# Patient Record
Sex: MALE | Race: WHITE | Employment: UNEMPLOYED | URBAN - METROPOLITAN AREA
[De-identification: names, ages, dates, MRNs, and addresses within clinical notes are randomized per-mention and may not be internally consistent; named-entity substitution may affect disease eponyms.]

---

## 2018-11-16 ENCOUNTER — OFFICE VISIT (OUTPATIENT)
Dept: FAMILY MEDICINE CLINIC | Age: 58
End: 2018-11-16
Payer: MEDICAID

## 2018-11-16 VITALS
DIASTOLIC BLOOD PRESSURE: 82 MMHG | TEMPERATURE: 98.1 F | SYSTOLIC BLOOD PRESSURE: 132 MMHG | HEART RATE: 89 BPM | WEIGHT: 222.5 LBS | HEIGHT: 70 IN | OXYGEN SATURATION: 95 % | BODY MASS INDEX: 31.85 KG/M2

## 2018-11-16 DIAGNOSIS — F33.1 MODERATE EPISODE OF RECURRENT MAJOR DEPRESSIVE DISORDER (HCC): ICD-10-CM

## 2018-11-16 DIAGNOSIS — F10.10 ALCOHOL ABUSE: ICD-10-CM

## 2018-11-16 DIAGNOSIS — F10.982 ALCOHOL-INDUCED INSOMNIA (HCC): Primary | ICD-10-CM

## 2018-11-16 DIAGNOSIS — M79.2 PERIPHERAL NEURALGIA: ICD-10-CM

## 2018-11-16 LAB
BASOPHILS ABSOLUTE: 0.1 K/UL (ref 0–0.2)
BASOPHILS RELATIVE PERCENT: 1.3 %
EOSINOPHILS ABSOLUTE: 0.1 K/UL (ref 0–0.6)
EOSINOPHILS RELATIVE PERCENT: 1.6 %
HCT VFR BLD CALC: 41.8 % (ref 40.5–52.5)
HEMOGLOBIN: 14 G/DL (ref 13.5–17.5)
LYMPHOCYTES ABSOLUTE: 1.7 K/UL (ref 1–5.1)
LYMPHOCYTES RELATIVE PERCENT: 28.9 %
MCH RBC QN AUTO: 33.8 PG (ref 26–34)
MCHC RBC AUTO-ENTMCNC: 33.4 G/DL (ref 31–36)
MCV RBC AUTO: 101.3 FL (ref 80–100)
MONOCYTES ABSOLUTE: 0.7 K/UL (ref 0–1.3)
MONOCYTES RELATIVE PERCENT: 11 %
NEUTROPHILS ABSOLUTE: 3.4 K/UL (ref 1.7–7.7)
NEUTROPHILS RELATIVE PERCENT: 57.2 %
PDW BLD-RTO: 15.1 % (ref 12.4–15.4)
PLATELET # BLD: 193 K/UL (ref 135–450)
PMV BLD AUTO: 9.6 FL (ref 5–10.5)
RBC # BLD: 4.13 M/UL (ref 4.2–5.9)
WBC # BLD: 5.9 K/UL (ref 4–11)

## 2018-11-16 PROCEDURE — 36415 COLL VENOUS BLD VENIPUNCTURE: CPT | Performed by: NURSE PRACTITIONER

## 2018-11-16 PROCEDURE — G8484 FLU IMMUNIZE NO ADMIN: HCPCS | Performed by: NURSE PRACTITIONER

## 2018-11-16 PROCEDURE — 3017F COLORECTAL CA SCREEN DOC REV: CPT | Performed by: NURSE PRACTITIONER

## 2018-11-16 PROCEDURE — G8417 CALC BMI ABV UP PARAM F/U: HCPCS | Performed by: NURSE PRACTITIONER

## 2018-11-16 PROCEDURE — 99204 OFFICE O/P NEW MOD 45 MIN: CPT | Performed by: NURSE PRACTITIONER

## 2018-11-16 PROCEDURE — 1036F TOBACCO NON-USER: CPT | Performed by: NURSE PRACTITIONER

## 2018-11-16 PROCEDURE — G8427 DOCREV CUR MEDS BY ELIG CLIN: HCPCS | Performed by: NURSE PRACTITIONER

## 2018-11-16 RX ORDER — NALTREXONE HYDROCHLORIDE 50 MG/1
50 TABLET, FILM COATED ORAL DAILY
Qty: 30 TABLET | Refills: 0 | Status: SHIPPED | OUTPATIENT
Start: 2018-11-16 | End: 2018-12-12 | Stop reason: SDUPTHER

## 2018-11-16 RX ORDER — MIRTAZAPINE 15 MG/1
TABLET, FILM COATED ORAL
Qty: 49 TABLET | Refills: 0 | Status: SHIPPED | OUTPATIENT
Start: 2018-11-16 | End: 2018-12-12

## 2018-11-16 ASSESSMENT — PATIENT HEALTH QUESTIONNAIRE - PHQ9
SUM OF ALL RESPONSES TO PHQ QUESTIONS 1-9: 17
4. FEELING TIRED OR HAVING LITTLE ENERGY: 3
8. MOVING OR SPEAKING SO SLOWLY THAT OTHER PEOPLE COULD HAVE NOTICED. OR THE OPPOSITE, BEING SO FIGETY OR RESTLESS THAT YOU HAVE BEEN MOVING AROUND A LOT MORE THAN USUAL: 2
10. IF YOU CHECKED OFF ANY PROBLEMS, HOW DIFFICULT HAVE THESE PROBLEMS MADE IT FOR YOU TO DO YOUR WORK, TAKE CARE OF THINGS AT HOME, OR GET ALONG WITH OTHER PEOPLE: 1
6. FEELING BAD ABOUT YOURSELF - OR THAT YOU ARE A FAILURE OR HAVE LET YOURSELF OR YOUR FAMILY DOWN: 2
9. THOUGHTS THAT YOU WOULD BE BETTER OFF DEAD, OR OF HURTING YOURSELF: 1
2. FEELING DOWN, DEPRESSED OR HOPELESS: 2
SUM OF ALL RESPONSES TO PHQ QUESTIONS 1-9: 17
5. POOR APPETITE OR OVEREATING: 1
SUM OF ALL RESPONSES TO PHQ9 QUESTIONS 1 & 2: 4
1. LITTLE INTEREST OR PLEASURE IN DOING THINGS: 2
7. TROUBLE CONCENTRATING ON THINGS, SUCH AS READING THE NEWSPAPER OR WATCHING TELEVISION: 1
3. TROUBLE FALLING OR STAYING ASLEEP: 3

## 2018-11-16 NOTE — PROGRESS NOTES
were both diabetic as well as a daughter. No current outpatient prescriptions on file. No current facility-administered medications for this visit. Patient's past medical history, surgical history, family history, medications,  and allergies  were all reviewed and updated as appropriate today. Review of Systems  See HPI    Physical Exam   Constitutional: He is oriented to person, place, and time. He appears well-developed. Non-toxic appearance. Griffin facial complexion. HENT:   Head: Normocephalic and atraumatic. Mouth/Throat: Oropharynx is clear and moist.   Eyes: Pupils are equal, round, and reactive to light. EOM are normal. No scleral icterus. Neck: Normal range of motion. Neck supple. Cardiovascular: Normal rate, regular rhythm, normal heart sounds and intact distal pulses. No murmur heard. Pulmonary/Chest: Effort normal and breath sounds normal.   Abdominal: Soft. Bowel sounds are normal. He exhibits no abdominal bruit and no ascites. There is no tenderness. Multiple surgical scars   Musculoskeletal: He exhibits no edema. Lymphadenopathy:     He has no cervical adenopathy. Neurological: He is alert and oriented to person, place, and time. Skin: Skin is warm and dry. Capillary refill takes 2 to 3 seconds. Acanthosis nigricans noted bilateral ankles. Psychiatric: His behavior is normal. Thought content normal. He exhibits a depressed mood (tearful). Contracts for safety   Vitals reviewed. Vitals:    11/16/18 1501   BP: 132/82   Pulse: 89   Temp: 98.1 °F (36.7 °C)   TempSrc: Oral   SpO2: 95%   Weight: 222 lb 8 oz (100.9 kg)   Height: 5' 10.25\" (1.784 m)       Assessment:  Encounter Diagnoses   Name Primary?  Alcohol-induced insomnia (HCC) Yes    Moderate episode of recurrent major depressive disorder (HCC)     Alcohol abuse     Peripheral neuralgia        Plan:  1.  Alcohol-induced insomnia ([facility] [address]  New problem  I discussed patient's history and physical

## 2018-11-16 NOTE — PATIENT INSTRUCTIONS
taking it too soon, your symptoms may come back or get worse. You may start to feel better within 1 to 3 weeks of taking antidepressant medicine. But it can take as many as 6 to 8 weeks to see more improvement. Talk to your doctor if you have problems with your medicine or if you do not notice any improvement after 3 weeks. Antidepressants can make you feel tired, dizzy, or nervous. Some people have dry mouth, constipation, headaches, sexual problems, an upset stomach, or diarrhea. Many of these side effects are mild and go away on their own after you take the medicine for a few weeks. Some may last longer. Talk to your doctor if side effects bother you too much. You might be able to try a different medicine. If you are pregnant or breastfeeding, talk to your doctor about what medicines you can take. Learn about counseling  In many cases, counseling can work as well as medicines to treat mild to moderate depression. Counseling is done by licensed mental health providers, such as psychologists, social workers, and some types of nurses. It can be done in one-on-one sessions or in a group setting. Many people find group sessions helpful. Cognitive-behavioral therapy is a type of counseling. In this treatment therapy, you learn how to see and change unhelpful thinking styles that may be adding to your depression. Counseling and medicines often work well when used together. To manage depression  · Be physically active. Getting 30 minutes of exercise each day is good for your body and your mind. Begin slowly if it is hard for you to get started. If you already exercise, keep it up. · Plan something pleasant for yourself every day. Include activities that you have enjoyed in the past.  · Get enough sleep. Talk to your doctor if you have problems sleeping. · Eat a balanced diet. If you do not feel hungry, eat small snacks rather than large meals.   · Do not drink alcohol, use illegal drugs, or take medicines that your Treatment: Care Instructions. \"     If you do not have an account, please click on the \"Sign Up Now\" link. Current as of: December 7, 2017  Content Version: 11.8  © 2006-2018 [employer]wise, [employer]. Care instructions adapted under license by [facility] ([facility]). If you have questions about a medical condition or this instruction, always ask your healthcare professional. [address] any warranty or liability for your use of this information. Patient Education        Learning About Alcohol Misuse  What is alcohol misuse? Alcohol misuse means drinking so much that it causes problems for you or others. Early problems with alcohol can start at home. You may argue with loved ones about how much you're drinking. Your job may be affected because of drinking. You may drink when it's dangerous or illegal, such as when you drive. Drinking too much for a long time can lead to health conditions like high blood pressure and liver problems. What are the symptoms? Symptoms of alcohol misuse may include:  · Drinking much more than you planned. · Drinking even though it's causing problems for you or others. · Putting yourself in situations where you might get hurt. · Wanting to cut down or stop drinking, but not being able to. · Feeling guilty about how much you're drinking. How is alcohol misuse treated? Getting help for problems with alcohol is up to you. But you don't have to do it alone. There are many people and kinds of treatments to help with alcohol problems. Talking to your doctor is the first step. When you get a doctor's help, treatment for alcohol problems can be safer and quicker. Treatment options can include:  · Treatment programs. Examples are group therapy, one or more types of counseling, and alcohol education. · Medicines. A doctor or counselor can help you know what kinds of medicines might help with cravings. · Free social support groups.  These groups include AA can also help you sleep better and improve your mood. But medicines are only one part of successful treatment. Most people do best with more than one kind of treatment. Your doctor may recommend that you try cognitive-behavioral therapy and stress management. Or, if needed, you may decide to try to quit smoking, lower your blood pressure, or better control blood sugar. These kinds of healthy changes can also make a difference. If you feel that your treatment is not working, talk to your doctor. And be sure to tell your doctor if you think you might be depressed or anxious. These are common problems that can also be treated. Follow-up care is a key part of your treatment and safety. Be sure to make and go to all appointments, and call your doctor if you are having problems. It's also a good idea to know your test results and keep a list of the medicines you take. How can you care for yourself at home? · Be safe with medicines. Read and follow all instructions on the label. ? If the doctor gave you a prescription medicine for pain, take it as prescribed. ? If you are not taking a prescription pain medicine, ask your doctor if you can take an over-the-counter medicine. · Save hard tasks for days when you have less pain. Follow a hard task with an easy task. And remember to take breaks. · Relax, and reduce stress. You may want to try deep breathing or meditation. These can help. · Keep moving. Gentle, daily exercise can help reduce pain. Your doctor or physical therapist can tell you what type of exercise is best for you. This may include walking, swimming, and stationary biking. It may also include stretches and range-of-motion exercises. · Try heat, cold packs, and massage. · Get enough sleep. Constant pain can make you more tired. If the pain makes it hard to sleep, talk with your doctor. · Think positively. Your thoughts can affect your pain. Do fun things to distract yourself from the pain.  See a movie, read a book, listen to music, or spend time with a friend. · Keep a pain diary. Try to write down how strong your pain is and what it feels like. Also try to notice and write down how your moods, thoughts, sleep, activities, and medicine affect your pain. These notes can help you and your doctor find the best ways to treat your pain. Reducing constipation caused by pain medicine  Pain medicines often cause constipation. To reduce constipation:  · Include fruits, vegetables, beans, and whole grains in your diet each day. These foods are high in fiber. · Drink plenty of fluids, enough so that your urine is light yellow or clear like water. If you have kidney, heart, or liver disease and have to limit fluids, talk with your doctor before you increase the amount of fluids you drink. · Get some exercise every day. Build up slowly to 30 to 60 minutes a day on 5 or more days of the week. · Take a fiber supplement, such as Citrucel or Metamucil, every day if needed. Read and follow all instructions on the label. · Schedule time each day for a bowel movement. Having a daily routine may help. Take your time and do not strain when having a bowel movement. · Ask your doctor about a laxative. The goal is to have one easy bowel movement every 1 to 2 days. Do not let constipation go untreated for more than 3 days. When should you call for help? Call your doctor now or seek immediate medical care if:    · You feel sad, anxious, or hopeless for more than a few days. This could mean you are depressed. Depression is common in people who have a lot of pain. But it can be treated.     · You have trouble with bowel movements, such as:  ? No bowel movement in 3 days. ? Blood in the anal area, in your stool, or on the toilet paper. ? Diarrhea for more than 24 hours.    Watch closely for changes in your health, and be sure to contact your doctor if:    · Your pain is getting worse.     · You can't sleep because of pain.

## 2018-11-17 LAB
A/G RATIO: 1.7 (ref 1.1–2.2)
ALBUMIN SERPL-MCNC: 4.8 G/DL (ref 3.4–5)
ALP BLD-CCNC: 79 U/L (ref 40–129)
ALT SERPL-CCNC: 120 U/L (ref 10–40)
ANION GAP SERPL CALCULATED.3IONS-SCNC: 20 MMOL/L (ref 3–16)
AST SERPL-CCNC: 86 U/L (ref 15–37)
BILIRUB SERPL-MCNC: <0.2 MG/DL (ref 0–1)
BUN BLDV-MCNC: 12 MG/DL (ref 7–20)
CALCIUM SERPL-MCNC: 9.4 MG/DL (ref 8.3–10.6)
CHLORIDE BLD-SCNC: 104 MMOL/L (ref 99–110)
CO2: 21 MMOL/L (ref 21–32)
CREAT SERPL-MCNC: 0.8 MG/DL (ref 0.9–1.3)
ESTIMATED AVERAGE GLUCOSE: 108.3 MG/DL
FOLATE: 9.34 NG/ML (ref 4.78–24.2)
GFR AFRICAN AMERICAN: >60
GFR NON-AFRICAN AMERICAN: >60
GLOBULIN: 2.8 G/DL
GLUCOSE BLD-MCNC: 113 MG/DL (ref 70–99)
HAV IGM SER IA-ACNC: NORMAL
HBA1C MFR BLD: 5.4 %
HEPATITIS B CORE IGM ANTIBODY: NORMAL
HEPATITIS B SURFACE ANTIGEN INTERPRETATION: NORMAL
HEPATITIS C ANTIBODY INTERPRETATION: NORMAL
POTASSIUM SERPL-SCNC: 4.1 MMOL/L (ref 3.5–5.1)
SODIUM BLD-SCNC: 145 MMOL/L (ref 136–145)
TOTAL PROTEIN: 7.6 G/DL (ref 6.4–8.2)
VITAMIN B-12: 350 PG/ML (ref 211–911)

## 2018-11-19 DIAGNOSIS — K62.5 RECTAL BLEEDING: Primary | ICD-10-CM

## 2018-11-19 DIAGNOSIS — K92.0 HEMATEMESIS WITHOUT NAUSEA: ICD-10-CM

## 2018-12-05 ENCOUNTER — TELEPHONE (OUTPATIENT)
Dept: FAMILY MEDICINE CLINIC | Age: 58
End: 2018-12-05

## 2018-12-06 ENCOUNTER — TELEPHONE (OUTPATIENT)
Dept: FAMILY MEDICINE CLINIC | Age: 58
End: 2018-12-06

## 2018-12-12 ENCOUNTER — OFFICE VISIT (OUTPATIENT)
Dept: FAMILY MEDICINE CLINIC | Age: 58
End: 2018-12-12
Payer: MEDICAID

## 2018-12-12 VITALS
HEART RATE: 87 BPM | WEIGHT: 228.25 LBS | SYSTOLIC BLOOD PRESSURE: 172 MMHG | BODY MASS INDEX: 32.52 KG/M2 | OXYGEN SATURATION: 97 % | DIASTOLIC BLOOD PRESSURE: 112 MMHG | TEMPERATURE: 98.7 F

## 2018-12-12 DIAGNOSIS — F10.10 ALCOHOL ABUSE: ICD-10-CM

## 2018-12-12 DIAGNOSIS — F33.1 MODERATE EPISODE OF RECURRENT MAJOR DEPRESSIVE DISORDER (HCC): ICD-10-CM

## 2018-12-12 DIAGNOSIS — I10 ESSENTIAL HYPERTENSION: ICD-10-CM

## 2018-12-12 DIAGNOSIS — M79.2 PERIPHERAL NEURALGIA: ICD-10-CM

## 2018-12-12 DIAGNOSIS — F10.982 ALCOHOL-INDUCED INSOMNIA (HCC): Primary | ICD-10-CM

## 2018-12-12 PROCEDURE — G8417 CALC BMI ABV UP PARAM F/U: HCPCS | Performed by: NURSE PRACTITIONER

## 2018-12-12 PROCEDURE — G8427 DOCREV CUR MEDS BY ELIG CLIN: HCPCS | Performed by: NURSE PRACTITIONER

## 2018-12-12 PROCEDURE — 1036F TOBACCO NON-USER: CPT | Performed by: NURSE PRACTITIONER

## 2018-12-12 PROCEDURE — 3017F COLORECTAL CA SCREEN DOC REV: CPT | Performed by: NURSE PRACTITIONER

## 2018-12-12 PROCEDURE — 99214 OFFICE O/P EST MOD 30 MIN: CPT | Performed by: NURSE PRACTITIONER

## 2018-12-12 PROCEDURE — G8484 FLU IMMUNIZE NO ADMIN: HCPCS | Performed by: NURSE PRACTITIONER

## 2018-12-12 RX ORDER — TRAZODONE HYDROCHLORIDE 50 MG/1
50 TABLET ORAL NIGHTLY
Qty: 30 TABLET | Refills: 0 | Status: SHIPPED | OUTPATIENT
Start: 2018-12-12 | End: 2018-12-31 | Stop reason: SDUPTHER

## 2018-12-12 RX ORDER — UREA 10 %
800 LOTION (ML) TOPICAL DAILY
Qty: 30 TABLET | Refills: 3 | Status: SHIPPED | OUTPATIENT
Start: 2018-12-12 | End: 2020-08-05

## 2018-12-12 RX ORDER — LISINOPRIL 10 MG/1
10 TABLET ORAL DAILY
Qty: 90 TABLET | Refills: 1 | Status: SHIPPED | OUTPATIENT
Start: 2018-12-12 | End: 2019-01-02 | Stop reason: DRUGHIGH

## 2018-12-12 RX ORDER — NALTREXONE HYDROCHLORIDE 50 MG/1
50 TABLET, FILM COATED ORAL DAILY
Qty: 30 TABLET | Refills: 0 | Status: SHIPPED | OUTPATIENT
Start: 2018-12-12 | End: 2019-01-11

## 2018-12-12 RX ORDER — PANTOPRAZOLE SODIUM 40 MG/1
40 TABLET, DELAYED RELEASE ORAL
Qty: 90 TABLET | Refills: 1 | Status: ON HOLD | OUTPATIENT
Start: 2018-12-12 | End: 2019-01-04

## 2018-12-12 NOTE — PROGRESS NOTES
normal.   Musculoskeletal: He exhibits no edema. Neurological: He is alert and oriented to person, place, and time. Skin: Skin is warm and dry. Psychiatric: He has a normal mood and affect. His behavior is normal. Thought content normal.   Nursing note and vitals reviewed. Vitals:    12/12/18 1344 12/12/18 1348   BP: (!) 174/114 (!) 172/112   Pulse: 87    Temp: 98.7 °F (37.1 °C)    TempSrc: Oral    SpO2: 97%    Weight: 228 lb 4 oz (103.5 kg)        Assessment:  Encounter Diagnoses   Name Primary?  Alcohol-induced insomnia (HCC) Yes    Moderate episode of recurrent major depressive disorder (HCC)     Essential hypertension     Peripheral neuralgia     Alcohol abuse        Plan:  1. Alcohol-induced insomnia (HCC)  Discontinue Remeron and start trazodone. Take medication for 3 days then may increase the dose to 2 tablets at bedtime. He is to call the office if he has to increased dose. Discussed potential medication side effects. Patient is okay side effects  He is not to take more medication than directed without calling the office. Follow-up in 3 months or sooner as needed  - traZODone (DESYREL) 50 MG tablet; Take 1 tablet by mouth nightly  Dispense: 30 tablet; Refill: 0    2. Moderate episode of recurrent [facility]  See above  - traZODone (DESYREL) 50 MG tablet; Take 1 tablet by mouth nightly  Dispense: 30 tablet; Refill: 0    3. Essential hypertension  Uncontrolled  Continue to check blood pressure daily. Call the office his blood pressure is not improved in 2 weeks  Follow-up in 3 months or sooner as needed  - lisinopril (PRINIVIL;ZESTRIL) 10 MG tablet; Take 1 tablet by mouth daily  Dispense: 90 tablet; Refill: 1    4. Peripheral neuralgia  Trial folic acid. Symptoms do not improve with folic acid use consider low back imaging.  - folic acid (FOLVITE) 522 MCG tablet; Take 1 tablet by mouth daily  Dispense: 30 tablet; Refill: 3    5.  Alcohol abuse  Continue medication  Continue to decrease alcohol intake. - naltrexone (DEPADE) 50 MG tablet; Take 1 tablet by mouth daily  Dispense: 30 tablet; Refill: 0      HERRERA Fitzgerald-CNP    The note was completedusing Dragon voice recognition transcription. Every effort was made to ensure accuracy; however, inadvertent  transcription errors may be present despite my best efforts to edit errors.

## 2018-12-17 ENCOUNTER — INITIAL CONSULT (OUTPATIENT)
Dept: GASTROENTEROLOGY | Age: 58
End: 2018-12-17
Payer: MEDICAID

## 2018-12-17 VITALS
SYSTOLIC BLOOD PRESSURE: 164 MMHG | WEIGHT: 231.4 LBS | BODY MASS INDEX: 33.13 KG/M2 | DIASTOLIC BLOOD PRESSURE: 110 MMHG | HEIGHT: 70 IN

## 2018-12-17 DIAGNOSIS — K62.5 RECTAL BLEEDING: ICD-10-CM

## 2018-12-17 DIAGNOSIS — K76.0 FATTY LIVER: ICD-10-CM

## 2018-12-17 DIAGNOSIS — F10.982 ALCOHOL-INDUCED INSOMNIA (HCC): ICD-10-CM

## 2018-12-17 DIAGNOSIS — F33.1 MODERATE EPISODE OF RECURRENT MAJOR DEPRESSIVE DISORDER (HCC): ICD-10-CM

## 2018-12-17 DIAGNOSIS — R10.9 ABDOMINAL PAIN, UNSPECIFIED ABDOMINAL LOCATION: Primary | ICD-10-CM

## 2018-12-17 DIAGNOSIS — R13.19 ESOPHAGEAL DYSPHAGIA: ICD-10-CM

## 2018-12-17 PROCEDURE — 1036F TOBACCO NON-USER: CPT | Performed by: INTERNAL MEDICINE

## 2018-12-17 PROCEDURE — 3017F COLORECTAL CA SCREEN DOC REV: CPT | Performed by: INTERNAL MEDICINE

## 2018-12-17 PROCEDURE — G8417 CALC BMI ABV UP PARAM F/U: HCPCS | Performed by: INTERNAL MEDICINE

## 2018-12-17 PROCEDURE — 99204 OFFICE O/P NEW MOD 45 MIN: CPT | Performed by: INTERNAL MEDICINE

## 2018-12-17 PROCEDURE — G8427 DOCREV CUR MEDS BY ELIG CLIN: HCPCS | Performed by: INTERNAL MEDICINE

## 2018-12-17 PROCEDURE — G8484 FLU IMMUNIZE NO ADMIN: HCPCS | Performed by: INTERNAL MEDICINE

## 2018-12-17 RX ORDER — POLYETHYLENE GLYCOL 3350 17 G/17G
255 POWDER ORAL DAILY
Qty: 255 G | Refills: 0 | Status: SHIPPED | OUTPATIENT
Start: 2018-12-17 | End: 2018-12-30

## 2018-12-17 NOTE — PATIENT INSTRUCTIONS
into the anus and advanced through the entire colon. The procedure generally takes between 20 minutes and one hour. Other tests that are sometimes used to screen for colon cancer, like virtual colonoscopy (also called CT colonography), are discussed separately. More detailed information about colonoscopy is available by subscription. REASONS FOR COLONOSCOPY - The most common reasons for colonoscopy are to evaluate the following:        As a screening exam for colon cancer      Rectal bleeding      A change in bowel habits, like persistent diarrhea      Iron deficiency anemia (a decrease in blood count due to loss of iron)      A family history of colon cancer      As a follow-up test in people with colon polyps or colon cancer      Chronic, unexplained abdominal or rectal pain      An abnormal X-ray exam, like a barium enema or CT scan    COLONOSCOPY PREPARATION - Before colonoscopy, your colon must be completely cleaned out so that the doctor can see any abnormal areas. To clean the colon, you will take a strong laxative and empty your bowels the night before your test.    Your doctor's office will provide specific instructions about how you should prepare for colonoscopy. Be sure to read these instructions ahead of time so you will be prepared for the prep. If you have questions, call the doctor's office in advance. You will need to avoid solid food for at least one day before the test. You should also drink plenty of fluids on the day before the test. You can drink clear liquids up to several hours before your procedure, including:        Water      Clear broth (beef, chicken, or vegetable)      Coffee or tea (without milk)      Ices      Gelatin (avoid red gelatin)    The day or night before the colonoscopy, you will take a laxative in two parts:        A pill that you take by mouth      A powder that is mixed with water    The most common laxative treatment is called Go-Lytely® or Half-Lytely®.  You lining. You might feel bloating or gas cramps as the air opens the colon. Try not to be embarrassed about passing this gas, and let your doctor know if you are uncomfortable. During the procedure, the doctor might take a biopsy (small pieces of tissue) or remove polyps. Polyps are growths of tissue that can range in size from the tip of a pen to several inches. Most polyps are benign (not cancerous). However, some polyps can become cancerous if allowed to grow for a long time. Having a polyp removed does not hurt. RECOVERY FROM COLONOSCOPY - After the colonoscopy, you will be observed in a recovery area until the effects of the sedative medication wear off. The most common complaint after colonoscopy is a feeling of bloating and gas cramps. You may also feel groggy from the sedation medications. You should not return to work or drive that day. Most people are able to eat normally after the test. Ask your doctor when it is safe to restart aspirin and other blood-thinning medications. COLONOSCOPY COMPLICATIONS - Colonoscopy is a safe procedure, and complications are rare but can occur:        Bleeding can occur from biopsies or the removal of polyps, but it is usually minimal and can be controlled. The colonoscope can cause a tear or hole in the colon. This is a serious problem, but it does not happen commonly. It is possible to have side effects from the sedative medicines. Although colonoscopy is the best test to examine the colon, it is possible for even the most skilled doctors to miss or overlook an abnormal area in the colon.     You should call your doctor immediately if you have any of the following:        Severe abdominal pain (not just gas cramps)      A firm, bloated abdomen      Vomiting      Fever      Rectal bleeding (greater than a few tablespoons)    AFTER COLONOSCOPY - Although many people worry about being uncomfortable during a colonoscopy, most people tolerate it very well

## 2018-12-18 RX ORDER — MIRTAZAPINE 15 MG/1
TABLET, FILM COATED ORAL
Qty: 49 TABLET | Refills: 0 | OUTPATIENT
Start: 2018-12-18

## 2018-12-30 ENCOUNTER — HOSPITAL ENCOUNTER (EMERGENCY)
Age: 58
Discharge: HOME | End: 2018-12-30
Attending: EMERGENCY MEDICINE
Payer: MEDICAID

## 2018-12-30 VITALS
HEIGHT: 71 IN | RESPIRATION RATE: 18 BRPM | BODY MASS INDEX: 31.78 KG/M2 | WEIGHT: 227 LBS | OXYGEN SATURATION: 97 % | HEART RATE: 79 BPM | DIASTOLIC BLOOD PRESSURE: 118 MMHG | SYSTOLIC BLOOD PRESSURE: 154 MMHG | TEMPERATURE: 97.3 F

## 2018-12-30 DIAGNOSIS — I10 ESSENTIAL HYPERTENSION: ICD-10-CM

## 2018-12-30 DIAGNOSIS — M10.9 ACUTE GOUT OF LEFT FOOT, UNSPECIFIED CAUSE: Primary | ICD-10-CM

## 2018-12-30 DIAGNOSIS — I73.9 PERIPHERAL VASCULAR INSUFFICIENCY (HCC): ICD-10-CM

## 2018-12-30 LAB
A/G RATIO: 1.2 (ref 1.1–2.2)
ALBUMIN SERPL-MCNC: 4.4 G/DL (ref 3.4–5)
ALP BLD-CCNC: 90 U/L (ref 40–129)
ALT SERPL-CCNC: 78 U/L (ref 10–40)
AMMONIA: <10 UMOL/L (ref 16–60)
ANION GAP SERPL CALCULATED.3IONS-SCNC: 10 MMOL/L (ref 3–16)
AST SERPL-CCNC: 89 U/L (ref 15–37)
BASOPHILS ABSOLUTE: 0.1 K/UL (ref 0–0.2)
BASOPHILS RELATIVE PERCENT: 0.9 %
BILIRUB SERPL-MCNC: 0.8 MG/DL (ref 0–1)
BUN BLDV-MCNC: 15 MG/DL (ref 7–20)
CALCIUM SERPL-MCNC: 10.1 MG/DL (ref 8.3–10.6)
CHLORIDE BLD-SCNC: 96 MMOL/L (ref 99–110)
CO2: 27 MMOL/L (ref 21–32)
CREAT SERPL-MCNC: 0.7 MG/DL (ref 0.9–1.3)
EOSINOPHILS ABSOLUTE: 0.1 K/UL (ref 0–0.6)
EOSINOPHILS RELATIVE PERCENT: 1.6 %
GFR AFRICAN AMERICAN: >60
GFR NON-AFRICAN AMERICAN: >60
GLOBULIN: 3.6 G/DL
GLUCOSE BLD-MCNC: 110 MG/DL (ref 70–99)
HCT VFR BLD CALC: 45.9 % (ref 40.5–52.5)
HEMOGLOBIN: 15.6 G/DL (ref 13.5–17.5)
LYMPHOCYTES ABSOLUTE: 1 K/UL (ref 1–5.1)
LYMPHOCYTES RELATIVE PERCENT: 13.8 %
MCH RBC QN AUTO: 33.6 PG (ref 26–34)
MCHC RBC AUTO-ENTMCNC: 34.1 G/DL (ref 31–36)
MCV RBC AUTO: 98.7 FL (ref 80–100)
MONOCYTES ABSOLUTE: 0.8 K/UL (ref 0–1.3)
MONOCYTES RELATIVE PERCENT: 10.4 %
NEUTROPHILS ABSOLUTE: 5.4 K/UL (ref 1.7–7.7)
NEUTROPHILS RELATIVE PERCENT: 73.3 %
PDW BLD-RTO: 14.3 % (ref 12.4–15.4)
PLATELET # BLD: 189 K/UL (ref 135–450)
PMV BLD AUTO: 9.3 FL (ref 5–10.5)
POTASSIUM REFLEX MAGNESIUM: 4.1 MMOL/L (ref 3.5–5.1)
RBC # BLD: 4.65 M/UL (ref 4.2–5.9)
SODIUM BLD-SCNC: 133 MMOL/L (ref 136–145)
TOTAL PROTEIN: 8 G/DL (ref 6.4–8.2)
URIC ACID, SERUM: 6.6 MG/DL (ref 3.5–7.2)
WBC # BLD: 7.3 K/UL (ref 4–11)

## 2018-12-30 PROCEDURE — 99284 EMERGENCY DEPT VISIT MOD MDM: CPT

## 2018-12-30 PROCEDURE — 93005 ELECTROCARDIOGRAM TRACING: CPT | Performed by: PHYSICIAN ASSISTANT

## 2018-12-30 PROCEDURE — 6370000000 HC RX 637 (ALT 250 FOR IP): Performed by: PHYSICIAN ASSISTANT

## 2018-12-30 PROCEDURE — 80053 COMPREHEN METABOLIC PANEL: CPT

## 2018-12-30 PROCEDURE — 85025 COMPLETE CBC W/AUTO DIFF WBC: CPT

## 2018-12-30 PROCEDURE — 96374 THER/PROPH/DIAG INJ IV PUSH: CPT

## 2018-12-30 PROCEDURE — 6360000002 HC RX W HCPCS: Performed by: EMERGENCY MEDICINE

## 2018-12-30 PROCEDURE — 84550 ASSAY OF BLOOD/URIC ACID: CPT

## 2018-12-30 PROCEDURE — 82140 ASSAY OF AMMONIA: CPT

## 2018-12-30 RX ORDER — LISINOPRIL 10 MG/1
10 TABLET ORAL ONCE
Status: COMPLETED | OUTPATIENT
Start: 2018-12-30 | End: 2018-12-30

## 2018-12-30 RX ORDER — INDOMETHACIN 50 MG/1
50 CAPSULE ORAL 2 TIMES DAILY WITH MEALS
Qty: 20 CAPSULE | Refills: 0 | Status: SHIPPED | OUTPATIENT
Start: 2018-12-30 | End: 2019-01-02 | Stop reason: ALTCHOICE

## 2018-12-30 RX ORDER — 0.9 % SODIUM CHLORIDE 0.9 %
1000 INTRAVENOUS SOLUTION INTRAVENOUS ONCE
Status: DISCONTINUED | OUTPATIENT
Start: 2018-12-30 | End: 2018-12-30

## 2018-12-30 RX ORDER — METHYLPREDNISOLONE SODIUM SUCCINATE 125 MG/2ML
125 INJECTION, POWDER, LYOPHILIZED, FOR SOLUTION INTRAMUSCULAR; INTRAVENOUS ONCE
Status: COMPLETED | OUTPATIENT
Start: 2018-12-30 | End: 2018-12-30

## 2018-12-30 RX ORDER — INDOMETHACIN 25 MG/1
50 CAPSULE ORAL ONCE
Status: COMPLETED | OUTPATIENT
Start: 2018-12-30 | End: 2018-12-30

## 2018-12-30 RX ORDER — METHYLPREDNISOLONE SODIUM SUCCINATE 125 MG/2ML
125 INJECTION, POWDER, LYOPHILIZED, FOR SOLUTION INTRAMUSCULAR; INTRAVENOUS ONCE
Status: DISCONTINUED | OUTPATIENT
Start: 2018-12-30 | End: 2018-12-30

## 2018-12-30 RX ADMIN — LISINOPRIL 10 MG: 10 TABLET ORAL at 16:08

## 2018-12-30 RX ADMIN — INDOMETHACIN 50 MG: 25 CAPSULE ORAL at 15:42

## 2018-12-30 RX ADMIN — METHYLPREDNISOLONE SODIUM SUCCINATE 125 MG: 125 INJECTION, POWDER, FOR SOLUTION INTRAMUSCULAR; INTRAVENOUS at 15:42

## 2018-12-30 ASSESSMENT — PAIN SCALES - GENERAL
PAINLEVEL_OUTOF10: 8
PAINLEVEL_OUTOF10: 9

## 2018-12-30 ASSESSMENT — PAIN DESCRIPTION - PAIN TYPE
TYPE: ACUTE PAIN
TYPE: ACUTE PAIN

## 2018-12-30 ASSESSMENT — PAIN DESCRIPTION - LOCATION: LOCATION: FOOT

## 2018-12-31 DIAGNOSIS — F10.982 ALCOHOL-INDUCED INSOMNIA (HCC): ICD-10-CM

## 2018-12-31 DIAGNOSIS — F33.1 MODERATE EPISODE OF RECURRENT MAJOR DEPRESSIVE DISORDER (HCC): ICD-10-CM

## 2018-12-31 LAB
EKG ATRIAL RATE: 78 BPM
EKG DIAGNOSIS: NORMAL
EKG P AXIS: 70 DEGREES
EKG P-R INTERVAL: 158 MS
EKG Q-T INTERVAL: 366 MS
EKG QRS DURATION: 92 MS
EKG QTC CALCULATION (BAZETT): 417 MS
EKG R AXIS: 37 DEGREES
EKG T AXIS: 46 DEGREES
EKG VENTRICULAR RATE: 78 BPM

## 2018-12-31 PROCEDURE — 93010 ELECTROCARDIOGRAM REPORT: CPT | Performed by: INTERNAL MEDICINE

## 2018-12-31 RX ORDER — TRAZODONE HYDROCHLORIDE 50 MG/1
50 TABLET ORAL NIGHTLY
Qty: 30 TABLET | Refills: 3 | Status: SHIPPED | OUTPATIENT
Start: 2018-12-31 | End: 2019-01-02 | Stop reason: DRUGHIGH

## 2019-01-02 ENCOUNTER — OFFICE VISIT (OUTPATIENT)
Dept: FAMILY MEDICINE CLINIC | Age: 59
End: 2019-01-02
Payer: MEDICAID

## 2019-01-02 VITALS
DIASTOLIC BLOOD PRESSURE: 106 MMHG | OXYGEN SATURATION: 97 % | HEART RATE: 76 BPM | WEIGHT: 234.25 LBS | TEMPERATURE: 98.5 F | BODY MASS INDEX: 33.14 KG/M2 | SYSTOLIC BLOOD PRESSURE: 172 MMHG

## 2019-01-02 DIAGNOSIS — F33.1 MODERATE EPISODE OF RECURRENT MAJOR DEPRESSIVE DISORDER (HCC): ICD-10-CM

## 2019-01-02 DIAGNOSIS — M79.672 BILATERAL FOOT PAIN: Primary | ICD-10-CM

## 2019-01-02 DIAGNOSIS — I10 ESSENTIAL HYPERTENSION: ICD-10-CM

## 2019-01-02 DIAGNOSIS — M79.671 BILATERAL FOOT PAIN: Primary | ICD-10-CM

## 2019-01-02 DIAGNOSIS — F10.982 ALCOHOL-INDUCED INSOMNIA (HCC): ICD-10-CM

## 2019-01-02 PROCEDURE — G8417 CALC BMI ABV UP PARAM F/U: HCPCS | Performed by: NURSE PRACTITIONER

## 2019-01-02 PROCEDURE — 99214 OFFICE O/P EST MOD 30 MIN: CPT | Performed by: NURSE PRACTITIONER

## 2019-01-02 PROCEDURE — 1036F TOBACCO NON-USER: CPT | Performed by: NURSE PRACTITIONER

## 2019-01-02 PROCEDURE — 3017F COLORECTAL CA SCREEN DOC REV: CPT | Performed by: NURSE PRACTITIONER

## 2019-01-02 PROCEDURE — G8427 DOCREV CUR MEDS BY ELIG CLIN: HCPCS | Performed by: NURSE PRACTITIONER

## 2019-01-02 PROCEDURE — G8484 FLU IMMUNIZE NO ADMIN: HCPCS | Performed by: NURSE PRACTITIONER

## 2019-01-02 RX ORDER — LISINOPRIL 30 MG/1
30 TABLET ORAL DAILY
Qty: 30 TABLET | Refills: 0 | Status: SHIPPED | OUTPATIENT
Start: 2019-01-02 | End: 2020-08-05

## 2019-01-02 RX ORDER — POLYETHYLENE GLYCOL 3350 17 G/17G
POWDER, FOR SOLUTION ORAL
Status: ON HOLD | COMMUNITY
Start: 2018-12-17 | End: 2019-01-04 | Stop reason: HOSPADM

## 2019-01-02 RX ORDER — TRAZODONE HYDROCHLORIDE 100 MG/1
100 TABLET ORAL NIGHTLY
Qty: 30 TABLET | Refills: 3 | Status: SHIPPED | OUTPATIENT
Start: 2019-01-02 | End: 2020-08-05

## 2019-01-02 ASSESSMENT — ENCOUNTER SYMPTOMS
SHORTNESS OF BREATH: 0
COLOR CHANGE: 0

## 2019-01-03 ENCOUNTER — ANESTHESIA EVENT (OUTPATIENT)
Dept: ENDOSCOPY | Age: 59
End: 2019-01-03
Payer: MEDICAID

## 2019-01-03 ENCOUNTER — TELEPHONE (OUTPATIENT)
Dept: GASTROENTEROLOGY | Age: 59
End: 2019-01-03

## 2019-01-04 ENCOUNTER — TELEPHONE (OUTPATIENT)
Dept: GASTROENTEROLOGY | Age: 59
End: 2019-01-04

## 2019-01-04 ENCOUNTER — HOSPITAL ENCOUNTER (OUTPATIENT)
Age: 59
Setting detail: OUTPATIENT SURGERY
Discharge: HOME | End: 2019-01-04
Attending: INTERNAL MEDICINE | Admitting: INTERNAL MEDICINE
Payer: MEDICAID

## 2019-01-04 ENCOUNTER — TELEPHONE (OUTPATIENT)
Dept: FAMILY MEDICINE CLINIC | Age: 59
End: 2019-01-04

## 2019-01-04 ENCOUNTER — ANESTHESIA (OUTPATIENT)
Dept: ENDOSCOPY | Age: 59
End: 2019-01-04
Payer: MEDICAID

## 2019-01-04 VITALS
SYSTOLIC BLOOD PRESSURE: 151 MMHG | DIASTOLIC BLOOD PRESSURE: 110 MMHG | BODY MASS INDEX: 32.76 KG/M2 | OXYGEN SATURATION: 95 % | HEIGHT: 71 IN | HEART RATE: 68 BPM | TEMPERATURE: 98.3 F | WEIGHT: 234 LBS | RESPIRATION RATE: 16 BRPM

## 2019-01-04 VITALS — DIASTOLIC BLOOD PRESSURE: 86 MMHG | OXYGEN SATURATION: 96 % | SYSTOLIC BLOOD PRESSURE: 133 MMHG

## 2019-01-04 PROCEDURE — 2709999900 HC NON-CHARGEABLE SUPPLY: Performed by: INTERNAL MEDICINE

## 2019-01-04 PROCEDURE — 3609012400 HC EGD TRANSORAL BIOPSY SINGLE/MULTIPLE: Performed by: INTERNAL MEDICINE

## 2019-01-04 PROCEDURE — 7100000011 HC PHASE II RECOVERY - ADDTL 15 MIN: Performed by: INTERNAL MEDICINE

## 2019-01-04 PROCEDURE — 3700000001 HC ADD 15 MINUTES (ANESTHESIA): Performed by: INTERNAL MEDICINE

## 2019-01-04 PROCEDURE — 6360000002 HC RX W HCPCS: Performed by: NURSE ANESTHETIST, CERTIFIED REGISTERED

## 2019-01-04 PROCEDURE — 2580000003 HC RX 258: Performed by: NURSE ANESTHETIST, CERTIFIED REGISTERED

## 2019-01-04 PROCEDURE — 45380 COLONOSCOPY AND BIOPSY: CPT | Performed by: INTERNAL MEDICINE

## 2019-01-04 PROCEDURE — 88305 TISSUE EXAM BY PATHOLOGIST: CPT

## 2019-01-04 PROCEDURE — 2500000003 HC RX 250 WO HCPCS: Performed by: NURSE ANESTHETIST, CERTIFIED REGISTERED

## 2019-01-04 PROCEDURE — 7100000010 HC PHASE II RECOVERY - FIRST 15 MIN: Performed by: INTERNAL MEDICINE

## 2019-01-04 PROCEDURE — 3609027000 HC COLONOSCOPY: Performed by: INTERNAL MEDICINE

## 2019-01-04 PROCEDURE — 43239 EGD BIOPSY SINGLE/MULTIPLE: CPT | Performed by: INTERNAL MEDICINE

## 2019-01-04 PROCEDURE — 3700000000 HC ANESTHESIA ATTENDED CARE: Performed by: INTERNAL MEDICINE

## 2019-01-04 RX ORDER — SODIUM CHLORIDE, SODIUM LACTATE, POTASSIUM CHLORIDE, CALCIUM CHLORIDE 600; 310; 30; 20 MG/100ML; MG/100ML; MG/100ML; MG/100ML
INJECTION, SOLUTION INTRAVENOUS CONTINUOUS
Status: DISCONTINUED | OUTPATIENT
Start: 2019-01-04 | End: 2019-01-04 | Stop reason: HOSPADM

## 2019-01-04 RX ORDER — LIDOCAINE HYDROCHLORIDE 20 MG/ML
INJECTION, SOLUTION INFILTRATION; PERINEURAL PRN
Status: DISCONTINUED | OUTPATIENT
Start: 2019-01-04 | End: 2019-01-04 | Stop reason: SDUPTHER

## 2019-01-04 RX ORDER — PROPOFOL 10 MG/ML
INJECTION, EMULSION INTRAVENOUS PRN
Status: DISCONTINUED | OUTPATIENT
Start: 2019-01-04 | End: 2019-01-04 | Stop reason: SDUPTHER

## 2019-01-04 RX ORDER — PANTOPRAZOLE SODIUM 40 MG/1
40 TABLET, DELAYED RELEASE ORAL 2 TIMES DAILY
Qty: 60 TABLET | Refills: 2 | Status: SHIPPED | OUTPATIENT
Start: 2019-01-04 | End: 2020-08-05

## 2019-01-04 RX ORDER — SODIUM CHLORIDE, SODIUM LACTATE, POTASSIUM CHLORIDE, CALCIUM CHLORIDE 600; 310; 30; 20 MG/100ML; MG/100ML; MG/100ML; MG/100ML
INJECTION, SOLUTION INTRAVENOUS CONTINUOUS PRN
Status: DISCONTINUED | OUTPATIENT
Start: 2019-01-04 | End: 2019-01-04 | Stop reason: SDUPTHER

## 2019-01-04 RX ORDER — COLCHICINE 0.6 MG/1
TABLET ORAL
Qty: 3 TABLET | Refills: 0 | Status: SHIPPED | OUTPATIENT
Start: 2019-01-04 | End: 2019-01-16

## 2019-01-04 RX ADMIN — PROPOFOL 50 MG: 10 INJECTION, EMULSION INTRAVENOUS at 10:05

## 2019-01-04 RX ADMIN — PROPOFOL 100 MG: 10 INJECTION, EMULSION INTRAVENOUS at 10:03

## 2019-01-04 RX ADMIN — PROPOFOL 100 MG: 10 INJECTION, EMULSION INTRAVENOUS at 10:17

## 2019-01-04 RX ADMIN — PROPOFOL 50 MG: 10 INJECTION, EMULSION INTRAVENOUS at 10:09

## 2019-01-04 RX ADMIN — SODIUM CHLORIDE, POTASSIUM CHLORIDE, SODIUM LACTATE AND CALCIUM CHLORIDE: 600; 310; 30; 20 INJECTION, SOLUTION INTRAVENOUS at 09:46

## 2019-01-04 RX ADMIN — PROPOFOL 100 MG: 10 INJECTION, EMULSION INTRAVENOUS at 10:13

## 2019-01-04 RX ADMIN — LIDOCAINE HYDROCHLORIDE 40 MG: 20 INJECTION, SOLUTION INFILTRATION; PERINEURAL at 10:03

## 2019-01-04 RX ADMIN — PROPOFOL 100 MG: 10 INJECTION, EMULSION INTRAVENOUS at 10:04

## 2019-01-04 ASSESSMENT — PAIN - FUNCTIONAL ASSESSMENT: PAIN_FUNCTIONAL_ASSESSMENT: 0-10

## 2019-01-16 ENCOUNTER — TELEPHONE (OUTPATIENT)
Dept: FAMILY MEDICINE CLINIC | Age: 59
End: 2019-01-16

## 2019-01-16 RX ORDER — COLCHICINE 0.6 MG/1
0.6 TABLET ORAL DAILY
Qty: 30 TABLET | Refills: 3 | Status: SHIPPED | OUTPATIENT
Start: 2019-01-16 | End: 2020-08-05

## 2019-03-08 ENCOUNTER — TELEPHONE (OUTPATIENT)
Dept: GASTROENTEROLOGY | Age: 59
End: 2019-03-08

## 2019-03-28 ENCOUNTER — TELEPHONE (OUTPATIENT)
Dept: GASTROENTEROLOGY | Age: 59
End: 2019-03-28

## 2019-03-28 NOTE — TELEPHONE ENCOUNTER
Pts daughter called to [facility] scheduled for 4/9/19 w/ Dr Eliceo Ortez - family emergency - wcb to resched

## 2020-08-05 ENCOUNTER — OFFICE VISIT (OUTPATIENT)
Dept: FAMILY MEDICINE CLINIC | Age: 60
End: 2020-08-05
Payer: MEDICAID

## 2020-08-05 VITALS
SYSTOLIC BLOOD PRESSURE: 138 MMHG | BODY MASS INDEX: 34.22 KG/M2 | WEIGHT: 239 LBS | HEART RATE: 82 BPM | DIASTOLIC BLOOD PRESSURE: 86 MMHG | OXYGEN SATURATION: 96 % | HEIGHT: 70 IN | TEMPERATURE: 98.6 F

## 2020-08-05 LAB
A/G RATIO: 1.7 (ref 1.1–2.2)
ALBUMIN SERPL-MCNC: 4.4 G/DL (ref 3.4–5)
ALP BLD-CCNC: 85 U/L (ref 40–129)
ALT SERPL-CCNC: 24 U/L (ref 10–40)
ANION GAP SERPL CALCULATED.3IONS-SCNC: 12 MMOL/L (ref 3–16)
AST SERPL-CCNC: 23 U/L (ref 15–37)
BASOPHILS ABSOLUTE: 0.1 K/UL (ref 0–0.2)
BASOPHILS RELATIVE PERCENT: 1.3 %
BILIRUB SERPL-MCNC: 0.4 MG/DL (ref 0–1)
BILIRUBIN, POC: NORMAL
BLOOD URINE, POC: NORMAL
BUN BLDV-MCNC: 13 MG/DL (ref 7–20)
CALCIUM SERPL-MCNC: 9.6 MG/DL (ref 8.3–10.6)
CHLORIDE BLD-SCNC: 104 MMOL/L (ref 99–110)
CLARITY, POC: CLEAR
CO2: 26 MMOL/L (ref 21–32)
COLOR, POC: YELLOW
CREAT SERPL-MCNC: 0.7 MG/DL (ref 0.9–1.3)
EOSINOPHILS ABSOLUTE: 0.1 K/UL (ref 0–0.6)
EOSINOPHILS RELATIVE PERCENT: 1.9 %
GFR AFRICAN AMERICAN: >60
GFR NON-AFRICAN AMERICAN: >60
GLOBULIN: 2.6 G/DL
GLUCOSE BLD-MCNC: 103 MG/DL (ref 70–99)
GLUCOSE URINE, POC: NORMAL
HCT VFR BLD CALC: 43 % (ref 40.5–52.5)
HEMOGLOBIN: 14.4 G/DL (ref 13.5–17.5)
KETONES, POC: NORMAL
LEUKOCYTE EST, POC: NORMAL
LYMPHOCYTES ABSOLUTE: 1.7 K/UL (ref 1–5.1)
LYMPHOCYTES RELATIVE PERCENT: 31.7 %
MCH RBC QN AUTO: 30.8 PG (ref 26–34)
MCHC RBC AUTO-ENTMCNC: 33.4 G/DL (ref 31–36)
MCV RBC AUTO: 92 FL (ref 80–100)
MONOCYTES ABSOLUTE: 0.5 K/UL (ref 0–1.3)
MONOCYTES RELATIVE PERCENT: 10.2 %
NEUTROPHILS ABSOLUTE: 2.9 K/UL (ref 1.7–7.7)
NEUTROPHILS RELATIVE PERCENT: 54.9 %
NITRITE, POC: NORMAL
PDW BLD-RTO: 13.4 % (ref 12.4–15.4)
PH, POC: 5
PLATELET # BLD: 179 K/UL (ref 135–450)
PMV BLD AUTO: 10.3 FL (ref 5–10.5)
POTASSIUM SERPL-SCNC: 4.8 MMOL/L (ref 3.5–5.1)
PROSTATE SPECIFIC ANTIGEN: 0.54 NG/ML (ref 0–4)
PROTEIN, POC: NORMAL
RBC # BLD: 4.68 M/UL (ref 4.2–5.9)
SODIUM BLD-SCNC: 142 MMOL/L (ref 136–145)
SPECIFIC GRAVITY, POC: 1.03
TOTAL PROTEIN: 7 G/DL (ref 6.4–8.2)
TSH REFLEX: 2.23 UIU/ML (ref 0.27–4.2)
UROBILINOGEN, POC: 0.2
WBC # BLD: 5.3 K/UL (ref 4–11)

## 2020-08-05 PROCEDURE — 99214 OFFICE O/P EST MOD 30 MIN: CPT | Performed by: NURSE PRACTITIONER

## 2020-08-05 PROCEDURE — 36415 COLL VENOUS BLD VENIPUNCTURE: CPT | Performed by: NURSE PRACTITIONER

## 2020-08-05 PROCEDURE — 81002 URINALYSIS NONAUTO W/O SCOPE: CPT | Performed by: NURSE PRACTITIONER

## 2020-08-05 RX ORDER — GABAPENTIN 100 MG/1
100 CAPSULE ORAL 2 TIMES DAILY
Qty: 60 CAPSULE | Refills: 2 | Status: SHIPPED | OUTPATIENT
Start: 2020-08-05 | End: 2020-08-26

## 2020-08-05 ASSESSMENT — ENCOUNTER SYMPTOMS
EYES NEGATIVE: 1
RESPIRATORY NEGATIVE: 1

## 2020-08-05 NOTE — PATIENT INSTRUCTIONS
Please read the healthy family handout that you were given and share it with your family. Please compare this printed medication list with your medications at home to be sure they are the same. If you have any medications that are different please contact us immediately at [phone]. Also review your allergies that we have listed, these may also include medications that you have not been able to tolerate, make sure everything listed is correct. If you have any allergies that are different please contact us immediately at [phone].
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
## 2020-08-06 LAB
ESTIMATED AVERAGE GLUCOSE: 111.2 MG/DL
HBA1C MFR BLD: 5.5 %

## 2020-08-12 ENCOUNTER — TELEPHONE (OUTPATIENT)
Dept: VASCULAR SURGERY | Age: 60
End: 2020-08-12

## 2020-08-17 ENCOUNTER — HOSPITAL ENCOUNTER (OUTPATIENT)
Dept: VASCULAR LAB | Age: 60
Discharge: HOME | End: 2020-08-17
Payer: COMMERCIAL

## 2020-08-17 PROCEDURE — 93925 LOWER EXTREMITY STUDY: CPT

## 2020-08-23 NOTE — PROGRESS NOTES
Bert Maldonado   1960, 64 y.o. male   [id number]       Referring Provider: Kristina Carrasco DO  Referral Type: In an order in [address] [place] [address]    Reason for Visit: Evaluation of suspected change in hearing, tinnitus, and balance. ADULT AUDIOLOGIC EVALUATION      Bert Maldonado is a 64 y.o. male seen today, 8/24/2020 , for an initial audiologic evaluation. Patient was seen by Kristina Carrasco DO following today's evaluation. AUDIOLOGIC AND OTHER PERTINENT MEDICAL HISTORY:      Bert Maldonado noted long-standing history of constant, high-pitched tinnitus, bilaterally with recent ear pressure \"needing to pop\" sensation that started about 3 weeks ago. Patient reports history of imbalance over the past 10 years attributed to leg injury - denies room-spinning sensation. Patient states tinnitus can become aggravating as it often prevents him from concentrating, falling asleep at night, and gets worse in quiet. He also notes history of noise exposure through working around loud machines and tools for most of adult life. No additional significant otologic or medical history was reported. Bert Maldonado denied otalgia, aural fullness, otorrhea, dizziness, history of falls, history of head trauma, history of ear surgery and family history of hearing loss. Date: 8/24/2020     IMPRESSIONS:      Today's results revealed sensorineural hearing loss with excellent word recognition, bilaterally. Significant asymmetry of 35 dBHL noted at [facility] with left ear worse than right. Hearing loss consistent with patient's tinnitus percept and significant enough to create hearing difficulty in most listening situations. Discussed tinnitus management strategies and benefits of amplification. Gave patient information for [employer] and [employer]. due to [employer].  Follow medical recommendations of Kristina Carrasco DO.     ASSESSMENT AND FINDINGS:     QUESTIONNAIRES:  Tinnitus Handicap Inventory: Patient's score = 96  0-16 = Slight or no handicap  18-36 = Mild handicap  38-56 = Moderate handicap  58-76 = Severe handicap                       = Catastrophic handicap    Otoscopy revealed: Clear ear canals bilaterally    RIGHT EAR:  Hearing Sensitivity: Within normal limits through 1.5kHz sloping to a severe sensorineural hearing loss notch from 3-4kHz rising to a moderately-severe sensorineural hearing loss from 6-8kHz. Speech Recognition Threshold: 15 dB HL  Word Recognition: Excellent (96%), based on NU-6 25-word list at 75 dBHL masked using recorded speech stimuli. Tympanometry: Normal peak pressure and compliance, Type A tympanogram, consistent with normal middle ear function. Acoustic Reflexes: Ipsilateral: Could not maintain seal. Contralateral: Could not maintain seal.    LEFT EAR:  Hearing Sensitivity: Within normal limits through 1kHz sloping to a moderately-severe sensorineural hearing loss from 2-8kHz. Speech Recognition Threshold: 15 dB HL  Word Recognition: Excellent (92.%), based on NU-6 25-word list at 80 dBHL masked using recorded speech stimuli. Tympanometry: Normal peak pressure and compliance, Type A tympanogram, consistent with normal middle ear function. Acoustic Reflexes: Ipsilateral: Did not test. Contralateral: Did not test.    Reliability: Good  Transducer: Inserts    **NOTE: Significant asymmetry of 35 dBHL noted at [facility] with left ear worse than right. See scanned audiogram dated 8/24/2020  for results. PATIENT EDUCATION:       The following items were discussed with the patient:    - Good Communication Strategies   - Hearing Loss and Hearing Aids   - Tinnitus Management Strategies   - Noise-Induced Hearing Loss and use of Hearing Protection Devices (HPDs)    Educational information was shared in the After Visit Summary.                                                 RECOMMENDATIONS: The following items are recommended based on patient report and results from today's appointment:   - Continue medical follow-up with Mikey Starks DO.   - Retest hearing as medically indicated and/or sooner if a change in hearing is noted. - If desired, schedule a Hearing Aid Evaluation (HAE) appointment to discuss hearing aid options. Recommended patient contact insurance to determine possible hearing aid benefit. - Utilize \"Good Communication Strategies\" as discussed to assist in speech understanding with communication partners. - Maintain a sound enriched environment to assist in the management of tinnitus symptoms. Catastrophic handicap noted on patient's THI. Dicussed long-term options of tinnitus management should tinnitus persist following trail with hearing aids. - Use hearing protection devices (HPDs), such as protective ear muffs and ear plugs, when exposed to dangerous sound levels.        Shelbi Perkins  Audiologist    Chart CC'd to: Mikey Starks DO      Degree of   Hearing Sensitivity dB Range   Within Normal Limits (WNL) 0 - 20   Mild 20 - 40   Moderate 40 - 55   Moderately-Severe 55 - 70   Severe 70 - 90   Profound 90 +

## 2020-08-24 ENCOUNTER — PROCEDURE VISIT (OUTPATIENT)
Dept: AUDIOLOGY | Age: 60
End: 2020-08-24
Payer: COMMERCIAL

## 2020-08-24 ENCOUNTER — TELEPHONE (OUTPATIENT)
Dept: VASCULAR SURGERY | Age: 60
End: 2020-08-24

## 2020-08-24 ENCOUNTER — OFFICE VISIT (OUTPATIENT)
Dept: ENT CLINIC | Age: 60
End: 2020-08-24
Payer: COMMERCIAL

## 2020-08-24 VITALS
HEART RATE: 74 BPM | BODY MASS INDEX: 33.74 KG/M2 | HEIGHT: 71 IN | WEIGHT: 241 LBS | TEMPERATURE: 97.1 F | SYSTOLIC BLOOD PRESSURE: 127 MMHG | DIASTOLIC BLOOD PRESSURE: 84 MMHG

## 2020-08-24 PROBLEM — H90.3 SENSORINEURAL HEARING LOSS, BILATERAL: Status: ACTIVE | Noted: 2020-08-24

## 2020-08-24 PROCEDURE — 92557 COMPREHENSIVE HEARING TEST: CPT | Performed by: AUDIOLOGIST

## 2020-08-24 PROCEDURE — 99203 OFFICE O/P NEW LOW 30 MIN: CPT | Performed by: OTOLARYNGOLOGY

## 2020-08-24 PROCEDURE — 92567 TYMPANOMETRY: CPT | Performed by: AUDIOLOGIST

## 2020-08-24 NOTE — PATIENT INSTRUCTIONS
Good Communication Strategies    Communication can be challenging for anyone, but can be especially difficult for those with some degree of hearing loss. While we may not be able to control every factor that may lead to difficulty with communication, there are Good Communication Strategies that we can all use in our day-to-day lives. Communication takes both parties working together for it to be successful. Tips as a Listener:   1. Control your environment. It is important to limit the amount of background noise in the room when possible. You should also consider having a good light source in the room to best see the other person. 2. Ask for clarification. Instead of saying \"What?\", you can use parts of what you heard to make a new question. For example, if you heard the word \"Thursday\" but not the rest of the week, you may ask \"What was that about Thursday? \" or \"What did you want to do Thursday? \". This shows the person talking that you are listening and will help them better explain what they are saying. 3. Be an advocate for yourself. If you are hearing but not understanding, tell the other person \"I can hear you, but I need you to slow down when you speak. \"  Or if someone is facing the other direction, say \"I cannot hear you when you are not looking at me when we talk. \"       Tips as a Talker:   - Sit or stand 3 to 6 feet away to maximize audibility         -- It is unrealistic to believe someone else will fully hear your message if you are speaking from across the room or in a different room in the house   - Stay at eye level to help with visual cues   - Make sure you have the persons attention before speaking   - Use facial expressions and gestures to accentuate your message   - Raise your voice slightly (do not scream)   - Speak slowly and distinctly   - Use short, simple sentences   - Rephrase your words if the person is having a hard time understanding you    - To avoid distortion, dont speak directly into a persons ear      Some additional items that may be helpful:   - Remain patient - this is important for both parties   - Write down items that still cannot be heard/understood. You may write with pen/paper or consider typing/texting on a cell phone or smart device. - If background noise is unavoidable, try to keep yourself in a good position in the room. By sitting at a ramos on the side of the restaurant (preferably a corner), it will be easier to communicate than if you were sitting at a table in the middle with background noise surrounding you. Keep yourself positioned away from music speakers or heavy foot traffic. Tinnitus: Overview and Management Strategies          Many people have some ringing sounds in their ears once in a while. You may hear a roar, a hiss, a tinkle, or a buzz. The sound usually lasts only a few minutes. If it goes on all the time, you may have tinnitus. Tinnitus is usually caused by long-term exposure to loud noise. This damages the nerves in the inner ear. It can occur with all types of hearing loss. It may be a symptom of almost any ear problem. Tinnitus may be caused by a buildup of earwax. Or, it may be caused by ear infections or certain medicines (especially antibiotics or large amounts of aspirin). You can also hear noises in your ears because of an injury to the ears, drinking too much alcohol or caffeine, or a medical condition. Other conditions may also contribute to tinnitus, including: head and neck trauma, temporomandibular joint disorder (TMJ), sinus pressure and barometric trauma, traumatic brain injury, metabolic disorders, autoimmune disorders, stress, and high blood pressure. You may need tests to evaluate your hearing and to find causes of long-lasting tinnitus. Your doctor may suggest one or more treatments to help you cope with the tinnitus. You can also do things at home to help reduce symptoms.     Follow-up care is a key part of your treatment and safety. Be sure to make and go to all appointments, and call your doctor if you are having problems. It's also a good idea to know your test results and keep a list of the medicines you take. How can you care for yourself at home? · Limit or cut out alcohol, caffeine, and sodium. They can make your symptoms worse. · Do not smoke or use other tobacco products. Nicotine reduces blood flow to the ear and makes tinnitus worse. If you need help quitting, talk to your doctor about stop-smoking programs and medicines. These can increase your chances of quitting for good. · Talk to your doctor about whether to stop taking aspirin and similar products such as ibuprofen or naproxen. · Get exercise often. It can help improve blood flow to the ear. Ways to manage/cope with tinnitus  Some tinnitus may last a long time. To manage your tinnitus, try to:  · Avoid noises that you think caused your tinnitus. If you can't avoid loud noises, wear earplugs or earmuffs. · Ignore the sound by paying attention to other things. Keeping your brain busy with other tasks or background noise can help your brain not focus on the tinnitus. · Try to not give the tinnitus an emotional reaction. Do your best to ignore the sound and not let it bother you. Relax using biofeedback, meditation, or yoga. Feeling stressed and being tired can make tinnitus worse. · Play music or white noise to help you sleep. Background noise may cover up the noise that you hear in your ears. You can buy a tabletop machine or a device that sits under your pillow to play soothing sounds, like ocean waves. · Smart phones have free apps, such as Whist, Relax Melodies, ReSound Relief, and White Noise Lite. These apps have different types of sounds/noise, some of which you can blend together to find sounds that are most soothing to you.   · Hearing aid technology, especially when there is some hearing loss, may help reduce tinnitus symptoms by giving your brain better access to the sounds it is missing. There are some hearing aids with built-in noise generator programs, which may help when amplification alone is not enough. Additional resources may be found through the [employer]inn[employer] Association at www.malathi.org    When should you call for help? Call 911 anytime you think you may need emergency care. For example, call if:    · You have symptoms of a stroke. These may include:  ? Sudden numbness, tingling, weakness, or loss of movement in your face, arm, or leg, especially on only one side of your body. ? Sudden vision changes. ? Sudden trouble speaking. ? Sudden confusion or trouble understanding simple statements. ? Sudden problems with walking or balance. ? A sudden, severe headache that is different from past headaches. Call your doctor now or seek immediate medical care if:    · You develop other symptoms. These may include hearing loss (or worse hearing loss), balance problems, dizziness, nausea, or vomiting. Watch closely for changes in your health, and be sure to contact your doctor if:    · Your tinnitus moves from both ears to one ear. · Your hearing loss gets worse within 1 day after an ear injury. · Your tinnitus or hearing loss does not get better within 1 week after an ear injury. · Your tinnitus bothers you enough that you want to take medicines to help you cope with it. If you notice changes in your tinnitus and/or your hearing, it is recommended that you have your hearing tested by your audiologist and to follow-up with your physician that manages your hearing loss (such as your ENT or Primary Care doctor). Hearing Loss: Care Instructions  Your Care Instructions      Hearing loss is a sudden or slow decrease in how well you hear. It can range from mild to profound. Permanent hearing loss can occur with aging, and it can happen when you are exposed long-term to loud noise. Examples include listening to loud music, riding motorcycles, or being around other loud machines. Hearing loss can affect your work and home life. It can make you feel lonely or depressed. You may feel that you have lost your independence. But hearing aids and other devices can help you hear better and feel connected to others. Follow-up care is a key part of your treatment and safety. Be sure to make and go to all appointments, and call your doctor if you are having problems. It's also a good idea to know your test results and keep a list of the medicines you take. How can you care for yourself at home? · Avoid loud noises whenever possible. This helps keep your hearing from getting worse. Always wear hearing protection around loud noises. · If appropriate, wear hearing aid(s) as directed. It is recommended that hearing aids are worn during all waking hours to keep your brain active and give it access to the sounds it is missing. · If you are beginning your process with hearing aid(s), schedule a \"Hearing Aid Evaluation\" with an audiologist to discuss your lifestyle, features of hearing aid technology, and styles of hearing aids available. It is recommended that you contact your insurance company to determine if you have a hearing aid benefit, as this may dictate who you can see for these services. · Have hearing tests as your doctor suggests. They can show whether your hearing has changed. Your hearing aid may need to be adjusted. · Use other assistive devices as needed. These may include:  ? Telephone amplifiers and hearing aids that can connect to a television, stereo, radio, or microphone. ? Devices that use lights or vibrations. These alert you to the doorbell, a ringing telephone, or a baby monitor. ? Television closed-captioning. This shows the words at the bottom of the screen. Most new TVs can do this. ? TTY (text telephone).  This lets you type messages back and forth on the cannot avoid loud sounds, here are ways to reduce your exposure:  o 1. Wear hearing protection  - Ear plugs and protective ear muffs can be used to reduce the intensity of the sound. The higher the NRR (noise reduction rating), the better reduction of the intensity of the sound   o 2. Turn the volume down  - When listening to music, turn the volume down, especially when wearing ear buds or headphones. A good rule of thumb is to not go beyond the middle setting on your device. If you can't hear someone talking to you from arm's length away, your music may be at a level that it can cause damage. If someone else can hear your music from 3 feet away, it may also be at a level that it can cause damage. o 3. Walk away from the sound  - If you do not have the ability to wear hearing protection or turn down the volume of the sound, you should do your best to move away from the source of the sound. - Sound decreases in intensity as we move further from the source. The sound will decrease by 6 dB for every doubling of distance from the sound source. Exposure to these sounds may cause permanent damage to your hearing.   If you suspect your hearing has changed, it is recommended that you have your hearing tested by your audiologist.
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
## 2020-08-27 ENCOUNTER — OFFICE VISIT (OUTPATIENT)
Dept: FAMILY MEDICINE CLINIC | Age: 60
End: 2020-08-27
Payer: COMMERCIAL

## 2020-08-27 VITALS
OXYGEN SATURATION: 95 % | HEART RATE: 76 BPM | SYSTOLIC BLOOD PRESSURE: 138 MMHG | WEIGHT: 238.2 LBS | TEMPERATURE: 98.2 F | DIASTOLIC BLOOD PRESSURE: 88 MMHG | BODY MASS INDEX: 33.22 KG/M2

## 2020-08-27 PROCEDURE — G8417 CALC BMI ABV UP PARAM F/U: HCPCS | Performed by: NURSE PRACTITIONER

## 2020-08-27 PROCEDURE — 3017F COLORECTAL CA SCREEN DOC REV: CPT | Performed by: NURSE PRACTITIONER

## 2020-08-27 PROCEDURE — 36415 COLL VENOUS BLD VENIPUNCTURE: CPT | Performed by: NURSE PRACTITIONER

## 2020-08-27 PROCEDURE — 99213 OFFICE O/P EST LOW 20 MIN: CPT | Performed by: NURSE PRACTITIONER

## 2020-08-27 PROCEDURE — G8427 DOCREV CUR MEDS BY ELIG CLIN: HCPCS | Performed by: NURSE PRACTITIONER

## 2020-08-27 PROCEDURE — 1036F TOBACCO NON-USER: CPT | Performed by: NURSE PRACTITIONER

## 2020-08-27 RX ORDER — ALLOPURINOL 100 MG/1
100 TABLET ORAL DAILY
Qty: 90 TABLET | Refills: 1 | Status: SHIPPED | OUTPATIENT
Start: 2020-08-27 | End: 2021-01-04

## 2020-08-27 RX ORDER — PREDNISONE 10 MG/1
TABLET ORAL
Qty: 30 TABLET | Refills: 0 | Status: SHIPPED | OUTPATIENT
Start: 2020-08-27 | End: 2020-09-08

## 2020-08-27 RX ORDER — GABAPENTIN 100 MG/1
100 CAPSULE ORAL 2 TIMES DAILY
COMMUNITY
End: 2020-08-27

## 2020-08-27 RX ORDER — GABAPENTIN 100 MG/1
200 CAPSULE ORAL 2 TIMES DAILY
Qty: 120 CAPSULE | Refills: 2 | Status: SHIPPED | OUTPATIENT
Start: 2020-08-27 | End: 2020-11-23

## 2020-08-27 ASSESSMENT — ENCOUNTER SYMPTOMS
GASTROINTESTINAL NEGATIVE: 1
RESPIRATORY NEGATIVE: 1
EYES NEGATIVE: 1

## 2020-08-27 NOTE — PATIENT INSTRUCTIONS
Please read the healthy family handout that you were given and share it with your family. Please compare this printed medication list with your medications at home to be sure they are the same. If you have any medications that are different please contact us immediately at [phone]. Also review your allergies that we have listed, these may also include medications that you have not been able to tolerate, make sure everything listed is correct. If you have any allergies that are different please contact us immediately at [phone].
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
## 2020-08-28 LAB — URIC ACID, SERUM: 7.8 MG/DL (ref 3.5–7.2)

## 2020-11-23 ENCOUNTER — OFFICE VISIT (OUTPATIENT)
Dept: FAMILY MEDICINE CLINIC | Age: 60
End: 2020-11-23
Payer: COMMERCIAL

## 2020-11-23 VITALS
TEMPERATURE: 98 F | OXYGEN SATURATION: 96 % | HEART RATE: 75 BPM | WEIGHT: 239 LBS | DIASTOLIC BLOOD PRESSURE: 82 MMHG | BODY MASS INDEX: 33.33 KG/M2 | SYSTOLIC BLOOD PRESSURE: 133 MMHG

## 2020-11-23 PROBLEM — I73.9 PERIPHERAL VASCULAR INSUFFICIENCY (HCC): Status: ACTIVE | Noted: 2020-11-23

## 2020-11-23 PROBLEM — F10.982 ALCOHOL-INDUCED INSOMNIA (HCC): Status: RESOLVED | Noted: 2018-11-16 | Resolved: 2020-11-23

## 2020-11-23 PROCEDURE — G8427 DOCREV CUR MEDS BY ELIG CLIN: HCPCS | Performed by: NURSE PRACTITIONER

## 2020-11-23 PROCEDURE — G8417 CALC BMI ABV UP PARAM F/U: HCPCS | Performed by: NURSE PRACTITIONER

## 2020-11-23 PROCEDURE — 3017F COLORECTAL CA SCREEN DOC REV: CPT | Performed by: NURSE PRACTITIONER

## 2020-11-23 PROCEDURE — 90471 IMMUNIZATION ADMIN: CPT | Performed by: NURSE PRACTITIONER

## 2020-11-23 PROCEDURE — 1036F TOBACCO NON-USER: CPT | Performed by: NURSE PRACTITIONER

## 2020-11-23 PROCEDURE — 99214 OFFICE O/P EST MOD 30 MIN: CPT | Performed by: NURSE PRACTITIONER

## 2020-11-23 PROCEDURE — G8482 FLU IMMUNIZE ORDER/ADMIN: HCPCS | Performed by: NURSE PRACTITIONER

## 2020-11-23 PROCEDURE — 90688 IIV4 VACCINE SPLT 0.5 ML IM: CPT | Performed by: NURSE PRACTITIONER

## 2020-11-23 RX ORDER — GABAPENTIN 300 MG/1
300 CAPSULE ORAL 3 TIMES DAILY
Qty: 90 CAPSULE | Refills: 2 | Status: SHIPPED | OUTPATIENT
Start: 2020-11-23 | End: 2020-12-23 | Stop reason: SDUPTHER

## 2020-11-23 RX ORDER — MELATONIN 10 MG
10 CAPSULE ORAL NIGHTLY
Qty: 30 CAPSULE | Refills: 5 | Status: SHIPPED | OUTPATIENT
Start: 2020-11-23 | End: 2020-12-23 | Stop reason: ALTCHOICE

## 2020-11-23 RX ORDER — DULOXETIN HYDROCHLORIDE 20 MG/1
20 CAPSULE, DELAYED RELEASE ORAL DAILY
Qty: 30 CAPSULE | Refills: 5 | Status: SHIPPED | OUTPATIENT
Start: 2020-11-23 | End: 2020-12-23

## 2020-11-23 ASSESSMENT — ENCOUNTER SYMPTOMS
RESPIRATORY NEGATIVE: 1
EYES NEGATIVE: 1
GASTROINTESTINAL NEGATIVE: 1
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
## 2020-11-23 NOTE — PATIENT INSTRUCTIONS
Please read the healthy family handout that you were given and share it with your family. Please compare this printed medication list with your medications at home to be sure they are the same. If you have any medications that are different please contact us immediately at [phone]. Also review your allergies that we have listed, these may also include medications that you have not been able to tolerate, make sure everything listed is correct. If you have any allergies that are different please contact us immediately at [phone]. Patient Education        Neuropathic Pain: Care Instructions  Your Care Instructions     Neuropathic pain is caused by pressure on or damage to your nerves. It's often simply called nerve pain. Some people feel this type of pain all the time. For others, it comes and goes. Diabetes, shingles, or an injury can cause nerve pain. Many people say the pain feels sharp, burning, or stabbing. But some people feel it as a dull ache. In some cases, it makes your skin very sensitive. So touch, pressure, and other sensations that did not hurt before may now cause pain. It's important to know that this kind of pain is real and can affect your quality of life. It's also important to know that treatment can help. Treatment includes pain medicines, exercise, and physical therapy. Medicines can help reduce the number of pain signals that travel over the nerves. This can make the painful areas less sensitive. It can also help you sleep better and improve your mood. But medicines are only one part of successful treatment. Most people do best with more than one kind of treatment. Your doctor may recommend that you try cognitive-behavioral therapy and stress management. Or, if needed, you may decide to try to quit smoking, lower your blood pressure, or better control blood sugar. These kinds of healthy changes can also make a difference.   If you feel that your treatment is not working, talk to your doctor. And be sure to tell your doctor if you think you might be depressed or anxious. These are common problems that can also be treated. Follow-up care is a key part of your treatment and safety. Be sure to make and go to all appointments, and call your doctor if you are having problems. It's also a good idea to know your test results and keep a list of the medicines you take. How can you care for yourself at home? · Be safe with medicines. Read and follow all instructions on the label. ? If the doctor gave you a prescription medicine for pain, take it as prescribed. ? If you are not taking a prescription pain medicine, ask your doctor if you can take an over-the-counter medicine. · Save hard tasks for days when you have less pain. Follow a hard task with an easy task. And remember to take breaks. · Relax, and reduce stress. You may want to try deep breathing or meditation. These can help. · Keep moving. Gentle, daily exercise can help reduce pain. Your doctor or physical therapist can tell you what type of exercise is best for you. This may include walking, swimming, and stationary biking. It may also include stretches and range-of-motion exercises. · Try heat, cold packs, and massage. · Get enough sleep. Constant pain can make you more tired. If the pain makes it hard to sleep, talk with your doctor. · Think positively. Your thoughts can affect your pain. Do fun things to distract yourself from the pain. See a movie, read a book, listen to music, or spend time with a friend. · Keep a pain diary. Try to write down how strong your pain is and what it feels like. Also try to notice and write down how your moods, thoughts, sleep, activities, and medicine affect your pain. These notes can help you and your doctor find the best ways to treat your pain. Reducing constipation caused by pain medicine  Pain medicines often cause constipation.  To reduce constipation:  · Include fruits, vegetables, beans, and whole grains in your diet each day. These foods are high in fiber. · Drink plenty of fluids, enough so that your urine is light yellow or clear like water. If you have kidney, heart, or liver disease and have to limit fluids, talk with your doctor before you increase the amount of fluids you drink. · Get some exercise every day. Build up slowly to 30 to 60 minutes a day on 5 or more days of the week. · Take a fiber supplement, such as Citrucel or Metamucil, every day if needed. Read and follow all instructions on the label. · Schedule time each day for a bowel movement. Having a daily routine may help. Take your time and do not strain when having a bowel movement. · Ask your doctor about a laxative. The goal is to have one easy bowel movement every 1 to 2 days. Do not let constipation go untreated for more than 3 days. When should you call for help? Call your doctor now or seek immediate medical care if:    · You feel sad, anxious, or hopeless for more than a few days. This could mean you are depressed. Depression is common in people who have a lot of pain. But it can be treated.     · You have trouble with bowel movements, such as:  ? No bowel movement in 3 days. ? Blood in the anal area, in your stool, or on the toilet paper. ? Diarrhea for more than 24 hours. Watch closely for changes in your health, and be sure to contact your doctor if:    · Your pain is getting worse.     · You can't sleep because of pain.     · You are very worried or anxious about your pain.     · You have trouble taking your pain medicine.     · You have any concerns about your pain medicine or its side effects.     · You have vomiting or cramps for more than 2 hours. Where can you learn more? Go to [email or web address] org and sign in to your [employer] account. Enter I189 in the [employer] box to learn more about \"Neuropathic Pain: Care Instructions. \"     If you do not have an account, please click on the \"Sign Up Now\" link. Current as of: November 20, 2019               Content Version: 12.6  © 2006-2020 [employer]. Care instructions adapted under license by [facility] ([facility]). If you have questions about a medical condition or this instruction, always ask your healthcare professional. [address] any warranty or liability for your use of this information. Patient Education        duloxetine  Pronunciation:  chiquita BERMUDEZ 25 e teen  Brand:  Pablo Lo  What is the most important information I should know about duloxetine? Do not take duloxetine within 5 days before or 14 days after you have used an MAO inhibitor, such as isocarboxazid, linezolid, methylene blue injection, phenelzine, rasagiline, selegiline, or tranylcypromine. Some young people have thoughts about suicide when first taking an antidepressant. Stay alert to changes in your mood or symptoms. Report any new or worsening symptoms to your doctor. Do not stop using duloxetine without first talking to your doctor. What is duloxetine? Duloxetine is a selective serotonin and norepinephrine reuptake inhibitor antidepressant (SSNRI). Duloxetine is used to treat major depressive disorder in adults. Duloxetine is also used to treat general anxiety disorder in adults and children at least 9years old. Duloxetine is also used in adults to treat nerve pain caused by diabetes (diabetic neuropathy), or chronic muscle or joint pain (such as low back pain and osteoarthritis pain). Duloxetine is also used to treat fibromyalgia (a chronic pain disorder) in adults and children at least 15years old. Duloxetine may also be used for purposes not listed in this medication guide. What should I discuss with my healthcare provider before taking duloxetine? You should not use duloxetine if you are allergic to it.   Do not take duloxetine within 5 days before or 14 days after you have used an MAO inhibitor, such as isocarboxazid, linezolid, methylene blue injection, phenelzine, rasagiline, selegiline, or tranylcypromine. A dangerous drug interaction could occur. Be sure your doctor knows if you also take stimulant medicine, opioid medicine, herbal products, or medicine for depression, mental illness, Parkinson's disease, migraine headaches, serious infections, or prevention of nausea and vomiting. These medicines may interact with duloxetine and cause a serious condition called serotonin syndrome. Duloxetine is not approved for use by anyone younger than 9years old. Tell your doctor if you have ever had:  · liver or kidney disease;  · slow digestion;  · a seizure;  · bleeding problems;  · narrow-angle glaucoma;  · bipolar disorder (manic depression); or  · drug addiction or suicidal thoughts. Some young people have thoughts about suicide when first taking an antidepressant. Your doctor should check your progress at regular visits. Your family or other caregivers should also be alert to changes in your mood or symptoms. Ask your doctor about taking duloxetine if you are pregnant. Taking this medicine during late pregnancy may cause excessive bleeding during childbirth, or serious medical complications in the baby. However, if not treated during pregnancy, conditions such as depression or fibromyalgia may cause complications in the baby or the mother. If you are pregnant, your name may be listed on a pregnancy registry to track the effects of duloxetine on the baby. Tell your doctor right away if you become pregnant while taking duloxetine. Do not start or stop taking this medicine without your doctor's advice. It may not be safe to breast-feed while using this medicine. Ask your doctor about any risk. How should I take duloxetine? Follow all directions on your prescription label and read all medication guides or instruction sheets. Your doctor may occasionally change your dose.  Use the medicine exactly as directed. Taking duloxetine in higher doses or more often than prescribed will not make it more effective, and may increase side effects. Swallow the capsule whole and do not crush, chew, break, or open it. You may take duloxetine with or without food. It may take up to 4 weeks before your symptoms improve. Keep using the medication as directed and tell your doctor if your symptoms do not improve. Your blood pressure will need to be checked often. Do not stop using duloxetine suddenly, or you could have unpleasant symptoms (such as dizziness, nausea, diarrhea, irritability, or anxiety). Ask your doctor how to safely stop using this medicine. Store at room temperature away from moisture and heat. What happens if I miss a dose? Take the medicine as soon as you can, but skip the missed dose if it is almost time for your next dose. Do not take two doses at one time. What happens if I overdose? Seek emergency medical attention or call the Poison Help line at 1-316.465.9972. Overdose symptoms may include vomiting, dizziness or drowsiness, seizures, fast heartbeats, fainting, or coma. What should I avoid while taking duloxetine? Avoid driving or hazardous activity until you know how this medicine will affect you. Your reactions could be impaired. Dizziness or fainting can cause falls, accidents, or severe injuries. Avoid getting up too fast from a sitting or lying position, or you may feel dizzy. Avoid drinking alcohol. It may increase your risk of liver damage. Ask your doctor before taking a nonsteroidal anti-inflammatory drug (NSAID) such as aspirin, ibuprofen (Advil, Motrin), naproxen (Aleve), celecoxib (Celebrex), diclofenac, indomethacin, meloxicam, and others. Using an NSAID with duloxetine may cause you to bruise or bleed easily. What are the possible side effects of duloxetine?   Get emergency medical help if you have signs of an allergic reaction (hives, difficult breathing, swelling in your This includes prescription and over-the-counter medicines, vitamins, and herbal products. Not all possible interactions are listed here. Tell your doctor about all your current medicines and any medicine you start or stop using. Where can I get more information? Your pharmacist can provide more information about duloxetine. Remember, keep this and all other medicines out of the reach of children, never share your medicines with others, and use this medication only for the indication prescribed. Every effort has been made to ensure that the information provided by [facility] is accurate, up-to-date, and complete, but no guarantee is made to that effect. Drug information contained herein may be time sensitive. [facility] information has been compiled for use by healthcare practitioners and consumers in the [place] and therefore [facility] does not warrant that uses outside of the [place] are appropriate, unless specifically indicated otherwise. [facility]'s drug information does not endorse drugs, diagnose patients or recommend therapy. [facility] drug information is an informational resource designed to assist licensed healthcare practitioners in caring for their patients and/or to serve consumers viewing this service as a supplement to, and not a substitute for, the expertise, skill, knowledge and judgment of healthcare practitioners. The absence of a warning for a given drug or drug combination in no way should be construed to indicate that the drug or drug combination is safe, effective or appropriate for any given patient. [facility] does not assume any responsibility for any aspect of healthcare administered with the aid of information [facility] provides. The information contained herein is not intended to cover all possible uses, directions, precautions, warnings, drug interactions, allergic reactions, or adverse effects.  If you have questions about the drugs you are taking, check with your tablet  whole and do not crush, chew, break, or open it. Measure liquid medicine carefully. Use the dosing syringe provided, or use a medicine dose-measuring device (not a kitchen spoon). Do not stop using gabapentin suddenly, even if you feel fine. Stopping suddenly may cause increased seizures. Follow your doctor's instructions about tapering your dose. In case of emergency, wear or carry medical identification to let others know you have seizures. This medicine can cause unusual results with certain medical tests. Tell any doctor who treats you that you are using gabapentin. Store gabapentin tablets and capsules at room temperature away from light and moisture. Store the liquid medicine in the refrigerator. Do not freeze. What happens if I miss a dose? Take the medicine as soon as you can, but skip the missed dose if it is almost time for your next dose. Do not take two doses at one time. If you take Horizant:  Skip the missed dose and use your next dose at the regular time. Do not use two doses of Horizant at one time. What happens if I overdose? Seek emergency medical attention or call the Poison Help line at 1-811.340.3416. What should I avoid while taking gabapentin? Avoid driving or hazardous activity until you know how this medicine will affect you. Your reactions could be impaired. Dizziness or drowsiness can cause falls, accidents, or severe injuries. Avoid taking an antacid within 2 hours before or after you take gabapentin. Antacids can make it harder for your body to absorb gabapentin. Avoid drinking alcohol while taking gabapentin. What are the possible side effects of gabapentin? Get emergency medical help if you have signs of an allergic reaction: hives; difficult breathing; swelling of your face, lips, tongue, or throat. Seek medical treatment if you have a serious drug reaction that can affect many parts of your body.  Symptoms may include: skin rash, fever, swollen glands, muscle aches, severe weakness, unusual bruising, upper stomach pain, or yellowing of your skin or eyes. Report any new or worsening symptoms to your doctor, such as: mood or behavior changes, anxiety, panic attacks, trouble sleeping, or if you feel impulsive, irritable, agitated, hostile, aggressive, restless, hyperactive (mentally or physically), depressed, or have thoughts about suicide or hurting yourself. Call your doctor at once if you have:  · weak or shallow breathing;  · blue-colored skin, lips, fingers, and toes;  · confusion, extreme drowsiness or weakness;  · problems with balance or muscle movement;  · unusual or involuntary eye movements; or  · increased seizures. Gabapentin can cause life-threatening breathing problems. A person caring for you should seek emergency medical attention if you have slow breathing with long pauses, blue colored lips, or if you are hard to wake up. Breathing problems may be more likely in older adults or in people with COPD. Some side effects are more likely in children taking gabapentin. Contact your doctor if the child taking this medicine has any of the following side effects:  · changes in behavior;  · memory problems;  · trouble concentrating; or  · acting restless, hostile, or aggressive. Common side effects may include:  · headache;  · dizziness, drowsiness, tiredness;  · problems with balance or eye movements; or  · (in children) fever, nausea, vomiting. This is not a complete list of side effects and others may occur. Call your doctor for medical advice about side effects. You may report side effects to FDA at 4-627-FDA-8405. What other drugs will affect gabapentin? Using gabapentin with other drugs that slow your breathing can cause dangerous side effects or death. Ask your doctor before using opioid medication, a sleeping pill, cold or allergy medicine, a muscle relaxer, or medicine for anxiety or seizures.   Other drugs may affect gabapentin, including prescription and over-the-counter medicines, vitamins, and herbal products. Tell your doctor about all your current medicines and any medicine you start or stop using. Where can I get more information? Your pharmacist can provide more information about gabapentin. Remember, keep this and all other medicines out of the reach of children, never share your medicines with others, and use this medication only for the indication prescribed. Every effort has been made to ensure that the information provided by [address] [place] [address]  is accurate, up-to-date, and complete, but no guarantee is made to that effect. Drug information contained herein may be time sensitive. [facility] information has been compiled for use by healthcare practitioners and consumers in the [place] and therefore [facility] does not warrant that uses outside of the [place] are appropriate, unless specifically indicated otherwise. [facility]'s drug information does not endorse drugs, diagnose patients or recommend therapy. [facility]'s drug information is an informational resource designed to assist licensed healthcare practitioners in caring for their patients and/or to serve consumers viewing this service as a supplement to, and not a substitute for, the expertise, skill, knowledge and judgment of healthcare practitioners. The absence of a warning for a given drug or drug combination in no way should be construed to indicate that the drug or drug combination is safe, effective or appropriate for any given patient. [facility] does not assume any responsibility for any aspect of healthcare administered with the aid of information [facility] provides. The information contained herein is not intended to cover all possible uses, directions, precautions, warnings, drug interactions, allergic reactions, or adverse effects.  If you have questions about the drugs you are taking, check with your doctor, nurse or pharmacist.  Copyright 4215-1471 [employer] Version: 16.01. Revision date: 4/30/2020. Care instructions adapted under license by [facility] ([facility]). If you have questions about a medical condition or this instruction, always ask your healthcare professional. [address] any warranty or liability for your use of this information. Patient Education        Insomnia: Care Instructions  Your Care Instructions     Insomnia is the inability to sleep well. It is a common problem for most people at some time. Insomnia may make it hard for you to get to sleep, stay asleep, or sleep as long as you need to. This can make you tired and grouchy during the day. It can also make you forgetful, less effective at work, and unhappy. Insomnia can be caused by conditions such as depression or anxiety. Pain can also affect your ability to sleep. When these problems are solved, the insomnia usually clears up. But sometimes bad sleep habits can cause insomnia. If insomnia is affecting your work or your enjoyment of life, you can take steps to improve your sleep. Follow-up care is a key part of your treatment and safety. Be sure to make and go to all appointments, and call your doctor if you are having problems. It's also a good idea to know your test results and keep a list of the medicines you take. How can you care for yourself at home? What to avoid   · Do not have drinks with caffeine, such as coffee or black tea, for 8 hours before bed. · Do not smoke or use other types of tobacco near bedtime. Nicotine is a stimulant and can keep you awake. · Avoid drinking alcohol late in the evening, because it can cause you to wake in the middle of the night. · Do not eat a big meal close to bedtime. If you are hungry, eat a light snack. · Do not drink a lot of water close to bedtime, because the need to urinate may wake you up during the night. · Do not read or watch TV in bed. Use the bed only for sleeping and sexual activity.   What to try   · Go to mean?     Sleeping well means getting enough sleep. How much sleep is enough varies among people. The number of hours you sleep is not as important as how you feel when you wake up. If you do not feel refreshed, you probably need more sleep. Another sign of not getting enough sleep is feeling tired during the day. The average total nightly sleep time is 7½ to 8 hours. Healthy adults may need a little more or a little less than this. Why is getting enough sleep important? Getting enough quality sleep is a basic part of good health. When your sleep suffers, your mood and your thoughts can suffer too. You may find yourself feeling more grumpy or stressed. Not getting enough sleep also can lead to serious problems, including injury, accidents, anxiety, and depression. What might cause poor sleeping? Many things can cause sleep problems, including:  · Stress. Stress can be caused by fear about a single event, such as giving a speech. Or you may have ongoing stress, such as worry about work or school. · Depression, anxiety, and other mental or emotional conditions. · Changes in your sleep habits or surroundings. This includes changes that happen where you sleep, such as noise, light, or sleeping in a different bed. It also includes changes in your sleep pattern, such as having jet lag or working a late shift. · Health problems, such as pain, breathing problems, and restless legs syndrome. · Lack of regular exercise. How can you help yourself? Here are some tips that may help you sleep more soundly and wake up feeling more refreshed. Your sleeping area   · Use your bedroom only for sleeping and sex. A bit of light reading may help you fall asleep. But if it doesn't, do your reading elsewhere in the house. Don't watch TV in bed. · Be sure your bed is big enough to stretch out comfortably, especially if you have a sleep partner. · Keep your bedroom quiet, dark, and cool.  Use curtains, blinds, or a sleep mask to block out light. To block out noise, use earplugs, soothing music, or a \"white noise\" machine. Your evening and bedtime routine   · Create a relaxing bedtime routine. You might want to take a warm shower or bath, listen to soothing music, or drink a cup of noncaffeinated tea. · Go to bed at the same time every night. And get up at the same time every morning, even if you feel tired. What to avoid   · Limit caffeine (coffee, tea, caffeinated sodas) during the day, and don't have any for at least 4 to 6 hours before bedtime. · Don't drink alcohol before bedtime. Alcohol can cause you to wake up more often during the night. · Don't smoke or use tobacco, especially in the evening. Nicotine can keep you awake. · Don't take naps during the day, especially close to bedtime. · Don't lie in bed awake for too long. If you can't fall asleep, or if you wake up in the middle of the night and can't get back to sleep within 15 minutes or so, get out of bed and go to another room until you feel sleepy. · Don't take medicine right before bed that may keep you awake or make you feel hyper or energized. Your doctor can tell you if your medicine may do this and if you can take it earlier in the day. If you can't sleep   · Imagine yourself in a peaceful, pleasant scene. Focus on the details and feelings of being in a place that is relaxing. · Get up and do a quiet or boring activity until you feel sleepy. · Don't drink any liquids after 6 p.m. if you wake up often because you have to go to the bathroom. Where can you learn more? Go to [email or web address] org and sign in to your [email or web address] account. Enter T753 in the Bizible box to learn more about \"Learning About Sleeping Well. \"     If you do not have an account, please click on the \"Sign Up Now\" link. Current as of: January 31, 2020               Content Version: 12.6  © 1728-8409 [employer].    Care instructions adapted under license by [facility] ([facility]). If you have questions about a medical condition or this instruction, always ask your healthcare professional. [place] [address] any warranty or liability for your use of this information. Patient Education        melatonin  Pronunciation:  dwight sheron TOE ralph  Brand:  Advanced Sleep Melatonin, Dual Spectrum Melatonin, Melatonin Time Release, Nature's Bounty Dual Spectrum Melatonin  What is the most important information I should know about melatonin? Follow all directions on the product label and package. Tell each of your healthcare providers about all your medical conditions, allergies, and all medicines you use. What is melatonin? Melatonin is a manmade form of a hormone produced in the brain that helps regulate your sleep and wake cycle. Melatonin has been used in alternative medicine as a likely effective aid in treating insomnia (trouble falling asleep or staying asleep). Melatonin is also likely effective in treating sleep disorders in people who are blind. Melatonin is also possibly effective in treating jet lag, high blood pressure, tumors, low blood platelets (blood cells that help your blood to clot), insomnia caused by withdrawal from drug addiction, or anxiety caused by surgery. A topical form of melatonin applied to the skin is possibly effective in preventing sunburn. Melatonin has also been used to treat infertility, to improve sleep problems caused by shift work, or to enhance athletic performance. However, research has shown that melatonin may not be effective in treating these conditions. Other uses not proven with research have included treating depression, bipolar disorder, dementia, macular degeneration, attention deficit hyperactivity disorder, enlarged prostate, chronic fatigue syndrome, fibromyalgia, restless leg syndrome, stomach ulcers, irritable bowel syndrome, nicotine withdrawal, and many other conditions.   It is not certain whether sleep. If you use this product to treat jet lag, take the melatonin at bedtime on the day you arrive at your destination and keep using this product for 2 to 5 days. If you take this product to treat other conditions not related to sleep, follow your healthcare provider's instructions about when and how to take melatonin. Do not crush, chew, or break an extended-release tablet. Swallow it whole. Do not swallow the orally disintegrating tablet whole. Allow it to dissolve in your mouth without chewing. If desired, you may drink liquid to help swallow the dissolved tablet. Measure liquid medicine carefully. Use the dosing syringe provided, or use a medicine dose-measuring device (not a kitchen spoon). Call your doctor if the condition you are treating with melatonin does not improve, or if it gets worse while using this product. Store at room temperature away from moisture and heat. What happens if I miss a dose? Since melatonin is used when needed, you may not be on a dosing schedule. Skip any missed dose if it's almost time for your next dose. Do not use two doses at one time. What happens if I overdose? Seek emergency medical attention or call the Poison Help line at 1-387.815.9748. What should I avoid while taking melatonin? Melatonin may impair your thinking or reactions. Avoid driving or operating machinery for a least 4 hours after taking melatonin. This product may also affect your sleep-wake cycle for several days if you are traveling through many different time zones. Avoid using melatonin with other herbal/health supplements. Melatonin and many other herbal products can increase your risk of bleeding, seizures, or low blood pressure. Using certain products together can increase these risks. Avoid coffee, tea, cola, energy drinks, or other products that contain caffeine. What are the possible side effects of melatonin?   Get emergency medical help if you have signs of an allergic reaction: hives; difficult breathing; swelling of your face, lips, tongue, or throat. Although not all side effects are known, melatonin is thought to be possibly safe when taken for a short period of time (up to 2 years in some people). Common side effects may include:  · daytime drowsiness;  · depressed mood, feeling irritable;  · stomach pain;  · headache; or  · dizziness. This is not a complete list of side effects and others may occur. Call your doctor for medical advice about side effects. You may report side effects to FDA at 0-897-FDA-8841. What other drugs will affect melatonin? Using melatonin with other drugs that make you drowsy can worsen this effect. Ask your doctor before using opioid medication, a sleeping pill, a muscle relaxer, medicine for anxiety or seizures, or herbal/health supplements may also cause drowsiness (tryptophan, [place] poppy, chamomile, [place], kava, Janneth's wort, skullcap, valerian, and others). Do not take melatonin without medical advice if you are using any of the following medications:  · an antibiotic;  · aspirin or acetaminophen (Tylenol);  · birth control pills;  · insulin or oral diabetes medicine;  · narcotic pain medicine;  · stomach medicine --lansoprazole (Prevacid), omeprazole (Prilosec), ondansetron (Zofran);  · ADHD medication- -methylphenidate, Adderall, Ritalin, and others;  · heart or blood pressure medicine --mexiletine, propranolol, verapamil;  · medicine to treat or prevent blood clots --clopidogrel (Plavix), warfarin (Coumadin, Jantoven);  · NSAIDs (nonsteroidal anti-inflammatory drugs) --ibuprofen (Advil, Motrin), naproxen (Aleve), celecoxib, diclofenac, indomethacin, meloxicam, and others; or  · steroid medicine --prednisone, and others. This list is not complete. Other drugs may affect melatonin, including prescription and over-the-counter medicines, vitamins, and herbal products. Not all possible drug interactions are listed here.   Where can I get more information? Your doctor, pharmacist, or health care provider may have more information about melatonin. Remember, keep this and all other medicines out of the reach of children, never share your medicines with others, and use this medication only for the indication prescribed. Every effort has been made to ensure that the information provided by [facility] [address] is accurate, up-to-date, and complete, but no guarantee is made to that effect. Drug information contained herein may be time sensitive. [facility] information has been compiled for use by healthcare practitioners and consumers in the [place] and therefore [facility] does not warrant that uses outside of the [place] are appropriate, unless specifically indicated otherwise. [facility]'s drug information does not endorse drugs, diagnose patients or recommend therapy. [facility]'s drug information is an informational resource designed to assist licensed healthcare practitioners in caring for their patients and/or to serve consumers viewing this service as a supplement to, and not a substitute for, the expertise, skill, knowledge and judgment of healthcare practitioners. The absence of a warning for a given drug or drug combination in no way should be construed to indicate that the drug or drug combination is safe, effective or appropriate for any given patient. [facility] does not assume any responsibility for any aspect of healthcare administered with the aid of information [facility] provides. The information contained herein is not intended to cover all possible uses, directions, precautions, warnings, drug interactions, allergic reactions, or adverse effects. If you have questions about the drugs you are taking, check with your doctor, nurse or pharmacist.  Copyright 6999-3268 [address] [place] [address] Dr SMITH. Version: 3.06. Revision date: 4/17/2019. Care instructions adapted under license by [facility] ([facility]).  If you have questions about a medical condition or

## 2020-11-23 NOTE — PROGRESS NOTES
Vaccine Information Sheet, \"Influenza - Inactivated\"  given to Jeanne Pittman, or parent/legal guardian of  Jeanne Pittman and verbalized understanding. Patient responses:    Have you ever had a reaction to a flu vaccine? No  Do you have any current illness? No  Have you ever had [place] Syndrome? No  Do you have a serious allergy to any of the follow: Neomycin, Polymyxin, Thimerosal, eggs or egg products? No    Flu vaccine given per order. Please see immunization tab. Risks and benefits explained. Current VIS given.

## 2020-12-23 ENCOUNTER — OFFICE VISIT (OUTPATIENT)
Dept: FAMILY MEDICINE CLINIC | Age: 60
End: 2020-12-23
Payer: COMMERCIAL

## 2020-12-23 VITALS
SYSTOLIC BLOOD PRESSURE: 159 MMHG | BODY MASS INDEX: 33.81 KG/M2 | WEIGHT: 242.4 LBS | TEMPERATURE: 97.8 F | HEART RATE: 82 BPM | OXYGEN SATURATION: 96 % | DIASTOLIC BLOOD PRESSURE: 94 MMHG

## 2020-12-23 PROCEDURE — 1036F TOBACCO NON-USER: CPT | Performed by: NURSE PRACTITIONER

## 2020-12-23 PROCEDURE — G8417 CALC BMI ABV UP PARAM F/U: HCPCS | Performed by: NURSE PRACTITIONER

## 2020-12-23 PROCEDURE — 3017F COLORECTAL CA SCREEN DOC REV: CPT | Performed by: NURSE PRACTITIONER

## 2020-12-23 PROCEDURE — G8482 FLU IMMUNIZE ORDER/ADMIN: HCPCS | Performed by: NURSE PRACTITIONER

## 2020-12-23 PROCEDURE — 99214 OFFICE O/P EST MOD 30 MIN: CPT | Performed by: NURSE PRACTITIONER

## 2020-12-23 PROCEDURE — G8427 DOCREV CUR MEDS BY ELIG CLIN: HCPCS | Performed by: NURSE PRACTITIONER

## 2020-12-23 RX ORDER — TRAZODONE HYDROCHLORIDE 50 MG/1
50 TABLET ORAL NIGHTLY PRN
Qty: 30 TABLET | Refills: 5 | Status: SHIPPED | OUTPATIENT
Start: 2020-12-23 | End: 2021-02-24

## 2020-12-23 RX ORDER — DULOXETIN HYDROCHLORIDE 30 MG/1
30 CAPSULE, DELAYED RELEASE ORAL DAILY
Qty: 30 CAPSULE | Refills: 5 | Status: SHIPPED | OUTPATIENT
Start: 2020-12-23 | End: 2021-02-24

## 2020-12-23 RX ORDER — GABAPENTIN 400 MG/1
400 CAPSULE ORAL 3 TIMES DAILY
Qty: 90 CAPSULE | Refills: 2 | Status: SHIPPED | OUTPATIENT
Start: 2020-12-23 | End: 2021-06-09 | Stop reason: SDUPTHER

## 2020-12-23 ASSESSMENT — ENCOUNTER SYMPTOMS
EYES NEGATIVE: 1
ALLERGIC/IMMUNOLOGIC NEGATIVE: 1
RESPIRATORY NEGATIVE: 1
GASTROINTESTINAL NEGATIVE: 1

## 2020-12-23 NOTE — PATIENT INSTRUCTIONS
Please read the healthy family handout that you were given and share it with your family. Please compare this printed medication list with your medications at home to be sure they are the same. If you have any medications that are different please contact us immediately at [phone]. Also review your allergies that we have listed, these may also include medications that you have not been able to tolerate, make sure everything listed is correct. If you have any allergies that are different please contact us immediately at [phone]. Patient Education      Monitor blood pressure daily, keep a log and call with report in 1 week. Insomnia: Care Instructions  Your Care Instructions     Insomnia is the inability to sleep well. It is a common problem for most people at some time. Insomnia may make it hard for you to get to sleep, stay asleep, or sleep as long as you need to. This can make you tired and grouchy during the day. It can also make you forgetful, less effective at work, and unhappy. Insomnia can be caused by conditions such as depression or anxiety. Pain can also affect your ability to sleep. When these problems are solved, the insomnia usually clears up. But sometimes bad sleep habits can cause insomnia. If insomnia is affecting your work or your enjoyment of life, you can take steps to improve your sleep. Follow-up care is a key part of your treatment and safety. Be sure to make and go to all appointments, and call your doctor if you are having problems. It's also a good idea to know your test results and keep a list of the medicines you take. How can you care for yourself at home? What to avoid   · Do not have drinks with caffeine, such as coffee or black tea, for 8 hours before bed. · Do not smoke or use other types of tobacco near bedtime. Nicotine is a stimulant and can keep you awake.   · Avoid drinking alcohol late in the evening, because it can cause you to wake in the middle of the [facility] [facility] If you have questions about a medical condition or this instruction, always ask your healthcare professional. [address] any warranty or liability for your use of this information. Patient Education        Recovering From Depression: Care Instructions  Your Care Instructions     Taking good care of yourself is important as you recover from depression. In time, your symptoms will fade as your treatment takes hold. Do not give up. Instead, focus your energy on getting better. Your mood will improve. It just takes some time. Focus on things that can help you feel better, such as being with friends and family, eating well, and getting enough rest. But take things slowly. Do not do too much too soon. You will begin to feel better gradually. Follow-up care is a key part of your treatment and safety. Be sure to make and go to all appointments, and call your doctor if you are having problems. It's also a good idea to know your test results and keep a list of the medicines you take. How can you care for yourself at home? Be realistic  · If you have a large task to do, break it up into smaller steps you can handle, and just do what you can. · You may want to put off important decisions until your depression has lifted. If you have plans that will have a major impact on your life, such as marriage, divorce, or a job change, try to wait a bit. Talk it over with friends and loved ones who can help you look at the overall picture first.  · Reaching out to people for help is important. Do not isolate yourself. Let your family and friends help you. Find someone you can trust and confide in, and talk to that person. · Be patient, and be kind to yourself. Remember that depression is not your fault and is not something you can overcome with willpower alone. Treatment is important for depression, just like for any other illness.  Feeling better takes time, and your mood will improve little by little. Stay active  · Stay busy and get outside. Take a walk, or try some other light exercise. · Talk with your doctor about an exercise program. Exercise can help with mild depression. · Go to a movie or concert. Take part in a Hindu activity or other social gathering. Go to a ball game. · Ask a friend to have dinner with you. Take care of yourself  · Eat a balanced diet with plenty of fresh fruits and vegetables, whole grains, and lean protein. If you have lost your appetite, eat small snacks rather than large meals. · Avoid using illegal drugs or marijuana and drinking alcohol. Do not take medicines that have not been prescribed for you. They may interfere with medicines you may be taking for depression, or they may make your depression worse. · Take your medicines exactly as they are prescribed. You may start to feel better within 1 to 3 weeks of taking antidepressant medicine. But it can take as many as 6 to 8 weeks to see more improvement. If you have questions or concerns about your medicines, or if you do not notice any improvement by 3 weeks, talk to your doctor. · Continue to take your medicine after your symptoms improve. Taking your medicine for at least 6 months after you feel better can help keep you from getting depressed again. If this isn't the first time you have been depressed, your doctor may recommend you to take medicine even longer. · If you have any side effects from your medicine, tell your doctor. Many side effects are mild and will go away on their own after you have been taking the medicine for a few weeks. Some may last longer. Talk to your doctor if side effects are bothering you too much. You might be able to try a different medicine. · Continue counseling. It may help prevent depression from returning, especially if you've had multiple episodes of depression.  Talk with your counselor if you are having a hard time attending your sessions or you think the sessions aren't working. Don't just stop going. · Get enough sleep. Talk to your doctor if you are having problems sleeping. · Avoid sleeping pills unless they are prescribed by the doctor treating your depression. Sleeping pills may make you groggy during the day, and they may interact with other medicine you are taking. · If you have any other illnesses, such as diabetes, heart disease, or high blood pressure, make sure to continue with your treatment. Tell your doctor about all of the medicines you take, including those with or without a prescription. · If you or someone you know talks about suicide, self-harm, or feeling hopeless, get help right away. Call the [facility] at 7-322-640-TIJX ([phone]) or text HOME to 547569 to access the [employer] Text Line. Consider saving these numbers in your phone. When should you call for help? Call 911 anytime you think you may need emergency care. For example, call if:    · You feel like hurting yourself or someone else.     · Someone you know has depression and is about to attempt or is attempting suicide. Call your doctor now or seek immediate medical care if:    · You hear voices.     · Someone you know has depression and:  ? Starts to give away his or her possessions. ? Uses illegal drugs or drinks alcohol heavily. ? Talks or writes about death, including writing suicide notes or talking about guns, knives, or pills. ? Starts to spend a lot of time alone. ? Acts very aggressively or suddenly appears calm. Watch closely for changes in your health, and be sure to contact your doctor if:    · You do not get better as expected. Where can you learn more? Go to https://edilberto.2houses. org and sign in to your [employer] account. Enter G335 in the [employer] box to learn more about \"Recovering From Depression: Care Instructions. \"     If you do not have an account, please click on the \"Sign Up Now\" link.   Current as of: January 31, 2020               Content Version: 12.6  © 2006-2020 [facility], [employer]. Care instructions adapted under license by [facility] ([facility]). If you have questions about a medical condition or this instruction, always ask your healthcare professional. [address] any warranty or liability for your use of this information. Patient Education        trazodone  Pronunciation:  Shell Ellison oh done  Brand:  Beckie  What is the most important information I should know about trazodone? Do not use this medicine if you have used an MAO inhibitor in the past 14 days, such as isocarboxazid, linezolid, methylene blue injection, phenelzine, rasagiline, selegiline, or tranylcypromine. Some young people have thoughts about suicide when first taking an antidepressant. Stay alert to changes in your mood or symptoms. Report any new or worsening symptoms to your doctor. Trazodone is not approved for use by anyone younger than 25years old. What is trazodone? Trazodone is an antidepressant that is used to treat major depressive disorder. Trazodone may also be used for purposes not listed in this medication guide. What should I discuss with my healthcare provider before taking trazodone? You should not use trazodone if you are allergic to it. Do not use trazodone if you have used an MAO inhibitor in the past 14 days. A dangerous drug interaction could occur. MAO inhibitors include isocarboxazid, linezolid, methylene blue injection, phenelzine, rasagiline, selegiline, tranylcypromine, and others. After you stop taking trazodone, you must wait at least 14 days before you start taking an MAOI.   Tell your doctor if you have ever had:  · liver or kidney disease;  · heart disease, or a recent heart attack;  · a bleeding or blood clotting disorder;  · seizures or epilepsy;  · narrow-angle glaucoma;  · long QT syndrome;  · drug addiction or suicidal thoughts; or  · bipolar disorder (manic depression). Be sure your doctor knows if you also take stimulant medicine, opioid medicine, herbal products, or medicine for depression, mental illness, Parkinson's disease, migraine headaches, serious infections, or prevention of nausea and vomiting. These medicines may interact with trazodone and cause a serious condition called serotonin syndrome. Some young people have thoughts about suicide when first taking an antidepressant. Your doctor should check your progress at regular visits. Your family or other caregivers should also be alert to changes in your mood or symptoms. Ask your doctor about taking this medicine if you are pregnant. It is not known whether trazodone will harm an unborn baby. However, you may have a relapse of depression if you stop taking your antidepressant. Tell your doctor right away if you become pregnant. Do not start or stop taking this medicine without your doctor's advice. If you are pregnant, your name may be listed on a pregnancy registry to track the effects of trazodone on the baby. It may not be safe to breastfeed while using this medicine. Ask your doctor about any risk. Trazodone is not approved for use by anyone younger than 25years old. How should I take trazodone? Follow all directions on your prescription label and read all medication guides or instruction sheets. Your doctor may occasionally change your dose. Use the medicine exactly as directed. Take trazodone after a meal or a snack. It may take up to 2 weeks before your symptoms improve. Keep using the medication as directed and tell your doctor if your symptoms do not improve. Do not stop using trazodone suddenly, or you could have unpleasant symptoms (such as dizziness, vomiting, agitation, sweating, confusion, numbness, tingling, or electric shock feelings). Ask your doctor how to safely stop using this medicine. Store at room temperature away from moisture, heat, and light.   What happens if I miss a in the body --headache, confusion, slurred speech, severe weakness, vomiting, loss of coordination, feeling unsteady. Seek medical attention right away if you have symptoms of serotonin syndrome, such as: agitation, hallucinations, fever, sweating, shivering, fast heart rate, muscle stiffness, twitching, loss of coordination, nausea, vomiting, or diarrhea. Common side effects may include:  · drowsiness, dizziness, tiredness;  · swelling;  · weight loss;  · blurred vision;  · diarrhea, constipation; or  · stuffy nose. This is not a complete list of side effects and others may occur. Call your doctor for medical advice about side effects. You may report side effects to FDA at 4-581-DUF-7999. What other drugs will affect trazodone? Ask your doctor before taking a nonsteroidal anti-inflammatory drug (NSAID) such as aspirin, ibuprofen (Advil, Motrin), naproxen (Aleve), celecoxib (Celebrex), diclofenac, indomethacin, meloxicam, and others. Using an NSAID with trazodone may cause you to bruise or bleed easily. Using trazodone with other drugs that make you drowsy can worsen this effect. Ask your doctor before using opioid medication, a sleeping pill, a muscle relaxer, or medicine for anxiety or seizures. Tell your doctor about all your current medicines. Many drugs can affect trazodone, especially:  · any other antidepressants;  · phenytoin;  · Janneth's wort;  · tramadol;  · a diuretic or \"water pill\";  · medicine to treat anxiety, mood disorders, or mental illness such as schizophrenia;  · a blood thinner --warfarin, Coumadin, Jantoven; or  · migraine headache medicine --sumatriptan, Imitrex, Maxalt, Treximet, and others. This list is not complete and many other drugs may affect trazodone. This includes prescription and over-the-counter medicines, vitamins, and herbal products. Not all possible drug interactions are listed here. Where can I get more information?   Your pharmacist can provide more information about trazodone. Remember, keep this and all other medicines out of the reach of children, never share your medicines with others, and use this medication only for the indication prescribed. Every effort has been made to ensure that the information provided by [address] is accurate, up-to-date, and complete, but no guarantee is made to that effect. Drug information contained herein may be time sensitive. [facility] information has been compiled for use by healthcare practitioners and consumers in the [place] and therefore [facility] does not warrant that uses outside of the [place] are appropriate, unless specifically indicated otherwise. [facility]'s drug information does not endorse drugs, diagnose patients or recommend therapy. [facility]'s drug information is an informational resource designed to assist licensed healthcare practitioners in caring for their patients and/or to serve consumers viewing this service as a supplement to, and not a substitute for, the expertise, skill, knowledge and judgment of healthcare practitioners. The absence of a warning for a given drug or drug combination in no way should be construed to indicate that the drug or drug combination is safe, effective or appropriate for any given patient. [facility] does not assume any responsibility for any aspect of healthcare administered with the aid of information [facility] provides. The information contained herein is not intended to cover all possible uses, directions, precautions, warnings, drug interactions, allergic reactions, or adverse effects. If you have questions about the drugs you are taking, check with your doctor, nurse or pharmacist.  Copyright 0397-3599 [employer] [address] [place] [address] Avenue: 10.03. Revision date: 4/22/2020. Care instructions adapted under license by [facility] ([facility]).  If you have questions about a medical condition or this instruction, always ask your healthcare professional. [place] [address] any warranty or liability for your use of this information. Patient Education        duloxetine  Pronunciation:  chiquita BERMUDEZ 25 e teen  Brand:  Norman Lo  What is the most important information I should know about duloxetine? Do not take duloxetine within 5 days before or 14 days after you have used an MAO inhibitor, such as isocarboxazid, linezolid, methylene blue injection, phenelzine, rasagiline, selegiline, or tranylcypromine. Some young people have thoughts about suicide when first taking an antidepressant. Stay alert to changes in your mood or symptoms. Report any new or worsening symptoms to your doctor. Do not stop using duloxetine without first talking to your doctor. What is duloxetine? Duloxetine is a selective serotonin and norepinephrine reuptake inhibitor antidepressant (SSNRI). Duloxetine is used to treat major depressive disorder in adults. Duloxetine is also used to treat general anxiety disorder in adults and children at least 9years old. Duloxetine is also used in adults to treat nerve pain caused by diabetes (diabetic neuropathy), or chronic muscle or joint pain (such as low back pain and osteoarthritis pain). Duloxetine is also used to treat fibromyalgia (a chronic pain disorder) in adults and children at least 15years old. Duloxetine may also be used for purposes not listed in this medication guide. What should I discuss with my healthcare provider before taking duloxetine? You should not use duloxetine if you are allergic to it. Do not take duloxetine within 5 days before or 14 days after you have used an MAO inhibitor, such as isocarboxazid, linezolid, methylene blue injection, phenelzine, rasagiline, selegiline, or tranylcypromine. A dangerous drug interaction could occur.   Be sure your doctor knows if you also take stimulant medicine, opioid medicine, herbal products, or medicine for depression, mental illness, Parkinson's disease, migraine headaches, serious infections, or prevention of nausea and vomiting. These medicines may interact with duloxetine and cause a serious condition called serotonin syndrome. Duloxetine is not approved for use by anyone younger than 9years old. Tell your doctor if you have ever had:  · liver or kidney disease;  · slow digestion;  · a seizure;  · bleeding problems;  · narrow-angle glaucoma;  · bipolar disorder (manic depression); or  · drug addiction or suicidal thoughts. Some young people have thoughts about suicide when first taking an antidepressant. Your doctor should check your progress at regular visits. Your family or other caregivers should also be alert to changes in your mood or symptoms. Ask your doctor about taking duloxetine if you are pregnant. Taking this medicine during late pregnancy may cause excessive bleeding during childbirth, or serious medical complications in the baby. However, if not treated during pregnancy, conditions such as depression or fibromyalgia may cause complications in the baby or the mother. If you are pregnant, your name may be listed on a pregnancy registry to track the effects of duloxetine on the baby. Tell your doctor right away if you become pregnant while taking duloxetine. Do not start or stop taking this medicine without your doctor's advice. It may not be safe to breast-feed while using this medicine. Ask your doctor about any risk. How should I take duloxetine? Follow all directions on your prescription label and read all medication guides or instruction sheets. Your doctor may occasionally change your dose. Use the medicine exactly as directed. Taking duloxetine in higher doses or more often than prescribed will not make it more effective, and may increase side effects. Swallow the capsule whole and do not crush, chew, break, or open it. You may take duloxetine with or without food. It may take up to 4 weeks before your symptoms improve.  Keep using the medication as directed and tell your doctor if your symptoms do not improve. Your blood pressure will need to be checked often. Do not stop using duloxetine suddenly, or you could have unpleasant symptoms (such as dizziness, nausea, diarrhea, irritability, or anxiety). Ask your doctor how to safely stop using this medicine. Store at room temperature away from moisture and heat. What happens if I miss a dose? Take the medicine as soon as you can, but skip the missed dose if it is almost time for your next dose. Do not take two doses at one time. What happens if I overdose? Seek emergency medical attention or call the Poison Help line at 1-247.186.1884. Overdose symptoms may include vomiting, dizziness or drowsiness, seizures, fast heartbeats, fainting, or coma. What should I avoid while taking duloxetine? Avoid driving or hazardous activity until you know how this medicine will affect you. Your reactions could be impaired. Dizziness or fainting can cause falls, accidents, or severe injuries. Avoid getting up too fast from a sitting or lying position, or you may feel dizzy. Avoid drinking alcohol. It may increase your risk of liver damage. Ask your doctor before taking a nonsteroidal anti-inflammatory drug (NSAID) such as aspirin, ibuprofen (Advil, Motrin), naproxen (Aleve), celecoxib (Celebrex), diclofenac, indomethacin, meloxicam, and others. Using an NSAID with duloxetine may cause you to bruise or bleed easily. What are the possible side effects of duloxetine? Get emergency medical help if you have signs of an allergic reaction (hives, difficult breathing, swelling in your face or throat) or a severe skin reaction (fever, sore throat, burning eyes, skin pain, red or purple skin rash with blistering and peeling).   Report any new or worsening symptoms to your doctor, such as: mood or behavior changes, anxiety, panic attacks, trouble sleeping, or if you feel impulsive, irritable, agitated, hostile, aggressive, restless, and all other medicines out of the reach of children, never share your medicines with others, and use this medication only for the indication prescribed. Every effort has been made to ensure that the information provided by [address] is accurate, up-to-date, and complete, but no guarantee is made to that effect. Drug information contained herein may be time sensitive. [facility] information has been compiled for use by healthcare practitioners and consumers in the [place] and therefore [facility] does not warrant that uses outside of the [place] are appropriate, unless specifically indicated otherwise. [facility]'s drug information does not endorse drugs, diagnose patients or recommend therapy. [facility]'s drug information is an informational resource designed to assist licensed healthcare practitioners in caring for their patients and/or to serve consumers viewing this service as a supplement to, and not a substitute for, the expertise, skill, knowledge and judgment of healthcare practitioners. The absence of a warning for a given drug or drug combination in no way should be construed to indicate that the drug or drug combination is safe, effective or appropriate for any given patient. [facility] does not assume any responsibility for any aspect of healthcare administered with the aid of information [facility] provides. The information contained herein is not intended to cover all possible uses, directions, precautions, warnings, drug interactions, allergic reactions, or adverse effects. If you have questions about the drugs you are taking, check with your doctor, nurse or pharmacist.  Copyright 9177-1130 [address]: 13.01. Revision date: 5/28/2020. Care instructions adapted under license by [facility] ([facility]).  If you have questions about a medical condition or this instruction, always ask your healthcare professional. [place] [address] any warranty or liability for your use of this information.
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
## 2021-01-04 RX ORDER — ALLOPURINOL 100 MG/1
TABLET ORAL
Qty: 90 TABLET | Refills: 1 | Status: SHIPPED | OUTPATIENT
Start: 2021-01-04 | End: 2021-06-09

## 2021-02-24 ENCOUNTER — OFFICE VISIT (OUTPATIENT)
Dept: FAMILY MEDICINE CLINIC | Age: 61
End: 2021-02-24
Payer: COMMERCIAL

## 2021-02-24 VITALS
DIASTOLIC BLOOD PRESSURE: 88 MMHG | WEIGHT: 240.6 LBS | BODY MASS INDEX: 33.56 KG/M2 | OXYGEN SATURATION: 95 % | TEMPERATURE: 98 F | SYSTOLIC BLOOD PRESSURE: 123 MMHG | HEART RATE: 100 BPM

## 2021-02-24 DIAGNOSIS — K21.9 GASTROESOPHAGEAL REFLUX DISEASE WITHOUT ESOPHAGITIS: ICD-10-CM

## 2021-02-24 DIAGNOSIS — R35.0 URINARY FREQUENCY: ICD-10-CM

## 2021-02-24 DIAGNOSIS — F10.10 ALCOHOL ABUSE: ICD-10-CM

## 2021-02-24 DIAGNOSIS — M79.2 PERIPHERAL NEURALGIA: ICD-10-CM

## 2021-02-24 DIAGNOSIS — F51.02 ADJUSTMENT INSOMNIA: ICD-10-CM

## 2021-02-24 DIAGNOSIS — M1A.40X0 OTHER SECONDARY CHRONIC GOUT WITHOUT TOPHUS, UNSPECIFIED SITE: ICD-10-CM

## 2021-02-24 DIAGNOSIS — M54.50 ACUTE LEFT-SIDED LOW BACK PAIN WITHOUT SCIATICA: ICD-10-CM

## 2021-02-24 DIAGNOSIS — F32.9 REACTIVE DEPRESSION: ICD-10-CM

## 2021-02-24 DIAGNOSIS — Z13.220 LIPID SCREENING: ICD-10-CM

## 2021-02-24 DIAGNOSIS — R10.13 DYSPEPSIA: ICD-10-CM

## 2021-02-24 DIAGNOSIS — F33.1 MODERATE EPISODE OF RECURRENT MAJOR DEPRESSIVE DISORDER (HCC): ICD-10-CM

## 2021-02-24 DIAGNOSIS — I73.9 PERIPHERAL VASCULAR INSUFFICIENCY (HCC): ICD-10-CM

## 2021-02-24 DIAGNOSIS — I10 ESSENTIAL HYPERTENSION: Primary | ICD-10-CM

## 2021-02-24 PROBLEM — M1A.9XX0 CHRONIC GOUT WITHOUT TOPHUS: Status: ACTIVE | Noted: 2021-02-24

## 2021-02-24 LAB
BILIRUBIN, POC: ABNORMAL
BLOOD URINE, POC: ABNORMAL
CLARITY, POC: CLEAR
COLOR, POC: CLEAR
GLUCOSE URINE, POC: ABNORMAL
KETONES, POC: 15
LEUKOCYTE EST, POC: ABNORMAL
NITRITE, POC: ABNORMAL
PH, POC: 5.5
PROTEIN, POC: ABNORMAL
SPECIFIC GRAVITY, POC: 1.02
UROBILINOGEN, POC: 0.2

## 2021-02-24 PROCEDURE — G8427 DOCREV CUR MEDS BY ELIG CLIN: HCPCS | Performed by: NURSE PRACTITIONER

## 2021-02-24 PROCEDURE — 36415 COLL VENOUS BLD VENIPUNCTURE: CPT | Performed by: NURSE PRACTITIONER

## 2021-02-24 PROCEDURE — G8482 FLU IMMUNIZE ORDER/ADMIN: HCPCS | Performed by: NURSE PRACTITIONER

## 2021-02-24 PROCEDURE — 1036F TOBACCO NON-USER: CPT | Performed by: NURSE PRACTITIONER

## 2021-02-24 PROCEDURE — 81002 URINALYSIS NONAUTO W/O SCOPE: CPT | Performed by: NURSE PRACTITIONER

## 2021-02-24 PROCEDURE — 3017F COLORECTAL CA SCREEN DOC REV: CPT | Performed by: NURSE PRACTITIONER

## 2021-02-24 PROCEDURE — G8417 CALC BMI ABV UP PARAM F/U: HCPCS | Performed by: NURSE PRACTITIONER

## 2021-02-24 PROCEDURE — 99214 OFFICE O/P EST MOD 30 MIN: CPT | Performed by: NURSE PRACTITIONER

## 2021-02-24 RX ORDER — DULOXETINE 40 MG/1
40 CAPSULE, DELAYED RELEASE ORAL DAILY
Qty: 30 CAPSULE | Refills: 5 | Status: SHIPPED | OUTPATIENT
Start: 2021-02-24 | End: 2021-06-09 | Stop reason: SDUPTHER

## 2021-02-24 RX ORDER — PREDNISONE 20 MG/1
20 TABLET ORAL DAILY
Qty: 10 TABLET | Refills: 0 | Status: SHIPPED | OUTPATIENT
Start: 2021-02-24 | End: 2021-03-06

## 2021-02-24 RX ORDER — TRAZODONE HYDROCHLORIDE 150 MG/1
150 TABLET ORAL NIGHTLY PRN
Qty: 30 TABLET | Refills: 5 | Status: SHIPPED | OUTPATIENT
Start: 2021-02-24 | End: 2021-06-09 | Stop reason: SDUPTHER

## 2021-02-24 ASSESSMENT — ENCOUNTER SYMPTOMS
GASTROINTESTINAL NEGATIVE: 1
BACK PAIN: 1
EYES NEGATIVE: 1
ALLERGIC/IMMUNOLOGIC NEGATIVE: 1
RESPIRATORY NEGATIVE: 1
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
## 2021-02-24 NOTE — PATIENT INSTRUCTIONS
Please read the healthy family handout that you were given and share it with your family. Please compare this printed medication list with your medications at home to be sure they are the same. If you have any medications that are different please contact us immediately at [phone]. Also review your allergies that we have listed, these may also include medications that you have not been able to tolerate, make sure everything listed is correct. If you have any allergies that are different please contact us immediately at [phone]. Patient Education        Back Strain: Care Instructions  Overview     A back strain happens when you overstretch, or pull, a muscle in your back. You may hurt your back in an accident or when you exercise or lift something. Sometimes you may not know how you hurt your back. Most back pain will get better with rest and time. You can take care of yourself at home to help your back heal.  Follow-up care is a key part of your treatment and safety. Be sure to make and go to all appointments, and call your doctor if you are having problems. It's also a good idea to know your test results and keep a list of the medicines you take. How can you care for yourself at home? · Try to stay as active as you can, but stop or reduce any activity that causes pain. · Put ice or a cold pack on the sore muscle for 10 to 20 minutes at a time to stop swelling. Try this every 1 to 2 hours for 3 days (when you are awake) or until the swelling goes down. Put a thin cloth between the ice pack and your skin. · After 2 or 3 days, apply a heating pad on low or a warm cloth to your back. Some doctors suggest that you go back and forth between hot and cold treatments. · Take pain medicines exactly as directed. ? If the doctor gave you a prescription medicine for pain, take it as prescribed.   ? If you are not taking a prescription pain medicine, ask your doctor if you can take an over-the-counter medicine. · Try sleeping on your side with a pillow between your legs. Or put a pillow under your knees when you lie on your back. These measures can ease pain in your lower back. · Return to your usual level of activity slowly. When should you call for help? Call 911 anytime you think you may need emergency care. For example, call if:    · You are unable to move a leg at all. Call your doctor now or seek immediate medical care if:    · You have new or worse symptoms in your legs, belly, or buttocks. Symptoms may include:  ? Numbness or tingling. ? Weakness. ? Pain.     · You lose bladder or bowel control. Watch closely for changes in your health, and be sure to contact your doctor if:    · You have a fever, lose weight, or don't feel well.     · You are not getting better as expected. Where can you learn more? Go to https://Keldeal.[employer]. org and sign in to your Rep[employer]Pal account. Enter R175 in the [employer] box to learn more about \"Back Strain: Care Instructions. \"     If you do not have an account, please click on the \"Sign Up Now\" link. Current as of: March 2, 2020               Content Version: 12.6  © 4315-5491 Bringg. Care instructions adapted under license by [facility] ([facility]). If you have questions about a medical condition or this instruction, always ask your healthcare professional. [place] [address] any warranty or liability for your use of this information. Patient Education        Learning About RICE (Rest, Ice, Compression, and Elevation)  What is RICE? RICE is a way to care for an injury. RICE helps relieve pain and swelling. It may also help with healing and flexibility. RICE stands for:  · R est and protect the injured or sore area. · I ce or a cold pack used as soon as possible. · C ompression, or wrapping the injured or sore area with an elastic bandage. · E levation (propping up) the injured or sore area.   How do you do RICE? You can use RICE for home treatment when you have general aches and pains or after an injury or surgery. Rest  · Do not put weight on the injury for at least 24 to 48 hours. · Use crutches for a badly sprained knee or ankle. · Support a sprained wrist, elbow, or shoulder with a sling. Ice  · Put ice or a cold pack on the injury right away to reduce pain and swelling. Frozen vegetables will also work as an ice pack. Put a thin cloth between the ice or cold pack and your skin. The cloth protects the injured area from getting too cold. · Use ice for 10 to 15 minutes at a time for the first 48 to 72 hours. Compression  · Use compression for sprains, strains, and surgeries of the arms and legs. · Wrap the injured area with an elastic bandage or compression sleeve to reduce swelling. · Don't wrap it too tightly. If the area below it feels numb, tingles, or feels cool, loosen the wrap. Elevation  · Use elevation for areas of the body that can be propped up, such as arms and legs. · Prop up the injured area on pillows whenever you use ice. Keep it propped up anytime you sit or lie down. · Try to keep the injured area at or above the level of your heart. This will help reduce swelling and bruising. Where can you learn more? Go to https://Quandoomonty.ClearFit. org and sign in to your Creator Up account. Enter V253 in the [facility] box to learn more about \"Learning About RICE (Rest, Ice, Compression, and Elevation). \"     If you do not have an account, please click on the \"Sign Up Now\" link. Current as of: March 2, 2020               Content Version: 12.6  © 9160-5397 DocSend, Waterfall. Care instructions adapted under license by [facility] ([facility]). If you have questions about a medical condition or this instruction, always ask your healthcare professional. [address] any warranty or liability for your use of this information.          Patient Education prednisone  Pronunciation:  PRED ni lay  Brand:  Ольга  What is the most important information I should know about prednisone? You should not use prednisone if you have a fungal infection anywhere in your body. You should not stop using prednisone suddenly. Follow your doctor's instructions about tapering your dose. What is prednisone? Prednisone is a steroid that reduces inflammation in the body, and also suppresses your immune system. Prednisone is used to treat many different conditions such as hormonal disorders, skin diseases, arthritis, lupus, psoriasis, allergic conditions, ulcerative colitis, Crohn's disease, eye diseases, lung diseases, asthma, tuberculosis, blood cell disorders, kidney disorders, leukemia, lymphoma, multiple sclerosis, organ transplant rejection, swelling from a brain tumor or injury. Prednisone may also be used for purposes not listed in this medication guide. What should I discuss with my healthcare provider before taking prednisone? You should not use prednisone if you are allergic to it, or if you have a fungal infection anywhere in your body. Steroid medication can weaken your immune system, making it easier for you to get an infection or worsening an infection you already have. Tell your doctor about any illness or infection you've had within the past several weeks.   Tell your doctor if you have ever had:  · heart problems, high blood pressure, or a heart attack;  · glaucoma or cataracts;  · herpes infection of the eyes;  · past or present tuberculosis;  · a parasite infection that causes diarrhea (such as threadworms);  · any illness that causes diarrhea;  · underactive thyroid;  · diabetes;  · a stomach ulcer, diverticulitis;  · a colostomy or ileostomy;  · osteoporosis or low bone mineral density (steroid medication can increase your risk of bone loss);  · low levels of calcium or potassium in your blood;  · cirrhosis or other liver disease;  · mental illness or psychosis; or  · a muscle disorder such as myasthenia gravis. Long-term use of steroids may lead to bone loss (osteoporosis), especially if you smoke or drink alcohol, if you do not exercise, or if you do not get enough vitamin D or calcium in your diet. It is not known whether this medicine will harm an unborn baby. Tell your doctor if you are pregnant or plan to become pregnant. You should not breastfeed while using prednisone. How should I take prednisone? Follow all directions on your prescription label and read all medication guides or instruction sheets. Your doctor may occasionally change your dose. Use the medicine exactly as directed. Prednisone is taken daily or every other day, depending on the condition being treated. You may need to take the medicine at a certain time of day. Follow your doctor's instructions about when and how often to take this medicine. Take with food if prednisone upsets your stomach. Measure liquid medicine carefully. Use the dosing syringe provided, or use a medicine dose-measuring device (not a kitchen spoon). Swallow the delayed-release tablet whole and do not crush, chew, or break it. Prednisone can weaken (suppress) your immune system, and you may get an infection more easily. Call your doctor if you have signs of infection (fever, weakness, cold or flu symptoms, skin sores, diarrhea, frequent or recurring illness). If you have major surgery or a severe injury or infection, your prednisone dose needs may change. Make sure any doctor caring for you knows you are using this medicine. If you use this medicine long-term, you may need medical tests and vision exams. In case of emergency, wear or carry medical identification to let others know you use a steroid. You should not stop using prednisone suddenly. Follow your doctor's instructions about tapering your dose. Store at room temperature away from moisture, heat, and light. What happens if I miss a dose?   Take the medicine as soon as you can, but skip the missed dose if it is almost time for your next dose. Do not take two doses at one time. What happens if I overdose? Seek emergency medical attention or call the Poison Help line at 1-413.702.9385. High doses or long-term use of prednisone can lead to thinning skin, easy bruising, changes in body fat (especially in your face, neck, back, and waist), increased acne or facial hair, menstrual problems, impotence, or loss of interest in sex. What should I avoid while taking prednisone? Do not receive a \"live\" vaccine while using prednisone. The vaccine may not work as well and may not fully protect you from disease. Live vaccines include measles, mumps, rubella (MMR), polio, rotavirus, typhoid, yellow fever, varicella (chickenpox), zoster (shingles), and nasal flu (influenza) vaccine. Avoid being near people who are sick or have infections. Call your doctor for preventive treatment if you are exposed to chickenpox or measles. These conditions can be serious or even fatal in people who are using steroid medicine. Avoid drinking alcohol. What are the possible side effects of prednisone? Get emergency medical help if you have signs of an allergic reaction: hives; difficult breathing; swelling of your face, lips, tongue, or throat.   Call your doctor at once if you have:  · muscle pain or weakness;  · blurred vision, tunnel vision, eye pain, or seeing halos around lights;  · severe depression, changes in personality, unusual thoughts or behavior;  · bloody or tarry stools, coughing up blood or vomit that looks like coffee grounds;  · swelling, rapid weight gain, feeling short of breath;  · irregular heartbeats;  · severe headache, pounding in your neck or ears;  · decreased adrenal gland hormones --muscle weakness, tiredness, diarrhea, nausea, menstrual changes, skin discoloration, craving salty foods, and feeling light-headed; or  · low potassium level --leg cramps, information? Your pharmacist can provide more information about prednisone. Remember, keep this and all other medicines out of the reach of children, never share your medicines with others, and use this medication only for the indication prescribed. Every effort has been made to ensure that the information provided by [address] is accurate, up-to-date, and complete, but no guarantee is made to that effect. Drug information contained herein may be time sensitive. [facility] information has been compiled for use by healthcare practitioners and consumers in the [place] and therefore [facility] does not warrant that uses outside of the [place] are appropriate, unless specifically indicated otherwise. [facility]'s drug information does not endorse drugs, diagnose patients or recommend therapy. [facility]'s drug information is an informational resource designed to assist licensed healthcare practitioners in caring for their patients and/or to serve consumers viewing this service as a supplement to, and not a substitute for, the expertise, skill, knowledge and judgment of healthcare practitioners. The absence of a warning for a given drug or drug combination in no way should be construed to indicate that the drug or drug combination is safe, effective or appropriate for any given patient. [facility] does not assume any responsibility for any aspect of healthcare administered with the aid of information [facility] provides. The information contained herein is not intended to cover all possible uses, directions, precautions, warnings, drug interactions, allergic reactions, or adverse effects. If you have questions about the drugs you are taking, check with your doctor, nurse or pharmacist.  Copyright 3111-5921 [address]. Version: 10.01. Revision date: 3/28/2019. Care instructions adapted under license by [facility] ([facility]).  If you have questions about a medical condition or this instruction, always ask your healthcare professional. [address] any warranty or liability for your use of this information.

## 2021-02-25 LAB
A/G RATIO: 1.5 (ref 1.1–2.2)
ALBUMIN SERPL-MCNC: 4.5 G/DL (ref 3.4–5)
ALP BLD-CCNC: 101 U/L (ref 40–129)
ALT SERPL-CCNC: 76 U/L (ref 10–40)
ANION GAP SERPL CALCULATED.3IONS-SCNC: 13 MMOL/L (ref 3–16)
AST SERPL-CCNC: 52 U/L (ref 15–37)
BILIRUB SERPL-MCNC: 0.8 MG/DL (ref 0–1)
BUN BLDV-MCNC: 15 MG/DL (ref 7–20)
CALCIUM SERPL-MCNC: 9.6 MG/DL (ref 8.3–10.6)
CHLORIDE BLD-SCNC: 100 MMOL/L (ref 99–110)
CO2: 26 MMOL/L (ref 21–32)
CREAT SERPL-MCNC: 0.9 MG/DL (ref 0.9–1.3)
GFR AFRICAN AMERICAN: >60
GFR NON-AFRICAN AMERICAN: >60
GLOBULIN: 3.1 G/DL
GLUCOSE BLD-MCNC: 106 MG/DL (ref 70–99)
POTASSIUM SERPL-SCNC: 4.6 MMOL/L (ref 3.5–5.1)
SODIUM BLD-SCNC: 139 MMOL/L (ref 136–145)
TOTAL PROTEIN: 7.6 G/DL (ref 6.4–8.2)
URIC ACID, SERUM: 7 MG/DL (ref 3.5–7.2)

## 2021-04-28 DIAGNOSIS — R20.2 PARESTHESIA OF BOTH LOWER EXTREMITIES: ICD-10-CM

## 2021-04-28 RX ORDER — GABAPENTIN 300 MG/1
300 CAPSULE ORAL 3 TIMES DAILY
Qty: 90 CAPSULE | Refills: 2 | OUTPATIENT
Start: 2021-04-28 | End: 2021-07-27

## 2021-05-03 ENCOUNTER — TELEPHONE (OUTPATIENT)
Dept: FAMILY MEDICINE CLINIC | Age: 61
End: 2021-05-03

## 2021-05-03 DIAGNOSIS — R20.2 PARESTHESIA OF BOTH LOWER EXTREMITIES: ICD-10-CM

## 2021-05-03 RX ORDER — PREDNISONE 10 MG/1
TABLET ORAL
Qty: 18 TABLET | Refills: 0 | Status: SHIPPED | OUTPATIENT
Start: 2021-05-03 | End: 2021-06-09

## 2021-05-03 NOTE — TELEPHONE ENCOUNTER
Recommend prednisone 30 mg daily x3 days then 20 mg daily x3 days then 10 mg daily x3 days then discontinue.

## 2021-05-03 NOTE — TELEPHONE ENCOUNTER
Patient notified.  Rx sent to [facility] in [facility]

## 2021-05-03 NOTE — TELEPHONE ENCOUNTER
Patient called and state he has gout in his right foot. It is swollen and painful. He is taking Allopurinol 100mg daily.

## 2021-05-04 RX ORDER — GABAPENTIN 300 MG/1
300 CAPSULE ORAL 3 TIMES DAILY
Qty: 90 CAPSULE | Refills: 2 | OUTPATIENT
Start: 2021-05-04 | End: 2021-08-02

## 2021-05-05 ENCOUNTER — TELEPHONE (OUTPATIENT)
Dept: ADMINISTRATIVE | Age: 61
End: 2021-05-05

## 2021-05-05 NOTE — TELEPHONE ENCOUNTER
Submitted PA for DULOXETINE  Via CMM Key: [id number] STATUS: \":Approved; Review Type:Prior Auth; Coverage Start Date:04/06/2021; Coverage End Date:05/06/2022\"    If this requires a response please respond to the pool ( [id number]). Not the person sending the telephone encounter. Thank you please advise patient.

## 2021-05-26 DIAGNOSIS — F51.01 PRIMARY INSOMNIA: ICD-10-CM

## 2021-05-26 RX ORDER — BIOTIN 10000 MCG
TABLET,CHEWABLE ORAL
Qty: 30 CAPSULE | Refills: 5 | Status: SHIPPED | OUTPATIENT
Start: 2021-05-26 | End: 2021-11-24

## 2021-05-27 ENCOUNTER — TELEPHONE (OUTPATIENT)
Dept: FAMILY MEDICINE CLINIC | Age: 61
End: 2021-05-27

## 2021-05-27 RX ORDER — LISINOPRIL 20 MG/1
20 TABLET ORAL DAILY
Qty: 30 TABLET | Refills: 5 | Status: SHIPPED | OUTPATIENT
Start: 2021-05-27 | End: 2021-06-09 | Stop reason: ALTCHOICE

## 2021-05-27 NOTE — TELEPHONE ENCOUNTER
Patient states his bp has been running high again and he has been having some headaches. Recent bp readings 166/91 155/99 161/109 176/118. He was on bp med in the past but states he has not been on it for awhile.   His next appt is not until end of Aug.  Wanted to see if you want to start him back on bp med or have him make appt sooner

## 2021-06-09 ENCOUNTER — OFFICE VISIT (OUTPATIENT)
Dept: FAMILY MEDICINE CLINIC | Age: 61
End: 2021-06-09
Payer: COMMERCIAL

## 2021-06-09 VITALS
OXYGEN SATURATION: 95 % | BODY MASS INDEX: 33.64 KG/M2 | WEIGHT: 241.2 LBS | TEMPERATURE: 98.2 F | HEART RATE: 76 BPM | DIASTOLIC BLOOD PRESSURE: 103 MMHG | SYSTOLIC BLOOD PRESSURE: 157 MMHG

## 2021-06-09 DIAGNOSIS — F51.02 ADJUSTMENT INSOMNIA: ICD-10-CM

## 2021-06-09 DIAGNOSIS — R20.2 PARESTHESIA OF BOTH LOWER EXTREMITIES: ICD-10-CM

## 2021-06-09 DIAGNOSIS — M1A.40X0 OTHER SECONDARY CHRONIC GOUT WITHOUT TOPHUS, UNSPECIFIED SITE: ICD-10-CM

## 2021-06-09 DIAGNOSIS — F32.9 REACTIVE DEPRESSION: ICD-10-CM

## 2021-06-09 DIAGNOSIS — I10 ESSENTIAL HYPERTENSION: Primary | ICD-10-CM

## 2021-06-09 DIAGNOSIS — R73.09 ELEVATED GLUCOSE: ICD-10-CM

## 2021-06-09 PROCEDURE — 3017F COLORECTAL CA SCREEN DOC REV: CPT | Performed by: NURSE PRACTITIONER

## 2021-06-09 PROCEDURE — G8417 CALC BMI ABV UP PARAM F/U: HCPCS | Performed by: NURSE PRACTITIONER

## 2021-06-09 PROCEDURE — 99214 OFFICE O/P EST MOD 30 MIN: CPT | Performed by: NURSE PRACTITIONER

## 2021-06-09 PROCEDURE — 1036F TOBACCO NON-USER: CPT | Performed by: NURSE PRACTITIONER

## 2021-06-09 PROCEDURE — G8427 DOCREV CUR MEDS BY ELIG CLIN: HCPCS | Performed by: NURSE PRACTITIONER

## 2021-06-09 RX ORDER — TRAZODONE HYDROCHLORIDE 150 MG/1
150 TABLET ORAL NIGHTLY PRN
Qty: 30 TABLET | Refills: 5 | Status: SHIPPED | OUTPATIENT
Start: 2021-06-09 | End: 2021-11-26

## 2021-06-09 RX ORDER — GABAPENTIN 400 MG/1
400 CAPSULE ORAL 3 TIMES DAILY
Qty: 90 CAPSULE | Refills: 2 | Status: SHIPPED | OUTPATIENT
Start: 2021-06-09 | End: 2021-08-26

## 2021-06-09 RX ORDER — DULOXETINE 40 MG/1
40 CAPSULE, DELAYED RELEASE ORAL DAILY
Qty: 30 CAPSULE | Refills: 2 | Status: SHIPPED | OUTPATIENT
Start: 2021-06-09 | End: 2021-11-26

## 2021-06-09 RX ORDER — ALLOPURINOL 100 MG/1
TABLET ORAL
Qty: 30 TABLET | Refills: 5 | Status: SHIPPED | OUTPATIENT
Start: 2021-06-09 | End: 2021-12-20

## 2021-06-09 RX ORDER — LISINOPRIL 30 MG/1
30 TABLET ORAL DAILY
Qty: 30 TABLET | Refills: 5 | Status: SHIPPED | OUTPATIENT
Start: 2021-06-09 | End: 2021-08-26 | Stop reason: SDUPTHER

## 2021-06-09 ASSESSMENT — ENCOUNTER SYMPTOMS
RESPIRATORY NEGATIVE: 1
ALLERGIC/IMMUNOLOGIC NEGATIVE: 1
EYES NEGATIVE: 1

## 2021-06-09 NOTE — PATIENT INSTRUCTIONS
Please read the healthy family handout that you were given and share it with your family. Please compare this printed medication list with your medications at home to be sure they are the same. If you have any medications that are different please contact us immediately at [phone]. Also review your allergies that we have listed, these may also include medications that you have not been able to tolerate, make sure everything listed is correct. If you have any allergies that are different please contact us immediately at [phone]. Patient Education      Reduce salt in diet, take medications as ordered, monitor blood pressure daily, keep a log and call with report in 2 weeks. DASH Diet: Care Instructions  Your Care Instructions     The DASH diet is an eating plan that can help lower your blood pressure. DASH stands for Dietary Approaches to Stop Hypertension. Hypertension is high blood pressure. The DASH diet focuses on eating foods that are high in calcium, potassium, and magnesium. These nutrients can lower blood pressure. The foods that are highest in these nutrients are fruits, vegetables, low-fat dairy products, nuts, seeds, and legumes. But taking calcium, potassium, and magnesium supplements instead of eating foods that are high in those nutrients does not have the same effect. The DASH diet also includes whole grains, fish, and poultry. The DASH diet is one of several lifestyle changes your doctor may recommend to lower your high blood pressure. Your doctor may also want you to decrease the amount of sodium in your diet. Lowering sodium while following the DASH diet can lower blood pressure even further than just the DASH diet alone. Follow-up care is a key part of your treatment and safety. Be sure to make and go to all appointments, and call your doctor if you are having problems. It's also a good idea to know your test results and keep a list of the medicines you take.   How can you care for yourself at home? Following the DASH diet  · Eat 4 to 5 servings of fruit each day. A serving is 1 medium-sized piece of fruit, ½ cup chopped or canned fruit, 1/4 cup dried fruit, or 4 ounces (½ cup) of fruit juice. Choose fruit more often than fruit juice. · Eat 4 to 5 servings of vegetables each day. A serving is 1 cup of lettuce or raw leafy vegetables, ½ cup of chopped or cooked vegetables, or 4 ounces (½ cup) of vegetable juice. Choose vegetables more often than vegetable juice. · Get 2 to 3 servings of low-fat and fat-free dairy each day. A serving is 8 ounces of milk, 1 cup of yogurt, or 1 ½ ounces of cheese. · Eat 6 to 8 servings of grains each day. A serving is 1 slice of bread, 1 ounce of dry cereal, or ½ cup of cooked rice, pasta, or cooked cereal. Try to choose whole-grain products as much as possible. · Limit lean meat, poultry, and fish to 2 servings each day. A serving is 3 ounces, about the size of a deck of cards. · Eat 4 to 5 servings of nuts, seeds, and legumes (cooked dried beans, lentils, and split peas) each week. A serving is 1/3 cup of nuts, 2 tablespoons of seeds, or ½ cup of cooked beans or peas. · Limit fats and oils to 2 to 3 servings each day. A serving is 1 teaspoon of vegetable oil or 2 tablespoons of salad dressing. · Limit sweets and added sugars to 5 servings or less a week. A serving is 1 tablespoon jelly or jam, ½ cup sorbet, or 1 cup of lemonade. · Eat less than 2,300 milligrams (mg) of sodium a day. If you limit your sodium to 1,500 mg a day, you can lower your blood pressure even more. · Be aware that all of these are the suggested number of servings for people who eat 1,800 to 2,000 calories a day. Your recommended number of servings may be different if you need more or fewer calories. Tips for success  · Start small. Do not try to make dramatic changes to your diet all at once.  You might feel that you are missing out on your favorite foods and then be more likely to not follow the plan. Make small changes, and stick with them. Once those changes become habit, add a few more changes. · Try some of the following:  ? Make it a goal to eat a fruit or vegetable at every meal and at snacks. This will make it easy to get the recommended amount of fruits and vegetables each day. ? Try yogurt topped with fruit and nuts for a snack or healthy dessert. ? Add lettuce, tomato, cucumber, and onion to sandwiches. ? Combine a ready-made pizza crust with low-fat mozzarella cheese and lots of vegetable toppings. Try using tomatoes, squash, spinach, broccoli, carrots, cauliflower, and onions. ? Have a variety of cut-up vegetables with a low-fat dip as an appetizer instead of chips and dip. ? Sprinkle sunflower seeds or chopped almonds over salads. Or try adding chopped walnuts or almonds to cooked vegetables. ? Try some vegetarian meals using beans and peas. Add garbanzo or kidney beans to salads. Make burritos and tacos with mashed white beans or black beans. Where can you learn more? Go to https://Dreamzer [email or web address] technologyeb.GreenBiz Group. org and sign in to your [employer] account. Enter G757 in the [facility] box to learn more about \"DASH Diet: Care Instructions. \"     If you do not have an account, please click on the \"Sign Up Now\" link. Current as of: August 31, 2020               Content Version: 12.8  © 2919-3874 Healthwise, [employer]orporated. Care instructions adapted under license by [facility] ([facility]). If you have questions about a medical condition or this instruction, always ask your healthcare professional. [place] [address] any warranty or liability for your use of this information.

## 2021-06-09 NOTE — PROGRESS NOTES
Subjective:      Patient ID: Aurelia Mendieta is a 64 y.o. male. Chief Complaint   Patient presents with    Other     bp        HPI     Patient presents today to f/u on HTN. Hypertension:  Home blood pressure monitoring: Yes - improving however it is still elevated. 150-160's over . He is not adherent to a low sodium diet. Patient denies chest pain, shortness of breath, lightheadedness, blurred vision, peripheral edema, palpitations, dry cough and fatigue however he had headache and had a nose bleed one day. Antihypertensive medication side effects: no medication side effects noted. Use of agents associated with hypertension: none. Sodium (mmol/L)   Date Value   02/24/2021 139    BUN (mg/dL)   Date Value   02/24/2021 15    Glucose (mg/dL)   Date Value   02/24/2021 106 (H)      Potassium (mmol/L)   Date Value   02/24/2021 4.6     Potassium reflex Magnesium (mmol/L)   Date Value   12/30/2018 4.1    CREATININE (mg/dL)   Date Value   02/24/2021 0.9             Aurelia Mendieta is a 64 y.o. male who presents to f/u on chronic gout. Patient reports gout flare about every 2 weeks. Pain is located in multiple joints, and has been present for several years. Pain is described as aching, and is intermittent. Associated symptoms: crepitation, decreased range of motion, tenderness and warmth. Patient was previously not Allopurinol however he stopped taking, it seems like he lost track of his medication. Review of Systems   Constitutional: Negative for appetite change, chills and fever. HENT: Negative. Eyes: Negative. Respiratory: Negative. Cardiovascular: Negative. Endocrine: Negative. Genitourinary: Negative. Musculoskeletal: Positive for arthralgias. Skin: Negative. Allergic/Immunologic: Negative. Neurological: Positive for headaches. Hematological: Negative. Psychiatric/Behavioral: Negative.         Patient Active Problem List Diagnosis    Alcohol abuse    Moderate episode of recurrent major depressive disorder (HCC)    Peripheral neuralgia    Essential hypertension    Bilateral foot pain    Dyspepsia    Gastroesophageal reflux disease without esophagitis    Esophageal dysphagia    Esophagitis, [place] grade C    Sensorineural hearing loss, bilateral    Peripheral vascular insufficiency (HCC)    Chronic gout without tophus       No outpatient medications have been marked as taking for the 6/9/21 encounter (Appointment) with HERRERA Morris CNP. No Known Allergies    Social History     Tobacco Use    Smoking status: Never Smoker    Smokeless tobacco: Never Used   Substance Use Topics    Alcohol use: Yes     Comment: 1 pint a day. \"quit a couple of years ago\"- 08/24/2020         Objective:   BP (!) 157/94   Pulse 76   Temp 98.2 °F (36.8 °C) (Oral)   Wt 241 lb 3.2 oz (109.4 kg)   SpO2 95%   BMI 33.64 kg/m²     Vitals:    06/09/21 1632 06/09/21 1719   BP: (!) 157/94 (!) 157/103   Pulse: 76    Temp: 98.2 °F (36.8 °C)    TempSrc: Oral    SpO2: 95%    Weight: 241 lb 3.2 oz (109.4 kg)      Physical Exam  Vitals and nursing note reviewed. Constitutional:       General: He is not in acute distress. Appearance: Normal appearance. He is well-developed and well-groomed. He is not ill-appearing or toxic-appearing. HENT:      Head: Normocephalic and atraumatic. Eyes:      Extraocular Movements: Extraocular movements intact. Conjunctiva/sclera: Conjunctivae normal.   Cardiovascular:      Rate and Rhythm: Normal rate and regular rhythm. Pulses: Normal pulses. Heart sounds: Normal heart sounds, S1 normal and S2 normal.   Pulmonary:      Effort: Pulmonary effort is normal. No accessory muscle usage or respiratory distress. Breath sounds: Normal breath sounds. Abdominal:      General: Bowel sounds are normal.      Palpations: Abdomen is soft.    Musculoskeletal:      Cervical back: Neck supple. Right lower leg: No edema. Left lower leg: No edema. Lymphadenopathy:      Cervical: No cervical adenopathy. Skin:     General: Skin is warm and moist.      Capillary Refill: Capillary refill takes less than 2 seconds. Findings: No rash. Neurological:      General: No focal deficit present. Mental Status: He is alert and oriented to person, place, and time. Mental status is at baseline. Psychiatric:         Attention and Perception: Attention normal.         Mood and Affect: Mood normal.         Speech: Speech normal.         Behavior: Behavior normal. Behavior is cooperative. Assessment/Plan:   1. Essential hypertension  Patient presents today to follow-up on blood pressure. Patient has been monitoring his blood pressure reports it is improved however remains elevated. Recommend increasing lisinopril to 30 mg daily. Advised patient to work on reducing sodium in diet and avoid excessive alcohol intake. Instructed to monitor blood pressure daily, keep a log and call with report in 2 weeks. Provided patient with order for blood work as below. Patient will obtain blood work at outside lab. Patient verbalized understanding agreeable to plan. - lisinopril (PRINIVIL;ZESTRIL) 30 MG tablet; Take 1 tablet by mouth daily  Dispense: 30 tablet; Refill: 5  - Comprehensive Metabolic Panel; Future  - CBC Auto Differential; Future    2. Adjustment insomnia  Well-controlled with trazodone. Patient denies any side effects. Recommend patient continue with trazodone as ordered. Refills provided. - traZODone (DESYREL) 150 MG tablet; Take 1 tablet by mouth nightly as needed for Sleep  Dispense: 30 tablet; Refill: 5    3. Reactive depression  Significantly improved. Patient is tolerating medication without any side effects. Refill on duloxetine as below. Recommend patient continue with current treatment. Patient agreeable. - DULoxetine HCl 40 MG CPEP;  Take 40 mg by mouth daily  Dispense: 30 capsule; Refill: 2    4. Paresthesia of both lower extremities  Improvement with gabapentin. [facility] report reviewed with no concerns noted. Recommend patient continue with gabapentin 400 mg 3 times daily. Refills provided. - gabapentin (NEURONTIN) 400 MG capsule; Take 1 capsule by mouth 3 times daily for 90 days. Dispense: 90 capsule; Refill: 2    5. Other secondary chronic gout without tophus, unspecified site  Patient reports gout flare about every 2 weeks. Patient is back to drinking alcohol regularly. Patient was previously prescribed allopurinol however somewhere along the line he stopped taking. Patient is not currently having a gout flare. Recommend patient start back on allopurinol 100 mg daily. Order for uric acid level. Advised on low purine diet. Advised patient to follow up should he develop any gout flares. Patient verbalized understanding and agreeable to plan. - allopurinol (ZYLOPRIM) 100 MG tablet; TAKE 1 TABLET BY MOUTH EVERY DAY  Dispense: 30 tablet; Refill: 5  - URIC ACID; Future    6.  Elevated glucose  - Hemoglobin A1C; Future

## 2021-06-18 DIAGNOSIS — F32.9 REACTIVE DEPRESSION: ICD-10-CM

## 2021-06-18 RX ORDER — DULOXETIN HYDROCHLORIDE 20 MG/1
CAPSULE, DELAYED RELEASE ORAL
Qty: 30 CAPSULE | Refills: 5 | OUTPATIENT
Start: 2021-06-18

## 2021-06-23 DIAGNOSIS — F32.9 REACTIVE DEPRESSION: ICD-10-CM

## 2021-06-23 RX ORDER — DULOXETIN HYDROCHLORIDE 20 MG/1
CAPSULE, DELAYED RELEASE ORAL
Qty: 30 CAPSULE | Refills: 5 | OUTPATIENT
Start: 2021-06-23

## 2021-06-25 DIAGNOSIS — F32.9 REACTIVE DEPRESSION: ICD-10-CM

## 2021-06-25 DIAGNOSIS — F51.02 ADJUSTMENT INSOMNIA: ICD-10-CM

## 2021-06-25 RX ORDER — TRAZODONE HYDROCHLORIDE 50 MG/1
TABLET ORAL
Qty: 30 TABLET | Refills: 5 | OUTPATIENT
Start: 2021-06-25

## 2021-06-25 RX ORDER — DULOXETIN HYDROCHLORIDE 30 MG/1
CAPSULE, DELAYED RELEASE ORAL
Qty: 30 CAPSULE | Refills: 5 | OUTPATIENT
Start: 2021-06-25

## 2021-08-26 ENCOUNTER — OFFICE VISIT (OUTPATIENT)
Dept: FAMILY MEDICINE CLINIC | Age: 61
End: 2021-08-26
Payer: COMMERCIAL

## 2021-08-26 VITALS
OXYGEN SATURATION: 96 % | HEIGHT: 71 IN | SYSTOLIC BLOOD PRESSURE: 152 MMHG | TEMPERATURE: 98.4 F | DIASTOLIC BLOOD PRESSURE: 97 MMHG | WEIGHT: 234.6 LBS | HEART RATE: 78 BPM | BODY MASS INDEX: 32.84 KG/M2

## 2021-08-26 DIAGNOSIS — M1A.40X0 OTHER SECONDARY CHRONIC GOUT WITHOUT TOPHUS, UNSPECIFIED SITE: ICD-10-CM

## 2021-08-26 DIAGNOSIS — F33.1 MODERATE EPISODE OF RECURRENT MAJOR DEPRESSIVE DISORDER (HCC): ICD-10-CM

## 2021-08-26 DIAGNOSIS — R20.2 PARESTHESIA OF BOTH LOWER EXTREMITIES: ICD-10-CM

## 2021-08-26 DIAGNOSIS — M79.2 PERIPHERAL NEURALGIA: ICD-10-CM

## 2021-08-26 DIAGNOSIS — I73.9 PERIPHERAL VASCULAR INSUFFICIENCY (HCC): ICD-10-CM

## 2021-08-26 DIAGNOSIS — K21.9 GASTROESOPHAGEAL REFLUX DISEASE WITHOUT ESOPHAGITIS: ICD-10-CM

## 2021-08-26 DIAGNOSIS — Z12.5 PROSTATE CANCER SCREENING: ICD-10-CM

## 2021-08-26 DIAGNOSIS — I10 ESSENTIAL HYPERTENSION: Primary | ICD-10-CM

## 2021-08-26 PROCEDURE — G8427 DOCREV CUR MEDS BY ELIG CLIN: HCPCS | Performed by: NURSE PRACTITIONER

## 2021-08-26 PROCEDURE — G8417 CALC BMI ABV UP PARAM F/U: HCPCS | Performed by: NURSE PRACTITIONER

## 2021-08-26 PROCEDURE — 1036F TOBACCO NON-USER: CPT | Performed by: NURSE PRACTITIONER

## 2021-08-26 PROCEDURE — 36415 COLL VENOUS BLD VENIPUNCTURE: CPT | Performed by: NURSE PRACTITIONER

## 2021-08-26 PROCEDURE — 3017F COLORECTAL CA SCREEN DOC REV: CPT | Performed by: NURSE PRACTITIONER

## 2021-08-26 PROCEDURE — 99214 OFFICE O/P EST MOD 30 MIN: CPT | Performed by: NURSE PRACTITIONER

## 2021-08-26 RX ORDER — GABAPENTIN 300 MG/1
600 CAPSULE ORAL 3 TIMES DAILY
Qty: 180 CAPSULE | Refills: 2 | Status: ON HOLD | OUTPATIENT
Start: 2021-08-26 | End: 2022-05-23 | Stop reason: HOSPADM

## 2021-08-26 RX ORDER — LISINOPRIL 30 MG/1
30 TABLET ORAL DAILY
Qty: 30 TABLET | Refills: 5 | Status: ON HOLD | OUTPATIENT
Start: 2021-08-26 | End: 2022-05-23 | Stop reason: HOSPADM

## 2021-08-26 ASSESSMENT — ENCOUNTER SYMPTOMS
ALLERGIC/IMMUNOLOGIC NEGATIVE: 1
CHEST TIGHTNESS: 0
ABDOMINAL PAIN: 0
RESPIRATORY NEGATIVE: 1
EYES NEGATIVE: 1
SHORTNESS OF BREATH: 0

## 2021-08-26 NOTE — PROGRESS NOTES
Subjective:      Patient ID: Ashly Ivey is a 64 y.o. male. Chief Complaint   Patient presents with    Check-Up    Hypertension        HPI   Patient presents today to f/u on chronic conditions. Depression  Patient complains of depression. He complains of depressed mood, insomnia and tearful. Onset was approximately several months ago. Symptoms have been gradually improving since started on Cymbalta. Current symptoms include: as previously noted. Patient denies suicidal thoughts with specific plan and suicidal thoughts without plan. Family history significant for alcoholism, anxiety, depression and substance abuse. Possible organic causes contributing are: none. Risk factors: previous episode of depression. Previous treatment includes Cymbalta. He complains of the following side effects from the treatment: None    Hypertension:  Home blood pressure monitoring: Yes - he has checked occasionally has been high per patient. He is not adherent to a low sodium diet. Patient denies chest pain, shortness of breath, headache, blurred vision, peripheral edema, palpitations, dry cough and fatigue. Antihypertensive medication side effects: no medication side effects noted however patient reports she has not been taking his blood pressure medication for several days. Use of agents associated with hypertension: none. Sodium (mmol/L)   Date Value   02/24/2021 139    BUN (mg/dL)   Date Value   02/24/2021 15    Glucose (mg/dL)   Date Value   02/24/2021 106 (H)      Potassium (mmol/L)   Date Value   02/24/2021 4.6     Potassium reflex Magnesium (mmol/L)   Date Value   12/30/2018 4.1    CREATININE (mg/dL)   Date Value   02/24/2021 0.9         Review of Systems   Constitutional: Negative. Negative for activity change, appetite change, chills, fatigue and unexpected weight change. HENT: Negative. Eyes: Negative. Respiratory: Negative.   Negative for chest tightness and shortness of breath. Cardiovascular: Negative. Negative for chest pain, palpitations and leg swelling. Gastrointestinal: Negative for abdominal pain. Endocrine: Negative. Genitourinary: Negative. Musculoskeletal: Negative. Skin: Negative. Negative for rash and wound. Allergic/Immunologic: Negative. Neurological: Positive for light-headedness (peripheral neuropathy). Negative for dizziness and headaches. Hematological: Negative. Psychiatric/Behavioral: Negative. Negative for dysphoric mood. Patient Active Problem List   Diagnosis    Alcohol abuse    Moderate episode of recurrent major depressive disorder (HCC)    Peripheral neuralgia    Essential hypertension    Bilateral foot pain    Dyspepsia    Gastroesophageal reflux disease without esophagitis    Esophageal dysphagia    Esophagitis, [place] grade C    Sensorineural hearing loss, bilateral    Peripheral vascular insufficiency (HCC)    Chronic gout without tophus       No outpatient medications have been marked as taking for the 8/26/21 encounter (Appointment) with HERRERA Rolon CNP. No Known Allergies    Social History     Tobacco Use    Smoking status: Never Smoker    Smokeless tobacco: Never Used   Substance Use Topics    Alcohol use: Yes     Comment: 1 pint a day. \"quit a couple of years ago\"- 08/24/2020         Objective:   BP (!) 152/97   Pulse 78   Temp 98.4 °F (36.9 °C) (Oral)   Ht 5' 10.5\" (1.791 m)   Wt 234 lb 9.6 oz (106.4 kg)   SpO2 96%   BMI 33.19 kg/m²     Physical Exam  Vitals and nursing note reviewed. Constitutional:       General: He is not in acute distress. Appearance: Normal appearance. He is well-developed and well-groomed. He is not ill-appearing or toxic-appearing. HENT:      Head: Normocephalic and atraumatic. Eyes:      Extraocular Movements: Extraocular movements intact.       Conjunctiva/sclera: Conjunctivae normal.   Cardiovascular:      Rate and as below. Advised patient to follow-up if no better worsening of symptoms. Patient agreeable. - gabapentin (NEURONTIN) 300 MG capsule; Take 2 capsules by mouth 3 times daily for 30 days. Dispense: 180 capsule; Refill: 2    3. Moderate episode of recurrent major depressive disorder [facility])  Patient continues to take Cymbalta without any side effects. Patient reports symptoms have been stable with current treatment. Recommend patient continue with Cymbalta as ordered. 4. Peripheral vascular insufficiency (HCC)  Stable. 5. Gastroesophageal reflux disease without esophagitis  Asymptomatic    6. Other secondary chronic gout without tophus, unspecified site  Patient continues to take allopurinol 100 mg daily. Repeat uric acid level. Recommend patient continue with current treatment and I will make further recommendations based on lab results. - URIC ACID    7. Prostate cancer screening  - PSA screening    8. Paresthesia of both lower extremities  See #2  - gabapentin (NEURONTIN) 300 MG capsule; Take 2 capsules by mouth 3 times daily for 30 days. Dispense: 180 capsule;  Refill: 2

## 2021-08-26 NOTE — PATIENT INSTRUCTIONS
Please read the healthy family handout that you were given and share it with your family. Please compare this printed medication list with your medications at home to be sure they are the same. If you have any medications that are different please contact us immediately at [phone]. Also review your allergies that we have listed, these may also include medications that you have not been able to tolerate, make sure everything listed is correct. If you have any allergies that are different please contact us immediately at [phone]. Patient Education      Get started back on blood pressure medication, monitor blood pressure and call in two weeks with a report on blood pressure. DASH Diet: Care Instructions  Your Care Instructions     The DASH diet is an eating plan that can help lower your blood pressure. DASH stands for Dietary Approaches to Stop Hypertension. Hypertension is high blood pressure. The DASH diet focuses on eating foods that are high in calcium, potassium, and magnesium. These nutrients can lower blood pressure. The foods that are highest in these nutrients are fruits, vegetables, low-fat dairy products, nuts, seeds, and legumes. But taking calcium, potassium, and magnesium supplements instead of eating foods that are high in those nutrients does not have the same effect. The DASH diet also includes whole grains, fish, and poultry. The DASH diet is one of several lifestyle changes your doctor may recommend to lower your high blood pressure. Your doctor may also want you to decrease the amount of sodium in your diet. Lowering sodium while following the DASH diet can lower blood pressure even further than just the DASH diet alone. Follow-up care is a key part of your treatment and safety. Be sure to make and go to all appointments, and call your doctor if you are having problems. It's also a good idea to know your test results and keep a list of the medicines you take.   How can you care for yourself at home? Following the DASH diet  · Eat 4 to 5 servings of fruit each day. A serving is 1 medium-sized piece of fruit, ½ cup chopped or canned fruit, 1/4 cup dried fruit, or 4 ounces (½ cup) of fruit juice. Choose fruit more often than fruit juice. · Eat 4 to 5 servings of vegetables each day. A serving is 1 cup of lettuce or raw leafy vegetables, ½ cup of chopped or cooked vegetables, or 4 ounces (½ cup) of vegetable juice. Choose vegetables more often than vegetable juice. · Get 2 to 3 servings of low-fat and fat-free dairy each day. A serving is 8 ounces of milk, 1 cup of yogurt, or 1 ½ ounces of cheese. · Eat 6 to 8 servings of grains each day. A serving is 1 slice of bread, 1 ounce of dry cereal, or ½ cup of cooked rice, pasta, or cooked cereal. Try to choose whole-grain products as much as possible. · Limit lean meat, poultry, and fish to 2 servings each day. A serving is 3 ounces, about the size of a deck of cards. · Eat 4 to 5 servings of nuts, seeds, and legumes (cooked dried beans, lentils, and split peas) each week. A serving is 1/3 cup of nuts, 2 tablespoons of seeds, or ½ cup of cooked beans or peas. · Limit fats and oils to 2 to 3 servings each day. A serving is 1 teaspoon of vegetable oil or 2 tablespoons of salad dressing. · Limit sweets and added sugars to 5 servings or less a week. A serving is 1 tablespoon jelly or jam, ½ cup sorbet, or 1 cup of lemonade. · Eat less than 2,300 milligrams (mg) of sodium a day. If you limit your sodium to 1,500 mg a day, you can lower your blood pressure even more. · Be aware that all of these are the suggested number of servings for people who eat 1,800 to 2,000 calories a day. Your recommended number of servings may be different if you need more or fewer calories. Tips for success  · Start small. Do not try to make dramatic changes to your diet all at once.  You might feel that you are missing out on your favorite foods and then be more likely to not follow the plan. Make small changes, and stick with them. Once those changes become habit, add a few more changes. · Try some of the following:  ? Make it a goal to eat a fruit or vegetable at every meal and at snacks. This will make it easy to get the recommended amount of fruits and vegetables each day. ? Try yogurt topped with fruit and nuts for a snack or healthy dessert. ? Add lettuce, tomato, cucumber, and onion to sandwiches. ? Combine a ready-made pizza crust with low-fat mozzarella cheese and lots of vegetable toppings. Try using tomatoes, squash, spinach, broccoli, carrots, cauliflower, and onions. ? Have a variety of cut-up vegetables with a low-fat dip as an appetizer instead of chips and dip. ? Sprinkle sunflower seeds or chopped almonds over salads. Or try adding chopped walnuts or almonds to cooked vegetables. ? Try some vegetarian meals using beans and peas. Add garbanzo or kidney beans to salads. Make burritos and tacos with mashed white beans or black beans. Where can you learn more? Go to https://[employer]. org and sign in to your [employer] account. Enter A134 in the [facility] box to learn more about \"DASH Diet: Care Instructions. \"     If you do not have an account, please click on the \"Sign Up Now\" link. Current as of: August 31, 2020               Content Version: 12.9  © 4890-7800 [employer], [employer]. Care instructions adapted under license by [facility] ([facility]). If you have questions about a medical condition or this instruction, always ask your healthcare professional. [place] [address] any warranty or liability for your use of this information.

## 2021-08-27 LAB
A/G RATIO: 1.7 (ref 1.1–2.2)
ALBUMIN SERPL-MCNC: 4.5 G/DL (ref 3.4–5)
ALP BLD-CCNC: 84 U/L (ref 40–129)
ALT SERPL-CCNC: 53 U/L (ref 10–40)
ANION GAP SERPL CALCULATED.3IONS-SCNC: 16 MMOL/L (ref 3–16)
AST SERPL-CCNC: 36 U/L (ref 15–37)
BASOPHILS ABSOLUTE: 0 K/UL (ref 0–0.2)
BASOPHILS RELATIVE PERCENT: 0.7 %
BILIRUB SERPL-MCNC: 0.5 MG/DL (ref 0–1)
BUN BLDV-MCNC: 11 MG/DL (ref 7–20)
CALCIUM SERPL-MCNC: 9.5 MG/DL (ref 8.3–10.6)
CHLORIDE BLD-SCNC: 103 MMOL/L (ref 99–110)
CO2: 22 MMOL/L (ref 21–32)
CREAT SERPL-MCNC: 0.8 MG/DL (ref 0.9–1.3)
EOSINOPHILS ABSOLUTE: 0.1 K/UL (ref 0–0.6)
EOSINOPHILS RELATIVE PERCENT: 1.9 %
GFR AFRICAN AMERICAN: >60
GFR NON-AFRICAN AMERICAN: >60
GLOBULIN: 2.7 G/DL
GLUCOSE BLD-MCNC: 100 MG/DL (ref 70–99)
HCT VFR BLD CALC: 43.1 % (ref 40.5–52.5)
HEMOGLOBIN: 14.7 G/DL (ref 13.5–17.5)
LYMPHOCYTES ABSOLUTE: 1.6 K/UL (ref 1–5.1)
LYMPHOCYTES RELATIVE PERCENT: 21.7 %
MCH RBC QN AUTO: 32.4 PG (ref 26–34)
MCHC RBC AUTO-ENTMCNC: 34.1 G/DL (ref 31–36)
MCV RBC AUTO: 95 FL (ref 80–100)
MONOCYTES ABSOLUTE: 0.6 K/UL (ref 0–1.3)
MONOCYTES RELATIVE PERCENT: 7.8 %
NEUTROPHILS ABSOLUTE: 4.9 K/UL (ref 1.7–7.7)
NEUTROPHILS RELATIVE PERCENT: 67.9 %
PDW BLD-RTO: 14.1 % (ref 12.4–15.4)
PLATELET # BLD: 189 K/UL (ref 135–450)
PMV BLD AUTO: 9.8 FL (ref 5–10.5)
POTASSIUM SERPL-SCNC: 4.4 MMOL/L (ref 3.5–5.1)
PROSTATE SPECIFIC ANTIGEN: 0.52 NG/ML (ref 0–4)
RBC # BLD: 4.53 M/UL (ref 4.2–5.9)
SODIUM BLD-SCNC: 141 MMOL/L (ref 136–145)
TOTAL PROTEIN: 7.2 G/DL (ref 6.4–8.2)
URIC ACID, SERUM: 5.9 MG/DL (ref 3.5–7.2)
WBC # BLD: 7.2 K/UL (ref 4–11)

## 2021-11-18 DIAGNOSIS — R20.2 PARESTHESIA OF BOTH LOWER EXTREMITIES: ICD-10-CM

## 2021-11-18 DIAGNOSIS — M79.2 PERIPHERAL NEURALGIA: ICD-10-CM

## 2021-11-18 RX ORDER — GABAPENTIN 300 MG/1
600 CAPSULE ORAL 3 TIMES DAILY
OUTPATIENT
Start: 2021-11-18

## 2021-11-18 NOTE — TELEPHONE ENCOUNTER
Send to provider due to medication being filled earlier this month so that medication is clarified and sent correctly.

## 2021-11-18 NOTE — TELEPHONE ENCOUNTER
Date of last refill of this med was 8/26, # of pills given 180 and # of refills given 2. Their next appointment is not scheduled, the last date patient was seen was 8/26. Does patient have medication agreement on file? No  Has drug screen been done in last 12 months if needed?  no

## 2021-11-23 NOTE — TELEPHONE ENCOUNTER
On 8/26/2021 the gabapentin was adjusted during patient's. Visit -  gabapentin to 300 mg, 2 tablets 3 times daily for a total of 600 mg 3 times a day. The refill for the 400 mg should not have been filled as there was a new order which superseded that old order. Please notify pharmacy of patient's new order it appears that she is getting prescriptions from 2 different pharmacies and also had the refill for the 400 mg filled in [place]. He does not need to have the 400 mg dose any longer. Please contact patient and clarify what he is taking and what he has on hand. Controlled Substance Monitoring:    Acute and Chronic Pain Monitoring:   RX Monitoring 11/23/2021   Periodic Controlled Substance Monitoring Potential drug abuse or diversion identified, see note documentation.

## 2021-11-24 DIAGNOSIS — F51.01 PRIMARY INSOMNIA: ICD-10-CM

## 2021-11-24 RX ORDER — BIOTIN 10000 MCG
TABLET,CHEWABLE ORAL
Qty: 30 CAPSULE | Refills: 1 | Status: SHIPPED | OUTPATIENT
Start: 2021-11-24

## 2021-11-24 NOTE — TELEPHONE ENCOUNTER
Future appt scheduled due in Feb  Last appt 8/26  Refilled medication per verbal order from provider.

## 2021-11-26 DIAGNOSIS — F51.02 ADJUSTMENT INSOMNIA: ICD-10-CM

## 2021-11-26 DIAGNOSIS — F32.9 REACTIVE DEPRESSION: ICD-10-CM

## 2021-11-26 RX ORDER — TRAZODONE HYDROCHLORIDE 150 MG/1
TABLET ORAL
Qty: 30 TABLET | Refills: 2 | Status: SHIPPED | OUTPATIENT
Start: 2021-11-26 | End: 2022-02-28

## 2021-11-26 RX ORDER — DULOXETINE 40 MG/1
CAPSULE, DELAYED RELEASE ORAL
Qty: 30 CAPSULE | Refills: 2 | Status: SHIPPED | OUTPATIENT
Start: 2021-11-26 | End: 2022-02-28 | Stop reason: DRUGHIGH

## 2021-12-01 RX ORDER — GABAPENTIN 300 MG/1
600 CAPSULE ORAL 3 TIMES DAILY
Qty: 180 CAPSULE | OUTPATIENT
Start: 2021-12-01 | End: 2021-12-31

## 2021-12-02 DIAGNOSIS — M79.2 PERIPHERAL NEURALGIA: ICD-10-CM

## 2021-12-02 DIAGNOSIS — R20.2 PARESTHESIA OF BOTH LOWER EXTREMITIES: ICD-10-CM

## 2021-12-02 RX ORDER — GABAPENTIN 300 MG/1
600 CAPSULE ORAL 3 TIMES DAILY
Qty: 180 CAPSULE | Refills: 2 | OUTPATIENT
Start: 2021-12-02 | End: 2022-01-01

## 2021-12-07 RX ORDER — GABAPENTIN 400 MG/1
CAPSULE ORAL
Qty: 90 CAPSULE | Refills: 5 | OUTPATIENT
Start: 2021-12-07

## 2021-12-19 DIAGNOSIS — M1A.40X0 OTHER SECONDARY CHRONIC GOUT WITHOUT TOPHUS, UNSPECIFIED SITE: ICD-10-CM

## 2021-12-20 RX ORDER — ALLOPURINOL 100 MG/1
TABLET ORAL
Qty: 30 TABLET | Refills: 5 | Status: SHIPPED | OUTPATIENT
Start: 2021-12-20 | End: 2022-07-05

## 2021-12-20 NOTE — TELEPHONE ENCOUNTER
Future appt scheduled 0 appt scheduled- Return in about 6 months (around 2/26/2022),                Last appt 08/26/2021      Last Written 06/09/2021    allopurinol (ZYLOPRIM) 100 MG tablet  #30  5 RF

## 2021-12-30 RX ORDER — GABAPENTIN 400 MG/1
CAPSULE ORAL
Qty: 90 CAPSULE | Refills: 5 | Status: SHIPPED | OUTPATIENT
Start: 2021-12-30 | End: 2022-07-05

## 2021-12-30 NOTE — TELEPHONE ENCOUNTER
Date of last refill of this med was 8/26/21, # of pills given 180 and # of refills given 2. Their next appointment is 2/28/22, the last date patient was seen was 8/26/21. Does patient have medication agreement on file? No  Has drug screen been done in last 12 months if needed?  no

## 2022-01-10 ENCOUNTER — TELEPHONE (OUTPATIENT)
Dept: FAMILY MEDICINE CLINIC | Age: 62
End: 2022-01-10

## 2022-01-10 NOTE — TELEPHONE ENCOUNTER
----- Message from Estephania Wheeler sent at 1/10/2022  2:04 PM EST -----  Subject: Message to Provider    QUESTIONS  Information for Provider? pt said his covid test is neg and wants to know   if he can have an appt please call to advise   ---------------------------------------------------------------------------  --------------  CALL BACK INFO  What is the best way for the office to contact you? OK to leave message on   voicemail  Preferred Call Back Phone Number? [phone]  ---------------------------------------------------------------------------  --------------  SCRIPT ANSWERS  Relationship to Patient?  Self

## 2022-01-11 ENCOUNTER — OFFICE VISIT (OUTPATIENT)
Dept: FAMILY MEDICINE CLINIC | Age: 62
End: 2022-01-11
Payer: COMMERCIAL

## 2022-01-11 VITALS
BODY MASS INDEX: 32.45 KG/M2 | WEIGHT: 229.4 LBS | SYSTOLIC BLOOD PRESSURE: 138 MMHG | HEART RATE: 91 BPM | OXYGEN SATURATION: 96 % | TEMPERATURE: 99.6 F | DIASTOLIC BLOOD PRESSURE: 88 MMHG

## 2022-01-11 DIAGNOSIS — L50.9 URTICARIA: Primary | ICD-10-CM

## 2022-01-11 PROCEDURE — 96372 THER/PROPH/DIAG INJ SC/IM: CPT | Performed by: NURSE PRACTITIONER

## 2022-01-11 PROCEDURE — 99213 OFFICE O/P EST LOW 20 MIN: CPT | Performed by: NURSE PRACTITIONER

## 2022-01-11 PROCEDURE — G8484 FLU IMMUNIZE NO ADMIN: HCPCS | Performed by: NURSE PRACTITIONER

## 2022-01-11 PROCEDURE — G8417 CALC BMI ABV UP PARAM F/U: HCPCS | Performed by: NURSE PRACTITIONER

## 2022-01-11 PROCEDURE — 3017F COLORECTAL CA SCREEN DOC REV: CPT | Performed by: NURSE PRACTITIONER

## 2022-01-11 PROCEDURE — G8427 DOCREV CUR MEDS BY ELIG CLIN: HCPCS | Performed by: NURSE PRACTITIONER

## 2022-01-11 PROCEDURE — 1036F TOBACCO NON-USER: CPT | Performed by: NURSE PRACTITIONER

## 2022-01-11 RX ORDER — METHYLPREDNISOLONE SODIUM SUCCINATE 40 MG/ML
40 INJECTION, POWDER, LYOPHILIZED, FOR SOLUTION INTRAMUSCULAR; INTRAVENOUS ONCE
Status: COMPLETED | OUTPATIENT
Start: 2022-01-11 | End: 2022-01-11

## 2022-01-11 RX ORDER — HYDROXYZINE HYDROCHLORIDE 25 MG/1
25 TABLET, FILM COATED ORAL EVERY 8 HOURS PRN
Qty: 30 TABLET | Refills: 0 | Status: SHIPPED | OUTPATIENT
Start: 2022-01-11 | End: 2022-01-21

## 2022-01-11 RX ORDER — PREDNISONE 20 MG/1
20 TABLET ORAL DAILY
Qty: 10 TABLET | Refills: 0 | Status: SHIPPED | OUTPATIENT
Start: 2022-01-11 | End: 2022-01-21

## 2022-01-11 RX ADMIN — METHYLPREDNISOLONE SODIUM SUCCINATE 40 MG: 40 INJECTION, POWDER, LYOPHILIZED, FOR SOLUTION INTRAMUSCULAR; INTRAVENOUS at 11:37

## 2022-01-11 ASSESSMENT — ENCOUNTER SYMPTOMS
RESPIRATORY NEGATIVE: 1
GASTROINTESTINAL NEGATIVE: 1
EYES NEGATIVE: 1

## 2022-01-11 NOTE — PROGRESS NOTES
Subjective:      Patient ID: Yosi Guzman is a 63 y.o. male. Chief Complaint   Patient presents with    Rash        HPI  Patient presents today with a diffuse rash X 3 days. Patient reports the rash first started on his arms and torso and has recently spread to his lower extremities. Rash  Patient complains of diffuse rash. Rash started 3 days ago. Patient had COVID infection approximately 1 month ago. Discomfort associated with rash: is pruritic. Associated symptoms: none. Denies: fever. Patient has not had previous evaluation of rash. Patient has not had previous treatment. Response to treatment: NA. Patient has not had contacts with similar rash. Patient has not identified precipitant. Patient has not had new exposures (soaps, lotions, laundry detergents, foods, medications, plants, insects or animals.)    Review of Systems   Constitutional: Negative for appetite change, chills and fever. HENT: Negative. Eyes: Negative. Respiratory: Negative. Cardiovascular: Negative. Gastrointestinal: Negative. Genitourinary: Negative. Musculoskeletal: Positive for arthralgias. Skin: Positive for rash. Neurological: Negative. Psychiatric/Behavioral: Negative.         Patient Active Problem List   Diagnosis    Alcohol abuse    Moderate episode of recurrent major depressive disorder (HCC)    Peripheral neuralgia    Essential hypertension    Bilateral foot pain    Dyspepsia    Gastroesophageal reflux disease without esophagitis    Esophageal dysphagia    Esophagitis, [place] grade C    Sensorineural hearing loss, bilateral    Peripheral vascular insufficiency (HCC)    Chronic gout without tophus       Outpatient Medications Marked as Taking for the 1/11/22 encounter (Office Visit) with HERRERA Carranza CNP   Medication Sig Dispense Refill    gabapentin (NEURONTIN) 400 MG capsule TAKE ONE CAPSULE BY MOUTH THREE TIMES DAILY 90 capsule 5    allopurinol (ZYLOPRIM) 100 MG tablet TAKE ONE TABLET BY MOUTH EVERY MORNING 30 tablet 5    Melatonin 10 MG SUBL TAKE ONE TABLET BY MOUTH AT BEDTIME 30 tablet 2    traZODone (DESYREL) 150 MG tablet TAKE ONE TABLET BY MOUTH AT BEDTIME 30 tablet 2    DULoxetine HCl 40 MG CPEP TAKE ONE CAPSULE BY MOUTH EVERY MORNING 30 capsule 2    [facility] MELATONIN 10 MG CAPS capsule TAKE 1 CAPSULE BY MOUTH EVERY DAY AT NIGHT 30 capsule 1    gabapentin (NEURONTIN) 300 MG capsule Take 2 capsules by mouth 3 times daily for 30 days. 180 capsule 2    lisinopril (PRINIVIL;ZESTRIL) 30 MG tablet Take 1 tablet by mouth daily 30 tablet 5       No Known Allergies    Social History     Tobacco Use    Smoking status: Never Smoker    Smokeless tobacco: Never Used   Substance Use Topics    Alcohol use: Yes     Comment: 1 pint a day. \"quit a couple of years ago\"- 08/24/2020         Objective:   /88   Pulse 91   Temp 99.6 °F (37.6 °C) (Oral)   Wt 229 lb 6.4 oz (104.1 kg)   SpO2 96%   BMI 32.45 kg/m²     Physical Exam  Vitals and nursing note reviewed. Constitutional:       General: He is not in acute distress. Appearance: Normal appearance. He is well-developed and well-groomed. He is not ill-appearing or toxic-appearing. HENT:      Head: Normocephalic and atraumatic. Eyes:      Extraocular Movements: Extraocular movements intact. Conjunctiva/sclera: Conjunctivae normal.   Cardiovascular:      Rate and Rhythm: Normal rate and regular rhythm. Pulses: Normal pulses. Heart sounds: Normal heart sounds, S1 normal and S2 normal.   Pulmonary:      Effort: Pulmonary effort is normal. No accessory muscle usage or respiratory distress. Breath sounds: Normal breath sounds. Abdominal:      General: Bowel sounds are normal.      Palpations: Abdomen is soft. Musculoskeletal:      Cervical back: Neck supple. Right lower leg: No edema. Left lower leg: No edema. Lymphadenopathy:      Cervical: No cervical adenopathy.    Skin: General: Skin is warm and moist.      Capillary Refill: Capillary refill takes less than 2 seconds. Findings: Rash present. Urticarial rash: diffuse. Neurological:      General: No focal deficit present. Mental Status: He is alert and oriented to person, place, and time. Mental status is at baseline. Psychiatric:         Attention and Perception: Attention normal.         Mood and Affect: Mood normal.         Speech: Speech normal.         Behavior: Behavior normal. Behavior is cooperative. Assessment/Plan:   1. Urticaria  Suspect a morbilliform rash related to recent COVID. Recommend treatment as below. Advised to follow-up if no better or worsening of symptoms. Patient agreeable. - predniSONE (DELTASONE) 20 MG tablet; Take 1 tablet by mouth daily for 10 days  Dispense: 10 tablet; Refill: 0  - methylPREDNISolone sodium (SOLU-MEDROL) injection 40 mg  - hydrOXYzine (ATARAX) 25 MG tablet; Take 1 tablet by mouth every 8 hours as needed for Itching or Anxiety  Dispense: 30 tablet;  Refill: 0

## 2022-01-11 NOTE — PATIENT INSTRUCTIONS
Please read the healthy family handout that you were given and share it with your family. Please compare this printed medication list with your medications at home to be sure they are the same. If you have any medications that are different please contact us immediately at [phone]. Also review your allergies that we have listed, these may also include medications that you have not been able to tolerate, make sure everything listed is correct. If you have any allergies that are different please contact us immediately at [phone]. Patient Education        Hives: Care Instructions  Your Care Instructions  Hives are raised, red, itchy patches of skin. They are also called wheals or welts. They usually have red borders and pale centers. Hives range in size from ¼ inch to 3 inches or more across. They may seem to move from place to place on the skin. Several hives may form a large area of raised, red skin. You can get hives after an insect sting, after taking medicine or eating certain foods, or because of infection or stress. Other causes include plants, things you breathe in, makeup, heat, cold, sunlight, and latex. You cannot spread hives to other people. Hives may last a few minutes or a few days, but a single spot may last less than 36 hours. Follow-up care is a key part of your treatment and safety. Be sure to make and go to all appointments, and call your doctor if you are having problems. It's also a good idea to know your test results and keep a list of the medicines you take. How can you care for yourself at home? · Avoid whatever you think may have caused your hives, such as a certain food or medicine. However, you may not know the cause. · Put a cool, wet towel on the area to relieve itching. · Take an over-the-counter antihistamine, such as diphenhydramine (Benadryl), cetirizine (Zyrtec), or loratadine (Claritin), to help stop the hives and calm the itching.  Read and follow directions enough vitamin D or calcium in your diet. It is not known whether this medicine will harm an unborn baby. Tell your doctor if you are pregnant or plan to become pregnant. You should not breastfeed while using prednisone. How should I take prednisone? Follow all directions on your prescription label and read all medication guides or instruction sheets. Your doctor may occasionally change your dose. Use the medicine exactly as directed. Prednisone is taken daily or every other day, depending on the condition being treated. You may need to take the medicine at a certain time of day. Follow your doctor's instructions about when and how often to take this medicine. Take with food if prednisone upsets your stomach. Measure liquid medicine carefully. Use the dosing syringe provided, or use a medicine dose-measuring device (not a kitchen spoon). Swallow the delayed-release tablet whole and do not crush, chew, or break it. Prednisone can weaken (suppress) your immune system, and you may get an infection more easily. Call your doctor if you have signs of infection (fever, weakness, cold or flu symptoms, skin sores, diarrhea, frequent or recurring illness). If you have major surgery or a severe injury or infection, your prednisone dose needs may change. Make sure any doctor caring for you knows you are using this medicine. If you use this medicine long-term, you may need medical tests and vision exams. In case of emergency, wear or carry medical identification to let others know you use a steroid. You should not stop using prednisone suddenly. Follow your doctor's instructions about tapering your dose. Store at room temperature away from moisture, heat, and light. What happens if I miss a dose? Take the medicine as soon as you can, but skip the missed dose if it is almost time for your next dose. Do not take two doses at one time. What happens if I overdose?   Seek emergency medical attention or call the Poison Help line at 1-277.516.3616. High doses or long-term use of prednisone can lead to thinning skin, easy bruising, changes in body fat (especially in your face, neck, back, and waist), increased acne or facial hair, menstrual problems, impotence, or loss of interest in sex. What should I avoid while taking prednisone? Do not receive a \"live\" vaccine while using prednisone. The vaccine may not work as well and may not fully protect you from disease. Live vaccines include measles, mumps, rubella (MMR), polio, rotavirus, typhoid, yellow fever, varicella (chickenpox), zoster (shingles), and nasal flu (influenza) vaccine. Avoid being near people who are sick or have infections. Call your doctor for preventive treatment if you are exposed to chickenpox or measles. These conditions can be serious or even fatal in people who are using steroid medicine. Avoid drinking alcohol. What are the possible side effects of prednisone? Get emergency medical help if you have signs of an allergic reaction: hives; difficult breathing; swelling of your face, lips, tongue, or throat. Call your doctor at once if you have:  · muscle pain or weakness;  · blurred vision, tunnel vision, eye pain, or seeing halos around lights;  · severe depression, changes in personality, unusual thoughts or behavior;  · bloody or tarry stools, coughing up blood or vomit that looks like coffee grounds;  · swelling, rapid weight gain, feeling short of breath;  · irregular heartbeats;  · severe headache, pounding in your neck or ears;  · decreased adrenal gland hormones --muscle weakness, tiredness, diarrhea, nausea, menstrual changes, skin discoloration, craving salty foods, and feeling light-headed; or  · low potassium level --leg cramps, constipation, irregular heartbeats, fluttering in your chest, increased thirst or urination, numbness or tingling, muscle weakness or limp feeling. Prednisone can affect growth in children.  Tell your doctor if your child is not growing at a normal rate while using this medicine. Common side effects may include:  · weight gain (especially in your face or your upper back and torso);  · increased appetite;  · mood changes, trouble sleeping;  · changes in your menstrual periods;  · problems with memory or thought;  · muscle or joint pain;  · weakness;  · headache, dizziness, spinning sensation;  · nausea, bloating, loss of appetite;  · slow wound healing; or  · acne, increased sweating, thinning skin, bruising, pinpoint spots under your skin. This is not a complete list of side effects and others may occur. Call your doctor for medical advice about side effects. You may report side effects to FDA at 1-857-FDA-5899. What other drugs will affect prednisone? Sometimes it is not safe to use certain medications at the same time. Some drugs can affect your blood levels of other drugs you take, which may increase side effects or make the medications less effective. Tell your doctor about all your current medicines. Many drugs can affect prednisone, especially:  · bupropion;  · cyclosporine;  · digoxin;  · ketoconazole;  · an antibiotic;  · birth control pills or hormone replacement therapy;  · a diuretic or \"water pill\";  · insulin or oral diabetes medicine;  · a blood thinner --warfarin, Coumadin, Jantoven; or  · NSAIDs (nonsteroidal anti-inflammatory drugs) --aspirin, ibuprofen (Advil, Motrin), naproxen (Aleve), celecoxib, diclofenac, indomethacin, meloxicam, and others. This list is not complete and many other drugs may affect prednisone. This includes prescription and over-the-counter medicines, vitamins, and herbal products. Not all possible drug interactions are listed here. Where can I get more information? Your pharmacist can provide more information about prednisone.   Remember, keep this and all other medicines out of the reach of children, never share your medicines with others, and use this medication only for the indication prescribed. Every effort has been made to ensure that the information provided by [facility] is accurate, up-to-date, and complete, but no guarantee is made to that effect. Drug information contained herein may be time sensitive. [facility] information has been compiled for use by healthcare practitioners and consumers in the [place] and therefore [facility] does not warrant that uses outside of the [place] are appropriate, unless specifically indicated otherwise. [facility]'s drug information does not endorse drugs, diagnose patients or recommend therapy. [facility]'s drug information is an informational resource designed to assist licensed healthcare practitioners in caring for their patients and/or to serve consumers viewing this service as a supplement to, and not a substitute for, the expertise, skill, knowledge and judgment of healthcare practitioners. The absence of a warning for a given drug or drug combination in no way should be construed to indicate that the drug or drug combination is safe, effective or appropriate for any given patient. [facility] does not assume any responsibility for any aspect of healthcare administered with the aid of information [facility] provides. The information contained herein is not intended to cover all possible uses, directions, precautions, warnings, drug interactions, allergic reactions, or adverse effects. If you have questions about the drugs you are taking, check with your doctor, nurse or pharmacist.  Copyright 2233-3691 [address]. Version: 10.01. Revision date: 3/28/2019. Care instructions adapted under license by [facility] ([facility]). If you have questions about a medical condition or this instruction, always ask your healthcare professional. [place] [address] any warranty or liability for your use of this information.          Patient Education        hydroxyzine  Pronunciation:  jairo DROX ee zeen  Brand:  Vistaril  What is the most important information I should know about hydroxyzine? You should not use hydroxyzine if you are pregnant, especially during the first or second trimester. Hydroxyzine can cause a serious heart problem, especially if you use certain medicines at the same time. Tell your doctor about all your current medicines and any you start or stop using. What is hydroxyzine? Hydroxyzine reduces activity in the central nervous system. It also acts as an antihistamine that reduces the effects of natural chemical histamine in the body. Histamine can produce symptoms of itching, or hives on the skin. Hydroxyzine is used as a sedative to treat anxiety and tension. It is also used together with other medications given during and after general anesthesia. Hydroxyzine is also used to treat allergic skin reactions such as hives or contact dermatitis. Hydroxyzine may also be used for purposes not listed in this medication guide. What should I discuss with my healthcare provider before taking hydroxyzine? You should not use hydroxyzine if you are allergic to it, or if:  · you have long QT syndrome;  · you are allergic to cetirizine (Zyrtec) or levocetirizine (Xyzal); or  · you are in the first trimester of pregnancy. You should not use hydroxyzine if you are pregnant, especially during the first or second trimester. Hydroxyzine could harm the unborn baby or cause birth defects. Use effective birth control to prevent pregnancy while you are using this medicine. To make sure hydroxyzine is safe for you, tell your doctor if you have:  · blockage in your digestive tract (stomach or intestines);  · bladder obstruction or other urination problems;  · glaucoma;  · heart disease, slow heartbeats;  · personal or family history of long QT syndrome;  · an electrolyte imbalance (such as high or low levels of potassium in your blood);  · if you have recently had a heart attack.   It is not known whether hydroxyzine passes into breast milk or if it could harm a nursing baby. You should not breast-feed while using this medicine. Do not give this medicine to a child without medical advice. How should I take hydroxyzine? Follow all directions on your prescription label. Your doctor may occasionally change your dose. Do not use this medicine in larger or smaller amounts or for longer than recommended. Shake the oral suspension (liquid) well just before you measure a dose. Measure liquid medicine with the dosing syringe provided, or with a special dose-measuring spoon or medicine cup. If you do not have a dose-measuring device, ask your pharmacist for one. Hydroxyzine is for short-term use only. You should not take this medicine for longer than 4 months. Call your doctor if your anxiety symptoms do not improve, or if they get worse. Store at room temperature away from moisture and heat. What happens if I miss a dose? Take the missed dose as soon as you remember. Skip the missed dose if it is almost time for your next scheduled dose. Do not take extra medicine to make up the missed dose. What happens if I overdose? Seek emergency medical attention or call the Poison Help line at 1-825.965.1944. Overdose symptoms may include severe drowsiness, nausea, vomiting, uncontrolled muscle movements, or seizure (convulsions). What should I avoid while taking hydroxyzine? This medicine may impair your thinking or reactions. Be careful if you drive or do anything that requires you to be alert. Drinking alcohol with this medicine can cause side effects. What are the possible side effects of hydroxyzine? Get emergency medical help if you have signs of an allergic reaction:  hives; difficult breathing; swelling of your face, lips, tongue, or throat. In rare cases, hydroxyzine may cause a severe skin reaction.  Stop taking this medicine and call your doctor right away if you have sudden skin redness or a rash that spreads and causes white or yellow pustules, blistering, or peeling. Stop using hydroxyzine and call your doctor at once if you have:  · fast or pounding heartbeats;  · headache with chest pain;  · severe dizziness, fainting; or  · a seizure (convulsions). Side effects such as drowsiness and confusion may be more likely in older adults. Common side effects may include:  · drowsiness;  · headache;  · dry mouth; or  · skin rash. This is not a complete list of side effects and others may occur. Call your doctor for medical advice about side effects. You may report side effects to FDA at 5-236-XWI-1595. What other drugs will affect hydroxyzine? Taking this medicine with other drugs that make you sleepy can worsen this effect. Ask your doctor before taking hydroxyzine with a sleeping pill, narcotic pain medicine, muscle relaxer, or medicine for anxiety, depression, or seizures. Hydroxyzine can cause a serious heart problem, especially if you use certain medicines at the same time, including antibiotics, antidepressants, heart rhythm medicine, antipsychotic medicines, and medicines to treat cancer, malaria, HIV or AIDS. Tell your doctor about all medicines you use, and those you start or stop using during your treatment with hydroxyzine. Other drugs may interact with hydroxyzine, including prescription and over-the-counter medicines, vitamins, and herbal products. Not all possible interactions are listed here. Tell each of your health care providers about all medicines you use now and any medicine you start or stop using. Where can I get more information? Your pharmacist can provide more information about hydroxyzine. Remember, keep this and all other medicines out of the reach of children, never share your medicines with others, and use this medication only for the indication prescribed.    Every effort has been made to ensure that the information provided by [facility] [address] is accurate, up-to-date, and complete, but no guarantee is made to that effect. Drug information contained herein may be time sensitive. [facility] information has been compiled for use by healthcare practitioners and consumers in the [place] and therefore [employer] does not warrant that uses outside of the [place] are appropriate, unless specifically indicated otherwise. [facility]'s drug information does not endorse drugs, diagnose patients or recommend therapy. [facility] drug information is an informational resource designed to assist licensed healthcare practitioners in caring for their patients and/or to serve consumers viewing this service as a supplement to, and not a substitute for, the expertise, skill, knowledge and judgment of healthcare practitioners. The absence of a warning for a given drug or drug combination in no way should be construed to indicate that the drug or drug combination is safe, effective or appropriate for any given patient. [facility] does not assume any responsibility for any aspect of healthcare administered with the aid of information [facility] provides. The information contained herein is not intended to cover all possible uses, directions, precautions, warnings, drug interactions, allergic reactions, or adverse effects. If you have questions about the drugs you are taking, check with your doctor, nurse or pharmacist.  Copyright 8495-0445 [address]. Version: 8.01. Revision date: 3/15/2017. Care instructions adapted under license by [facility] ([facility]). If you have questions about a medical condition or this instruction, always ask your healthcare professional. [place] [address] any warranty or liability for your use of this information.

## 2022-02-28 ENCOUNTER — OFFICE VISIT (OUTPATIENT)
Dept: FAMILY MEDICINE CLINIC | Age: 62
End: 2022-02-28
Payer: COMMERCIAL

## 2022-02-28 VITALS
DIASTOLIC BLOOD PRESSURE: 88 MMHG | TEMPERATURE: 97.8 F | OXYGEN SATURATION: 94 % | BODY MASS INDEX: 33.98 KG/M2 | HEART RATE: 90 BPM | SYSTOLIC BLOOD PRESSURE: 130 MMHG | WEIGHT: 240.2 LBS

## 2022-02-28 DIAGNOSIS — Z13.1 DIABETES MELLITUS SCREENING: ICD-10-CM

## 2022-02-28 DIAGNOSIS — M19.90 ARTHRITIS PAIN: ICD-10-CM

## 2022-02-28 DIAGNOSIS — M79.2 PERIPHERAL NEURALGIA: ICD-10-CM

## 2022-02-28 DIAGNOSIS — F32.9 REACTIVE DEPRESSION: ICD-10-CM

## 2022-02-28 DIAGNOSIS — F51.02 ADJUSTMENT INSOMNIA: ICD-10-CM

## 2022-02-28 DIAGNOSIS — M1A.40X0 OTHER SECONDARY CHRONIC GOUT WITHOUT TOPHUS, UNSPECIFIED SITE: ICD-10-CM

## 2022-02-28 DIAGNOSIS — Z13.220 LIPID SCREENING: ICD-10-CM

## 2022-02-28 DIAGNOSIS — Z13.31 POSITIVE DEPRESSION SCREENING: ICD-10-CM

## 2022-02-28 DIAGNOSIS — Z23 NEED FOR VACCINATION FOR PNEUMOCOCCUS: ICD-10-CM

## 2022-02-28 DIAGNOSIS — I10 ESSENTIAL HYPERTENSION: Primary | ICD-10-CM

## 2022-02-28 DIAGNOSIS — F10.10 ALCOHOL ABUSE: ICD-10-CM

## 2022-02-28 DIAGNOSIS — I73.9 PERIPHERAL VASCULAR INSUFFICIENCY (HCC): ICD-10-CM

## 2022-02-28 PROCEDURE — G8484 FLU IMMUNIZE NO ADMIN: HCPCS | Performed by: NURSE PRACTITIONER

## 2022-02-28 PROCEDURE — G8427 DOCREV CUR MEDS BY ELIG CLIN: HCPCS | Performed by: NURSE PRACTITIONER

## 2022-02-28 PROCEDURE — 1036F TOBACCO NON-USER: CPT | Performed by: NURSE PRACTITIONER

## 2022-02-28 PROCEDURE — G8417 CALC BMI ABV UP PARAM F/U: HCPCS | Performed by: NURSE PRACTITIONER

## 2022-02-28 PROCEDURE — 99214 OFFICE O/P EST MOD 30 MIN: CPT | Performed by: NURSE PRACTITIONER

## 2022-02-28 PROCEDURE — 90732 PPSV23 VACC 2 YRS+ SUBQ/IM: CPT | Performed by: NURSE PRACTITIONER

## 2022-02-28 PROCEDURE — 90471 IMMUNIZATION ADMIN: CPT | Performed by: NURSE PRACTITIONER

## 2022-02-28 PROCEDURE — 3017F COLORECTAL CA SCREEN DOC REV: CPT | Performed by: NURSE PRACTITIONER

## 2022-02-28 RX ORDER — CELECOXIB 100 MG/1
100 CAPSULE ORAL 2 TIMES DAILY
Qty: 60 CAPSULE | Refills: 5 | Status: ON HOLD | OUTPATIENT
Start: 2022-02-28 | End: 2022-05-23 | Stop reason: HOSPADM

## 2022-02-28 RX ORDER — DULOXETIN HYDROCHLORIDE 60 MG/1
60 CAPSULE, DELAYED RELEASE ORAL DAILY
Qty: 30 CAPSULE | Refills: 5 | Status: SHIPPED | OUTPATIENT
Start: 2022-02-28 | End: 2022-03-23

## 2022-02-28 RX ORDER — TRAZODONE HYDROCHLORIDE 100 MG/1
200 TABLET ORAL NIGHTLY
Qty: 60 TABLET | Refills: 5 | Status: SHIPPED | OUTPATIENT
Start: 2022-02-28 | End: 2022-03-23

## 2022-02-28 ASSESSMENT — PATIENT HEALTH QUESTIONNAIRE - PHQ9
1. LITTLE INTEREST OR PLEASURE IN DOING THINGS: 1
SUM OF ALL RESPONSES TO PHQ QUESTIONS 1-9: 10
10. IF YOU CHECKED OFF ANY PROBLEMS, HOW DIFFICULT HAVE THESE PROBLEMS MADE IT FOR YOU TO DO YOUR WORK, TAKE CARE OF THINGS AT HOME, OR GET ALONG WITH OTHER PEOPLE: 1
7. TROUBLE CONCENTRATING ON THINGS, SUCH AS READING THE NEWSPAPER OR WATCHING TELEVISION: 1
SUM OF ALL RESPONSES TO PHQ QUESTIONS 1-9: 10
4. FEELING TIRED OR HAVING LITTLE ENERGY: 1
6. FEELING BAD ABOUT YOURSELF - OR THAT YOU ARE A FAILURE OR HAVE LET YOURSELF OR YOUR FAMILY DOWN: 1
5. POOR APPETITE OR OVEREATING: 2
9. THOUGHTS THAT YOU WOULD BE BETTER OFF DEAD, OR OF HURTING YOURSELF: 0
8. MOVING OR SPEAKING SO SLOWLY THAT OTHER PEOPLE COULD HAVE NOTICED. OR THE OPPOSITE, BEING SO FIGETY OR RESTLESS THAT YOU HAVE BEEN MOVING AROUND A LOT MORE THAN USUAL: 0
SUM OF ALL RESPONSES TO PHQ9 QUESTIONS 1 & 2: 2
2. FEELING DOWN, DEPRESSED OR HOPELESS: 1
3. TROUBLE FALLING OR STAYING ASLEEP: 3

## 2022-02-28 ASSESSMENT — ENCOUNTER SYMPTOMS
ALLERGIC/IMMUNOLOGIC NEGATIVE: 1
CHEST TIGHTNESS: 0
SHORTNESS OF BREATH: 0
RESPIRATORY NEGATIVE: 1
EYES NEGATIVE: 1
ABDOMINAL PAIN: 0

## 2022-02-28 NOTE — PATIENT INSTRUCTIONS
Please read the healthy family handout that you were given and share it with your family. Please compare this printed medication list with your medications at home to be sure they are the same. If you have any medications that are different please contact us immediately at [phone]. Also review your allergies that we have listed, these may also include medications that you have not been able to tolerate, make sure everything listed is correct. If you have any allergies that are different please contact us immediately at [phone]. Patient Education        Arthritis: Care Instructions  Overview     Arthritis, also called osteoarthritis, is a breakdown of the cartilage that cushions your joints. When the cartilage wears down, your bones rub against each other. This causes pain and stiffness. Many people have some arthritis as they age. Arthritis most often affects the joints of the spine, hands, hips, knees, or feet. Arthritis never goes away completely. But medicine and home treatment can help reduce pain and help you stay active. Follow-up care is a key part of your treatment and safety. Be sure to make and go to all appointments, and call your doctor if you are having problems. It's also a good idea to know your test results and keep a list of the medicines you take. How can you care for yourself at home? · Stay at a healthy weight. Being overweight puts extra strain on your joints. · Talk to your doctor or physical therapist about exercises that will help ease joint pain. ? Stretch. You may enjoy gentle forms of yoga to help keep your joints and muscles flexible. ? Walk instead of jog. Other types of exercise that are less stressful on the joints include riding a bike, swimming, eron chi, or water exercise. ? Lift weights. Strong muscles help reduce stress on your joints. Stronger thigh muscles, for example, take some of the stress off of the knees and hips.  Learn the right way to lift weights so you don't make joint pain worse. · Take your medicines exactly as prescribed. Call your doctor if you think you are having a problem with your medicine. · Take pain medicines exactly as directed. ? If the doctor gave you a prescription medicine for pain, take it as prescribed. ? If you are not taking a prescription pain medicine, ask your doctor if you can take an over-the-counter medicine. · Use a cane, crutch, walker, or another device if you need help to get around. These can help rest your joints. You also can use other things to make life easier, such as a higher toilet seat and padded handles on kitchen utensils. · Do not sit in low chairs. They can make it hard to get up. · Put heat or cold on your sore joints as needed. Use whichever helps you most. You can also switch between hot and cold packs. ? Apply heat 2 or 3 times a day for 20 to 30 minutes--using a heating pad, hot shower, or hot pack--to relieve pain and stiffness. But don't use heat on a swollen joint. ? Put ice or a cold pack on your sore joint for 10 to 20 minutes at a time. Put a thin cloth between the ice and your skin. When should you call for help? Call your doctor now or seek immediate medical care if:    · You have sudden swelling, warmth, or pain in any joint.     · You have joint pain and a fever or rash.     · You have such bad pain that you cannot use a joint. Watch closely for changes in your health, and be sure to contact your doctor if:    · You have mild joint symptoms that continue even with more than 6 weeks of care at home.     · You have stomach pain or other problems with your medicine. Where can you learn more? Go to [email or web address] org and sign in to your [facility] account. Enter V954 in the [employer] CRM box to learn more about \"Arthritis: Care Instructions. \"     If you do not have an account, please click on the \"Sign Up Now\" link.   Current as of: April 30, 2021               Content Version: 13.1  © 2006-2021 [email or web address]. Care instructions adapted under license by [facility] ([facility]). If you have questions about a medical condition or this instruction, always ask your healthcare professional. [address] any warranty or liability for your use of this information. Patient Education        celecoxib  Pronunciation:  MAYCO SMITH ib  Brand:  CeleBREX  What is the most important information I should know about celecoxib? Celecoxib can increase your risk of fatal heart attack or stroke, even if you don't have any risk factors. Do not use this medicine just before or after heart bypass surgery (coronary artery bypass graft, or CABG). Celecoxib may also cause stomach or intestinal bleeding, which can be fatal. These conditions can occur without warning while you are using celecoxib, especially in older adults. What is celecoxib? Celecoxib is a nonsteroidal anti-inflammatory drug (NSAID). Celecoxib is used to treat pain or inflammation caused by many conditions such as arthritis, ankylosing spondylitis, and menstrual pain. Celecoxib is used to treat juvenile rheumatoid arthritis in children who are at least 3years old. Celecoxib is also used in the treatment of hereditary polyps in the colon. Celecoxib may also be used for purposes not listed in this medication guide. What should I discuss with my healthcare provider before taking celecoxib? Celecoxib can increase your risk of fatal heart attack or stroke, even if you don't have any risk factors. Do not use this medicine just before or after heart bypass surgery (coronary artery bypass graft, or CABG). Celecoxib may also cause stomach or intestinal bleeding, which can be fatal. These conditions can occur without warning while you are using celecoxib, especially in older adults.   You should not use celecoxib if you are allergic to it, or if you have:  · an allergy to sulfa drugs; or  · a history of asthma attack or severe allergic reaction after taking aspirin or an NSAID. Tell your doctor if you have ever had:  · a stomach ulcer, bleeding in your stomach or intestines;  · heart disease, high blood pressure;  · asthma;  · bleeding problems;  · liver or kidney disease; or  · if you smoke or drink alcohol. If you are pregnant, you should not take celecoxib unless your doctor tells you to. Taking an NSAID during the last 20 weeks of pregnancy can cause serious heart or kidney problems in the unborn baby and possible complications with your pregnancy. This medicine may affect fertility (ability to have children) in women. Ask your doctor about this risk. It may not be safe to breast-feed while using this medicine. Ask your doctor about any risk. How should I take celecoxib? Follow all directions on your prescription label and read all medication guides. Use the lowest dose that is effective in treating your condition. You may take celecoxib with or without food. If you cannot swallow a capsule whole, open it and sprinkle the medicine into a spoonful of applesauce. Swallow the mixture with water. You may save this applesauce mixture for later use in a refrigerator for up to 6 hours. Store at room temperature away from moisture and heat. What happens if I miss a dose? Take the medicine as soon as you can, but skip the missed dose if it is almost time for your next dose. Do not take two doses at one time. What happens if I overdose? Seek emergency medical attention or call the Poison Help line at 1-501.453.1270. What should I avoid while taking celecoxib? Avoid taking aspirin or other NSAIDs unless your doctor tells you to. Avoid drinking alcohol. It may increase your risk of stomach bleeding. Ask a doctor or pharmacist before using other medicines for pain, fever, swelling, or cold/flu symptoms.  They may contain ingredients similar to celecoxib (such as aspirin, ibuprofen, ketoprofen, or naproxen). What are the possible side effects of celecoxib? Get emergency medical help if you have signs of an allergic reaction (hives, difficult breathing, swelling in your face or throat) or a severe skin reaction (fever, sore throat, burning eyes, skin pain, red or purple skin rash with blistering and peeling). Get emergency medical help if you have signs of a heart attack or stroke: chest pain spreading to your jaw or shoulder, sudden numbness or weakness on one side of the body, slurred speech, leg swelling, feeling short of breath. Stop using celecoxib and call your doctor at once if you have:  · the first sign of any skin rash, no matter how mild;  · heart problems --swelling, rapid weight gain, feeling short of breath;  · signs of stomach bleeding --bloody or tarry stools, coughing up blood or vomit that looks like coffee grounds;  · liver problems --nausea, stomach pain (upper right side), itching, tiredness, dark urine, jaundice (yellowing of the skin or eyes);  · kidney problems --little or no urination, swelling in your feet or ankles, feeling tired or short of breath; or  · low red blood cells (anemia) --pale skin, unusual tiredness, feeling light-headed or short of breath, cold hands and feet. Common side effects may include:  · stomach pain, heartburn, gas, diarrhea, constipation, nausea, vomiting;  · swelling in your hands or feet;  · dizziness; or  · cold symptoms such as stuffy nose, sneezing, sore throat. This is not a complete list of side effects and others may occur. Call your doctor for medical advice about side effects. You may report side effects to FDA at 5-459-FDA-1243. What other drugs will affect celecoxib? Ask your doctor before using celecoxib if you take an antidepressant, steroid medicine, or medicine to treat or prevent blood clots. Taking certain medicines with an NSAID may increase your risk of a stomach ulcer or bleeding. Many drugs can affect celecoxib. This includes prescription and over-the-counter medicines, vitamins, and herbal products. Not all possible interactions are listed here. Tell your doctor about all your current medicines and any medicine you start or stop using. Where can I get more information? Your pharmacist can provide more information about celecoxib. Remember, keep this and all other medicines out of the reach of children, never share your medicines with others, and use this medication only for the indication prescribed. Every effort has been made to ensure that the information provided by [address] is accurate, up-to-date, and complete, but no guarantee is made to that effect. Drug information contained herein may be time sensitive. [facility] information has been compiled for use by healthcare practitioners and consumers in the [place] and therefore [facility] does not warrant that uses outside of the [place] are appropriate, unless specifically indicated otherwise. [facility]'s drug information does not endorse drugs, diagnose patients or recommend therapy. [facility] drug information is an informational resource designed to assist licensed healthcare practitioners in caring for their patients and/or to serve consumers viewing this service as a supplement to, and not a substitute for, the expertise, skill, knowledge and judgment of healthcare practitioners. The absence of a warning for a given drug or drug combination in no way should be construed to indicate that the drug or drug combination is safe, effective or appropriate for any given patient. [facility] does not assume any responsibility for any aspect of healthcare administered with the aid of information [facility] provides. The information contained herein is not intended to cover all possible uses, directions, precautions, warnings, drug interactions, allergic reactions, or adverse effects.  If you have questions about the drugs you are taking, check with your doctor, nurse or pharmacist.  Copyright 8328-6027 [employer]. Version: 19.01. Revision date: 11/3/2020. Care instructions adapted under license by [facility] [employer] [facility] ([facility]). If you have questions about a medical condition or this instruction, always ask your healthcare professional. [address] any warranty or liability for your use of this information. Patient Education        Learning About Sleeping Well  What does sleeping well mean? Sleeping well means getting enough sleep to feel good and stay healthy. How much sleep is enough varies among people. The number of hours you sleep and how you feel when you wake up are both important. If you do not feel refreshed, you probably need more sleep. Another sign of not getting enough sleep is feeling tired during the day. Experts recommend that adults get at least 7 or more hours of sleep per day. Children and older adults need more sleep. Why is getting enough sleep important? Getting enough quality sleep is a basic part of good health. When your sleep suffers, your physical health, mood, and your thoughts can suffer too. You may find yourself feeling more grumpy or stressed. Not getting enough sleep also can lead to serious problems, including injury, accidents, anxiety, and depression. What might cause poor sleeping? Many things can cause sleep problems, including:  · Changes to your sleep schedule. · Stress. Stress can be caused by fear about a single event, such as giving a speech. Or you may have ongoing stress, such as worry about work or school. · Depression, anxiety, and other mental or emotional conditions. · Changes in your sleep habits or surroundings. This includes changes that happen where you sleep, such as noise, light, or sleeping in a different bed. It also includes changes in your sleep pattern, such as having jet lag or working a late shift.   · Health problems, such as pain, breathing problems, and restless legs syndrome. · Lack of regular exercise. · Using alcohol, nicotine, or caffeine before bed. How can you help yourself? Here are some tips that may help you sleep more soundly and wake up feeling more refreshed. Your sleeping area   · Use your bedroom only for sleeping and sex. A bit of light reading may help you fall asleep. But if it doesn't, do your reading elsewhere in the house. Try not to use your TV, computer, smartphone, or tablet while you are in bed. · Be sure your bed is big enough to stretch out comfortably, especially if you have a sleep partner. · Keep your bedroom quiet, dark, and cool. Use curtains, blinds, or a sleep mask to block out light. To block out noise, use earplugs, soothing music, or a \"white noise\" machine. Your evening and bedtime routine   · Create a relaxing bedtime routine. You might want to take a warm shower or bath, or listen to soothing music. · Go to bed at the same time every night. And get up at the same time every morning, even if you feel tired. What to avoid   · Limit caffeine (coffee, tea, caffeinated sodas) during the day, and don't have any for at least 6 hours before bedtime. · Avoid drinking alcohol before bedtime. Alcohol can cause you to wake up more often during the night. · Try not to smoke or use tobacco, especially in the evening. Nicotine can keep you awake. · Limit naps during the day, especially close to bedtime. · Avoid lying in bed awake for too long. If you can't fall asleep or if you wake up in the middle of the night and can't get back to sleep within about 20 minutes, get out of bed and go to another room until you feel sleepy. · Avoid taking medicine right before bed that may keep you awake or make you feel hyper or energized. Your doctor can tell you if your medicine may do this and if you can take it earlier in the day. If you can't sleep   · Imagine yourself in a peaceful, pleasant scene.  Focus on the details and feelings of being in a place that is relaxing. · Get up and do a quiet or boring activity until you feel sleepy. · Avoid drinking any liquids before going to bed to help prevent waking up often to use the bathroom. Where can you learn more? Go to https://edilberto.[employer]. org and sign in to your [employer] account. Enter K850 in the [employer] Harry box to learn more about \"Learning About Sleeping Well. \"     If you do not have an account, please click on the \"Sign Up Now\" link. Current as of: June 16, 2021               Content Version: 13.1  © 2006-2021 [employer]. Care instructions adapted under license by [facility] ([facility]). If you have questions about a medical condition or this instruction, always ask your healthcare professional. [address] any warranty or liability for your use of this information. Patient Education        Pneumococcal Polysaccharide Vaccine: What You Need to Know  Why get vaccinated? Pneumococcal polysaccharide vaccine (PPSV23) can prevent pneumococcal disease. Pneumococcal disease refers to any illness caused by pneumococcal bacteria. These bacteria can cause many types of illnesses, including pneumonia, which is an infection of the lungs. Pneumococcal bacteria are one of the most common causes of pneumonia. Besides pneumonia, pneumococcal bacteria can also cause:  · Ear infections,  · Sinus infections  · Meningitis (infection of the tissue covering the brain and spinal cord)  · Bacteremia (bloodstream infection)  Anyone can get pneumococcal disease, but children under 3years of age, people with certain medical conditions, adults 72 years or older, and cigarette smokers are at the highest risk. Most pneumococcal infections are mild. However, some can result in long-term problems, such as brain damage or hearing loss.  Meningitis, bacteremia, and pneumonia caused by pneumococcal disease can be fatal.  PPSV23  PPSV23 protects against 23 types of bacteria that cause pneumococcal disease. PPSV23 is recommended for:  · All adults 72 years or older,  · Anyone 2 years or older with certain medical conditions that can lead to an increased risk for pneumococcal disease. Most people need only one dose of PPSV23. A second dose of PPSV23, and another type of pneumococcal vaccine called PCV13, are recommended for certain high-risk groups. Your health care provider can give you more information. People 65 years or older should get a dose of PPSV23 even if they have already gotten one or more doses of the vaccine before they turned 65. Talk with your health care provider  Tell your vaccine provider if the person getting the vaccine:  · Has had an allergic reaction after a previous dose of PPSV23, or has any severe, life-threatening allergies. In some cases, your health care provider may decide to postpone PPSV23 vaccination to a future visit. People with minor illnesses, such as a cold, may be vaccinated. People who are moderately or severely ill should usually wait until they recover before getting PPSV23. Your health care provider can give you more information. Risks of a vaccine reaction  · Redness or pain where the shot is given, feeling tired, fever, or muscle aches can happen after PPSV23. People sometimes faint after medical procedures, including vaccination. Tell your provider if you feel dizzy or have vision changes or ringing in the ears. As with any medicine, there is a very remote chance of a vaccine causing a severe allergic reaction, other serious injury, or death. What if there is a serious problem? An allergic reaction could occur after the vaccinated person leaves the clinic.  If you see signs of a severe allergic reaction (hives, swelling of the face and throat, difficulty breathing, a fast heartbeat, dizziness, or weakness), call 9-1-1 and get the person to the nearest hospital.  For other signs that concern you, call your health care provider. Adverse reactions should be reported to the Vaccine Adverse Event Reporting System (VAERS). Your health care provider will usually file this report, or you can do it yourself. Visit the VAERS website at www.vaers. hhs.gov at www.vaers. hhs.gov or call 9-851.561.6944. VAERS is only for reporting reactions, and VAERS staff do not give medical advice. How can I learn more? · Ask your health care provider. · Call your local or state health department. · Contact the Centers for Disease Control and Prevention (CDC):  ? Call 6-850.256.2479 (1-800-CDC-INFO) or  ? Visit CDC's website at www.cdc.gov/vaccines  Vaccine Information Statement  PPSV23 Vaccine  10/30/2019  [facility] and [employer] for Disease Control and Prevention  Many Vaccine Information Statements are available in [place] and other languages. See www.immunize.org/vis. Hojas de información Sobre Vacunas están disponibles en español y en muchos otros idiomas. Visite Gilson.si. Care instructions adapted under license by [facility] ([facility]). If you have questions about a medical condition or this instruction, always ask your healthcare professional. [place] [address] any warranty or liability for your use of this information.

## 2022-02-28 NOTE — PROGRESS NOTES
Subjective:      Patient ID: Keli Triana is a 63 y.o. male. Chief Complaint   Patient presents with    Check-Up    Hypertension        HPI     Patient presents today to f/u on chronic conditions. Hypertension:  Home blood pressure monitoring: No.  He is not adherent to a low sodium diet. Patient denies chest pain, shortness of breath, headache, lightheadedness, blurred vision, peripheral edema, palpitations, dry cough and fatigue. Antihypertensive medication side effects: no medication side effects noted. Use of agents associated with hypertension: none. 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 The patient reports no acute gout attacks since last clinic visit. Attacks occur primarily in the left first MTP joint. Patient reports his chronic pain is resolved, his joint stiffness is resolved and his joint swelling is resolved. Limitation on activities include none. The patient is not avoiding high purine foods. Review of Systems   Constitutional: Negative. Negative for activity change, appetite change, chills, fatigue and unexpected weight change. HENT: Negative. Eyes: Negative. Respiratory: Negative. Negative for chest tightness and shortness of breath. Cardiovascular: Negative. Negative for chest pain, palpitations and leg swelling. Gastrointestinal: Negative for abdominal pain. Endocrine: Negative. Genitourinary: Negative. Musculoskeletal: Positive for arthralgias (patient c/o bilat lower leg pain - reports broke both legs when was 8years old in an accident). Skin: Negative.   Negative for rash and wound. Allergic/Immunologic: Negative. Neurological: Negative. Negative for dizziness, light-headedness and headaches. Hematological: Negative. Psychiatric/Behavioral: Positive for dysphoric mood and sleep disturbance. Patient Active Problem List   Diagnosis    Alcohol abuse    Moderate episode of recurrent major depressive disorder (HCC)    Peripheral neuralgia    Essential hypertension    Bilateral foot pain    Dyspepsia    Gastroesophageal reflux disease without esophagitis    Esophageal dysphagia    Esophagitis, Ramey grade C    Sensorineural hearing loss, bilateral    Peripheral vascular insufficiency (HCC)    Chronic gout without tophus       No outpatient medications have been marked as taking for the 2/28/22 encounter (Appointment) with HERRERA Osman CNP. No Known Allergies    Social History     Tobacco Use    Smoking status: Never Smoker    Smokeless tobacco: Never Used   Substance Use Topics    Alcohol use: Yes     Comment: 1 pint a day. \"quit a couple of years ago\"- 08/24/2020         Objective:   /88   Pulse 90   Temp 97.8 °F (36.6 °C) (Oral)   Wt 240 lb 3.2 oz (109 kg)   SpO2 94%   BMI 33.98 kg/m²     Physical Exam  Vitals and nursing note reviewed. Constitutional:       General: He is not in acute distress. Appearance: Normal appearance. He is well-developed and well-groomed. He is not ill-appearing or toxic-appearing. HENT:      Head: Normocephalic and atraumatic. Eyes:      Extraocular Movements: Extraocular movements intact. Conjunctiva/sclera: Conjunctivae normal.   Cardiovascular:      Rate and Rhythm: Normal rate and regular rhythm. Pulses: Normal pulses. Heart sounds: Normal heart sounds, S1 normal and S2 normal.   Pulmonary:      Effort: Pulmonary effort is normal. No accessory muscle usage or respiratory distress. Breath sounds: Normal breath sounds.    Abdominal:      General: Bowel sounds are normal.      Palpations: Abdomen is soft. Musculoskeletal:      Cervical back: Neck supple. Right lower leg: No edema. Left lower leg: No edema. Lymphadenopathy:      Cervical: No cervical adenopathy. Skin:     General: Skin is warm and moist.      Capillary Refill: Capillary refill takes less than 2 seconds. Findings: No rash. Neurological:      General: No focal deficit present. Mental Status: He is alert and oriented to person, place, and time. Mental status is at baseline. Psychiatric:         Attention and Perception: Attention normal.         Mood and Affect: Mood normal.         Speech: Speech normal.         Behavior: Behavior normal. Behavior is cooperative. Assessment/Plan:   1. Essential hypertension  Patient presents today to follow-up on chronic conditions. Patient does not monitor his blood pressure at home however his blood pressure has been well controlled with lisinopril 30 mg daily. Recommend patient continue with current treatment. - Comprehensive Metabolic Panel; Future  - CBC with Auto Differential; Future    2. Other secondary chronic gout without tophus, unspecified site  No recent flares. Patient continues to take allopurinol daily as ordered. Recommend patient continue with current treatment. 3. Peripheral neuralgia  Reports improvement with gabapentin. Patient is tolerating medication without any oversedation. 4. Peripheral vascular insufficiency (HCC)  Stable. 5. Alcohol abuse  Drinks alcohol intermittently    6. Lipid screening  Patient will return tomorrow morning for fasting blood work. - Lipid Panel; Future    7. Diabetes mellitus screening  Asymptomatic.  - Hemoglobin A1C; Future    8. Reactive depression  Reports improvement with duloxetine 40 mg however patient reports he still has intermittent depression. Recommend increasing Cymbalta to 60 mg daily. Advised patient to follow-up if no better worsening of symptoms. Patient verbalized understanding agreeable to plan. - DULoxetine (CYMBALTA) 60 MG extended release capsule; Take 1 capsule by mouth daily  Dispense: 30 capsule; Refill: 5    9. Arthritis pain  Patient reports he fractured both lower extremities and accident at the age of 8years old. Patient reports intermittent pain which is worse during hours of sleep. Discussed treatment options and patient will trial Celebrex. Advised patient to follow-up if no better worsening of symptoms. Patient agreeable. - celecoxib (CELEBREX) 100 MG capsule; Take 1 capsule by mouth 2 times daily  Dispense: 60 capsule; Refill: 5    10. Positive depression screening  See #8    11. Need for vaccination for pneumococcus  - PNEUMOVAX 23 subcutaneous/IM (Pneumococcal polysaccharide vaccine 23-valent >= 1yo)    12. Adjustment insomnia  Patient reports trazodone does help but he continues to have difficulty sleeping. Will increase dose as below. Advised patient to follow-up if no better worsening of symptoms. - traZODone (DESYREL) 100 MG tablet; Take 2 tablets by mouth nightly  Dispense: 60 tablet; Refill: 5              PHQ-9 score today: (PHQ-9 Total Score: 10), additional evaluation and assessment performed, follow-up plan includes but not limited to: Medication management and Referral to /Specialist  for evaluation and management.

## 2022-03-01 ENCOUNTER — NURSE ONLY (OUTPATIENT)
Dept: FAMILY MEDICINE CLINIC | Age: 62
End: 2022-03-01
Payer: COMMERCIAL

## 2022-03-01 DIAGNOSIS — Z13.1 DIABETES MELLITUS SCREENING: ICD-10-CM

## 2022-03-01 DIAGNOSIS — I10 ESSENTIAL HYPERTENSION: ICD-10-CM

## 2022-03-01 DIAGNOSIS — Z13.220 LIPID SCREENING: ICD-10-CM

## 2022-03-01 LAB
A/G RATIO: 1.9 (ref 1.1–2.2)
ALBUMIN SERPL-MCNC: 4.6 G/DL (ref 3.4–5)
ALP BLD-CCNC: 76 U/L (ref 40–129)
ALT SERPL-CCNC: 74 U/L (ref 10–40)
ANION GAP SERPL CALCULATED.3IONS-SCNC: 16 MMOL/L (ref 3–16)
AST SERPL-CCNC: 67 U/L (ref 15–37)
BASOPHILS ABSOLUTE: 0 K/UL (ref 0–0.2)
BASOPHILS RELATIVE PERCENT: 0.8 %
BILIRUB SERPL-MCNC: 0.9 MG/DL (ref 0–1)
BUN BLDV-MCNC: 10 MG/DL (ref 7–20)
CALCIUM SERPL-MCNC: 9.2 MG/DL (ref 8.3–10.6)
CHLORIDE BLD-SCNC: 102 MMOL/L (ref 99–110)
CHOLESTEROL, TOTAL: 195 MG/DL (ref 0–199)
CO2: 25 MMOL/L (ref 21–32)
CREAT SERPL-MCNC: 0.6 MG/DL (ref 0.9–1.3)
EOSINOPHILS ABSOLUTE: 0.1 K/UL (ref 0–0.6)
EOSINOPHILS RELATIVE PERCENT: 2.6 %
GFR AFRICAN AMERICAN: >60
GFR NON-AFRICAN AMERICAN: >60
GLUCOSE BLD-MCNC: 110 MG/DL (ref 70–99)
HCT VFR BLD CALC: 41.6 % (ref 40.5–52.5)
HDLC SERPL-MCNC: 49 MG/DL (ref 40–60)
HEMOGLOBIN: 14.3 G/DL (ref 13.5–17.5)
LDL CHOLESTEROL CALCULATED: 113 MG/DL
LYMPHOCYTES ABSOLUTE: 1.2 K/UL (ref 1–5.1)
LYMPHOCYTES RELATIVE PERCENT: 21.5 %
MCH RBC QN AUTO: 32.9 PG (ref 26–34)
MCHC RBC AUTO-ENTMCNC: 34.4 G/DL (ref 31–36)
MCV RBC AUTO: 95.7 FL (ref 80–100)
MONOCYTES ABSOLUTE: 0.5 K/UL (ref 0–1.3)
MONOCYTES RELATIVE PERCENT: 8.3 %
NEUTROPHILS ABSOLUTE: 3.7 K/UL (ref 1.7–7.7)
NEUTROPHILS RELATIVE PERCENT: 66.8 %
PDW BLD-RTO: 16 % (ref 12.4–15.4)
PLATELET # BLD: 153 K/UL (ref 135–450)
PMV BLD AUTO: 9.2 FL (ref 5–10.5)
POTASSIUM SERPL-SCNC: 4.4 MMOL/L (ref 3.5–5.1)
RBC # BLD: 4.35 M/UL (ref 4.2–5.9)
SODIUM BLD-SCNC: 143 MMOL/L (ref 136–145)
TOTAL PROTEIN: 7 G/DL (ref 6.4–8.2)
TRIGL SERPL-MCNC: 165 MG/DL (ref 0–150)
VLDLC SERPL CALC-MCNC: 33 MG/DL
WBC # BLD: 5.5 K/UL (ref 4–11)

## 2022-03-01 PROCEDURE — 36415 COLL VENOUS BLD VENIPUNCTURE: CPT | Performed by: NURSE PRACTITIONER

## 2022-03-02 LAB
ESTIMATED AVERAGE GLUCOSE: 108.3 MG/DL
HBA1C MFR BLD: 5.4 %

## 2022-03-23 DIAGNOSIS — F32.9 REACTIVE DEPRESSION: ICD-10-CM

## 2022-03-23 DIAGNOSIS — F51.02 ADJUSTMENT INSOMNIA: ICD-10-CM

## 2022-03-23 RX ORDER — DULOXETIN HYDROCHLORIDE 60 MG/1
CAPSULE, DELAYED RELEASE ORAL
Qty: 30 CAPSULE | Refills: 5 | Status: SHIPPED | OUTPATIENT
Start: 2022-03-23 | End: 2022-05-27

## 2022-03-23 RX ORDER — TRAZODONE HYDROCHLORIDE 100 MG/1
TABLET ORAL
Qty: 180 TABLET | Refills: 1 | Status: SHIPPED | OUTPATIENT
Start: 2022-03-23

## 2022-03-23 NOTE — TELEPHONE ENCOUNTER
Future appt scheduled 08/30/2022              Last appt 02/528/2022      Last Written 02/28/2022    DULoxetine (CYMBALTA) 60 MG extended release capsule  #30  5 RF

## 2022-05-12 ENCOUNTER — TELEPHONE (OUTPATIENT)
Dept: FAMILY MEDICINE CLINIC | Age: 62
End: 2022-05-12

## 2022-05-12 NOTE — TELEPHONE ENCOUNTER
Patient c/o diarrhea since Monday, yesterday he started having pain on the right side under his ribs, he states it hurts to breathe, no other symptoms. discussed with pcp, recommend patient go to ER today for evaluation.   Patient notified and agreed

## 2022-05-13 ENCOUNTER — APPOINTMENT (OUTPATIENT)
Dept: CT IMAGING | Age: 62
End: 2022-05-13
Payer: COMMERCIAL

## 2022-05-13 ENCOUNTER — HOSPITAL ENCOUNTER (EMERGENCY)
Age: 62
Discharge: HOME | End: 2022-05-13
Payer: COMMERCIAL

## 2022-05-13 ENCOUNTER — APPOINTMENT (OUTPATIENT)
Dept: GENERAL RADIOLOGY | Age: 62
End: 2022-05-13
Payer: COMMERCIAL

## 2022-05-13 VITALS
OXYGEN SATURATION: 95 % | TEMPERATURE: 97.9 F | SYSTOLIC BLOOD PRESSURE: 165 MMHG | HEART RATE: 94 BPM | WEIGHT: 240 LBS | HEIGHT: 70 IN | BODY MASS INDEX: 34.36 KG/M2 | DIASTOLIC BLOOD PRESSURE: 94 MMHG | RESPIRATION RATE: 16 BRPM

## 2022-05-13 DIAGNOSIS — I10 ESSENTIAL HYPERTENSION: ICD-10-CM

## 2022-05-13 DIAGNOSIS — R10.11 ABDOMINAL PAIN, RIGHT UPPER QUADRANT: ICD-10-CM

## 2022-05-13 DIAGNOSIS — I70.0 THORACIC AORTIC ATHEROSCLEROSIS (HCC): ICD-10-CM

## 2022-05-13 DIAGNOSIS — R59.0 LYMPHADENOPATHY, MEDIASTINAL: ICD-10-CM

## 2022-05-13 DIAGNOSIS — R31.21 ASYMPTOMATIC MICROSCOPIC HEMATURIA: ICD-10-CM

## 2022-05-13 DIAGNOSIS — F10.10 ALCOHOL ABUSE, DAILY USE: ICD-10-CM

## 2022-05-13 DIAGNOSIS — R16.0 LIVER MASSES: Primary | ICD-10-CM

## 2022-05-13 LAB
A/G RATIO: 0.7 (ref 1.1–2.2)
ALBUMIN SERPL-MCNC: 3.2 G/DL (ref 3.4–5)
ALP BLD-CCNC: 174 U/L (ref 40–129)
ALT SERPL-CCNC: 65 U/L (ref 10–40)
ANION GAP SERPL CALCULATED.3IONS-SCNC: 13 MMOL/L (ref 3–16)
AST SERPL-CCNC: 60 U/L (ref 15–37)
BASOPHILS ABSOLUTE: 0 K/UL (ref 0–0.2)
BASOPHILS RELATIVE PERCENT: 0.3 %
BILIRUB SERPL-MCNC: 0.9 MG/DL (ref 0–1)
BILIRUBIN URINE: ABNORMAL
BLOOD, URINE: ABNORMAL
BUN BLDV-MCNC: 11 MG/DL (ref 7–20)
CALCIUM SERPL-MCNC: 9.1 MG/DL (ref 8.3–10.6)
CHLORIDE BLD-SCNC: 93 MMOL/L (ref 99–110)
CLARITY: CLEAR
CO2: 25 MMOL/L (ref 21–32)
COLOR: YELLOW
CREAT SERPL-MCNC: 0.6 MG/DL (ref 0.9–1.3)
EKG ATRIAL RATE: 94 BPM
EKG DIAGNOSIS: NORMAL
EKG P AXIS: -4 DEGREES
EKG P-R INTERVAL: 140 MS
EKG Q-T INTERVAL: 374 MS
EKG QRS DURATION: 100 MS
EKG QTC CALCULATION (BAZETT): 467 MS
EKG R AXIS: 7 DEGREES
EKG T AXIS: 2 DEGREES
EKG VENTRICULAR RATE: 94 BPM
EOSINOPHILS ABSOLUTE: 0 K/UL (ref 0–0.6)
EOSINOPHILS RELATIVE PERCENT: 0.2 %
EPITHELIAL CELLS, UA: ABNORMAL /HPF (ref 0–5)
GFR AFRICAN AMERICAN: >60
GFR NON-AFRICAN AMERICAN: >60
GLUCOSE BLD-MCNC: 109 MG/DL (ref 70–99)
GLUCOSE URINE: NEGATIVE MG/DL
HCT VFR BLD CALC: 41.9 % (ref 40.5–52.5)
HEMOGLOBIN: 14.1 G/DL (ref 13.5–17.5)
KETONES, URINE: NEGATIVE MG/DL
LEUKOCYTE ESTERASE, URINE: NEGATIVE
LIPASE: 21 U/L (ref 13–60)
LYMPHOCYTES ABSOLUTE: 1.4 K/UL (ref 1–5.1)
LYMPHOCYTES RELATIVE PERCENT: 11.6 %
MCH RBC QN AUTO: 31.9 PG (ref 26–34)
MCHC RBC AUTO-ENTMCNC: 33.7 G/DL (ref 31–36)
MCV RBC AUTO: 94.7 FL (ref 80–100)
MICROSCOPIC EXAMINATION: YES
MONOCYTES ABSOLUTE: 1.4 K/UL (ref 0–1.3)
MONOCYTES RELATIVE PERCENT: 11.5 %
NEUTROPHILS ABSOLUTE: 9 K/UL (ref 1.7–7.7)
NEUTROPHILS RELATIVE PERCENT: 76.4 %
NITRITE, URINE: NEGATIVE
PDW BLD-RTO: 14.5 % (ref 12.4–15.4)
PH UA: 5.5 (ref 5–8)
PLATELET # BLD: 249 K/UL (ref 135–450)
PMV BLD AUTO: 9.5 FL (ref 5–10.5)
POTASSIUM REFLEX MAGNESIUM: 4.2 MMOL/L (ref 3.5–5.1)
PROTEIN UA: NEGATIVE MG/DL
RBC # BLD: 4.42 M/UL (ref 4.2–5.9)
RBC UA: ABNORMAL /HPF (ref 0–4)
SODIUM BLD-SCNC: 131 MMOL/L (ref 136–145)
SPECIFIC GRAVITY UA: <=1.005 (ref 1–1.03)
TOTAL PROTEIN: 7.5 G/DL (ref 6.4–8.2)
TROPONIN: <0.01 NG/ML
URINE REFLEX TO CULTURE: ABNORMAL
URINE TYPE: ABNORMAL
UROBILINOGEN, URINE: 1 E.U./DL
WBC # BLD: 11.8 K/UL (ref 4–11)
WBC UA: ABNORMAL /HPF (ref 0–5)

## 2022-05-13 PROCEDURE — 74177 CT ABD & PELVIS W/CONTRAST: CPT

## 2022-05-13 PROCEDURE — 6360000004 HC RX CONTRAST MEDICATION: Performed by: PHYSICIAN ASSISTANT

## 2022-05-13 PROCEDURE — 71260 CT THORAX DX C+: CPT

## 2022-05-13 PROCEDURE — 96375 TX/PRO/DX INJ NEW DRUG ADDON: CPT

## 2022-05-13 PROCEDURE — 81001 URINALYSIS AUTO W/SCOPE: CPT

## 2022-05-13 PROCEDURE — 36415 COLL VENOUS BLD VENIPUNCTURE: CPT

## 2022-05-13 PROCEDURE — 85025 COMPLETE CBC W/AUTO DIFF WBC: CPT

## 2022-05-13 PROCEDURE — 80053 COMPREHEN METABOLIC PANEL: CPT

## 2022-05-13 PROCEDURE — 93005 ELECTROCARDIOGRAM TRACING: CPT | Performed by: STUDENT IN AN ORGANIZED HEALTH CARE EDUCATION/TRAINING PROGRAM

## 2022-05-13 PROCEDURE — 96374 THER/PROPH/DIAG INJ IV PUSH: CPT

## 2022-05-13 PROCEDURE — 71045 X-RAY EXAM CHEST 1 VIEW: CPT

## 2022-05-13 PROCEDURE — 6360000002 HC RX W HCPCS: Performed by: PHYSICIAN ASSISTANT

## 2022-05-13 PROCEDURE — 99285 EMERGENCY DEPT VISIT HI MDM: CPT

## 2022-05-13 PROCEDURE — 84484 ASSAY OF TROPONIN QUANT: CPT

## 2022-05-13 PROCEDURE — 83690 ASSAY OF LIPASE: CPT

## 2022-05-13 RX ORDER — ONDANSETRON 2 MG/ML
4 INJECTION INTRAMUSCULAR; INTRAVENOUS ONCE
Status: COMPLETED | OUTPATIENT
Start: 2022-05-13 | End: 2022-05-13

## 2022-05-13 RX ORDER — KETOROLAC TROMETHAMINE 30 MG/ML
15 INJECTION, SOLUTION INTRAMUSCULAR; INTRAVENOUS ONCE
Status: COMPLETED | OUTPATIENT
Start: 2022-05-13 | End: 2022-05-13

## 2022-05-13 RX ORDER — OXYCODONE HYDROCHLORIDE 5 MG/1
5 TABLET ORAL EVERY 6 HOURS PRN
Qty: 12 TABLET | Refills: 0 | Status: SHIPPED | OUTPATIENT
Start: 2022-05-13 | End: 2022-05-16

## 2022-05-13 RX ORDER — 0.9 % SODIUM CHLORIDE 0.9 %
1000 INTRAVENOUS SOLUTION INTRAVENOUS ONCE
Status: DISCONTINUED | OUTPATIENT
Start: 2022-05-13 | End: 2022-05-13 | Stop reason: HOSPADM

## 2022-05-13 RX ORDER — ONDANSETRON 4 MG/1
4 TABLET, FILM COATED ORAL EVERY 8 HOURS PRN
Qty: 10 TABLET | Refills: 0 | Status: SHIPPED | OUTPATIENT
Start: 2022-05-13 | End: 2022-05-27

## 2022-05-13 RX ADMIN — IOPAMIDOL 85 ML: 755 INJECTION, SOLUTION INTRAVENOUS at 16:29

## 2022-05-13 RX ADMIN — ONDANSETRON HYDROCHLORIDE 4 MG: 2 INJECTION, SOLUTION INTRAMUSCULAR; INTRAVENOUS at 16:27

## 2022-05-13 RX ADMIN — KETOROLAC TROMETHAMINE 15 MG: 30 INJECTION, SOLUTION INTRAMUSCULAR at 16:27

## 2022-05-13 ASSESSMENT — ENCOUNTER SYMPTOMS
NAUSEA: 1
BLOOD IN STOOL: 0
DIARRHEA: 1
VOMITING: 1
COUGH: 0
ABDOMINAL PAIN: 1
SHORTNESS OF BREATH: 1

## 2022-05-13 ASSESSMENT — PAIN SCALES - GENERAL: PAINLEVEL_OUTOF10: 9

## 2022-05-13 ASSESSMENT — PAIN DESCRIPTION - ORIENTATION: ORIENTATION: RIGHT

## 2022-05-13 ASSESSMENT — PAIN DESCRIPTION - LOCATION: LOCATION: ABDOMEN

## 2022-05-13 ASSESSMENT — PAIN - FUNCTIONAL ASSESSMENT
PAIN_FUNCTIONAL_ASSESSMENT: 0-10
PAIN_FUNCTIONAL_ASSESSMENT: NONE - DENIES PAIN
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
## 2022-05-13 NOTE — ED NOTES
1649 12 Lead EKG completed results given to Dr. Maddie Carrion for review      Rachel Hernandez  05/13/22 [id number]

## 2022-05-17 ENCOUNTER — HOSPITAL ENCOUNTER (INPATIENT)
Age: 62
LOS: 6 days | Discharge: HOME | DRG: 720 | End: 2022-05-23
Attending: INTERNAL MEDICINE | Admitting: INTERNAL MEDICINE
Payer: COMMERCIAL

## 2022-05-17 PROBLEM — R16.0 LIVER MASS: Status: ACTIVE | Noted: 2022-05-17

## 2022-05-17 LAB
A/G RATIO: 0.9 (ref 1.1–2.2)
ALBUMIN SERPL-MCNC: 3 G/DL (ref 3.4–5)
ALP BLD-CCNC: 187 U/L (ref 40–129)
ALT SERPL-CCNC: 50 U/L (ref 10–40)
ANION GAP SERPL CALCULATED.3IONS-SCNC: 11 MMOL/L (ref 3–16)
AST SERPL-CCNC: 48 U/L (ref 15–37)
BASOPHILS ABSOLUTE: 0 K/UL (ref 0–0.2)
BASOPHILS RELATIVE PERCENT: 0.2 %
BILIRUB SERPL-MCNC: 0.6 MG/DL (ref 0–1)
BUN BLDV-MCNC: 9 MG/DL (ref 7–20)
CALCIUM SERPL-MCNC: 8.3 MG/DL (ref 8.3–10.6)
CHLORIDE BLD-SCNC: 95 MMOL/L (ref 99–110)
CO2: 26 MMOL/L (ref 21–32)
CREAT SERPL-MCNC: 0.7 MG/DL (ref 0.9–1.3)
EOSINOPHILS ABSOLUTE: 0 K/UL (ref 0–0.6)
EOSINOPHILS RELATIVE PERCENT: 0.1 %
GFR AFRICAN AMERICAN: >60
GFR NON-AFRICAN AMERICAN: >60
GLUCOSE BLD-MCNC: 139 MG/DL (ref 70–99)
HCT VFR BLD CALC: 36.1 % (ref 40.5–52.5)
HEMOGLOBIN: 12.2 G/DL (ref 13.5–17.5)
INR BLD: 1.74 (ref 0.88–1.12)
LYMPHOCYTES ABSOLUTE: 1 K/UL (ref 1–5.1)
LYMPHOCYTES RELATIVE PERCENT: 7.4 %
MCH RBC QN AUTO: 31.7 PG (ref 26–34)
MCHC RBC AUTO-ENTMCNC: 33.9 G/DL (ref 31–36)
MCV RBC AUTO: 93.4 FL (ref 80–100)
MONOCYTES ABSOLUTE: 0.8 K/UL (ref 0–1.3)
MONOCYTES RELATIVE PERCENT: 5.8 %
NEUTROPHILS ABSOLUTE: 11.8 K/UL (ref 1.7–7.7)
NEUTROPHILS RELATIVE PERCENT: 86.5 %
PDW BLD-RTO: 14.4 % (ref 12.4–15.4)
PLATELET # BLD: 317 K/UL (ref 135–450)
PMV BLD AUTO: 8.7 FL (ref 5–10.5)
POTASSIUM REFLEX MAGNESIUM: 4.8 MMOL/L (ref 3.5–5.1)
PROTHROMBIN TIME: 20.1 SEC (ref 9.9–12.7)
RBC # BLD: 3.86 M/UL (ref 4.2–5.9)
SODIUM BLD-SCNC: 132 MMOL/L (ref 136–145)
TOTAL PROTEIN: 6.5 G/DL (ref 6.4–8.2)
WBC # BLD: 13.6 K/UL (ref 4–11)

## 2022-05-17 PROCEDURE — 1200000000 HC SEMI PRIVATE

## 2022-05-17 PROCEDURE — 0F903ZZ DRAINAGE OF LIVER, PERCUTANEOUS APPROACH: ICD-10-PCS | Performed by: INTERNAL MEDICINE

## 2022-05-17 PROCEDURE — 80053 COMPREHEN METABOLIC PANEL: CPT

## 2022-05-17 PROCEDURE — 85610 PROTHROMBIN TIME: CPT

## 2022-05-17 PROCEDURE — 6360000002 HC RX W HCPCS

## 2022-05-17 PROCEDURE — 36415 COLL VENOUS BLD VENIPUNCTURE: CPT

## 2022-05-17 PROCEDURE — 2580000003 HC RX 258

## 2022-05-17 PROCEDURE — 6370000000 HC RX 637 (ALT 250 FOR IP)

## 2022-05-17 PROCEDURE — 6370000000 HC RX 637 (ALT 250 FOR IP): Performed by: INTERNAL MEDICINE

## 2022-05-17 PROCEDURE — 82105 ALPHA-FETOPROTEIN SERUM: CPT

## 2022-05-17 PROCEDURE — 85025 COMPLETE CBC W/AUTO DIFF WBC: CPT

## 2022-05-17 PROCEDURE — 86301 IMMUNOASSAY TUMOR CA 19-9: CPT

## 2022-05-17 RX ORDER — ONDANSETRON 4 MG/1
4 TABLET, ORALLY DISINTEGRATING ORAL EVERY 8 HOURS PRN
Status: DISCONTINUED | OUTPATIENT
Start: 2022-05-17 | End: 2022-05-23 | Stop reason: HOSPADM

## 2022-05-17 RX ORDER — ACETAMINOPHEN 650 MG/1
650 SUPPOSITORY RECTAL EVERY 6 HOURS PRN
Status: DISCONTINUED | OUTPATIENT
Start: 2022-05-17 | End: 2022-05-23 | Stop reason: HOSPADM

## 2022-05-17 RX ORDER — MORPHINE SULFATE 4 MG/ML
4 INJECTION, SOLUTION INTRAMUSCULAR; INTRAVENOUS
Status: DISCONTINUED | OUTPATIENT
Start: 2022-05-17 | End: 2022-05-23 | Stop reason: HOSPADM

## 2022-05-17 RX ORDER — SODIUM CHLORIDE 0.9 % (FLUSH) 0.9 %
10 SYRINGE (ML) INJECTION PRN
Status: DISCONTINUED | OUTPATIENT
Start: 2022-05-17 | End: 2022-05-23 | Stop reason: HOSPADM

## 2022-05-17 RX ORDER — TRAZODONE HYDROCHLORIDE 100 MG/1
200 TABLET ORAL NIGHTLY
Status: DISCONTINUED | OUTPATIENT
Start: 2022-05-17 | End: 2022-05-17

## 2022-05-17 RX ORDER — ALLOPURINOL 100 MG/1
100 TABLET ORAL EVERY MORNING
Status: DISCONTINUED | OUTPATIENT
Start: 2022-05-18 | End: 2022-05-23 | Stop reason: HOSPADM

## 2022-05-17 RX ORDER — ACETAMINOPHEN 325 MG/1
650 TABLET ORAL EVERY 6 HOURS PRN
Status: DISCONTINUED | OUTPATIENT
Start: 2022-05-17 | End: 2022-05-23 | Stop reason: HOSPADM

## 2022-05-17 RX ORDER — POTASSIUM CHLORIDE 7.45 MG/ML
10 INJECTION INTRAVENOUS PRN
Status: DISCONTINUED | OUTPATIENT
Start: 2022-05-17 | End: 2022-05-23 | Stop reason: HOSPADM

## 2022-05-17 RX ORDER — OXYCODONE HYDROCHLORIDE AND ACETAMINOPHEN 5; 325 MG/1; MG/1
1 TABLET ORAL EVERY 6 HOURS PRN
Status: DISCONTINUED | OUTPATIENT
Start: 2022-05-17 | End: 2022-05-23 | Stop reason: HOSPADM

## 2022-05-17 RX ORDER — ONDANSETRON 2 MG/ML
4 INJECTION INTRAMUSCULAR; INTRAVENOUS EVERY 6 HOURS PRN
Status: DISCONTINUED | OUTPATIENT
Start: 2022-05-17 | End: 2022-05-23 | Stop reason: HOSPADM

## 2022-05-17 RX ORDER — SODIUM CHLORIDE 0.9 % (FLUSH) 0.9 %
5-40 SYRINGE (ML) INJECTION EVERY 12 HOURS SCHEDULED
Status: DISCONTINUED | OUTPATIENT
Start: 2022-05-17 | End: 2022-05-23 | Stop reason: HOSPADM

## 2022-05-17 RX ORDER — MAGNESIUM SULFATE 1 G/100ML
1000 INJECTION INTRAVENOUS PRN
Status: DISCONTINUED | OUTPATIENT
Start: 2022-05-17 | End: 2022-05-23 | Stop reason: HOSPADM

## 2022-05-17 RX ORDER — MECOBALAMIN 5000 MCG
10 TABLET,DISINTEGRATING ORAL NIGHTLY
Status: DISCONTINUED | OUTPATIENT
Start: 2022-05-17 | End: 2022-05-23 | Stop reason: HOSPADM

## 2022-05-17 RX ORDER — SODIUM CHLORIDE 9 MG/ML
INJECTION, SOLUTION INTRAVENOUS CONTINUOUS
Status: DISCONTINUED | OUTPATIENT
Start: 2022-05-17 | End: 2022-05-23 | Stop reason: HOSPADM

## 2022-05-17 RX ORDER — DULOXETIN HYDROCHLORIDE 60 MG/1
60 CAPSULE, DELAYED RELEASE ORAL DAILY
Status: DISCONTINUED | OUTPATIENT
Start: 2022-05-17 | End: 2022-05-23 | Stop reason: HOSPADM

## 2022-05-17 RX ORDER — TRAZODONE HYDROCHLORIDE 100 MG/1
100 TABLET ORAL NIGHTLY PRN
Status: DISCONTINUED | OUTPATIENT
Start: 2022-05-17 | End: 2022-05-23 | Stop reason: HOSPADM

## 2022-05-17 RX ORDER — MORPHINE SULFATE 2 MG/ML
2 INJECTION, SOLUTION INTRAMUSCULAR; INTRAVENOUS
Status: DISCONTINUED | OUTPATIENT
Start: 2022-05-17 | End: 2022-05-23 | Stop reason: HOSPADM

## 2022-05-17 RX ORDER — POLYETHYLENE GLYCOL 3350 17 G/17G
17 POWDER, FOR SOLUTION ORAL DAILY PRN
Status: DISCONTINUED | OUTPATIENT
Start: 2022-05-17 | End: 2022-05-23 | Stop reason: HOSPADM

## 2022-05-17 RX ORDER — POTASSIUM CHLORIDE 20 MEQ/1
40 TABLET, EXTENDED RELEASE ORAL PRN
Status: DISCONTINUED | OUTPATIENT
Start: 2022-05-17 | End: 2022-05-23 | Stop reason: HOSPADM

## 2022-05-17 RX ORDER — SODIUM CHLORIDE 9 MG/ML
INJECTION, SOLUTION INTRAVENOUS PRN
Status: DISCONTINUED | OUTPATIENT
Start: 2022-05-17 | End: 2022-05-23 | Stop reason: HOSPADM

## 2022-05-17 RX ADMIN — MORPHINE SULFATE 4 MG: 4 INJECTION, SOLUTION INTRAMUSCULAR; INTRAVENOUS at 20:06

## 2022-05-17 RX ADMIN — SODIUM CHLORIDE: 9 INJECTION, SOLUTION INTRAVENOUS at 16:56

## 2022-05-17 RX ADMIN — MORPHINE SULFATE 2 MG: 2 INJECTION, SOLUTION INTRAMUSCULAR; INTRAVENOUS at 16:56

## 2022-05-17 RX ADMIN — OXYCODONE HYDROCHLORIDE AND ACETAMINOPHEN 1 TABLET: 5; 325 TABLET ORAL at 22:26

## 2022-05-17 RX ADMIN — Medication 10 MG: at 22:26

## 2022-05-17 RX ADMIN — ACETAMINOPHEN 650 MG: 325 TABLET ORAL at 16:00

## 2022-05-17 RX ADMIN — TRAZODONE HYDROCHLORIDE 100 MG: 100 TABLET ORAL at 22:35

## 2022-05-17 ASSESSMENT — PAIN SCALES - GENERAL
PAINLEVEL_OUTOF10: 7
PAINLEVEL_OUTOF10: 7
PAINLEVEL_OUTOF10: 8
PAINLEVEL_OUTOF10: 4
PAINLEVEL_OUTOF10: 7
PAINLEVEL_OUTOF10: 4

## 2022-05-17 ASSESSMENT — PAIN DESCRIPTION - FREQUENCY: FREQUENCY: CONTINUOUS

## 2022-05-17 ASSESSMENT — PAIN DESCRIPTION - ORIENTATION: ORIENTATION: LEFT;MID;RIGHT

## 2022-05-17 ASSESSMENT — PAIN DESCRIPTION - PAIN TYPE: TYPE: ACUTE PAIN;CHRONIC PAIN

## 2022-05-17 ASSESSMENT — PAIN - FUNCTIONAL ASSESSMENT: PAIN_FUNCTIONAL_ASSESSMENT: INTOLERABLE, UNABLE TO DO ANY ACTIVE OR PASSIVE ACTIVITIES

## 2022-05-17 ASSESSMENT — PAIN DESCRIPTION - ONSET: ONSET: ON-GOING

## 2022-05-17 ASSESSMENT — PAIN DESCRIPTION - LOCATION
LOCATION: ABDOMEN
LOCATION: ABDOMEN

## 2022-05-17 ASSESSMENT — PAIN DESCRIPTION - DESCRIPTORS: DESCRIPTORS: SHARP

## 2022-05-17 ASSESSMENT — LIFESTYLE VARIABLES
HOW OFTEN DO YOU HAVE A DRINK CONTAINING ALCOHOL: NEVER
HOW MANY STANDARD DRINKS CONTAINING ALCOHOL DO YOU HAVE ON A TYPICAL DAY: 1 OR 2

## 2022-05-17 NOTE — PLAN OF CARE
2w     Direct admit from Dr. Lynda Damico. Patient has liver mass that needs biopsy. Initially going to pursue OP but abdominal pain is uncontrolled.      Plan of care discussed with Dr. Margaret Gomes PA-C  5/17/2022 3:08 PM
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
## 2022-05-18 ENCOUNTER — APPOINTMENT (OUTPATIENT)
Dept: CT IMAGING | Age: 62
DRG: 720 | End: 2022-05-18
Attending: INTERNAL MEDICINE
Payer: COMMERCIAL

## 2022-05-18 ENCOUNTER — APPOINTMENT (OUTPATIENT)
Dept: GENERAL RADIOLOGY | Age: 62
DRG: 720 | End: 2022-05-18
Attending: INTERNAL MEDICINE
Payer: COMMERCIAL

## 2022-05-18 PROBLEM — J32.0 OROANTRAL FISTULA: Status: RESOLVED | Noted: 2019-11-07 | Resolved: 2022-05-18

## 2022-05-18 PROBLEM — M79.671 BILATERAL FOOT PAIN: Status: RESOLVED | Noted: 2019-01-02 | Resolved: 2022-05-18

## 2022-05-18 PROBLEM — J32.0 OROANTRAL FISTULA: Status: ACTIVE | Noted: 2019-11-07

## 2022-05-18 PROBLEM — K75.0 LIVER ABSCESS: Status: ACTIVE | Noted: 2022-05-17

## 2022-05-18 PROBLEM — M79.672 BILATERAL FOOT PAIN: Status: RESOLVED | Noted: 2019-01-02 | Resolved: 2022-05-18

## 2022-05-18 LAB
A/G RATIO: 0.7 (ref 1.1–2.2)
ALBUMIN SERPL-MCNC: 2.8 G/DL (ref 3.4–5)
ALP BLD-CCNC: 193 U/L (ref 40–129)
ALT SERPL-CCNC: 52 U/L (ref 10–40)
ANION GAP SERPL CALCULATED.3IONS-SCNC: 13 MMOL/L (ref 3–16)
AST SERPL-CCNC: 45 U/L (ref 15–37)
BASOPHILS ABSOLUTE: 0.1 K/UL (ref 0–0.2)
BASOPHILS RELATIVE PERCENT: 0.6 %
BILIRUB SERPL-MCNC: 0.6 MG/DL (ref 0–1)
BILIRUBIN URINE: NEGATIVE
BLOOD, URINE: NEGATIVE
BUN BLDV-MCNC: 8 MG/DL (ref 7–20)
CALCIUM SERPL-MCNC: 8.1 MG/DL (ref 8.3–10.6)
CHLORIDE BLD-SCNC: 99 MMOL/L (ref 99–110)
CLARITY: CLEAR
CO2: 25 MMOL/L (ref 21–32)
COLOR: YELLOW
CREAT SERPL-MCNC: 0.7 MG/DL (ref 0.9–1.3)
EOSINOPHILS ABSOLUTE: 0 K/UL (ref 0–0.6)
EOSINOPHILS RELATIVE PERCENT: 0.1 %
GFR AFRICAN AMERICAN: >60
GFR NON-AFRICAN AMERICAN: >60
GLUCOSE BLD-MCNC: 119 MG/DL (ref 70–99)
GLUCOSE URINE: NEGATIVE MG/DL
HCT VFR BLD CALC: 37.2 % (ref 40.5–52.5)
HEMOGLOBIN: 12.7 G/DL (ref 13.5–17.5)
INFLUENZA A: NOT DETECTED
INFLUENZA B: NOT DETECTED
KETONES, URINE: NEGATIVE MG/DL
LEUKOCYTE ESTERASE, URINE: NEGATIVE
LYMPHOCYTES ABSOLUTE: 1 K/UL (ref 1–5.1)
LYMPHOCYTES RELATIVE PERCENT: 6.2 %
MCH RBC QN AUTO: 31.9 PG (ref 26–34)
MCHC RBC AUTO-ENTMCNC: 34.1 G/DL (ref 31–36)
MCV RBC AUTO: 93.6 FL (ref 80–100)
MICROSCOPIC EXAMINATION: NORMAL
MONOCYTES ABSOLUTE: 1 K/UL (ref 0–1.3)
MONOCYTES RELATIVE PERCENT: 6 %
NEUTROPHILS ABSOLUTE: 13.9 K/UL (ref 1.7–7.7)
NEUTROPHILS RELATIVE PERCENT: 87.1 %
NITRITE, URINE: NEGATIVE
PDW BLD-RTO: 14.5 % (ref 12.4–15.4)
PH UA: 6 (ref 5–8)
PLATELET # BLD: 357 K/UL (ref 135–450)
PMV BLD AUTO: 8.5 FL (ref 5–10.5)
POTASSIUM REFLEX MAGNESIUM: 4.5 MMOL/L (ref 3.5–5.1)
PROCALCITONIN: 1.26 NG/ML (ref 0–0.15)
PROTEIN UA: NEGATIVE MG/DL
RBC # BLD: 3.97 M/UL (ref 4.2–5.9)
SARS-COV-2 RNA, RT PCR: NOT DETECTED
SODIUM BLD-SCNC: 137 MMOL/L (ref 136–145)
SPECIFIC GRAVITY UA: 1.01 (ref 1–1.03)
TOTAL PROTEIN: 6.7 G/DL (ref 6.4–8.2)
TROPONIN: <0.01 NG/ML
URINE TYPE: NORMAL
UROBILINOGEN, URINE: 0.2 E.U./DL
WBC # BLD: 16 K/UL (ref 4–11)

## 2022-05-18 PROCEDURE — 87040 BLOOD CULTURE FOR BACTERIA: CPT

## 2022-05-18 PROCEDURE — 6370000000 HC RX 637 (ALT 250 FOR IP)

## 2022-05-18 PROCEDURE — 6360000002 HC RX W HCPCS: Performed by: INTERNAL MEDICINE

## 2022-05-18 PROCEDURE — 71045 X-RAY EXAM CHEST 1 VIEW: CPT

## 2022-05-18 PROCEDURE — 87636 SARSCOV2 & INF A&B AMP PRB: CPT

## 2022-05-18 PROCEDURE — 87075 CULTR BACTERIA EXCEPT BLOOD: CPT

## 2022-05-18 PROCEDURE — 2580000003 HC RX 258: Performed by: INTERNAL MEDICINE

## 2022-05-18 PROCEDURE — 6370000000 HC RX 637 (ALT 250 FOR IP): Performed by: INTERNAL MEDICINE

## 2022-05-18 PROCEDURE — 2580000003 HC RX 258

## 2022-05-18 PROCEDURE — 80053 COMPREHEN METABOLIC PANEL: CPT

## 2022-05-18 PROCEDURE — 99152 MOD SED SAME PHYS/QHP 5/>YRS: CPT

## 2022-05-18 PROCEDURE — 81003 URINALYSIS AUTO W/O SCOPE: CPT

## 2022-05-18 PROCEDURE — 87070 CULTURE OTHR SPECIMN AEROBIC: CPT

## 2022-05-18 PROCEDURE — 6360000002 HC RX W HCPCS

## 2022-05-18 PROCEDURE — 2060000000 HC ICU INTERMEDIATE R&B

## 2022-05-18 PROCEDURE — 87076 CULTURE ANAEROBE IDENT EACH: CPT

## 2022-05-18 PROCEDURE — 87077 CULTURE AEROBIC IDENTIFY: CPT

## 2022-05-18 PROCEDURE — 87186 SC STD MICRODIL/AGAR DIL: CPT

## 2022-05-18 PROCEDURE — 75989 ABSCESS DRAINAGE UNDER X-RAY: CPT

## 2022-05-18 PROCEDURE — 84484 ASSAY OF TROPONIN QUANT: CPT

## 2022-05-18 PROCEDURE — 6360000002 HC RX W HCPCS: Performed by: RADIOLOGY

## 2022-05-18 PROCEDURE — 87205 SMEAR GRAM STAIN: CPT

## 2022-05-18 PROCEDURE — 85025 COMPLETE CBC W/AUTO DIFF WBC: CPT

## 2022-05-18 PROCEDURE — 2500000003 HC RX 250 WO HCPCS: Performed by: INTERNAL MEDICINE

## 2022-05-18 PROCEDURE — 36415 COLL VENOUS BLD VENIPUNCTURE: CPT

## 2022-05-18 PROCEDURE — 99254 IP/OBS CNSLTJ NEW/EST MOD 60: CPT | Performed by: INTERNAL MEDICINE

## 2022-05-18 PROCEDURE — 99223 1ST HOSP IP/OBS HIGH 75: CPT | Performed by: INTERNAL MEDICINE

## 2022-05-18 PROCEDURE — 84145 PROCALCITONIN (PCT): CPT

## 2022-05-18 PROCEDURE — 93005 ELECTROCARDIOGRAM TRACING: CPT | Performed by: INTERNAL MEDICINE

## 2022-05-18 PROCEDURE — 82378 CARCINOEMBRYONIC ANTIGEN: CPT

## 2022-05-18 PROCEDURE — C1769 GUIDE WIRE: HCPCS

## 2022-05-18 RX ORDER — METOPROLOL TARTRATE 5 MG/5ML
5 INJECTION INTRAVENOUS EVERY 4 HOURS
Status: COMPLETED | OUTPATIENT
Start: 2022-05-19 | End: 2022-05-19

## 2022-05-18 RX ORDER — PHYTONADIONE 10 MG/ML
5 INJECTION, EMULSION INTRAMUSCULAR; INTRAVENOUS; SUBCUTANEOUS ONCE
Status: COMPLETED | OUTPATIENT
Start: 2022-05-18 | End: 2022-05-18

## 2022-05-18 RX ORDER — DOCUSATE SODIUM 100 MG/1
100 CAPSULE, LIQUID FILLED ORAL 2 TIMES DAILY
Status: DISCONTINUED | OUTPATIENT
Start: 2022-05-18 | End: 2022-05-23 | Stop reason: HOSPADM

## 2022-05-18 RX ORDER — FENTANYL CITRATE 50 UG/ML
INJECTION, SOLUTION INTRAMUSCULAR; INTRAVENOUS
Status: COMPLETED | OUTPATIENT
Start: 2022-05-18 | End: 2022-05-18

## 2022-05-18 RX ORDER — ADENOSINE 3 MG/ML
6 INJECTION, SOLUTION INTRAVENOUS ONCE
Status: COMPLETED | OUTPATIENT
Start: 2022-05-18 | End: 2022-05-18

## 2022-05-18 RX ORDER — METRONIDAZOLE 500 MG/100ML
500 INJECTION, SOLUTION INTRAVENOUS EVERY 8 HOURS
Status: DISCONTINUED | OUTPATIENT
Start: 2022-05-18 | End: 2022-05-23

## 2022-05-18 RX ORDER — ADENOSINE 3 MG/ML
12 INJECTION, SOLUTION INTRAVENOUS ONCE
Status: DISCONTINUED | OUTPATIENT
Start: 2022-05-18 | End: 2022-05-23 | Stop reason: HOSPADM

## 2022-05-18 RX ORDER — ADENOSINE 3 MG/ML
12 INJECTION, SOLUTION INTRAVENOUS ONCE
Status: COMPLETED | OUTPATIENT
Start: 2022-05-18 | End: 2022-05-18

## 2022-05-18 RX ORDER — MIDAZOLAM HYDROCHLORIDE 5 MG/ML
INJECTION INTRAMUSCULAR; INTRAVENOUS
Status: COMPLETED | OUTPATIENT
Start: 2022-05-18 | End: 2022-05-18

## 2022-05-18 RX ADMIN — MORPHINE SULFATE 4 MG: 4 INJECTION, SOLUTION INTRAMUSCULAR; INTRAVENOUS at 07:54

## 2022-05-18 RX ADMIN — METOPROLOL TARTRATE 25 MG: 25 TABLET, FILM COATED ORAL at 22:47

## 2022-05-18 RX ADMIN — VANCOMYCIN HYDROCHLORIDE 1250 MG: 10 INJECTION, POWDER, LYOPHILIZED, FOR SOLUTION INTRAVENOUS at 21:40

## 2022-05-18 RX ADMIN — PHYTONADIONE 5 MG: 10 INJECTION, EMULSION INTRAMUSCULAR; INTRAVENOUS; SUBCUTANEOUS at 17:13

## 2022-05-18 RX ADMIN — TRAZODONE HYDROCHLORIDE 100 MG: 100 TABLET ORAL at 21:34

## 2022-05-18 RX ADMIN — FENTANYL CITRATE 50 MCG: 50 INJECTION, SOLUTION INTRAMUSCULAR; INTRAVENOUS at 13:40

## 2022-05-18 RX ADMIN — SODIUM CHLORIDE: 9 INJECTION, SOLUTION INTRAVENOUS at 07:53

## 2022-05-18 RX ADMIN — MORPHINE SULFATE 4 MG: 4 INJECTION, SOLUTION INTRAMUSCULAR; INTRAVENOUS at 20:15

## 2022-05-18 RX ADMIN — DULOXETINE HYDROCHLORIDE 60 MG: 60 CAPSULE, DELAYED RELEASE ORAL at 17:14

## 2022-05-18 RX ADMIN — OXYCODONE HYDROCHLORIDE AND ACETAMINOPHEN 1 TABLET: 5; 325 TABLET ORAL at 04:46

## 2022-05-18 RX ADMIN — ALLOPURINOL 100 MG: 100 TABLET ORAL at 07:54

## 2022-05-18 RX ADMIN — MORPHINE SULFATE 4 MG: 4 INJECTION, SOLUTION INTRAMUSCULAR; INTRAVENOUS at 12:47

## 2022-05-18 RX ADMIN — MIDAZOLAM HYDROCHLORIDE 1 MG: 5 INJECTION, SOLUTION INTRAMUSCULAR; INTRAVENOUS at 13:40

## 2022-05-18 RX ADMIN — ADENOSINE 12 MG: 3 INJECTION, SOLUTION INTRAVENOUS at 23:40

## 2022-05-18 RX ADMIN — DOCUSATE SODIUM 100 MG: 100 CAPSULE, LIQUID FILLED ORAL at 21:34

## 2022-05-18 RX ADMIN — SODIUM CHLORIDE: 9 INJECTION, SOLUTION INTRAVENOUS at 21:38

## 2022-05-18 RX ADMIN — Medication 10 MG: at 21:34

## 2022-05-18 RX ADMIN — ADENOSINE 6 MG: 3 INJECTION, SOLUTION INTRAVENOUS at 22:26

## 2022-05-18 RX ADMIN — CEFEPIME 2000 MG: 2 INJECTION, POWDER, FOR SOLUTION INTRAVENOUS at 21:37

## 2022-05-18 RX ADMIN — METRONIDAZOLE 500 MG: 500 INJECTION, SOLUTION INTRAVENOUS at 17:19

## 2022-05-18 RX ADMIN — OXYCODONE HYDROCHLORIDE AND ACETAMINOPHEN 1 TABLET: 5; 325 TABLET ORAL at 17:14

## 2022-05-18 ASSESSMENT — PAIN SCALES - GENERAL
PAINLEVEL_OUTOF10: 8
PAINLEVEL_OUTOF10: 7
PAINLEVEL_OUTOF10: 5

## 2022-05-18 ASSESSMENT — PAIN DESCRIPTION - LOCATION: LOCATION: ABDOMEN
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
## 2022-05-18 NOTE — PROGRESS NOTES
Bedside report given to Hattie Boyer RN  pt in stable condition no needs at this time.  Call light within reach
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
## 2022-05-18 NOTE — FLOWSHEET NOTE
05/17/22 2015   Vital Signs   Temp 98.2 °F (36.8 °C)   Temp Source Oral   Pulse 87   Heart Rate Source Monitor   Resp 16   BP (!) 158/90   BP Location Left upper arm   MAP (Calculated) 112.67   Patient Position Semi fowlers   Level of Consciousness Alert (0)   MEWS Score 1   Oxygen Therapy   SpO2 91 %   O2 Device None (Room air)   Pt A/O assessment completed. IV infiltrated removed and dressing applied. New IV started. Meds given per MAR. Pt aware of NPO status at midnight. Pt denies any needs.  Call light within reach
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
## 2022-05-18 NOTE — CARE COORDINATION
Case Management Assessment  Initial Evaluation      Patient Name: Edi Bailon  YOB: 1960  Diagnosis: Liver mass [R16.0]  Date / Time: 5/17/2022  2:41 PM    Admission status/Date:  05/17/2022  Chart Reviewed: Yes      Patient Interviewed: Yes   Family Interviewed:  No      Hospitalization in the last 30 days:  No      Health Care Decision Maker :     (CM - must 1st enter selection under Navigator - emergency contact- Health Care Decision Maker Relationship and pick relationship)   Who do you trust or have selected to make healthcare decisions for you    Current PCP: HERRERA Lentz    Financial  [employer]  Precert required for SNF : Y, N          3 night stay required - Y, N    ADLS  Support Systems/Care Needs:    Transportation: self    Meal Preparation: self    Housing  Living Arrangements: IPTA Steps: NA  Intent for return to present living arrangements: Yes  Identified Issues: NA    Home Care Information  Active with [address] : No Agency:(Services)     Passport/Waiver : No  :                      Phone Number:    Passport/Waiver Services: NA          Durable Medical Equiptment   DME Provider: JAMAAL  Equipment: NA  Walker___Cane___RTS___ BSC___Shower Chair___Hospital Bed___W/C____Other________  02 at ____Liter(s)---wears(frequency)_______ HHN ___ CPAP___ BiPap___   N/A____      Home O2 Use :  No        Community Service Affiliation  Dialysis:  No    · Agency:  · Location:  · Dialysis Schedule:  · Phone:   · Fax: Other Community Services: (ex:PT/OT,Mental Health,Wound Clinic, Cardio/Pul [facility])    DISCHARGE PLAN: Explained Case Management role/services. Chart reviewed. Met with pt at bedside and explained the role of the CM. Plans to return home. IPTA. Unsure of home needs at this time. CM will follow and reassess. CM following.

## 2022-05-18 NOTE — PROGRESS NOTES
Pt awake at this time. PRN Percocet given with a sip of water. Pt denies any other needs.  Call light within reach

## 2022-05-18 NOTE — PROGRESS NOTES
Pharmacy Note  Vancomycin Consult    Ashly Ivey is a 63 y.o. male started on Vancomycin for Intra abdominal infection; consult received from Dr. Benjamin Sim to manage therapy. Also receiving the following antibiotics: cefepime. Allergies:  Patient has no known allergies. Tmax:     Recent Labs     05/17/22  1559 05/18/22  0540   CREATININE 0.7* 0.7*       Recent Labs     05/17/22  1559 05/18/22  0540   WBC 13.6* 16.0*       Estimated Creatinine Clearance: 140 mL/min (A) (based on SCr of 0.7 mg/dL (L)). Intake/Output Summary (Last 24 hours) at 5/18/2022 1505  Last data filed at 5/18/2022 1400  Gross per 24 hour   Intake 1286.87 ml   Output 1305 ml   Net -18.13 ml       Wt Readings from Last 1 Encounters:   05/17/22 234 lb (106.1 kg)         Body mass index is 33.1 kg/m². Culture Date      Source                       Results  NGTD     Loading dose (critically ill or in ICU, require dialysis or renal replacement therapy): Vancomycin 25 mg/kg IVPB x 1 (maximum 3000 mg). Maintenance dose: 15 mg/kg (maximum: 2000 mg/dose and 4500 mg/day) starting at the next dosing interval determined by renal function  Pulse dose: fluctuating renal function, PINA, ESRD   Goal Vancomycin trough: 10-15 mcg/mL or 15-20 mcg/mL   Goal Vancomycin AUC: 400-600     Assessment/Plan:  Will initiate Vancomycin 1250mg IV every 12 hours. Calculated  Vancomycin trough ordered for 5/19 Timing of trough level will be determined based on culture results, renal function, and clinical response. Thank you for the consult.   Robinson Banegas D 5/18/20223:06 PM  .
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
## 2022-05-18 NOTE — PROGRESS NOTES
Pt medicated for pain 7/10 in abdomen. Medicated with PRN Morphine. See MAR and pain flow sheet. No further needs at this time. Will continue to monitor.

## 2022-05-19 LAB
A/G RATIO: 0.4 (ref 1.1–2.2)
ALBUMIN SERPL-MCNC: 1.8 G/DL (ref 3.4–5)
ALP BLD-CCNC: 165 U/L (ref 40–129)
ALT SERPL-CCNC: 40 U/L (ref 10–40)
ANION GAP SERPL CALCULATED.3IONS-SCNC: 10 MMOL/L (ref 3–16)
AST SERPL-CCNC: 37 U/L (ref 15–37)
BASOPHILS ABSOLUTE: 0 K/UL (ref 0–0.2)
BASOPHILS RELATIVE PERCENT: 0.2 %
BILIRUB SERPL-MCNC: 0.6 MG/DL (ref 0–1)
BUN BLDV-MCNC: 10 MG/DL (ref 7–20)
CALCIUM SERPL-MCNC: 8.1 MG/DL (ref 8.3–10.6)
CHLORIDE BLD-SCNC: 98 MMOL/L (ref 99–110)
CO2: 19 MMOL/L (ref 21–32)
CREAT SERPL-MCNC: <0.5 MG/DL (ref 0.9–1.3)
EKG ATRIAL RATE: 46 BPM
EKG DIAGNOSIS: NORMAL
EKG Q-T INTERVAL: 282 MS
EKG QRS DURATION: 104 MS
EKG QTC CALCULATION (BAZETT): 477 MS
EKG R AXIS: 22 DEGREES
EKG T AXIS: -43 DEGREES
EKG VENTRICULAR RATE: 172 BPM
EOSINOPHILS ABSOLUTE: 0 K/UL (ref 0–0.6)
EOSINOPHILS RELATIVE PERCENT: 0.3 %
GFR AFRICAN AMERICAN: >60
GFR NON-AFRICAN AMERICAN: >60
GLUCOSE BLD-MCNC: 109 MG/DL (ref 70–99)
HCT VFR BLD CALC: 39.3 % (ref 40.5–52.5)
HEMOGLOBIN: 12.6 G/DL (ref 13.5–17.5)
LV EF: 55 %
LVEF MODALITY: NORMAL
LYMPHOCYTES ABSOLUTE: 1 K/UL (ref 1–5.1)
LYMPHOCYTES RELATIVE PERCENT: 7.9 %
MAGNESIUM: 2.3 MG/DL (ref 1.8–2.4)
MCH RBC QN AUTO: 31.1 PG (ref 26–34)
MCHC RBC AUTO-ENTMCNC: 32 G/DL (ref 31–36)
MCV RBC AUTO: 97.2 FL (ref 80–100)
MONOCYTES ABSOLUTE: 0.6 K/UL (ref 0–1.3)
MONOCYTES RELATIVE PERCENT: 4.6 %
NEUTROPHILS ABSOLUTE: 10.9 K/UL (ref 1.7–7.7)
NEUTROPHILS RELATIVE PERCENT: 87 %
PDW BLD-RTO: 15.2 % (ref 12.4–15.4)
PLATELET # BLD: 312 K/UL (ref 135–450)
PMV BLD AUTO: 9 FL (ref 5–10.5)
POTASSIUM REFLEX MAGNESIUM: 4.8 MMOL/L (ref 3.5–5.1)
RBC # BLD: 4.04 M/UL (ref 4.2–5.9)
SODIUM BLD-SCNC: 127 MMOL/L (ref 136–145)
TOTAL PROTEIN: 6.8 G/DL (ref 6.4–8.2)
WBC # BLD: 12.6 K/UL (ref 4–11)

## 2022-05-19 PROCEDURE — 99253 IP/OBS CNSLTJ NEW/EST LOW 45: CPT | Performed by: INTERNAL MEDICINE

## 2022-05-19 PROCEDURE — 2700000000 HC OXYGEN THERAPY PER DAY

## 2022-05-19 PROCEDURE — 2500000003 HC RX 250 WO HCPCS: Performed by: INTERNAL MEDICINE

## 2022-05-19 PROCEDURE — 2060000000 HC ICU INTERMEDIATE R&B

## 2022-05-19 PROCEDURE — 36415 COLL VENOUS BLD VENIPUNCTURE: CPT

## 2022-05-19 PROCEDURE — 93306 TTE W/DOPPLER COMPLETE: CPT

## 2022-05-19 PROCEDURE — 83735 ASSAY OF MAGNESIUM: CPT

## 2022-05-19 PROCEDURE — 99232 SBSQ HOSP IP/OBS MODERATE 35: CPT | Performed by: INTERNAL MEDICINE

## 2022-05-19 PROCEDURE — 93010 ELECTROCARDIOGRAM REPORT: CPT | Performed by: INTERNAL MEDICINE

## 2022-05-19 PROCEDURE — 6370000000 HC RX 637 (ALT 250 FOR IP)

## 2022-05-19 PROCEDURE — 94761 N-INVAS EAR/PLS OXIMETRY MLT: CPT

## 2022-05-19 PROCEDURE — 2580000003 HC RX 258: Performed by: INTERNAL MEDICINE

## 2022-05-19 PROCEDURE — 2580000003 HC RX 258

## 2022-05-19 PROCEDURE — 6360000002 HC RX W HCPCS: Performed by: INTERNAL MEDICINE

## 2022-05-19 PROCEDURE — 6370000000 HC RX 637 (ALT 250 FOR IP): Performed by: INTERNAL MEDICINE

## 2022-05-19 PROCEDURE — 85025 COMPLETE CBC W/AUTO DIFF WBC: CPT

## 2022-05-19 PROCEDURE — 99233 SBSQ HOSP IP/OBS HIGH 50: CPT | Performed by: INTERNAL MEDICINE

## 2022-05-19 PROCEDURE — 6360000002 HC RX W HCPCS

## 2022-05-19 PROCEDURE — 80053 COMPREHEN METABOLIC PANEL: CPT

## 2022-05-19 RX ADMIN — METRONIDAZOLE 500 MG: 500 INJECTION, SOLUTION INTRAVENOUS at 08:18

## 2022-05-19 RX ADMIN — CEFEPIME 2000 MG: 2 INJECTION, POWDER, FOR SOLUTION INTRAVENOUS at 08:20

## 2022-05-19 RX ADMIN — DOCUSATE SODIUM 100 MG: 100 CAPSULE, LIQUID FILLED ORAL at 08:13

## 2022-05-19 RX ADMIN — OXYCODONE HYDROCHLORIDE AND ACETAMINOPHEN 1 TABLET: 5; 325 TABLET ORAL at 20:45

## 2022-05-19 RX ADMIN — CEFTRIAXONE SODIUM 1000 MG: 1 INJECTION, POWDER, FOR SOLUTION INTRAMUSCULAR; INTRAVENOUS at 17:50

## 2022-05-19 RX ADMIN — METOPROLOL TARTRATE 25 MG: 25 TABLET, FILM COATED ORAL at 08:13

## 2022-05-19 RX ADMIN — OXYCODONE HYDROCHLORIDE AND ACETAMINOPHEN 1 TABLET: 5; 325 TABLET ORAL at 14:39

## 2022-05-19 RX ADMIN — METOPROLOL TARTRATE 5 MG: 5 INJECTION INTRAVENOUS at 04:44

## 2022-05-19 RX ADMIN — MORPHINE SULFATE 4 MG: 4 INJECTION, SOLUTION INTRAMUSCULAR; INTRAVENOUS at 04:49

## 2022-05-19 RX ADMIN — VANCOMYCIN HYDROCHLORIDE 1250 MG: 10 INJECTION, POWDER, LYOPHILIZED, FOR SOLUTION INTRAVENOUS at 10:20

## 2022-05-19 RX ADMIN — TRAZODONE HYDROCHLORIDE 100 MG: 100 TABLET ORAL at 20:45

## 2022-05-19 RX ADMIN — METRONIDAZOLE 500 MG: 500 INJECTION, SOLUTION INTRAVENOUS at 01:01

## 2022-05-19 RX ADMIN — Medication 10 MG: at 20:45

## 2022-05-19 RX ADMIN — ALLOPURINOL 100 MG: 100 TABLET ORAL at 08:13

## 2022-05-19 RX ADMIN — DOCUSATE SODIUM 100 MG: 100 CAPSULE, LIQUID FILLED ORAL at 20:45

## 2022-05-19 RX ADMIN — OXYCODONE HYDROCHLORIDE AND ACETAMINOPHEN 1 TABLET: 5; 325 TABLET ORAL at 08:28

## 2022-05-19 RX ADMIN — METOPROLOL TARTRATE 5 MG: 5 INJECTION INTRAVENOUS at 00:54

## 2022-05-19 RX ADMIN — METRONIDAZOLE 500 MG: 500 INJECTION, SOLUTION INTRAVENOUS at 17:10

## 2022-05-19 RX ADMIN — DULOXETINE HYDROCHLORIDE 60 MG: 60 CAPSULE, DELAYED RELEASE ORAL at 08:13

## 2022-05-19 RX ADMIN — METOPROLOL TARTRATE 25 MG: 25 TABLET, FILM COATED ORAL at 20:45

## 2022-05-19 RX ADMIN — Medication 10 ML: at 08:21

## 2022-05-19 ASSESSMENT — PAIN DESCRIPTION - LOCATION
LOCATION: ABDOMEN

## 2022-05-19 ASSESSMENT — PAIN - FUNCTIONAL ASSESSMENT
PAIN_FUNCTIONAL_ASSESSMENT: PREVENTS OR INTERFERES SOME ACTIVE ACTIVITIES AND ADLS
PAIN_FUNCTIONAL_ASSESSMENT: PREVENTS OR INTERFERES SOME ACTIVE ACTIVITIES AND ADLS

## 2022-05-19 ASSESSMENT — PAIN DESCRIPTION - ORIENTATION
ORIENTATION: RIGHT
ORIENTATION: RIGHT;MID
ORIENTATION: RIGHT;MID

## 2022-05-19 ASSESSMENT — PAIN DESCRIPTION - PAIN TYPE
TYPE: ACUTE PAIN
TYPE: ACUTE PAIN

## 2022-05-19 ASSESSMENT — PAIN DESCRIPTION - ONSET
ONSET: ON-GOING
ONSET: ON-GOING

## 2022-05-19 ASSESSMENT — PAIN SCALES - GENERAL
PAINLEVEL_OUTOF10: 7
PAINLEVEL_OUTOF10: 4
PAINLEVEL_OUTOF10: 7
PAINLEVEL_OUTOF10: 7

## 2022-05-19 ASSESSMENT — PAIN DESCRIPTION - FREQUENCY
FREQUENCY: CONTINUOUS
FREQUENCY: CONTINUOUS

## 2022-05-19 ASSESSMENT — PAIN DESCRIPTION - DESCRIPTORS
DESCRIPTORS: ACHING;SHARP
DESCRIPTORS: ACHING;SHARP

## 2022-05-19 NOTE — PROGRESS NOTES
D: Called back to the PCU because the patients Heart rate is back to 178. Dr. Dereck Laguna at bedside. Adenosine administered. Heart rate decreased to 82. Vital signs post adenosine obtained. New orders placed in Epic by Dr. Dereck Laguna.  Electronically signed by Shona Sprague RN on 5/19/2022 at 4:37 AM
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
## 2022-05-19 NOTE — PROGRESS NOTES
Pt transferred to 310 bedside report given to [facility]
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
## 2022-05-19 NOTE — PROGRESS NOTES
Bedside report given to [facility]. Pt. denies further needs at this time. Call light is within reach.   Carlo Cabrales RN
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
## 2022-05-19 NOTE — FLOWSHEET NOTE
05/18/22 2115   Vital Signs   Temp 98.2 °F (36.8 °C)   Temp Source Oral   Pulse 168   Heart Rate Source Monitor   Resp 18   /80   MAP (Calculated) 94   Patient Position Semi fowlers   Level of Consciousness Alert (0)   MEWS Score 4   Oxygen Therapy   SpO2 94 %   O2 Device None (Room air)   Pt A/O assessment completed at this time. Clinical made aware of elevated HR. STAT EKG ordered, with Troponin's. Pt does not complain of any chest pain or SOB. Meds given per MAR. Clinical in room at this time.  Call light within reach

## 2022-05-19 NOTE — FLOWSHEET NOTE
05/19/22 0828   Pain Assessment   Pain Assessment 0-10   Pain Level 7   Pain Location Abdomen   Pain Orientation Right;Mid   Pain Descriptors Aching; Sharp   Functional Pain Assessment Prevents or interferes some active activities and ADLs   Pain Type Acute pain   Pain Frequency Continuous   Pain Onset On-going     Pain as shown. PRN Percocet given per [facility] order. Pt. Assessment completed- see flow sheet. Pt. denies further needs at this time. Will continue to monitor. Call light is within reach.   Aleks Concepcion RN

## 2022-05-19 NOTE — PROGRESS NOTES
05/18/22 2345   Vital Signs   Temp 98.3 °F (36.8 °C)   Temp Source Oral   Pulse 86   Heart Rate Source Monitor   Resp 20   /87   MAP (Calculated) 101.33   Patient Position Semi fowlers   Level of Consciousness Alert (0)   MEWS Score 1   Oxygen Therapy   SpO2 90 %   O2 Device Nasal cannula   O2 Flow Rate (L/min) 2 L/min   Dr Anayeli Jaimes at bedside. Adenosine adminstered. SVT converted to SR. Vital signs obtained and are above. Patient denies chest pain, denies SOB.

## 2022-05-19 NOTE — FLOWSHEET NOTE
05/18/22 2233   Vitals   Temp 98.1 °F (36.7 °C)   Temp Source Axillary   Pulse 79   Heart Rate Source Monitor   Resp 20   BP (!) 133/96   Oxygen Therapy   SpO2 95 %   O2 Device None (Room air)   D: Patient transferred from [address] [place] [address] to PCU. Orders for adenosine obtained. Patient placed on a portable monitor/defibulator. 20 ga IV established in the patients left AC. Dr. Terry Barthel at bedside. Adenosine administered. SVT converted to SR. Vital signs obtained and are above. Patient verbalized that he still has no chest pain or SOB. Verbal orders from Dr. Terry Barthel for metoprolol 25 mg BID starting now.  Electronically signed by Ole Olson RN on 5/18/2022 at 10:38 PM

## 2022-05-19 NOTE — PROGRESS NOTES
ONCOLOGY FOLLOW-UP:         PROBLEM LIST:       Patient Active Problem List   Diagnosis Code    Alcohol abuse F10.10    Moderate episode of recurrent major depressive disorder ([facility] [address] F33.1    Peripheral neuralgia M79.2    Essential hypertension I10    Dyspepsia R10.13    Gastroesophageal reflux disease without esophagitis K21.9    Esophageal dysphagia R13.19    Sensorineural hearing loss, bilateral H90.3    Peripheral vascular insufficiency (HCC) I73.9    Chronic gout without tophus M1A. 9XX0    Liver abscess K75.0    Sepsis without acute organ dysfunction (HCC) A41.9    Hilar lymphadenopathy R59.0    Elevated INR R79.1       INTERVAL HISTORY:       Pt had drainage of liver lesion yesterday. Purulent fluid was aspirated and drain placed. Afebrile this AM.     REVIEW OF SYSTEMS:       10 point ROS completed. Pertinent positives in HPI, otherwise negative.        PHYSICAL EXAM:       Vitals:    05/19/22 0430   BP: 133/89   Pulse: 80   Resp: 16   Temp: 98.1 °F (36.7 °C)   SpO2: 93%       General appearance: alert and cooperative  Head: Normocephalic, without obvious abnormality, atraumatic  Neck: No palpable lymphadenopathy in supraclavicular or cervical chains  Lungs: Clear to auscultation bilaterally, no audible rales, wheezes or crackles  Heart: Regular rate and rhythm, S1, S2 normal  Abdomen: percutaneous drain present  Extremities: without cyanosis, clubbing, edema or asymmetry  Skin: No jaundice, purpura or petechiae      LABS:     Lab Results   Component Value Date    WBC 12.6 (H) 05/19/2022    HGB 12.6 (L) 05/19/2022    HCT 39.3 (L) 05/19/2022    MCV 97.2 05/19/2022     05/19/2022       Lab Results   Component Value Date    GLUCOSE 109 (H) 05/19/2022    BUN 10 05/19/2022    CREATININE <0.5 (L) 05/19/2022    K 4.8 05/19/2022       Lab Results   Component Value Date    ALKPHOS 165 (H) 05/19/2022    ALT 40 05/19/2022    AST 37 05/19/2022    BILITOT 0.6 05/19/2022    BILIDIR <0.2 07/21/2015 PROT 6.8 05/19/2022       IMAGING:     CT ABDOMEN PELVIS W IV CONTRAST Additional Contrast? None  Result Date: 5/13/2022  Abnormal hypoenhancing lesions in the hepatic parenchyma measuring up to 7 cm in the right hepatic lobe suspicious for patent metastasis new from prior comparison 2015 where these were not evident. Celiac and upper abdominal adenopathy with adjacent questionable wall thickening of the gastric outlet. Concern for underlying inflammatory process versus malignancy with endoscopy considered for further evaluation of gastric outlet findings. More distal segments of colon unremarkable however colonoscopy may additionally be considered for further evaluation in the setting of likely hepatic metastasis. No mechanical obstructive process evident     XR CHEST PORTABLE  Result Date: 5/13/2022  Mild left basilar airspace disease. This could represent atelectasis or in the appropriate clinical setting pneumonia. CT CHEST PULMONARY EMBOLISM W CONTRAST  Result Date: 5/13/2022  No evidence of a pulmonary embolus. Mildly dilated and atherosclerotic thoracic aorta with no aneurysm or dissection. Borderline enlarged mediastinal right hilar lymph nodes which are more apparent. Suggest follow-up or PET scan correlation. Subsegmental atelectasis vs early infiltrates along the lung bases posteriorly extending anteriorly. Recommend follow-up with serial chest x-rays Mild hepatosplenomegaly. PATHOLOGY:     Liver cytology 5/18/22 - pending    ASSESSMENT/PLAN:     1. Liver Mass  2. Abdominal LAD  3. Borderline Enlarged Mediastinal LAD  4. Abdominal Pain  5. Fever  6. Coagulopathy  7.  ETOH Abuse    - liver lesion and LAD noted on CT scan  - raises concern for malignancy vs.abscess/infectious causes  - fever and leukocytosis actually suggest possibly an infectious cause  - cultures have been sent  - abx per IM  - tumor markers have been sent - CEA, CA 19-9, AFP pending  - IR drained purulent fluid 5/18/22  - await cytology to rule out malignant process    Isaías Fleming MD

## 2022-05-19 NOTE — PROGRESS NOTES
Cardiology Consult has been called to Marin Reyes on 5/19/22. Spoke with answering service.  5:59 AM    Estefany Arce RN  5/19/2022

## 2022-05-19 NOTE — FLOWSHEET NOTE
05/19/22 1433 05/19/22 1439   Vital Signs   Temp 98.2 °F (36.8 °C)  --    Temp Source Oral  --    Pulse 73  --    Heart Rate Source Monitor  --    Resp 16 16   BP (!) 155/94  --    BP Location Left lower arm  --    MAP (Calculated) 114.33  --    Level of Consciousness Alert (0)  --    MEWS Score 1  --    Pain Assessment   Pain Level  --  7   Pain Location  --  Abdomen   Pain Orientation  --  Right;Mid   Pain Descriptors  --  Aching; Sharp   Functional Pain Assessment  --  Prevents or interferes some active activities and ADLs   Opioid-Induced Sedation   POSS Score  --  1   Oxygen Therapy   SpO2 95 %  --    O2 Device Nasal cannula  --    O2 Flow Rate (L/min) 2 L/min  --      VS and pain as shown. PRN Percocet given per [facility] order. Pt. denies further needs at this time. Will continue to monitor. Call light is within reach.   Ermias Podn RN

## 2022-05-20 LAB
A/G RATIO: 0.5 (ref 1.1–2.2)
AFP: 0.9 UG/L
ALBUMIN SERPL-MCNC: 2.3 G/DL (ref 3.4–5)
ALP BLD-CCNC: 160 U/L (ref 40–129)
ALT SERPL-CCNC: 35 U/L (ref 10–40)
ANION GAP SERPL CALCULATED.3IONS-SCNC: 11 MMOL/L (ref 3–16)
AST SERPL-CCNC: 26 U/L (ref 15–37)
BASOPHILS ABSOLUTE: 0 K/UL (ref 0–0.2)
BASOPHILS RELATIVE PERCENT: 0.3 %
BILIRUB SERPL-MCNC: 0.4 MG/DL (ref 0–1)
BUN BLDV-MCNC: 9 MG/DL (ref 7–20)
CA 19-9: 16 U/ML (ref 0–35)
CALCIUM SERPL-MCNC: 8.4 MG/DL (ref 8.3–10.6)
CEA: 1.6 NG/ML (ref 0–5)
CHLORIDE BLD-SCNC: 98 MMOL/L (ref 99–110)
CO2: 25 MMOL/L (ref 21–32)
CREAT SERPL-MCNC: <0.5 MG/DL (ref 0.9–1.3)
EOSINOPHILS ABSOLUTE: 0.1 K/UL (ref 0–0.6)
EOSINOPHILS RELATIVE PERCENT: 0.7 %
GFR AFRICAN AMERICAN: >60
GFR NON-AFRICAN AMERICAN: >60
GLUCOSE BLD-MCNC: 109 MG/DL (ref 70–99)
HCT VFR BLD CALC: 37.9 % (ref 40.5–52.5)
HEMOGLOBIN: 12.8 G/DL (ref 13.5–17.5)
LYMPHOCYTES ABSOLUTE: 0.9 K/UL (ref 1–5.1)
LYMPHOCYTES RELATIVE PERCENT: 8.1 %
MCH RBC QN AUTO: 31.6 PG (ref 26–34)
MCHC RBC AUTO-ENTMCNC: 33.8 G/DL (ref 31–36)
MCV RBC AUTO: 93.6 FL (ref 80–100)
MONOCYTES ABSOLUTE: 0.5 K/UL (ref 0–1.3)
MONOCYTES RELATIVE PERCENT: 4.7 %
NEUTROPHILS ABSOLUTE: 9.7 K/UL (ref 1.7–7.7)
NEUTROPHILS RELATIVE PERCENT: 86.2 %
PDW BLD-RTO: 14.9 % (ref 12.4–15.4)
PLATELET # BLD: 438 K/UL (ref 135–450)
PMV BLD AUTO: 8.4 FL (ref 5–10.5)
POTASSIUM REFLEX MAGNESIUM: 4.4 MMOL/L (ref 3.5–5.1)
RBC # BLD: 4.05 M/UL (ref 4.2–5.9)
SODIUM BLD-SCNC: 134 MMOL/L (ref 136–145)
TOTAL PROTEIN: 7 G/DL (ref 6.4–8.2)
WBC # BLD: 11.3 K/UL (ref 4–11)

## 2022-05-20 PROCEDURE — 6370000000 HC RX 637 (ALT 250 FOR IP): Performed by: INTERNAL MEDICINE

## 2022-05-20 PROCEDURE — 2580000003 HC RX 258

## 2022-05-20 PROCEDURE — 99232 SBSQ HOSP IP/OBS MODERATE 35: CPT | Performed by: NURSE PRACTITIONER

## 2022-05-20 PROCEDURE — 2060000000 HC ICU INTERMEDIATE R&B

## 2022-05-20 PROCEDURE — 2700000000 HC OXYGEN THERAPY PER DAY

## 2022-05-20 PROCEDURE — 6360000002 HC RX W HCPCS: Performed by: INTERNAL MEDICINE

## 2022-05-20 PROCEDURE — 85025 COMPLETE CBC W/AUTO DIFF WBC: CPT

## 2022-05-20 PROCEDURE — 36415 COLL VENOUS BLD VENIPUNCTURE: CPT

## 2022-05-20 PROCEDURE — 2580000003 HC RX 258: Performed by: INTERNAL MEDICINE

## 2022-05-20 PROCEDURE — 6370000000 HC RX 637 (ALT 250 FOR IP)

## 2022-05-20 PROCEDURE — 94761 N-INVAS EAR/PLS OXIMETRY MLT: CPT

## 2022-05-20 PROCEDURE — 80053 COMPREHEN METABOLIC PANEL: CPT

## 2022-05-20 PROCEDURE — 99233 SBSQ HOSP IP/OBS HIGH 50: CPT | Performed by: INTERNAL MEDICINE

## 2022-05-20 PROCEDURE — 6360000002 HC RX W HCPCS

## 2022-05-20 PROCEDURE — 2500000003 HC RX 250 WO HCPCS: Performed by: INTERNAL MEDICINE

## 2022-05-20 RX ADMIN — SODIUM CHLORIDE: 9 INJECTION, SOLUTION INTRAVENOUS at 16:50

## 2022-05-20 RX ADMIN — DOCUSATE SODIUM 100 MG: 100 CAPSULE, LIQUID FILLED ORAL at 21:03

## 2022-05-20 RX ADMIN — MORPHINE SULFATE 2 MG: 2 INJECTION, SOLUTION INTRAMUSCULAR; INTRAVENOUS at 03:29

## 2022-05-20 RX ADMIN — METOPROLOL TARTRATE 25 MG: 25 TABLET, FILM COATED ORAL at 08:50

## 2022-05-20 RX ADMIN — OXYCODONE HYDROCHLORIDE AND ACETAMINOPHEN 1 TABLET: 5; 325 TABLET ORAL at 17:27

## 2022-05-20 RX ADMIN — DULOXETINE HYDROCHLORIDE 60 MG: 60 CAPSULE, DELAYED RELEASE ORAL at 08:50

## 2022-05-20 RX ADMIN — SODIUM CHLORIDE: 9 INJECTION, SOLUTION INTRAVENOUS at 13:46

## 2022-05-20 RX ADMIN — METRONIDAZOLE 500 MG: 500 INJECTION, SOLUTION INTRAVENOUS at 16:48

## 2022-05-20 RX ADMIN — CEFTRIAXONE SODIUM 1000 MG: 1 INJECTION, POWDER, FOR SOLUTION INTRAMUSCULAR; INTRAVENOUS at 16:50

## 2022-05-20 RX ADMIN — METRONIDAZOLE 500 MG: 500 INJECTION, SOLUTION INTRAVENOUS at 00:51

## 2022-05-20 RX ADMIN — Medication 10 ML: at 08:51

## 2022-05-20 RX ADMIN — MORPHINE SULFATE 4 MG: 4 INJECTION, SOLUTION INTRAMUSCULAR; INTRAVENOUS at 19:22

## 2022-05-20 RX ADMIN — METOPROLOL TARTRATE 25 MG: 25 TABLET, FILM COATED ORAL at 21:03

## 2022-05-20 RX ADMIN — Medication 10 ML: at 21:03

## 2022-05-20 RX ADMIN — MORPHINE SULFATE 4 MG: 4 INJECTION, SOLUTION INTRAMUSCULAR; INTRAVENOUS at 13:53

## 2022-05-20 RX ADMIN — DOCUSATE SODIUM 100 MG: 100 CAPSULE, LIQUID FILLED ORAL at 08:50

## 2022-05-20 RX ADMIN — OXYCODONE HYDROCHLORIDE AND ACETAMINOPHEN 1 TABLET: 5; 325 TABLET ORAL at 08:50

## 2022-05-20 RX ADMIN — METRONIDAZOLE 500 MG: 500 INJECTION, SOLUTION INTRAVENOUS at 08:52

## 2022-05-20 RX ADMIN — POLYETHYLENE GLYCOL (3350) 17 G: 17 POWDER, FOR SOLUTION ORAL at 13:53

## 2022-05-20 RX ADMIN — TRAZODONE HYDROCHLORIDE 100 MG: 100 TABLET ORAL at 21:03

## 2022-05-20 RX ADMIN — ALLOPURINOL 100 MG: 100 TABLET ORAL at 08:50

## 2022-05-20 RX ADMIN — Medication 10 MG: at 21:03

## 2022-05-20 ASSESSMENT — PAIN DESCRIPTION - ONSET
ONSET: ON-GOING

## 2022-05-20 ASSESSMENT — PAIN DESCRIPTION - LOCATION
LOCATION: ABDOMEN

## 2022-05-20 ASSESSMENT — PAIN SCALES - GENERAL
PAINLEVEL_OUTOF10: 8
PAINLEVEL_OUTOF10: 7
PAINLEVEL_OUTOF10: 7
PAINLEVEL_OUTOF10: 9
PAINLEVEL_OUTOF10: 7

## 2022-05-20 ASSESSMENT — PAIN - FUNCTIONAL ASSESSMENT
PAIN_FUNCTIONAL_ASSESSMENT: ACTIVITIES ARE NOT PREVENTED
PAIN_FUNCTIONAL_ASSESSMENT: PREVENTS OR INTERFERES SOME ACTIVE ACTIVITIES AND ADLS
PAIN_FUNCTIONAL_ASSESSMENT: ACTIVITIES ARE NOT PREVENTED
PAIN_FUNCTIONAL_ASSESSMENT: PREVENTS OR INTERFERES SOME ACTIVE ACTIVITIES AND ADLS

## 2022-05-20 ASSESSMENT — PAIN DESCRIPTION - ORIENTATION
ORIENTATION: RIGHT

## 2022-05-20 ASSESSMENT — ENCOUNTER SYMPTOMS
RESPIRATORY NEGATIVE: 1
GASTROINTESTINAL NEGATIVE: 1

## 2022-05-20 ASSESSMENT — PAIN DESCRIPTION - PAIN TYPE
TYPE: ACUTE PAIN

## 2022-05-20 ASSESSMENT — PAIN DESCRIPTION - FREQUENCY
FREQUENCY: CONTINUOUS

## 2022-05-20 ASSESSMENT — PAIN DESCRIPTION - DESCRIPTORS
DESCRIPTORS: ACHING;SHARP
DESCRIPTORS: ACHING

## 2022-05-20 NOTE — FLOWSHEET NOTE
05/20/22 1922   Pain Assessment   Pain Assessment 0-10   Pain Level 7   Patient's Stated Pain Goal 6   Pain Location Abdomen   Pain Orientation Right   Pain Descriptors Aching   Functional Pain Assessment Prevents or interferes some active activities and ADLs   Pain Type Acute pain   Pain Frequency Continuous   Pain Onset On-going   Non-Pharmaceutical Pain Intervention(s) Declines   PRN pain medication administered at this time. Bedside report given to [facility], RN. Care transferred.

## 2022-05-20 NOTE — FLOWSHEET NOTE
05/20/22 7089   Pain Assessment   Pain Assessment 0-10   Pain Level 7   Patient's Stated Pain Goal 0 - No pain   Pain Location Abdomen   Pain Orientation Right   Pain Descriptors Aching   Functional Pain Assessment Activities are not prevented   Pain Type Acute pain   Pain Frequency Continuous   Pain Onset On-going   Non-Pharmaceutical Pain Intervention(s) Declines   PRN pain medication administered at this time.

## 2022-05-20 NOTE — PROGRESS NOTES
Physician Progress Note      [facility]:                  [id number]  :                       1960  ADMIT DATE:       2022 2:41 PM  [address] [place] DATE:  RESPONDING  PROVIDER #:        Willie Gann MD          QUERY TEXT:    Pt admitted with liver abscess. Noted documentation of sepsis per oncology   and Infectious disease consultant. If possible, please document in progress   notes and discharge summary:      The medical record reflects the following:  Risk Factors: fever and leukocytosis, liver abscess vs malignancy  Clinical Indicators: ID- Sepsis from the above. fever and leukocytosis   actually suggest possibly an infectious cause  - cultures have been sent. WBC 13.6 with highest 16,  temp 102.4,     Treatment: monitor labs, monitor vitals, oncology and ID consult, CT guided   drainage of liver lesion, vancomycin, cefepime, flagyl    Thank You Irma Portillo RN, [email or web address]  Options provided:  -- Sepsis confirmed present on admission  -- Sepsis ruled out  -- Other - I will add my own diagnosis  -- Disagree - Not applicable / Not valid  -- Disagree - Clinically unable to determine / Unknown  -- Refer to Clinical Documentation Reviewer    PROVIDER RESPONSE TEXT:    The diagnosis of sepsis was confirmed as present on admission.     Query created by: Lul Carmichael on 2022 12:54 PM      Electronically signed by:  Willie Gann MD 2022 2:39 PM
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
## 2022-05-20 NOTE — FLOWSHEET NOTE
05/20/22 0845   Vital Signs   Temp 96.8 °F (36 °C)   Temp Source Oral   Pulse 72   Heart Rate Source Monitor   Resp 20   BP (!) 163/93   BP Location Left lower arm   MAP (Calculated) 116.33   Patient Position Semi fowlers   Level of Consciousness Alert (0)   MEWS Score 1   Pain Assessment   Pain Assessment 0-10   Pain Level 7   Patient's Stated Pain Goal 0 - No pain   Pain Location Abdomen   Pain Orientation Right   Pain Descriptors Aching   Functional Pain Assessment Prevents or interferes some active activities and ADLs   Pain Type Acute pain   Pain Frequency Continuous   Pain Onset On-going   Non-Pharmaceutical Pain Intervention(s) Declines   Response to Pain Intervention Patient satisfied   Oxygen Therapy   SpO2 93 %   Pulse Oximeter Device Mode Intermittent   Pulse Oximeter Device Location Finger   O2 Device None (Room air)   Patient is resting showing no s/s of distress. Patient is alert and oriented. Meds were given, see MAR. Patient is denying any needs. Bed is in lowest position and call light is within reach. Will continue to monitor. Shift assessment complete, see flowsheets.    PRN  Percocet given at this time at per patient request.

## 2022-05-20 NOTE — PROGRESS NOTES
[address]  Cardiology  Progress Note    Admission date:  2022    Reason for follow up visit: SVT    HPI/CC: Edi Bailon is a 63 y.o. male who was admitted 2022 for abdominal pain and found to have liver mass and had biopsy of 2 liver collections with aspiration of purulent material. Cardiology consulted for SVT on 2022 requiring adenosine and IV metoprolol. Echo showed EF 55%. Rhythm has been sinus. Subjective: No chest pain, palpitations or shortness of breath. Vitals:  Blood pressure (!) 163/93, pulse 72, temperature 96.8 °F (36 °C), temperature source Oral, resp. rate 20, height 5' 10.5\" (1.791 m), weight 227 lb 3.2 oz (103.1 kg), SpO2 93 %.   Temp  Av.6 °F (36.4 °C)  Min: 96.8 °F (36 °C)  Max: 98.2 °F (36.8 °C)  Pulse  Av  Min: 69  Max: 73  BP  Min: 126/93  Max: 163/93  SpO2  Av.3 %  Min: 93 %  Max: 96 %    24 hour I/O    Intake/Output Summary (Last 24 hours) at 2022 0931  Last data filed at 2022 0843  Gross per 24 hour   Intake 440 ml   Output 535 ml   Net -95 ml     Current Facility-Administered Medications   Medication Dose Route Frequency Provider Last Rate Last Admin    cefTRIAXone (ROCEPHIN) 1000 mg IVPB in 50 mL D5W minibag  1,000 mg IntraVENous Q24H Devonte Murry MD   Stopped at 22 1821    docusate sodium (COLACE) capsule 100 mg  100 mg Oral BID Vanesa De La Paz MD   100 mg at 22 0850    metronidazole (FLAGYL) 500 mg in 0.9% NaCl 100 mL IVPB premix  500 mg IntraVENous Q8H Vanesa De La Paz  mL/hr at 22 0852 500 mg at 22 0852    adenosine (ADENOCARD) injection 12 mg  12 mg IntraVENous Once Krystal Rivera MD        metoprolol tartrate (LOPRESSOR) tablet 25 mg  25 mg Oral BID Krystal Rivera MD   25 mg at 22 0850    perflutren lipid microspheres (DEFINITY) injection 1.65 mg  1.5 mL IntraVENous ONCE PRN Krystal Rivera MD        allopurinol (ZYLOPRIM) tablet 100 mg  100 mg Oral REBEKA Case RADHA Ivey   100 mg at 05/20/22 0850    melatonin disintegrating tablet 10 mg  10 mg Oral Nightly Urmila Nolasco PA   10 mg at 05/19/22 2045    DULoxetine (CYMBALTA) extended release capsule 60 mg  60 mg Oral Daily Donnae Gaurav PA   60 mg at 05/20/22 0850    sodium chloride flush 0.9 % injection 5-40 mL  5-40 mL IntraVENous 2 times per day Denheidye Gaurav PA   10 mL at 05/20/22 0851    sodium chloride flush 0.9 % injection 10 mL  10 mL IntraVENous PRN Dennice Gaurav PA        0.9 % sodium chloride infusion   IntraVENous PRN Dennice Gaurav, PA 5 mL/hr at 05/18/22 2138 New Bag at 05/18/22 2138    potassium chloride (KLOR-CON M) extended release tablet 40 mEq  40 mEq Oral PRN Urmila Nolasco PA        Or    potassium bicarb-citric acid (EFFER-K) effervescent tablet 40 mEq  40 mEq Oral PRN Urmila Nolasco PA        Or    potassium chloride 10 mEq/100 mL IVPB (Peripheral Line)  10 mEq IntraVENous PRN Donnae Gaurav PA        magnesium sulfate 1000 mg in dextrose 5% 100 mL IVPB  1,000 mg IntraVENous PRN Donnae Gaurav PA        ondansetron (ZOFRAN-ODT) disintegrating tablet 4 mg  4 mg Oral Q8H PRN Urmila Nolasco PA        Or    ondansetron (ZOFRAN) injection 4 mg  4 mg IntraVENous Q6H PRN Dennice Gaurav, PA        polyethylene glycol (GLYCOLAX) packet 17 g  17 g Oral Daily PRN Urmila Nolasco PA        acetaminophen (TYLENOL) tablet 650 mg  650 mg Oral Q6H PRN Denheidye Gaurav PA   650 mg at 05/17/22 1600    Or    acetaminophen (TYLENOL) suppository 650 mg  650 mg Rectal Q6H PRN Urmila Nolasco PA        0.9 % sodium chloride infusion   IntraVENous Continuous Dennice Gaurav PA 75 mL/hr at 05/18/22 0753 New Bag at 05/18/22 0753    morphine (PF) injection 2 mg  2 mg IntraVENous Q3H PRN Dennice Gaurav, PA   2 mg at 05/20/22 0329    Or    morphine sulfate (PF) injection 4 mg  4 mg IntraVENous Q3H PRN Dennice Gaurav, PA   4 mg at 05/19/22 0449    traZODone (DESYREL) tablet 100 mg  100 mg Oral Nightly PRN Bari Sprague MD   100 mg at 05/19/22 2045    oxyCODONE-acetaminophen (PERCOCET) 5-325 MG per tablet 1 tablet  1 tablet Oral Q6H PRN Bari Sprague MD   1 tablet at 05/20/22 1977     Review of Systems   Constitutional: Negative. Respiratory: Negative. Cardiovascular: Negative. Gastrointestinal: Negative. Neurological: Negative. Objective:     Telemetry monitor: SR    Physical Exam:  Constitutional:  Comfortable and alert, NAD, appears stated age  Eyes: PERRL, sclera nonicteric  Neck:  Supple, no masses, no thyroidmegaly, no JVD  Skin:  Warm and dry; no rash or lesions  Heart: Regular, normal apex, S1 and S2 normal, no M/G/R  Lungs:  Normal respiratory effort; clear; no wheezing/rhonchi/rales  Abdomen: soft, non tender, + bowel sounds; +drain  Extremities:  No edema or cyanosis; no clubbing  Neuro: alert and oriented, moves legs and arms equally, normal mood and affect    Data Reviewed:    Echo 6/29/7990:  LV systolic function is normal with EF estimated at 55%. No regional wall motion abnormalities. There is mild concentric left ventricular hypertrophy. Normal left ventricular diastolic filling pressure. The right atrium is mildly dilated. Mild aortic and pulmonic regurgitation. Inadequate tricuspid regurgitation jet to estimate systolic artery pressure   (SPAP).     Lab Reviewed:     Renal Profile:  Lab Results   Component Value Date    CREATININE <0.5 05/20/2022    BUN 9 05/20/2022     05/20/2022    K 4.4 05/20/2022    CL 98 05/20/2022    CO2 25 05/20/2022     CBC:    Lab Results   Component Value Date    WBC 11.3 05/20/2022    RBC 4.05 05/20/2022    HGB 12.8 05/20/2022    HCT 37.9 05/20/2022    MCV 93.6 05/20/2022    RDW 14.9 05/20/2022     05/20/2022     BNP:  No results found for: PROBNP  Fasting Lipid Panel:    Lab Results   Component Value Date    CHOL 195 03/01/2022    HDL 49 03/01/2022    TRIG 165 03/01/2022     Cardiac Enzymes:  CK/MbTroponin  Lab Results   Component Value Date    TROPONINI <0.01 05/18/2022     PT/ INR   Lab Results   Component Value Date    INR 1.74 05/17/2022    PROTIME 20.1 05/17/2022     PTT No results found for: PTT   Lab Results   Component Value Date    MG 2.30 05/19/2022    No results found for: TSH    All labs and imaging reviewed today    Assessment:  SVT: now stable, noted 5/18/2022 required adenosine in the setting of hepatic abscess  HTN: sub optimal  Hepatic abscess: s/p drainage  Alcohol abuse: counseled    Plan:   1. Continue metoprolol 25 mg BID for SVT and HTN  2. Further HTN management per IM  3. He will be at increased risk for atrial arrhythmias given alcohol abuse so would continue metoprolol  4. Cardiology will sign off, please call if needed. Follow up will be arranged.      Frederick Carlson, HERRERA-CNP  [address]  [phone]

## 2022-05-20 NOTE — FLOWSHEET NOTE
05/20/22 1346   Vital Signs   Temp 96.8 °F (36 °C)   Temp Source Oral   Pulse 69   Heart Rate Source Monitor   Resp 18   BP (!) 158/87   BP Location Left upper arm   MAP (Calculated) 110.67   Patient Position Semi fowlers   Level of Consciousness Alert (0)   MEWS Score 1   Pain Assessment   Pain Assessment 0-10   Oxygen Therapy   SpO2 94 %   Pulse Oximeter Device Mode Intermittent   Pulse Oximeter Device Location Finger   O2 Device None (Room air)   PRN miralax administered per patient request. PRN morphine administered at this time for abdominal pain. Patient denies any other needs.

## 2022-05-20 NOTE — PROGRESS NOTES
Handoff report given to SERGE Martínez. Care transferred.      Electronically signed by Azar Aiken RN on 5/20/2022 at 7:39 AM

## 2022-05-20 NOTE — CARE COORDINATION
INTERDISCIPLINARY PLAN OF CARE CONFERENCE    Date/Time: 5/20/2022 9:38 AM  Completed by: Karina Collado RN, Case Management      Patient Name:  Edinson Mayer  YOB: 1960  Admitting Diagnosis: Liver mass [R16.0]     Admit Date/Time:  5/17/2022  2:41 PM    Chart reviewed. Interdisciplinary team contacted or reviewed plan related to patient progress and discharge plans. Disciplines included Case Management, Nursing, and Dietitian. Current Status:ongoing; has drains x2; cultures pending  GI and HemOnc involved    PT/OT recommendation for discharge plan of care: NA    Expected D/C Disposition:  Home  Discharge Plan Comments: Chart reviewed. Plan continues for return home at dc. IPTA Will follow for possible dc needs.      Home O2 in place on admit: No

## 2022-05-20 NOTE — PROGRESS NOTES
PM assessment completed. Scheduled medications given per MAR. VSS 2 liter NC, A/O x4, denies any needs at this time. Call light in reach, will monitor, bed alarm on.

## 2022-05-20 NOTE — PROGRESS NOTES
ONCOLOGY FOLLOW-UP:         PROBLEM LIST:       Patient Active Problem List   Diagnosis Code    Alcohol abuse F10.10    Moderate episode of recurrent major depressive disorder ([facility] [address] F33.1    Peripheral neuralgia M79.2    Essential hypertension I10    Dyspepsia R10.13    Gastroesophageal reflux disease without esophagitis K21.9    Esophageal dysphagia R13.19    Sensorineural hearing loss, bilateral H90.3    Peripheral vascular insufficiency (HCC) I73.9    Chronic gout without tophus M1A. 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 Purulent fluid was aspirated and drain placed. Afebrile this AM.     REVIEW OF SYSTEMS:       10 point ROS completed. Pertinent positives in HPI, otherwise negative.        PHYSICAL EXAM:       Vitals:    05/20/22 0045   BP: (!) 126/93   Pulse: 69   Resp: 16   Temp: 97.7 °F (36.5 °C)   SpO2: 93%       General appearance: alert and cooperative  Head: Normocephalic, without obvious abnormality, atraumatic  Neck: No palpable lymphadenopathy in supraclavicular or cervical chains  Lungs: Clear to auscultation bilaterally, no audible rales, wheezes or crackles  Heart: Regular rate and rhythm, S1, S2 normal  Abdomen: percutaneous drain present  Extremities: without cyanosis, clubbing, edema or asymmetry  Skin: No jaundice, purpura or petechiae      LABS:     Lab Results   Component Value Date    WBC 11.3 (H) 05/20/2022    HGB 12.8 (L) 05/20/2022    HCT 37.9 (L) 05/20/2022    MCV 93.6 05/20/2022     05/20/2022       Lab Results   Component Value Date    GLUCOSE 109 (H) 05/20/2022    BUN 9 05/20/2022    CREATININE <0.5 (L) 05/20/2022    K 4.4 05/20/2022       Lab Results   Component Value Date    ALKPHOS 160 (H) 05/20/2022    ALT 35 05/20/2022    AST 26 05/20/2022    BILITOT 0.4 05/20/2022    BILIDIR <0.2 07/21/2015    PROT 7.0 05/20/2022       IMAGING:     CT ABDOMEN PELVIS W IV CONTRAST Additional Contrast? None  Result Date: 5/13/2022  Abnormal hypoenhancing lesions in the hepatic parenchyma measuring up to 7 cm in the right hepatic lobe suspicious for patent metastasis new from prior comparison 2015 where these were not evident. Celiac and upper abdominal adenopathy with adjacent questionable wall thickening of the gastric outlet. Concern for underlying inflammatory process versus malignancy with endoscopy considered for further evaluation of gastric outlet findings. More distal segments of colon unremarkable however colonoscopy may additionally be considered for further evaluation in the setting of likely hepatic metastasis. No mechanical obstructive process evident     XR CHEST PORTABLE  Result Date: 5/13/2022  Mild left basilar airspace disease. This could represent atelectasis or in the appropriate clinical setting pneumonia. CT CHEST PULMONARY EMBOLISM W CONTRAST  Result Date: 5/13/2022  No evidence of a pulmonary embolus. Mildly dilated and atherosclerotic thoracic aorta with no aneurysm or dissection. Borderline enlarged mediastinal right hilar lymph nodes which are more apparent. Suggest follow-up or PET scan correlation. Subsegmental atelectasis vs early infiltrates along the lung bases posteriorly extending anteriorly. Recommend follow-up with serial chest x-rays Mild hepatosplenomegaly. PATHOLOGY:     None    ASSESSMENT/PLAN:     1. Liver Lesion  2. Abdominal LAD  3. Borderline Enlarged Mediastinal LAD  4. Abdominal Pain  5. Fever  6. Coagulopathy  7.  ETOH Abuse    - liver lesion and LAD noted on CT scan  - raises concern for malignancy vs.abscess/infectious causes  - fever and leukocytosis actually suggest possibly an infectious cause  - cultures have been sent  - abx per IM  - tumor markers have been sent - CEA, CA 19-9, AFP pending  - IR drained purulent fluid 5/18/22  - I do not see where cytology was sent on the aspirate  - recommend repeat imaging after completion of antibiotics  - will follow peripherally    Boy Veloz MD

## 2022-05-21 LAB
A/G RATIO: 0.5 (ref 1.1–2.2)
ALBUMIN SERPL-MCNC: 2.2 G/DL (ref 3.4–5)
ALP BLD-CCNC: 151 U/L (ref 40–129)
ALT SERPL-CCNC: 27 U/L (ref 10–40)
ANION GAP SERPL CALCULATED.3IONS-SCNC: 8 MMOL/L (ref 3–16)
AST SERPL-CCNC: 24 U/L (ref 15–37)
BASOPHILS ABSOLUTE: 0.1 K/UL (ref 0–0.2)
BASOPHILS RELATIVE PERCENT: 0.7 %
BILIRUB SERPL-MCNC: 0.3 MG/DL (ref 0–1)
BUN BLDV-MCNC: 8 MG/DL (ref 7–20)
CALCIUM SERPL-MCNC: 8.4 MG/DL (ref 8.3–10.6)
CHLORIDE BLD-SCNC: 101 MMOL/L (ref 99–110)
CO2: 23 MMOL/L (ref 21–32)
CREAT SERPL-MCNC: <0.5 MG/DL (ref 0.9–1.3)
EOSINOPHILS ABSOLUTE: 0.1 K/UL (ref 0–0.6)
EOSINOPHILS RELATIVE PERCENT: 1 %
GFR AFRICAN AMERICAN: >60
GFR NON-AFRICAN AMERICAN: >60
GLUCOSE BLD-MCNC: 108 MG/DL (ref 70–99)
HCT VFR BLD CALC: 39.5 % (ref 40.5–52.5)
HEMOGLOBIN: 13.1 G/DL (ref 13.5–17.5)
LYMPHOCYTES ABSOLUTE: 1.2 K/UL (ref 1–5.1)
LYMPHOCYTES RELATIVE PERCENT: 13.7 %
MCH RBC QN AUTO: 31.2 PG (ref 26–34)
MCHC RBC AUTO-ENTMCNC: 33.2 G/DL (ref 31–36)
MCV RBC AUTO: 93.9 FL (ref 80–100)
MONOCYTES ABSOLUTE: 0.5 K/UL (ref 0–1.3)
MONOCYTES RELATIVE PERCENT: 5.6 %
NEUTROPHILS ABSOLUTE: 7 K/UL (ref 1.7–7.7)
NEUTROPHILS RELATIVE PERCENT: 79 %
PDW BLD-RTO: 14.4 % (ref 12.4–15.4)
PLATELET # BLD: 472 K/UL (ref 135–450)
PMV BLD AUTO: 8.1 FL (ref 5–10.5)
POTASSIUM REFLEX MAGNESIUM: 4.4 MMOL/L (ref 3.5–5.1)
RBC # BLD: 4.21 M/UL (ref 4.2–5.9)
SODIUM BLD-SCNC: 132 MMOL/L (ref 136–145)
TOTAL PROTEIN: 7 G/DL (ref 6.4–8.2)
WBC # BLD: 8.9 K/UL (ref 4–11)

## 2022-05-21 PROCEDURE — 6360000002 HC RX W HCPCS: Performed by: INTERNAL MEDICINE

## 2022-05-21 PROCEDURE — 2500000003 HC RX 250 WO HCPCS: Performed by: INTERNAL MEDICINE

## 2022-05-21 PROCEDURE — 6370000000 HC RX 637 (ALT 250 FOR IP): Performed by: INTERNAL MEDICINE

## 2022-05-21 PROCEDURE — 2060000000 HC ICU INTERMEDIATE R&B

## 2022-05-21 PROCEDURE — 6370000000 HC RX 637 (ALT 250 FOR IP)

## 2022-05-21 PROCEDURE — 80053 COMPREHEN METABOLIC PANEL: CPT

## 2022-05-21 PROCEDURE — 2580000003 HC RX 258: Performed by: INTERNAL MEDICINE

## 2022-05-21 PROCEDURE — 36415 COLL VENOUS BLD VENIPUNCTURE: CPT

## 2022-05-21 PROCEDURE — 85025 COMPLETE CBC W/AUTO DIFF WBC: CPT

## 2022-05-21 PROCEDURE — 6360000002 HC RX W HCPCS

## 2022-05-21 PROCEDURE — 2580000003 HC RX 258

## 2022-05-21 RX ADMIN — METRONIDAZOLE 500 MG: 500 INJECTION, SOLUTION INTRAVENOUS at 08:17

## 2022-05-21 RX ADMIN — SODIUM CHLORIDE: 9 INJECTION, SOLUTION INTRAVENOUS at 17:08

## 2022-05-21 RX ADMIN — SODIUM CHLORIDE: 9 INJECTION, SOLUTION INTRAVENOUS at 18:44

## 2022-05-21 RX ADMIN — Medication 10 MG: at 20:15

## 2022-05-21 RX ADMIN — MORPHINE SULFATE 4 MG: 4 INJECTION, SOLUTION INTRAMUSCULAR; INTRAVENOUS at 20:25

## 2022-05-21 RX ADMIN — MORPHINE SULFATE 4 MG: 4 INJECTION, SOLUTION INTRAMUSCULAR; INTRAVENOUS at 14:05

## 2022-05-21 RX ADMIN — DOCUSATE SODIUM 100 MG: 100 CAPSULE, LIQUID FILLED ORAL at 08:17

## 2022-05-21 RX ADMIN — METRONIDAZOLE 500 MG: 500 INJECTION, SOLUTION INTRAVENOUS at 17:06

## 2022-05-21 RX ADMIN — DOCUSATE SODIUM 100 MG: 100 CAPSULE, LIQUID FILLED ORAL at 20:15

## 2022-05-21 RX ADMIN — ALLOPURINOL 100 MG: 100 TABLET ORAL at 08:18

## 2022-05-21 RX ADMIN — MORPHINE SULFATE 4 MG: 4 INJECTION, SOLUTION INTRAMUSCULAR; INTRAVENOUS at 11:04

## 2022-05-21 RX ADMIN — SODIUM CHLORIDE: 9 INJECTION, SOLUTION INTRAVENOUS at 00:55

## 2022-05-21 RX ADMIN — METOPROLOL TARTRATE 25 MG: 25 TABLET, FILM COATED ORAL at 08:18

## 2022-05-21 RX ADMIN — Medication 10 ML: at 08:18

## 2022-05-21 RX ADMIN — Medication 10 ML: at 20:15

## 2022-05-21 RX ADMIN — OXYCODONE HYDROCHLORIDE AND ACETAMINOPHEN 1 TABLET: 5; 325 TABLET ORAL at 18:44

## 2022-05-21 RX ADMIN — CEFTRIAXONE SODIUM 1000 MG: 1 INJECTION, POWDER, FOR SOLUTION INTRAMUSCULAR; INTRAVENOUS at 17:09

## 2022-05-21 RX ADMIN — METRONIDAZOLE 500 MG: 500 INJECTION, SOLUTION INTRAVENOUS at 00:56

## 2022-05-21 RX ADMIN — OXYCODONE HYDROCHLORIDE AND ACETAMINOPHEN 1 TABLET: 5; 325 TABLET ORAL at 08:17

## 2022-05-21 RX ADMIN — TRAZODONE HYDROCHLORIDE 100 MG: 100 TABLET ORAL at 20:15

## 2022-05-21 RX ADMIN — DULOXETINE HYDROCHLORIDE 60 MG: 60 CAPSULE, DELAYED RELEASE ORAL at 08:17

## 2022-05-21 RX ADMIN — METOPROLOL TARTRATE 25 MG: 25 TABLET, FILM COATED ORAL at 20:15

## 2022-05-21 RX ADMIN — MORPHINE SULFATE 4 MG: 4 INJECTION, SOLUTION INTRAMUSCULAR; INTRAVENOUS at 01:01

## 2022-05-21 ASSESSMENT — PAIN DESCRIPTION - ONSET
ONSET: ON-GOING

## 2022-05-21 ASSESSMENT — PAIN DESCRIPTION - DESCRIPTORS
DESCRIPTORS: ACHING

## 2022-05-21 ASSESSMENT — PAIN DESCRIPTION - PAIN TYPE
TYPE: ACUTE PAIN

## 2022-05-21 ASSESSMENT — PAIN DESCRIPTION - ORIENTATION
ORIENTATION: RIGHT

## 2022-05-21 ASSESSMENT — PAIN DESCRIPTION - FREQUENCY
FREQUENCY: CONTINUOUS

## 2022-05-21 ASSESSMENT — PAIN - FUNCTIONAL ASSESSMENT
PAIN_FUNCTIONAL_ASSESSMENT: PREVENTS OR INTERFERES WITH ALL ACTIVE AND SOME PASSIVE ACTIVITIES
PAIN_FUNCTIONAL_ASSESSMENT: PREVENTS OR INTERFERES WITH ALL ACTIVE AND SOME PASSIVE ACTIVITIES
PAIN_FUNCTIONAL_ASSESSMENT: PREVENTS OR INTERFERES SOME ACTIVE ACTIVITIES AND ADLS
PAIN_FUNCTIONAL_ASSESSMENT: PREVENTS OR INTERFERES WITH ALL ACTIVE AND SOME PASSIVE ACTIVITIES

## 2022-05-21 ASSESSMENT — PAIN DESCRIPTION - LOCATION
LOCATION: ABDOMEN

## 2022-05-21 ASSESSMENT — PAIN SCALES - GENERAL
PAINLEVEL_OUTOF10: 6
PAINLEVEL_OUTOF10: 0
PAINLEVEL_OUTOF10: 7
PAINLEVEL_OUTOF10: 7
PAINLEVEL_OUTOF10: 8
PAINLEVEL_OUTOF10: 7

## 2022-05-21 NOTE — FLOWSHEET NOTE
05/21/22 1102   Vital Signs   BP (!) 157/92   BP Location Left lower arm   MAP (Calculated) 113.67   Level of Consciousness Alert (0)   Pain Assessment   Pain Assessment 0-10   Pain Level 7   Patient's Stated Pain Goal 0 - No pain   Pain Location Abdomen   Pain Orientation Right   Pain Descriptors Aching   Functional Pain Assessment Prevents or interferes with all active and some passive activities   Pain Type Acute pain   Pain Frequency Continuous   Pain Onset On-going   Non-Pharmaceutical Pain Intervention(s) Declines   PRN morphine administered at this time per patient request. BP has significantly improved/

## 2022-05-21 NOTE — PLAN OF CARE
Problem: Discharge Planning  Goal: Discharge to home or other facility with appropriate resources  Outcome: Progressing     Problem: Pain  Goal: Verbalizes/displays adequate comfort level or baseline comfort level  Outcome: Progressing     Problem: Safety - Adult  Goal: Free from fall injury  Outcome: Progressing  Flowsheets (Taken 5/20/2022 0858 by Eleonora Burgos, RN)  Free From Fall Injury:   Instruct family/caregiver on patient safety   Based on caregiver fall risk screen, instruct family/caregiver to ask for assistance with transferring infant if caregiver noted to have fall risk factors     Problem: ABCDS Injury Assessment  Goal: Absence of physical injury  Outcome: Progressing  Flowsheets (Taken 5/20/2022 0858 by Eleonora Burgos, RN)  Absence of Physical Injury: Implement safety measures based on patient assessment

## 2022-05-21 NOTE — PROGRESS NOTES
Resting quietly in bed with eyes closed & respirations easy/even on RA. Continuous pulse oximetry mid 90%'s. Call in easy reach. Bed alarm on for safety. Continue to monitor closely.   Guanako Cortes RN

## 2022-05-21 NOTE — PROGRESS NOTES
Hospitalist Progress Note      PCP: Guille Finney, APRN - CNP    Date of Admission: 5/17/2022    Interval history:   Pt with Hx of HTN and alcohol abuse. Presented with increasing RUQ abd pain x1 week. Initially admitted to [address] floor for evaluation of liver mass vs liver abscess. Night of Wed to Thursday developing recurrent bouts of SVT s/p adenosine cardioversion and transferred to PCU floor. S/p right PTC pain placement, continued on IV antibiotics vancomycin cefepime and Flagyl    Subjective:   Abd pain - improved. No repeat SVT. 2 perc liver drains in place. Culture prelim with Strep. Afebrile. White count trending down. Tolerating diet.     Plan is for repeat CT abdomen on Monday    Medications:  Reviewed    Infusion Medications    sodium chloride 5 mL/hr at 05/20/22 1650    sodium chloride 75 mL/hr at 05/21/22 0338     Scheduled Medications    cefTRIAXone (ROCEPHIN) IV  1,000 mg IntraVENous Q24H    docusate sodium  100 mg Oral BID    metroNIDAZOLE  500 mg IntraVENous Q8H    adenosine  12 mg IntraVENous Once    metoprolol tartrate  25 mg Oral BID    allopurinol  100 mg Oral QAM    melatonin  10 mg Oral Nightly    DULoxetine  60 mg Oral Daily    sodium chloride flush  5-40 mL IntraVENous 2 times per day     PRN Meds: perflutren lipid microspheres, sodium chloride flush, sodium chloride, potassium chloride **OR** potassium alternative oral replacement **OR** potassium chloride, magnesium sulfate, ondansetron **OR** ondansetron, polyethylene glycol, acetaminophen **OR** acetaminophen, morphine **OR** morphine, traZODone, oxyCODONE-acetaminophen      Intake/Output Summary (Last 24 hours) at 5/21/2022 1402  Last data filed at 5/21/2022 1250  Gross per 24 hour   Intake 1220.44 ml   Output 2670 ml   Net -1449.56 ml       Physical Exam Performed:    BP (!) 157/92   Pulse 64   Temp 97.9 °F (36.6 °C) (Oral)   Resp 20   Ht 5' 10.5\" (1.791 m)   Wt 230 lb 6.4 oz (104.5 kg) SpO2 96%   BMI 32.59 kg/m²     General appearance: No apparent distress, appears stated age and cooperative. HEENT: Pupils equal, round, and reactive to light. Conjunctivae/corneas clear. Neck: Supple, with full range of motion. No jugular venous distention. Trachea midline. Respiratory:  Normal respiratory effort. Clear to auscultation, bilaterally without Rales/Wheezes/Rhonchi. Cardiovascular: Regular rate and rhythm with normal S1/S2 without murmurs, rubs or gallops. Abdomen: RUQ 2 perc liver drains in place. Abdomen mildly distended, mildly tender, no rigidity. +BS. Musculoskeletal: No clubbing, cyanosis or edema bilaterally. Full range of motion without deformity. Skin: Skin color, texture, turgor normal.  No rashes or lesions. Neurologic:  Neurovascularly intact without any focal sensory/motor deficits. Cranial nerves: II-XII intact, grossly non-focal.  Psychiatric: Alert and oriented, thought content appropriate, normal insight  Capillary Refill: Brisk,3 seconds, normal   Peripheral Pulses: +2 palpable, equal bilaterally       Labs:   Recent Labs     05/19/22 0434 05/20/22 0427 05/21/22 0416   WBC 12.6* 11.3* 8.9   HGB 12.6* 12.8* 13.1*   HCT 39.3* 37.9* 39.5*    438 472*     Recent Labs     05/19/22 0434 05/20/22 0427 05/21/22  0416   * 134* 132*   K 4.8 4.4 4.4   CL 98* 98* 101   CO2 19* 25 23   BUN 10 9 8   CREATININE <0.5* <0.5* <0.5*   CALCIUM 8.1* 8.4 8.4     Recent Labs     05/19/22  0434 05/20/22 0427 05/21/22  0416   AST 37 26 24   ALT 40 35 27   BILITOT 0.6 0.4 0.3   ALKPHOS 165* 160* 151*     No results for input(s): INR in the last 72 hours.   Recent Labs     05/18/22  2200   TROPONINI <0.01       Urinalysis:      Lab Results   Component Value Date    NITRU Negative 05/18/2022    WBCUA 0-2 05/13/2022    RBCUA 5-10 05/13/2022    BLOODU Negative 05/18/2022    SPECGRAV 1.010 05/18/2022    GLUCOSEU Negative 05/18/2022        Cultures  COVID not detected  Influenza a and B not detected  Cultures from drain from liver abscess growing Streptococcus intermedius, and Fusobacterium  Blood cultures no growth to date    Radiology:  CT GUIDED NEEDLE PLACEMENT   Final Result   Successful CT-guided drainage of 2 liver collections with aspiration of   approximately 35 cc of manish purulent material from the right side and 20 cc   of manish purulent material from the anterior collection as described above. 12 [place] drains x2 were placed to suction drainage. CT ABSCESS DRAINAGE W CATH PLACEMENT S&I   Final Result   Successful CT-guided drainage of 2 liver collections with aspiration of   approximately 35 cc of manish purulent material from the right side and 20 cc   of manish purulent material from the anterior collection as described above. 12 [place] drains x2 were placed to suction drainage. XR CHEST 1 VIEW   Final Result   No significant findings in the chest.                 Assessment/Plan:    Active Hospital Problems    Diagnosis     SVT (supraventricular tachycardia) (HCC) [I47.1]      Priority: Medium    Sepsis without acute organ dysfunction ([address] [A41.9]      Priority: Medium    Hilar lymphadenopathy [R59.0]      Priority: Medium    Elevated INR [R79.1]      Priority: Medium    Liver abscess [K75.0]      Priority: Medium        # Hepatic masses with abdominal LN - abscess vs cancerous   - high fever, high wbc s.o inflammatory process or lymphoma  - ID involved. - GI and HemOnc involved  - 2 perc liver drains in place.   - drain cultures with Strep and Fusobacterium.   -Was initially on IV vanc, cefepime and flagyl. Now on Rocephin and Flagyl-continue. - plan repeat CT abdomen on Monday. Likely could be discharged on oral antibiotics afterwards  -White blood count improved 16-> 12-> 8    #Sepsis present on admission  -Secondary to liver abscess  Resolved    # SVT - not POA. Several episodes 5/18 to 5/19 overnight.   S/p adenosine cardioversion and started on
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
## 2022-05-21 NOTE — FLOWSHEET NOTE
05/21/22 1404   Vital Signs   Temp 98.8 °F (37.1 °C)   Temp Source Oral   Pulse 64   Heart Rate Source Monitor   Resp 16   BP (!) 138/94   BP Location Left lower arm   MAP (Calculated) 108.67   Patient Position Semi fowlers   Level of Consciousness Alert (0)   MEWS Score 1   Pain Assessment   Pain Assessment 0-10   Pain Level 8   Patient's Stated Pain Goal 4   Pain Location Abdomen   Pain Orientation Right   Pain Descriptors Aching   Functional Pain Assessment Prevents or interferes with all active and some passive activities   Pain Type Acute pain   Pain Frequency Continuous   Pain Onset On-going   Non-Pharmaceutical Pain Intervention(s) Declines   Oxygen Therapy   SpO2 95 %   Pulse Oximeter Device Mode Intermittent   Pulse Oximeter Device Location Finger   O2 Device None (Room air)   PRN morphine administered at this time. Patient denies any other needs.

## 2022-05-22 ENCOUNTER — APPOINTMENT (OUTPATIENT)
Dept: CT IMAGING | Age: 62
DRG: 720 | End: 2022-05-22
Attending: INTERNAL MEDICINE
Payer: COMMERCIAL

## 2022-05-22 LAB
A/G RATIO: 0.6 (ref 1.1–2.2)
ALBUMIN SERPL-MCNC: 2.7 G/DL (ref 3.4–5)
ALP BLD-CCNC: 160 U/L (ref 40–129)
ALT SERPL-CCNC: 27 U/L (ref 10–40)
ANAEROBIC CULTURE: ABNORMAL
ANAEROBIC CULTURE: ABNORMAL
ANION GAP SERPL CALCULATED.3IONS-SCNC: 11 MMOL/L (ref 3–16)
AST SERPL-CCNC: 24 U/L (ref 15–37)
BASOPHILS ABSOLUTE: 0.1 K/UL (ref 0–0.2)
BASOPHILS RELATIVE PERCENT: 0.7 %
BILIRUB SERPL-MCNC: 0.4 MG/DL (ref 0–1)
BLOOD CULTURE, ROUTINE: NORMAL
BUN BLDV-MCNC: 9 MG/DL (ref 7–20)
CALCIUM SERPL-MCNC: 8.5 MG/DL (ref 8.3–10.6)
CHLORIDE BLD-SCNC: 102 MMOL/L (ref 99–110)
CO2: 23 MMOL/L (ref 21–32)
CREAT SERPL-MCNC: 0.6 MG/DL (ref 0.9–1.3)
CULTURE, BLOOD 2: NORMAL
EOSINOPHILS ABSOLUTE: 0.1 K/UL (ref 0–0.6)
EOSINOPHILS RELATIVE PERCENT: 0.9 %
GFR AFRICAN AMERICAN: >60
GFR NON-AFRICAN AMERICAN: >60
GLUCOSE BLD-MCNC: 103 MG/DL (ref 70–99)
GRAM STAIN RESULT: ABNORMAL
GRAM STAIN RESULT: ABNORMAL
HCT VFR BLD CALC: 40.7 % (ref 40.5–52.5)
HEMOGLOBIN: 13.5 G/DL (ref 13.5–17.5)
LYMPHOCYTES ABSOLUTE: 1.3 K/UL (ref 1–5.1)
LYMPHOCYTES RELATIVE PERCENT: 17.5 %
MCH RBC QN AUTO: 31.2 PG (ref 26–34)
MCHC RBC AUTO-ENTMCNC: 33.1 G/DL (ref 31–36)
MCV RBC AUTO: 94.4 FL (ref 80–100)
MONOCYTES ABSOLUTE: 0.5 K/UL (ref 0–1.3)
MONOCYTES RELATIVE PERCENT: 6.1 %
NEUTROPHILS ABSOLUTE: 5.5 K/UL (ref 1.7–7.7)
NEUTROPHILS RELATIVE PERCENT: 74.8 %
ORGANISM: ABNORMAL
PDW BLD-RTO: 14.8 % (ref 12.4–15.4)
PLATELET # BLD: 479 K/UL (ref 135–450)
PMV BLD AUTO: 8.1 FL (ref 5–10.5)
POTASSIUM REFLEX MAGNESIUM: 5.2 MMOL/L (ref 3.5–5.1)
RBC # BLD: 4.32 M/UL (ref 4.2–5.9)
SODIUM BLD-SCNC: 136 MMOL/L (ref 136–145)
TOTAL PROTEIN: 6.9 G/DL (ref 6.4–8.2)
WBC # BLD: 7.4 K/UL (ref 4–11)
WOUND/ABSCESS: ABNORMAL
WOUND/ABSCESS: ABNORMAL

## 2022-05-22 PROCEDURE — 2580000003 HC RX 258

## 2022-05-22 PROCEDURE — 6370000000 HC RX 637 (ALT 250 FOR IP)

## 2022-05-22 PROCEDURE — 74178 CT ABD&PLV WO CNTR FLWD CNTR: CPT

## 2022-05-22 PROCEDURE — 80053 COMPREHEN METABOLIC PANEL: CPT

## 2022-05-22 PROCEDURE — 36415 COLL VENOUS BLD VENIPUNCTURE: CPT

## 2022-05-22 PROCEDURE — 6370000000 HC RX 637 (ALT 250 FOR IP): Performed by: INTERNAL MEDICINE

## 2022-05-22 PROCEDURE — 2060000000 HC ICU INTERMEDIATE R&B

## 2022-05-22 PROCEDURE — 2500000003 HC RX 250 WO HCPCS: Performed by: INTERNAL MEDICINE

## 2022-05-22 PROCEDURE — 85025 COMPLETE CBC W/AUTO DIFF WBC: CPT

## 2022-05-22 PROCEDURE — 2580000003 HC RX 258: Performed by: INTERNAL MEDICINE

## 2022-05-22 PROCEDURE — 6360000002 HC RX W HCPCS: Performed by: INTERNAL MEDICINE

## 2022-05-22 PROCEDURE — 6360000004 HC RX CONTRAST MEDICATION: Performed by: INTERNAL MEDICINE

## 2022-05-22 PROCEDURE — 6360000002 HC RX W HCPCS

## 2022-05-22 RX ORDER — LISINOPRIL 20 MG/1
20 TABLET ORAL DAILY
Status: DISCONTINUED | OUTPATIENT
Start: 2022-05-22 | End: 2022-05-23 | Stop reason: HOSPADM

## 2022-05-22 RX ADMIN — IOPAMIDOL 75 ML: 755 INJECTION, SOLUTION INTRAVENOUS at 15:35

## 2022-05-22 RX ADMIN — METRONIDAZOLE 500 MG: 500 INJECTION, SOLUTION INTRAVENOUS at 00:36

## 2022-05-22 RX ADMIN — METRONIDAZOLE 500 MG: 500 INJECTION, SOLUTION INTRAVENOUS at 07:58

## 2022-05-22 RX ADMIN — CEFTRIAXONE SODIUM 1000 MG: 1 INJECTION, POWDER, FOR SOLUTION INTRAMUSCULAR; INTRAVENOUS at 17:16

## 2022-05-22 RX ADMIN — OXYCODONE HYDROCHLORIDE AND ACETAMINOPHEN 1 TABLET: 5; 325 TABLET ORAL at 00:48

## 2022-05-22 RX ADMIN — LISINOPRIL 20 MG: 20 TABLET ORAL at 07:56

## 2022-05-22 RX ADMIN — METOPROLOL TARTRATE 25 MG: 25 TABLET, FILM COATED ORAL at 07:56

## 2022-05-22 RX ADMIN — MORPHINE SULFATE 4 MG: 4 INJECTION, SOLUTION INTRAMUSCULAR; INTRAVENOUS at 16:08

## 2022-05-22 RX ADMIN — MORPHINE SULFATE 4 MG: 4 INJECTION, SOLUTION INTRAMUSCULAR; INTRAVENOUS at 04:47

## 2022-05-22 RX ADMIN — OXYCODONE HYDROCHLORIDE AND ACETAMINOPHEN 1 TABLET: 5; 325 TABLET ORAL at 13:07

## 2022-05-22 RX ADMIN — Medication 10 MG: at 21:11

## 2022-05-22 RX ADMIN — METRONIDAZOLE 500 MG: 500 INJECTION, SOLUTION INTRAVENOUS at 16:05

## 2022-05-22 RX ADMIN — DULOXETINE HYDROCHLORIDE 60 MG: 60 CAPSULE, DELAYED RELEASE ORAL at 07:56

## 2022-05-22 RX ADMIN — SODIUM CHLORIDE: 9 INJECTION, SOLUTION INTRAVENOUS at 17:15

## 2022-05-22 RX ADMIN — METOPROLOL TARTRATE 25 MG: 25 TABLET, FILM COATED ORAL at 21:11

## 2022-05-22 RX ADMIN — SODIUM CHLORIDE: 9 INJECTION, SOLUTION INTRAVENOUS at 19:05

## 2022-05-22 RX ADMIN — MORPHINE SULFATE 4 MG: 4 INJECTION, SOLUTION INTRAMUSCULAR; INTRAVENOUS at 19:11

## 2022-05-22 RX ADMIN — DOCUSATE SODIUM 100 MG: 100 CAPSULE, LIQUID FILLED ORAL at 07:57

## 2022-05-22 RX ADMIN — OXYCODONE HYDROCHLORIDE AND ACETAMINOPHEN 1 TABLET: 5; 325 TABLET ORAL at 21:19

## 2022-05-22 RX ADMIN — ALLOPURINOL 100 MG: 100 TABLET ORAL at 07:57

## 2022-05-22 RX ADMIN — SODIUM CHLORIDE: 9 INJECTION, SOLUTION INTRAVENOUS at 00:34

## 2022-05-22 ASSESSMENT — PAIN DESCRIPTION - ORIENTATION
ORIENTATION: RIGHT

## 2022-05-22 ASSESSMENT — PAIN DESCRIPTION - LOCATION
LOCATION: RIB CAGE
LOCATION: ABDOMEN

## 2022-05-22 ASSESSMENT — PAIN DESCRIPTION - FREQUENCY
FREQUENCY: CONTINUOUS

## 2022-05-22 ASSESSMENT — PAIN SCALES - GENERAL
PAINLEVEL_OUTOF10: 5
PAINLEVEL_OUTOF10: 7
PAINLEVEL_OUTOF10: 8
PAINLEVEL_OUTOF10: 7
PAINLEVEL_OUTOF10: 4
PAINLEVEL_OUTOF10: 6
PAINLEVEL_OUTOF10: 6
PAINLEVEL_OUTOF10: 7
PAINLEVEL_OUTOF10: 8

## 2022-05-22 ASSESSMENT — PAIN DESCRIPTION - PAIN TYPE
TYPE: ACUTE PAIN

## 2022-05-22 ASSESSMENT — PAIN - FUNCTIONAL ASSESSMENT
PAIN_FUNCTIONAL_ASSESSMENT: PREVENTS OR INTERFERES SOME ACTIVE ACTIVITIES AND ADLS

## 2022-05-22 ASSESSMENT — PAIN DESCRIPTION - ONSET
ONSET: ON-GOING

## 2022-05-22 ASSESSMENT — PAIN DESCRIPTION - DESCRIPTORS
DESCRIPTORS: ACHING
DESCRIPTORS: ACHING

## 2022-05-22 ASSESSMENT — PAIN SCALES - WONG BAKER: WONGBAKER_NUMERICALRESPONSE: 0

## 2022-05-22 NOTE — FLOWSHEET NOTE
05/22/22 1438   Vital Signs   Temp 96.6 °F (35.9 °C)   Temp Source Oral   Pulse 65   Heart Rate Source Monitor   Resp 20   BP (!) 148/78   BP Location Left upper arm   MAP (Calculated) 101.33   Patient Position Semi fowlers   Level of Consciousness Alert (0)   MEWS Score 1   Oxygen Therapy   SpO2 96 %   Pulse Oximeter Device Mode Intermittent   Pulse Oximeter Device Location Finger   O2 Device None (Room air)   Patient down to CT at this time. Patient denies any needs.
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
## 2022-05-22 NOTE — FLOWSHEET NOTE
05/22/22 1600   Pain Assessment   Pain Assessment 0-10   Pain Level 8   Patient's Stated Pain Goal 4   Pain Location Abdomen   Pain Orientation Right   Pain Descriptors Aching   Functional Pain Assessment Prevents or interferes some active activities and ADLs   Pain Type Acute pain   Pain Frequency Continuous   Pain Onset On-going   Non-Pharmaceutical Pain Intervention(s) Other (Comment)   Response to Pain Intervention None (pain unchanged or no improvement)   Patient returned from CT. New IV was placed. PRN morphine administered at this time. Vaccine Information Statement(s) for was given today. This has been reviewed, questions answered, and verbal consent given by Parent for injection(s) and administration of Diphtheria/Tetanus/Pertussis (Dtap), Haemophilus Influenza type b (Hib) and Varicella-Chickenpox.        Patient tolerated without incident. See immunization grid for documentation.

## 2022-05-22 NOTE — FLOWSHEET NOTE
05/22/22 0753   Vital Signs   Temp 96.9 °F (36.1 °C)   Temp Source Oral   Pulse 60   Heart Rate Source Monitor   Resp 18   BP (!) 154/97   BP Location Left lower arm   MAP (Calculated) 116   Patient Position Semi fowlers   Level of Consciousness Alert (0)   MEWS Score 1   Oxygen Therapy   SpO2 94 %   Pulse Oximeter Device Mode Intermittent   Pulse Oximeter Device Location Finger   O2 Device None (Room air)   Patient is resting showing no s/s of distress. Patient is alert and oriented. Meds were given, see MAR. Patient is denying any needs. Bed is in lowest position and call light is within reach. Will continue to monitor. Shift assessment complete, see flowsheets. Patient states pain is much more tolerable today than yesterday morning.

## 2022-05-22 NOTE — PROGRESS NOTES
Resting quietly with respirations easy/even on RA. Call in easy reach. Continue to monitor closely.   Rip Lissette, RN

## 2022-05-23 ENCOUNTER — APPOINTMENT (OUTPATIENT)
Dept: INTERVENTIONAL RADIOLOGY/VASCULAR | Age: 62
DRG: 720 | End: 2022-05-23
Attending: INTERNAL MEDICINE
Payer: COMMERCIAL

## 2022-05-23 VITALS
DIASTOLIC BLOOD PRESSURE: 82 MMHG | OXYGEN SATURATION: 95 % | RESPIRATION RATE: 18 BRPM | HEART RATE: 66 BPM | HEIGHT: 71 IN | SYSTOLIC BLOOD PRESSURE: 127 MMHG | BODY MASS INDEX: 30.82 KG/M2 | WEIGHT: 220.13 LBS | TEMPERATURE: 96.4 F

## 2022-05-23 LAB
A/G RATIO: 0.8 (ref 1.1–2.2)
ALBUMIN SERPL-MCNC: 2.9 G/DL (ref 3.4–5)
ALP BLD-CCNC: 157 U/L (ref 40–129)
ALT SERPL-CCNC: 24 U/L (ref 10–40)
ANION GAP SERPL CALCULATED.3IONS-SCNC: 9 MMOL/L (ref 3–16)
AST SERPL-CCNC: 24 U/L (ref 15–37)
BASOPHILS ABSOLUTE: 0.1 K/UL (ref 0–0.2)
BASOPHILS RELATIVE PERCENT: 1.1 %
BILIRUB SERPL-MCNC: <0.2 MG/DL (ref 0–1)
BUN BLDV-MCNC: 9 MG/DL (ref 7–20)
CALCIUM SERPL-MCNC: 8.8 MG/DL (ref 8.3–10.6)
CHLORIDE BLD-SCNC: 104 MMOL/L (ref 99–110)
CO2: 23 MMOL/L (ref 21–32)
CREAT SERPL-MCNC: <0.5 MG/DL (ref 0.9–1.3)
EOSINOPHILS ABSOLUTE: 0.1 K/UL (ref 0–0.6)
EOSINOPHILS RELATIVE PERCENT: 1 %
GFR AFRICAN AMERICAN: >60
GFR NON-AFRICAN AMERICAN: >60
GLUCOSE BLD-MCNC: 114 MG/DL (ref 70–99)
HCT VFR BLD CALC: 41.1 % (ref 40.5–52.5)
HEMOGLOBIN: 13.4 G/DL (ref 13.5–17.5)
LYMPHOCYTES ABSOLUTE: 1.3 K/UL (ref 1–5.1)
LYMPHOCYTES RELATIVE PERCENT: 15.7 %
MCH RBC QN AUTO: 30.5 PG (ref 26–34)
MCHC RBC AUTO-ENTMCNC: 32.6 G/DL (ref 31–36)
MCV RBC AUTO: 93.5 FL (ref 80–100)
MONOCYTES ABSOLUTE: 0.4 K/UL (ref 0–1.3)
MONOCYTES RELATIVE PERCENT: 5 %
NEUTROPHILS ABSOLUTE: 6.3 K/UL (ref 1.7–7.7)
NEUTROPHILS RELATIVE PERCENT: 77.2 %
PDW BLD-RTO: 14.9 % (ref 12.4–15.4)
PLATELET # BLD: 472 K/UL (ref 135–450)
PMV BLD AUTO: 8.3 FL (ref 5–10.5)
POTASSIUM REFLEX MAGNESIUM: 5 MMOL/L (ref 3.5–5.1)
RBC # BLD: 4.39 M/UL (ref 4.2–5.9)
SODIUM BLD-SCNC: 136 MMOL/L (ref 136–145)
TOTAL PROTEIN: 6.7 G/DL (ref 6.4–8.2)
WBC # BLD: 8.1 K/UL (ref 4–11)

## 2022-05-23 PROCEDURE — 6370000000 HC RX 637 (ALT 250 FOR IP): Performed by: INTERNAL MEDICINE

## 2022-05-23 PROCEDURE — 85025 COMPLETE CBC W/AUTO DIFF WBC: CPT

## 2022-05-23 PROCEDURE — 99239 HOSP IP/OBS DSCHRG MGMT >30: CPT | Performed by: INTERNAL MEDICINE

## 2022-05-23 PROCEDURE — 6360000002 HC RX W HCPCS

## 2022-05-23 PROCEDURE — 2500000003 HC RX 250 WO HCPCS: Performed by: INTERNAL MEDICINE

## 2022-05-23 PROCEDURE — 36415 COLL VENOUS BLD VENIPUNCTURE: CPT

## 2022-05-23 PROCEDURE — 99232 SBSQ HOSP IP/OBS MODERATE 35: CPT | Performed by: INTERNAL MEDICINE

## 2022-05-23 PROCEDURE — 2580000003 HC RX 258

## 2022-05-23 PROCEDURE — 6370000000 HC RX 637 (ALT 250 FOR IP)

## 2022-05-23 PROCEDURE — 80053 COMPREHEN METABOLIC PANEL: CPT

## 2022-05-23 RX ORDER — PANTOPRAZOLE SODIUM 40 MG/1
40 TABLET, DELAYED RELEASE ORAL
Qty: 30 TABLET | Refills: 1 | Status: SHIPPED | OUTPATIENT
Start: 2022-05-24 | End: 2022-05-27

## 2022-05-23 RX ORDER — METRONIDAZOLE 250 MG/1
500 TABLET ORAL 3 TIMES DAILY
Status: DISCONTINUED | OUTPATIENT
Start: 2022-05-23 | End: 2022-05-23 | Stop reason: HOSPADM

## 2022-05-23 RX ORDER — LISINOPRIL 20 MG/1
20 TABLET ORAL DAILY
Qty: 30 TABLET | Refills: 1 | Status: SHIPPED | OUTPATIENT
Start: 2022-05-24

## 2022-05-23 RX ORDER — PANTOPRAZOLE SODIUM 40 MG/1
40 TABLET, DELAYED RELEASE ORAL
Status: DISCONTINUED | OUTPATIENT
Start: 2022-05-23 | End: 2022-05-23 | Stop reason: HOSPADM

## 2022-05-23 RX ORDER — AMOXICILLIN AND CLAVULANATE POTASSIUM 875; 125 MG/1; MG/1
1 TABLET, FILM COATED ORAL 2 TIMES DAILY
Qty: 56 TABLET | Refills: 0 | Status: SHIPPED | OUTPATIENT
Start: 2022-05-23 | End: 2022-06-20

## 2022-05-23 RX ORDER — LACTOBACILLUS RHAMNOSUS GG 10B CELL
1 CAPSULE ORAL DAILY
Qty: 30 CAPSULE | Refills: 1 | Status: SHIPPED | OUTPATIENT
Start: 2022-05-23 | End: 2022-05-27

## 2022-05-23 RX ORDER — DOCUSATE SODIUM 100 MG/1
100 CAPSULE, LIQUID FILLED ORAL 2 TIMES DAILY
Qty: 60 CAPSULE | Refills: 0 | Status: SHIPPED | OUTPATIENT
Start: 2022-05-23 | End: 2022-05-27

## 2022-05-23 RX ADMIN — METRONIDAZOLE 500 MG: 500 INJECTION, SOLUTION INTRAVENOUS at 03:23

## 2022-05-23 RX ADMIN — LISINOPRIL 20 MG: 20 TABLET ORAL at 08:47

## 2022-05-23 RX ADMIN — METRONIDAZOLE 500 MG: 500 INJECTION, SOLUTION INTRAVENOUS at 08:49

## 2022-05-23 RX ADMIN — DOCUSATE SODIUM 100 MG: 100 CAPSULE, LIQUID FILLED ORAL at 08:47

## 2022-05-23 RX ADMIN — PANTOPRAZOLE SODIUM 40 MG: 40 TABLET, DELAYED RELEASE ORAL at 09:31

## 2022-05-23 RX ADMIN — METOPROLOL TARTRATE 25 MG: 25 TABLET, FILM COATED ORAL at 08:47

## 2022-05-23 RX ADMIN — MORPHINE SULFATE 4 MG: 4 INJECTION, SOLUTION INTRAMUSCULAR; INTRAVENOUS at 03:25

## 2022-05-23 RX ADMIN — ALLOPURINOL 100 MG: 100 TABLET ORAL at 08:47

## 2022-05-23 RX ADMIN — SODIUM CHLORIDE: 9 INJECTION, SOLUTION INTRAVENOUS at 03:20

## 2022-05-23 RX ADMIN — DULOXETINE HYDROCHLORIDE 60 MG: 60 CAPSULE, DELAYED RELEASE ORAL at 08:47

## 2022-05-23 RX ADMIN — OXYCODONE HYDROCHLORIDE AND ACETAMINOPHEN 1 TABLET: 5; 325 TABLET ORAL at 06:40

## 2022-05-23 ASSESSMENT — PAIN SCALES - GENERAL
PAINLEVEL_OUTOF10: 8
PAINLEVEL_OUTOF10: 8
PAINLEVEL_OUTOF10: 7
PAINLEVEL_OUTOF10: 7

## 2022-05-23 ASSESSMENT — PAIN DESCRIPTION - ORIENTATION: ORIENTATION: MID

## 2022-05-23 ASSESSMENT — PAIN DESCRIPTION - LOCATION: LOCATION: ABDOMEN

## 2022-05-23 NOTE — PROGRESS NOTES
PRN Percocet given for pain 7/10, J.P. drained emptied (see flow sheet). Pt A&O x 4, VSS. Beverage and snack offered, but denied by patient. IV taped and flushed. All patient needs addressed. Bed in lowest position, call light within reach, will continue to monitor.

## 2022-05-23 NOTE — DISCHARGE SUMMARY
Name:  Zora Mckeon  Room:  /6509-21  MRN:    [id number]    Discharge Summary      This discharge summary is in conjunction with a complete physical exam done on the day of discharge. Attending Physician: Dr. Noemí Sanchez  Discharging Physician: Dr. Thomson Manter: 5/17/2022  Discharge:   5/23/2022    HPI:    Patient is a 63 y.o.  male  With known h.o  HTN, alcohol abuse,  presents with increasing right UQ abd pain x 1 week, reports he was in usual state of health until a week ago when he had sharp right UQ abd pain on last Tuesday progressing to severe sharp stabbing  pain with associated increasing sob . No nausea or vomiting. No change in bowel habits . Has some night sweats. No changes in weight  Non smoker but does drink alcohol ( shots of whiskey) daily .      Seen in ER on Friday , diagnosed with liver mass and chest Lymphadenopathy , sent home to follow up with GI . He has seen Dr. Pamela Bennett yesterday where he was noticed to be in severe pain and recommended admission .          Diagnoses this Admission and Hospital Course      # Hepatic masses with abdominal LN   - high fever, high wbc s.o inflammatory process or lymphoma  - ID involved. - GI and HemOnc involved  - 2 perc liver drains in place.   - drain cultures with Strep and Fusobacterium.   -Was initially on IV vanc, cefepime and flagyl. Now on Rocephin and Flagyl-continued. -  repeat CT abdomen  5/22-> showed decrease in size of the hepatic abscesses. -Seen by ID; Patient can be switched to oral antibiotics.  -White blood count improved 16-> 12-> 8.1    Percutaneous abdominal drains removed by IR prior to discharge. Patient discharged on oral antibiotics Augmentin for 4 more weeks. Patient needs close follow-up with GI. #CT abdomen also showed evidence of esophagitis and ileitis.   Patient will need close outpatient GI follow-up for endoscopic evaluation     #Sepsis present on admission  -Secondary to liver Rales/Wheezes/Rhonchi. Cardiovascular: Regular rate and rhythm with normal S1/S2 without murmurs, rubs or gallops. Abdomen: RUQ 2 perc liver drains in place. Abdomen mildly distended, mildly tender, no rigidity. +BS    -> Patient subsequently went to interventional radiology and perc drains were removed prior to discharge    Musculoskeletal: No clubbing, cyanosis or edema bilaterally. Full range of motion without deformity. Skin: Skin color, texture, turgor normal.  No rashes or lesions. Neurologic:  Neurovascularly intact without any focal sensory/motor deficits. Cranial nerves: II-XII intact, grossly non-focal.  Psychiatric: Alert and oriented, thought content appropriate, normal insight  Capillary Refill: Brisk,3 seconds, normal   Peripheral Pulses: +2 palpable, equal bilaterally       CBC: Recent Labs     05/21/22 0416 05/22/22 0432 05/23/22 0447   WBC 8.9 7.4 8.1   HGB 13.1* 13.5 13.4*   HCT 39.5* 40.7 41.1   MCV 93.9 94.4 93.5   * 479* 472*     BMP:   Recent Labs     05/21/22 0416 05/22/22  0432 05/23/22  0447   * 136 136   K 4.4 5.2* 5.0    102 104   CO2 23 23 23   BUN 8 9 9   CREATININE <0.5* 0.6* <0.5*     LIVER PROFILE:   Recent Labs     05/21/22 0416 05/22/22  0432 05/23/22  0447   AST 24 24 24   ALT 27 27 24   BILITOT 0.3 0.4 <0.2   ALKPHOS 151* 160* 157*       U/A:    Lab Results   Component Value Date    NITRITE NEG 02/24/2021    COLORU Yellow 05/18/2022    WBCUA 0-2 05/13/2022    RBCUA 5-10 05/13/2022    CLARITYU Clear 05/18/2022    SPECGRAV 1.010 05/18/2022    LEUKOCYTESUR Negative 05/18/2022    BLOODU Negative 05/18/2022    GLUCOSEU Negative 05/18/2022       CULTURES  COVID not detected  Influenza a and B not detected  Cultures from drain from liver abscess growing Streptococcus intermedius, and Fusobacterium  Blood cultures no growth to date    RADIOLOGY  CT ABDOMEN PELVIS W WO CONTRAST Additional Contrast? None   Final Result   1.  Decrease in volume of bilobar hepatic abscesses status post percutaneous   catheter drainage detailed above. 2. Thickened loop of mid ileum with apparent sinus tract or fistula emanating   from this affected loop. This raises the possibility for underlying   inflammatory bowel disease and could be better evaluated with CT or MR   enterography. 3. Fatty liver with mild splenomegaly. 4. Mild wall thickening of the lower esophagus. Recommend clinical   correlation for esophagitis. CT GUIDED NEEDLE PLACEMENT   Final Result   Successful CT-guided drainage of 2 liver collections with aspiration of   approximately 35 cc of manish purulent material from the right side and 20 cc   of manish purulent material from the anterior collection as described above. 12 [place] drains x2 were placed to suction drainage. CT ABSCESS DRAINAGE W CATH PLACEMENT S&I   Final Result   Successful CT-guided drainage of 2 liver collections with aspiration of   approximately 35 cc of manish purulent material from the right side and 20 cc   of manish purulent material from the anterior collection as described above. 12 [place] drains x2 were placed to suction drainage.          XR CHEST 1 VIEW   Final Result   No significant findings in the chest.               Discharge Medications     Medication List      START taking these medications    amoxicillin-clavulanate 875-125 MG per tablet  Commonly known as: AUGMENTIN  Take 1 tablet by mouth 2 times daily for 28 days     docusate sodium 100 MG capsule  Commonly known as: COLACE  Take 1 capsule by mouth 2 times daily     lactobacillus capsule  Take 1 capsule by mouth daily     metoprolol tartrate 25 MG tablet  Commonly known as: LOPRESSOR  Take 1 tablet by mouth 2 times daily     pantoprazole 40 MG tablet  Commonly known as: PROTONIX  Take 1 tablet by mouth every morning (before breakfast)  Start taking on: May 24, 2022        CHANGE how you take these medications    [facility] Melatonin 10 MG Caps capsule  Generic drug: melatonin  TAKE 1 CAPSULE BY MOUTH EVERY DAY AT NIGHT  What changed: Another medication with the same name was removed. Continue taking this medication, and follow the directions you see here.     gabapentin 400 MG capsule  Commonly known as: NEURONTIN  TAKE ONE CAPSULE BY MOUTH THREE TIMES DAILY  What changed: Another medication with the same name was removed. Continue taking this medication, and follow the directions you see here. lisinopril 20 MG tablet  Commonly known as: PRINIVIL;ZESTRIL  Take 1 tablet by mouth daily  Start taking on: May 24, 2022  What changed:   · medication strength  · how much to take        CONTINUE taking these medications    allopurinol 100 MG tablet  Commonly known as: ZYLOPRIM  TAKE ONE TABLET BY MOUTH EVERY MORNING     DULoxetine 60 MG extended release capsule  Commonly known as: CYMBALTA  TAKE 1 CAPSULE BY MOUTH EVERY DAY     ondansetron 4 MG tablet  Commonly known as: Zofran  Take 1 tablet by mouth every 8 hours as needed for Nausea     traZODone 100 MG tablet  Commonly known as: DESYREL  TAKE 2 TABLETS BY MOUTH EVERY DAY AT NIGHT        STOP taking these medications    celecoxib 100 MG capsule  Commonly known as: CELEBREX           Where to Get Your Medications      These medications were sent to [id number], [address] [place] [address], [address] [facility] [address]    Phone: [phone]   · amoxicillin-clavulanate 875-125 MG per tablet  · docusate sodium 100 MG capsule  · lactobacillus capsule  · lisinopril 20 MG tablet  · metoprolol tartrate 25 MG tablet  · pantoprazole 40 MG tablet           Discharged in stable condition to home. D/C home with [facility]. Total time 45 minutes. > 50%  dominated by counseling and coordination of care. Follow Up: Follow up with PCP and GI.        Milla Murguia MD

## 2022-05-23 NOTE — PROGRESS NOTES
Vitals:    05/23/22 0312   BP: 138/85   Pulse: 55   Resp: 16   Temp: 97.4 °F (36.3 °C)   SpO2: 95%     Pt A&O x 4, VSS. Pt rating pain 8/10. PRN Morphine given. J.P. drains assessed, little to no drainage in either. Pt denies any further needs. Bed in lowest position, call light within reach with all personal belongings. Will continue to monitor.

## 2022-05-23 NOTE — PLAN OF CARE
Problem: Discharge Planning  Goal: Discharge to home or other facility with appropriate resources  Outcome: Progressing     Problem: Pain  Goal: Verbalizes/displays adequate comfort level or baseline comfort level  Outcome: Progressing     Problem: Safety - Adult  Goal: Free from fall injury  Outcome: Progressing  Flowsheets (Taken 5/22/2022 [id number] by Cabrera Abernathy RN)  Free From Fall Injury:   Instruct family/caregiver on patient safety   Based on caregiver fall risk screen, instruct family/caregiver to ask for assistance with transferring infant if caregiver noted to have fall risk factors
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
## 2022-05-23 NOTE — FLOWSHEET NOTE
05/23/22 1543   Encounter Summary   Encounter Overview/Reason  Initial Encounter   Service Provided For: Patient   Referral/Consult From: Rounding   Support System Unknown   Last Encounter  05/23/22   Complexity of Encounter Low   Begin Time 1420   End Time  1430   Total Time Calculated 10 min   Spiritual/Emotional needs   Type Spiritual Support   Assessment/Intervention/Outcome   Assessment Calm   Intervention Active listening;Sustaining Presence/Ministry of presence   Outcome Expressed Gratitude         encountered pt while rounding. Pt discharge in process. Not amenable to visit, but appreciative of the call.

## 2022-05-23 NOTE — PROGRESS NOTES
Pt sitting up in bed this evening watching TV, shift assessment complete and all meds given per MAR. A&O x 4, VSS. J.P. Drains in place & draining. NS infusing @ 75 mL/hr. Pt rating pain 6-7/10. PRN Percocet given. Water and ice provided. Urinal emptied by PCA. Bed in lowest position, call light within reach with all personal belongings. Pt aware of schedule for this evening. Pt denies any further needs. Will monitor.

## 2022-05-23 NOTE — PROGRESS NOTES
Shift assessment was completed (see flow sheet). Pt A/O, denies any pain or other needs at this time. Encouraged to Ambulate in room/ Hallway. Respirations are even, unlabored, with Clear sounds. On RA O2. Scheduled medications to follow- whole with Water. Infusing:  NS (see MAR). Call light within reach. Bed in lowest position. Bed alarm on. Will continue to monitor. Patient is able to demonstrate the ability to move from a reclining position to an upright position within the recliner.

## 2022-05-23 NOTE — CARE COORDINATION
DISCHARGE ORDER  Date/Time 2022 11:20 AM  Completed by: Talia Wills RN, Case Management    Patient Name: Poli Martins      : 1960  Admitting Diagnosis: Liver mass [R16.0]      Admit order Date and Status:2022  (verify MD's last order for status of admission)      Noted discharge order. If applicable PT/OT recommendation at Discharge: n/a  DME recommendation by PT/OT:n/a  Confirmed discharge plan  (pt): Yes  with whom____________pt___  If pt confirmed DC plan does family need to be contacted by CM No if yes who____n/a__  Discharge Plan: Reviewed chart. Role of discharge planner explained and patient verbalized understanding. Pt is alert and oriented. Discharge order is noted. Pt is being d/c'd to home today. CM offered HC and pt declined. Discharging on oral anbx. Pt's O2 sats are 95% on RA. No further discharge needs needed or noted. Reviewed chart. Role of discharge planner explained and patient verbalized understanding. Discharge order is noted. Has Home O2 in place on admit:  No  Informed of need to bring portable home O2 tank on day of discharge for nursing to connect prior to leaving:   Not Indicated  Verbalized agreement/Understanding:   Not Indicated    Discharge timeout done with Susan Wheeler RN. All discharge needs and concerns addressed.

## 2022-05-23 NOTE — PROGRESS NOTES
DC instructions reviewed with patient with a verbal understanding. Patient denied any needs prior to leaving facility. He was dressed and stated he had all of his belongings. I picked up his mediations from the out patient pharmacy. Patient is aware of his future appointments and the appointments he has to call himself.

## 2022-05-24 ENCOUNTER — CARE COORDINATION (OUTPATIENT)
Dept: CASE MANAGEMENT | Age: 62
End: 2022-05-24

## 2022-05-24 DIAGNOSIS — F10.10 ALCOHOL ABUSE: Primary | ICD-10-CM
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
## 2022-05-26 ENCOUNTER — CARE COORDINATION (OUTPATIENT)
Dept: CASE MANAGEMENT | Age: 62
End: 2022-05-26
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
## 2022-05-27 ENCOUNTER — OFFICE VISIT (OUTPATIENT)
Dept: FAMILY MEDICINE CLINIC | Age: 62
End: 2022-05-27
Payer: COMMERCIAL

## 2022-05-27 VITALS
OXYGEN SATURATION: 94 % | HEART RATE: 73 BPM | DIASTOLIC BLOOD PRESSURE: 81 MMHG | BODY MASS INDEX: 31.49 KG/M2 | WEIGHT: 222.6 LBS | SYSTOLIC BLOOD PRESSURE: 122 MMHG | TEMPERATURE: 98.1 F

## 2022-05-27 DIAGNOSIS — Z09 HOSPITAL DISCHARGE FOLLOW-UP: Primary | ICD-10-CM

## 2022-05-27 DIAGNOSIS — K75.0 LIVER ABSCESS: ICD-10-CM

## 2022-05-27 PROCEDURE — 99215 OFFICE O/P EST HI 40 MIN: CPT | Performed by: NURSE PRACTITIONER

## 2022-05-27 PROCEDURE — 1111F DSCHRG MED/CURRENT MED MERGE: CPT | Performed by: NURSE PRACTITIONER

## 2022-05-27 RX ORDER — CELECOXIB 100 MG/1
100 CAPSULE ORAL 2 TIMES DAILY
Status: ON HOLD | COMMUNITY
End: 2022-06-18 | Stop reason: HOSPADM

## 2022-05-27 RX ORDER — HYDROXYZINE HYDROCHLORIDE 25 MG/1
25 TABLET, FILM COATED ORAL EVERY 8 HOURS PRN
Status: ON HOLD | COMMUNITY
End: 2022-06-18 | Stop reason: HOSPADM

## 2022-05-27 ASSESSMENT — ENCOUNTER SYMPTOMS
ABDOMINAL PAIN: 1
SHORTNESS OF BREATH: 0
NAUSEA: 1
RESPIRATORY NEGATIVE: 1
ALLERGIC/IMMUNOLOGIC NEGATIVE: 1
CHEST TIGHTNESS: 0
EYES NEGATIVE: 1

## 2022-05-27 NOTE — PATIENT INSTRUCTIONS
Please read the healthy family handout that you were given and share it with your family. Please compare this printed medication list with your medications at home to be sure they are the same. If you have any medications that are different please contact us immediately at [phone]. Also review your allergies that we have listed, these may also include medications that you have not been able to tolerate, make sure everything listed is correct. If you have any allergies that are different please contact us immediately at [phone]. Patient Education        Abdominal Pain: Care Instructions  Your Care Instructions     Abdominal pain has many possible causes. Some aren't serious and get better on their own in a few days. Others need more testing and treatment. If your pain continues or gets worse, you need to be rechecked and may need more tests tofind out what is wrong. You may need surgery to correct the problem. Don't ignore new symptoms, such as fever, nausea and vomiting, urination problems, pain that gets worse, and dizziness. These may be signs of a moreserious problem. Your doctor may have recommended a follow-up visit in the next 8 to 12 hours. If you are not getting better, you may need more tests or treatment. The doctor has checked you carefully, but problems can develop later. If you notice any problems or new symptoms, get medical treatment right away. Follow-up care is a key part of your treatment and safety. Be sure to make and go to all appointments, and call your doctor if you are having problems. It's also a good idea to know your test results and keep alist of the medicines you take. How can you care for yourself at home?  Rest until you feel better.  To prevent dehydration, drink plenty of fluids. Choose water and other clear liquids until you feel better.  If you have kidney, heart, or liver disease and have to limit fluids, talk with your doctor before you increase the amount of fluids you drink.  If your stomach is upset, eat mild foods, such as rice, dry toast or crackers, bananas, and applesauce. Try eating several small meals instead of two or three large ones.  Wait until 48 hours after all symptoms have gone away before you have spicy foods, alcohol, and drinks that contain caffeine.  Do not eat foods that are high in fat.  Avoid anti-inflammatory medicines such as aspirin, ibuprofen (Advil, Motrin), and naproxen (Aleve). These can cause stomach upset. Talk to your doctor if you take daily aspirin for another health problem. When should you call for help? Call 911 anytime you think you may need emergency care. For example, call if:     You passed out (lost consciousness).      You pass maroon or very bloody stools.      You vomit blood or what looks like coffee grounds.      You have new, severe belly pain. Call your doctor now or seek immediate medical care if:     Your pain gets worse, especially if it becomes focused in one area of your belly.      You have a new or higher fever.      Your stools are black and look like tar, or they have streaks of blood.      You have unexpected vaginal bleeding.      You have symptoms of a urinary tract infection. These may include:  ? Pain when you urinate. ? Urinating more often than usual.  ? Blood in your urine.      You are dizzy or lightheaded, or you feel like you may faint. Watch closely for changes in your health, and be sure to contact your doctor if:     You are not getting better after 1 day (24 hours). Where can you learn more? Go to [email or web address] org and sign in to your [employer] account. Enter T655 in the [employer] box to learn more about \"Abdominal Pain: Care Instructions. \"     If you do not have an account, please click on the \"Sign Up Now\" link. Current as of: July 1, 2021               Content Version: 13.2  © 8585-7812 Healthwise, [facility].    Care instructions adapted under license by [facility] ([facility]). If you have questions about a medical condition or this instruction, always ask your healthcare professional. [address] any warranty or liability for your use of this information.

## 2022-05-27 NOTE — PROGRESS NOTES
Post-Discharge Transitional Care Follow Up      Cynthia Perdomo   YOB: 1960    Date of Office Visit:  5/27/2022  Date of Hospital Admission: 5/17/22  Date of Hospital Discharge: 5/23/22  Readmission Risk Score (high >=14%. Medium >=10%):Readmission Risk Score: 8.8 ( )      Care management risk score Rising risk (score 2-5) and Complex Care (Scores >=6): 1     Non face to face  following discharge, date last encounter closed (first attempt may have been earlier): 5/24/2022  1:43 PM     Call initiated 2 business days of discharge: Yes     Hospital discharge follow-up  -     ID DISCHARGE MEDS RECONCILED W/ CURRENT OUTPATIENT MED LIST  Liver abscess  Follow-up with GI as recommended. Continue oral antibiotics as ordered. Medical Decision Making: high complexity  Return in 1 month (on 6/27/2022). On this date 5/27/2022 I have reviewed previous notes, test results and face to face with the patient discussing the diagnosis and importance of compliance with the treatment plan as well as documenting on the day of the visit. Subjective:   HPI    Inpatient course: Discharge summary reviewed- see chart. Interval history/Current status: Patient presents today to follow-up on recent hospitalization. Patient was hospitalized from 5/17/2022 through 5/23/2022 with right upper quadrant abdominal pain. GI was consulted and CT showed a liver abscess. Patient was treated accordingly. Patient was discharged home on oral antibiotics. Patient reports he has some mild abdominal discomfort, primarily at the sites where he had drains removed however reports this is improving. Patient denies any fevers or chills since being home from hospital.  Patient continues on oral antibiotics as ordered. Advised patient to make an appointment to follow-up with GI and reminded him that he has an upcoming appointment June 30 with cardiology. Patient states he quit drinking alcohol approximately 4 weeks ago. Encourage patient to avoid alcohol. Patient Active Problem List   Diagnosis    Alcohol abuse    Moderate episode of recurrent major depressive disorder (HCC)    Peripheral neuralgia    Essential hypertension    Dyspepsia    Gastroesophageal reflux disease without esophagitis    Esophageal dysphagia    Sensorineural hearing loss, bilateral    Peripheral vascular insufficiency (HCC)    Chronic gout without tophus    Liver abscess    Sepsis without acute organ dysfunction (HCC)    Hilar lymphadenopathy    Elevated INR    SVT (supraventricular tachycardia) ([facility] [address]       Medications listed as ordered at the time of discharge from hospital     Medication List          Accurate as of May 27, 2022  5:41 PM. If you have any questions, ask your nurse or doctor.             CONTINUE taking these medications    allopurinol 100 MG tablet  Commonly known as: ZYLOPRIM  TAKE ONE TABLET BY MOUTH EVERY MORNING     amoxicillin-clavulanate 875-125 MG per tablet  Commonly known as: AUGMENTIN  Take 1 tablet by mouth 2 times daily for 28 days     celecoxib 100 MG capsule  Commonly known as: CELEBREX     [facility] Melatonin 10 MG Caps capsule  Generic drug: melatonin  TAKE 1 CAPSULE BY MOUTH EVERY DAY AT NIGHT     gabapentin 400 MG capsule  Commonly known as: NEURONTIN  TAKE ONE CAPSULE BY MOUTH THREE TIMES DAILY     hydrOXYzine 25 MG tablet  Commonly known as: ATARAX     lisinopril 20 MG tablet  Commonly known as: PRINIVIL;ZESTRIL  Take 1 tablet by mouth daily     metoprolol tartrate 25 MG tablet  Commonly known as: LOPRESSOR  Take 1 tablet by mouth 2 times daily     traZODone 100 MG tablet  Commonly known as: DESYREL  TAKE 2 TABLETS BY MOUTH EVERY DAY AT NIGHT             Medications marked \"taking\" at this time  Outpatient Medications Marked as Taking for the 5/27/22 encounter (Office Visit) with HERRERA Kerr - CNP   Medication Sig Dispense Refill    celecoxib (CELEBREX) 100 MG capsule Take 100 mg by mouth 2 times daily      hydrOXYzine (ATARAX) 25 MG tablet Take 25 mg by mouth every 8 hours as needed for Itching      lisinopril (PRINIVIL;ZESTRIL) 20 MG tablet Take 1 tablet by mouth daily 30 tablet 1    metoprolol tartrate (LOPRESSOR) 25 MG tablet Take 1 tablet by mouth 2 times daily 60 tablet 1    amoxicillin-clavulanate (AUGMENTIN) 875-125 MG per tablet Take 1 tablet by mouth 2 times daily for 28 days 56 tablet 0    traZODone (DESYREL) 100 MG tablet TAKE 2 TABLETS BY MOUTH EVERY DAY AT NIGHT 180 tablet 1    gabapentin (NEURONTIN) 400 MG capsule TAKE ONE CAPSULE BY MOUTH THREE TIMES DAILY 90 capsule 5    [facility] MELATONIN 10 MG CAPS capsule TAKE 1 CAPSULE BY MOUTH EVERY DAY AT NIGHT 30 capsule 1        Medications patient taking as of now reconciled against medications ordered at time of hospital discharge: Yes    Review of Systems   Constitutional: Positive for appetite change (improving). Negative for activity change, chills, fatigue and unexpected weight change. HENT: Negative. Eyes: Negative. Respiratory: Negative. Negative for chest tightness and shortness of breath. Cardiovascular: Negative. Negative for chest pain, palpitations and leg swelling. Gastrointestinal: Positive for abdominal pain (right side where drain was removed; improving) and nausea. Endocrine: Negative. Genitourinary: Negative. Musculoskeletal: Negative. Skin: Negative. Negative for rash and wound. Allergic/Immunologic: Negative. Neurological: Negative. Negative for dizziness, light-headedness and headaches. Hematological: Negative. Psychiatric/Behavioral: Negative. Negative for dysphoric mood. Objective:    /81   Pulse 73   Temp 98.1 °F (36.7 °C) (Oral)   Wt 222 lb 9.6 oz (101 kg)   SpO2 94%   BMI 31.49 kg/m²   Physical Exam  Vitals and nursing note reviewed. Constitutional:       General: He is not in acute distress. Appearance: Normal appearance. He is well-developed and well-groomed. He is not ill-appearing or toxic-appearing. HENT:      Head: Normocephalic and atraumatic. Eyes:      Extraocular Movements: Extraocular movements intact. Conjunctiva/sclera: Conjunctivae normal.   Cardiovascular:      Rate and Rhythm: Normal rate and regular rhythm. Pulses: Normal pulses. Heart sounds: Normal heart sounds, S1 normal and S2 normal.   Pulmonary:      Effort: Pulmonary effort is normal. No accessory muscle usage or respiratory distress. Breath sounds: Normal breath sounds. Abdominal:      General: Bowel sounds are normal.      Palpations: Abdomen is soft. Musculoskeletal:      Cervical back: Neck supple. Right lower leg: No edema. Left lower leg: No edema. Lymphadenopathy:      Cervical: No cervical adenopathy. Skin:     General: Skin is warm and moist.      Capillary Refill: Capillary refill takes less than 2 seconds. Findings: No rash. Neurological:      General: No focal deficit present. Mental Status: He is alert and oriented to person, place, and time. Mental status is at baseline. Psychiatric:         Attention and Perception: Attention normal.         Mood and Affect: Mood normal.         Speech: Speech normal.         Behavior: Behavior normal. Behavior is cooperative. An electronic signature was used to authenticate this note.   --Rashard Neely, HERRERA - CNP

## 2022-06-02 ENCOUNTER — APPOINTMENT (OUTPATIENT)
Dept: GENERAL RADIOLOGY | Age: 62
DRG: 230 | End: 2022-06-02
Payer: COMMERCIAL

## 2022-06-02 ENCOUNTER — ANESTHESIA EVENT (OUTPATIENT)
Dept: OPERATING ROOM | Age: 62
DRG: 230 | End: 2022-06-02
Payer: COMMERCIAL

## 2022-06-02 ENCOUNTER — APPOINTMENT (OUTPATIENT)
Dept: CT IMAGING | Age: 62
DRG: 230 | End: 2022-06-02
Payer: COMMERCIAL

## 2022-06-02 ENCOUNTER — HOSPITAL ENCOUNTER (INPATIENT)
Age: 62
LOS: 16 days | Discharge: HOME | DRG: 230 | End: 2022-06-18
Attending: EMERGENCY MEDICINE | Admitting: SURGERY
Payer: COMMERCIAL

## 2022-06-02 ENCOUNTER — ANESTHESIA (OUTPATIENT)
Dept: OPERATING ROOM | Age: 62
DRG: 230 | End: 2022-06-02
Payer: COMMERCIAL

## 2022-06-02 DIAGNOSIS — G89.18 POSTOPERATIVE PAIN: ICD-10-CM

## 2022-06-02 DIAGNOSIS — Z98.890 EXPLORATORY LAPAROTOMY SCAR: ICD-10-CM

## 2022-06-02 DIAGNOSIS — L90.5 EXPLORATORY LAPAROTOMY SCAR: ICD-10-CM

## 2022-06-02 DIAGNOSIS — K63.1 SMALL BOWEL PERFORATION (HCC): Primary | ICD-10-CM

## 2022-06-02 LAB
A/G RATIO: 0.7 (ref 1.1–2.2)
ALBUMIN SERPL-MCNC: 3.4 G/DL (ref 3.4–5)
ALP BLD-CCNC: 107 U/L (ref 40–129)
ALT SERPL-CCNC: 20 U/L (ref 10–40)
ANION GAP SERPL CALCULATED.3IONS-SCNC: 12 MMOL/L (ref 3–16)
AST SERPL-CCNC: 19 U/L (ref 15–37)
BASOPHILS ABSOLUTE: 0 K/UL (ref 0–0.2)
BASOPHILS RELATIVE PERCENT: 0.4 %
BILIRUB SERPL-MCNC: 0.5 MG/DL (ref 0–1)
BILIRUBIN URINE: NEGATIVE
BLOOD, URINE: NEGATIVE
BUN BLDV-MCNC: 7 MG/DL (ref 7–20)
CALCIUM SERPL-MCNC: 9.5 MG/DL (ref 8.3–10.6)
CHLORIDE BLD-SCNC: 100 MMOL/L (ref 99–110)
CLARITY: CLEAR
CO2: 24 MMOL/L (ref 21–32)
COLOR: YELLOW
CREAT SERPL-MCNC: 0.6 MG/DL (ref 0.9–1.3)
EKG ATRIAL RATE: 90 BPM
EKG DIAGNOSIS: NORMAL
EKG P AXIS: 27 DEGREES
EKG P-R INTERVAL: 148 MS
EKG Q-T INTERVAL: 374 MS
EKG QRS DURATION: 100 MS
EKG QTC CALCULATION (BAZETT): 457 MS
EKG R AXIS: 4 DEGREES
EKG T AXIS: 27 DEGREES
EKG VENTRICULAR RATE: 90 BPM
EOSINOPHILS ABSOLUTE: 0.1 K/UL (ref 0–0.6)
EOSINOPHILS RELATIVE PERCENT: 0.5 %
GFR AFRICAN AMERICAN: >60
GFR NON-AFRICAN AMERICAN: >60
GLUCOSE BLD-MCNC: 116 MG/DL (ref 70–99)
GLUCOSE URINE: NEGATIVE MG/DL
HCT VFR BLD CALC: 40.1 % (ref 40.5–52.5)
HEMOGLOBIN: 13.5 G/DL (ref 13.5–17.5)
INFLUENZA A: NOT DETECTED
INFLUENZA B: NOT DETECTED
KETONES, URINE: NEGATIVE MG/DL
LACTIC ACID, SEPSIS: 1.1 MMOL/L (ref 0.4–1.9)
LEUKOCYTE ESTERASE, URINE: NEGATIVE
LIPASE: 24 U/L (ref 13–60)
LYMPHOCYTES ABSOLUTE: 1.2 K/UL (ref 1–5.1)
LYMPHOCYTES RELATIVE PERCENT: 11.9 %
MCH RBC QN AUTO: 30.9 PG (ref 26–34)
MCHC RBC AUTO-ENTMCNC: 33.6 G/DL (ref 31–36)
MCV RBC AUTO: 91.9 FL (ref 80–100)
MICROSCOPIC EXAMINATION: NORMAL
MONOCYTES ABSOLUTE: 0.5 K/UL (ref 0–1.3)
MONOCYTES RELATIVE PERCENT: 5.3 %
NEUTROPHILS ABSOLUTE: 8.2 K/UL (ref 1.7–7.7)
NEUTROPHILS RELATIVE PERCENT: 81.9 %
NITRITE, URINE: NEGATIVE
PDW BLD-RTO: 15 % (ref 12.4–15.4)
PH UA: 8 (ref 5–8)
PLATELET # BLD: 282 K/UL (ref 135–450)
PMV BLD AUTO: 8.9 FL (ref 5–10.5)
POTASSIUM REFLEX MAGNESIUM: 4.2 MMOL/L (ref 3.5–5.1)
PROTEIN UA: NEGATIVE MG/DL
RBC # BLD: 4.36 M/UL (ref 4.2–5.9)
SARS-COV-2 RNA, RT PCR: NOT DETECTED
SODIUM BLD-SCNC: 136 MMOL/L (ref 136–145)
SPECIFIC GRAVITY UA: 1.01 (ref 1–1.03)
TOTAL PROTEIN: 8 G/DL (ref 6.4–8.2)
TROPONIN: <0.01 NG/ML
URINE REFLEX TO CULTURE: NORMAL
URINE TYPE: NORMAL
UROBILINOGEN, URINE: 0.2 E.U./DL
WBC # BLD: 10 K/UL (ref 4–11)

## 2022-06-02 PROCEDURE — 99285 EMERGENCY DEPT VISIT HI MDM: CPT

## 2022-06-02 PROCEDURE — 74177 CT ABD & PELVIS W/CONTRAST: CPT

## 2022-06-02 PROCEDURE — 93005 ELECTROCARDIOGRAM TRACING: CPT | Performed by: PHYSICIAN ASSISTANT

## 2022-06-02 PROCEDURE — 2500000003 HC RX 250 WO HCPCS: Performed by: ANESTHESIOLOGY

## 2022-06-02 PROCEDURE — 3700000001 HC ADD 15 MINUTES (ANESTHESIA): Performed by: SURGERY

## 2022-06-02 PROCEDURE — 96365 THER/PROPH/DIAG IV INF INIT: CPT

## 2022-06-02 PROCEDURE — 87636 SARSCOV2 & INF A&B AMP PRB: CPT

## 2022-06-02 PROCEDURE — 3600000014 HC SURGERY LEVEL 4 ADDTL 15MIN: Performed by: SURGERY

## 2022-06-02 PROCEDURE — 7100000000 HC PACU RECOVERY - FIRST 15 MIN: Performed by: SURGERY

## 2022-06-02 PROCEDURE — 2500000003 HC RX 250 WO HCPCS: Performed by: SURGERY

## 2022-06-02 PROCEDURE — 2709999900 HC NON-CHARGEABLE SUPPLY: Performed by: SURGERY

## 2022-06-02 PROCEDURE — 2580000003 HC RX 258: Performed by: PHYSICIAN ASSISTANT

## 2022-06-02 PROCEDURE — 6360000002 HC RX W HCPCS: Performed by: SURGERY

## 2022-06-02 PROCEDURE — 81003 URINALYSIS AUTO W/O SCOPE: CPT

## 2022-06-02 PROCEDURE — 84484 ASSAY OF TROPONIN QUANT: CPT

## 2022-06-02 PROCEDURE — C2626 INFUSION PUMP, NON-PROG,TEMP: HCPCS | Performed by: SURGERY

## 2022-06-02 PROCEDURE — 44120 REMOVAL OF SMALL INTESTINE: CPT | Performed by: SURGERY

## 2022-06-02 PROCEDURE — 6360000004 HC RX CONTRAST MEDICATION: Performed by: PHYSICIAN ASSISTANT

## 2022-06-02 PROCEDURE — 36415 COLL VENOUS BLD VENIPUNCTURE: CPT

## 2022-06-02 PROCEDURE — 80053 COMPREHEN METABOLIC PANEL: CPT

## 2022-06-02 PROCEDURE — C1892 INTRO/SHEATH,FIXED,PEEL-AWAY: HCPCS | Performed by: SURGERY

## 2022-06-02 PROCEDURE — 83690 ASSAY OF LIPASE: CPT

## 2022-06-02 PROCEDURE — 7100000001 HC PACU RECOVERY - ADDTL 15 MIN: Performed by: SURGERY

## 2022-06-02 PROCEDURE — 3700000000 HC ANESTHESIA ATTENDED CARE: Performed by: SURGERY

## 2022-06-02 PROCEDURE — 99223 1ST HOSP IP/OBS HIGH 75: CPT | Performed by: SURGERY

## 2022-06-02 PROCEDURE — 87040 BLOOD CULTURE FOR BACTERIA: CPT

## 2022-06-02 PROCEDURE — 2720000010 HC SURG SUPPLY STERILE: Performed by: SURGERY

## 2022-06-02 PROCEDURE — 1200000000 HC SEMI PRIVATE

## 2022-06-02 PROCEDURE — 93010 ELECTROCARDIOGRAM REPORT: CPT | Performed by: INTERNAL MEDICINE

## 2022-06-02 PROCEDURE — 6370000000 HC RX 637 (ALT 250 FOR IP): Performed by: SURGERY

## 2022-06-02 PROCEDURE — 6360000002 HC RX W HCPCS: Performed by: PHYSICIAN ASSISTANT

## 2022-06-02 PROCEDURE — 88307 TISSUE EXAM BY PATHOLOGIST: CPT

## 2022-06-02 PROCEDURE — A4217 STERILE WATER/SALINE, 500 ML: HCPCS | Performed by: SURGERY

## 2022-06-02 PROCEDURE — 85025 COMPLETE CBC W/AUTO DIFF WBC: CPT

## 2022-06-02 PROCEDURE — 0DN80ZZ RELEASE SMALL INTESTINE, OPEN APPROACH: ICD-10-PCS | Performed by: SURGERY

## 2022-06-02 PROCEDURE — 3600000004 HC SURGERY LEVEL 4 BASE: Performed by: SURGERY

## 2022-06-02 PROCEDURE — 6360000002 HC RX W HCPCS: Performed by: ANESTHESIOLOGY

## 2022-06-02 PROCEDURE — 71045 X-RAY EXAM CHEST 1 VIEW: CPT

## 2022-06-02 PROCEDURE — 96375 TX/PRO/DX INJ NEW DRUG ADDON: CPT

## 2022-06-02 PROCEDURE — 83605 ASSAY OF LACTIC ACID: CPT

## 2022-06-02 PROCEDURE — 2580000003 HC RX 258: Performed by: SURGERY

## 2022-06-02 PROCEDURE — 0DT80ZZ RESECTION OF SMALL INTESTINE, OPEN APPROACH: ICD-10-PCS | Performed by: SURGERY

## 2022-06-02 RX ORDER — HYDRALAZINE HYDROCHLORIDE 20 MG/ML
5 INJECTION INTRAMUSCULAR; INTRAVENOUS
Status: DISCONTINUED | OUTPATIENT
Start: 2022-06-02 | End: 2022-06-02 | Stop reason: HOSPADM

## 2022-06-02 RX ORDER — PROPOFOL 10 MG/ML
INJECTION, EMULSION INTRAVENOUS PRN
Status: DISCONTINUED | OUTPATIENT
Start: 2022-06-02 | End: 2022-06-02 | Stop reason: SDUPTHER

## 2022-06-02 RX ORDER — SODIUM CHLORIDE 9 MG/ML
INJECTION, SOLUTION INTRAVENOUS CONTINUOUS
Status: DISCONTINUED | OUTPATIENT
Start: 2022-06-02 | End: 2022-06-09

## 2022-06-02 RX ORDER — ACETAMINOPHEN 325 MG/1
650 TABLET ORAL EVERY 4 HOURS PRN
Status: DISCONTINUED | OUTPATIENT
Start: 2022-06-02 | End: 2022-06-18 | Stop reason: HOSPADM

## 2022-06-02 RX ORDER — 0.9 % SODIUM CHLORIDE 0.9 %
1000 INTRAVENOUS SOLUTION INTRAVENOUS ONCE
Status: COMPLETED | OUTPATIENT
Start: 2022-06-02 | End: 2022-06-02

## 2022-06-02 RX ORDER — FENTANYL CITRATE 50 UG/ML
INJECTION, SOLUTION INTRAMUSCULAR; INTRAVENOUS PRN
Status: DISCONTINUED | OUTPATIENT
Start: 2022-06-02 | End: 2022-06-02 | Stop reason: SDUPTHER

## 2022-06-02 RX ORDER — MAGNESIUM HYDROXIDE 1200 MG/15ML
LIQUID ORAL CONTINUOUS PRN
Status: COMPLETED | OUTPATIENT
Start: 2022-06-02 | End: 2022-06-02

## 2022-06-02 RX ORDER — ONDANSETRON 2 MG/ML
INJECTION INTRAMUSCULAR; INTRAVENOUS PRN
Status: DISCONTINUED | OUTPATIENT
Start: 2022-06-02 | End: 2022-06-02 | Stop reason: SDUPTHER

## 2022-06-02 RX ORDER — ONDANSETRON 2 MG/ML
4 INJECTION INTRAMUSCULAR; INTRAVENOUS EVERY 6 HOURS PRN
Status: DISCONTINUED | OUTPATIENT
Start: 2022-06-02 | End: 2022-06-02

## 2022-06-02 RX ORDER — SODIUM CHLORIDE 0.9 % (FLUSH) 0.9 %
5-40 SYRINGE (ML) INJECTION PRN
Status: DISCONTINUED | OUTPATIENT
Start: 2022-06-02 | End: 2022-06-02 | Stop reason: HOSPADM

## 2022-06-02 RX ORDER — MIDAZOLAM HYDROCHLORIDE 1 MG/ML
1 INJECTION INTRAMUSCULAR; INTRAVENOUS EVERY 5 MIN PRN
Status: DISCONTINUED | OUTPATIENT
Start: 2022-06-02 | End: 2022-06-02 | Stop reason: HOSPADM

## 2022-06-02 RX ORDER — MEPERIDINE HYDROCHLORIDE 25 MG/ML
12.5 INJECTION INTRAMUSCULAR; INTRAVENOUS; SUBCUTANEOUS EVERY 5 MIN PRN
Status: DISCONTINUED | OUTPATIENT
Start: 2022-06-02 | End: 2022-06-02 | Stop reason: HOSPADM

## 2022-06-02 RX ORDER — DIPHENHYDRAMINE HYDROCHLORIDE 50 MG/ML
12.5 INJECTION INTRAMUSCULAR; INTRAVENOUS
Status: DISCONTINUED | OUTPATIENT
Start: 2022-06-02 | End: 2022-06-02 | Stop reason: HOSPADM

## 2022-06-02 RX ORDER — MIDAZOLAM HYDROCHLORIDE 1 MG/ML
INJECTION INTRAMUSCULAR; INTRAVENOUS PRN
Status: DISCONTINUED | OUTPATIENT
Start: 2022-06-02 | End: 2022-06-02 | Stop reason: SDUPTHER

## 2022-06-02 RX ORDER — ROCURONIUM BROMIDE 10 MG/ML
INJECTION, SOLUTION INTRAVENOUS PRN
Status: DISCONTINUED | OUTPATIENT
Start: 2022-06-02 | End: 2022-06-02 | Stop reason: SDUPTHER

## 2022-06-02 RX ORDER — DIPHENHYDRAMINE HYDROCHLORIDE 50 MG/ML
25 INJECTION INTRAMUSCULAR; INTRAVENOUS EVERY 6 HOURS PRN
Status: DISCONTINUED | OUTPATIENT
Start: 2022-06-02 | End: 2022-06-18 | Stop reason: HOSPADM

## 2022-06-02 RX ORDER — SODIUM CHLORIDE 9 MG/ML
25 INJECTION, SOLUTION INTRAVENOUS PRN
Status: DISCONTINUED | OUTPATIENT
Start: 2022-06-02 | End: 2022-06-02 | Stop reason: HOSPADM

## 2022-06-02 RX ORDER — SUCCINYLCHOLINE CHLORIDE 20 MG/ML
INJECTION INTRAMUSCULAR; INTRAVENOUS PRN
Status: DISCONTINUED | OUTPATIENT
Start: 2022-06-02 | End: 2022-06-02 | Stop reason: SDUPTHER

## 2022-06-02 RX ORDER — ONDANSETRON 2 MG/ML
4 INJECTION INTRAMUSCULAR; INTRAVENOUS EVERY 4 HOURS PRN
Status: DISCONTINUED | OUTPATIENT
Start: 2022-06-02 | End: 2022-06-18 | Stop reason: HOSPADM

## 2022-06-02 RX ORDER — OXYCODONE HYDROCHLORIDE 5 MG/1
5 TABLET ORAL PRN
Status: DISCONTINUED | OUTPATIENT
Start: 2022-06-02 | End: 2022-06-02 | Stop reason: HOSPADM

## 2022-06-02 RX ORDER — LISINOPRIL 20 MG/1
20 TABLET ORAL DAILY
Status: DISCONTINUED | OUTPATIENT
Start: 2022-06-03 | End: 2022-06-18 | Stop reason: HOSPADM

## 2022-06-02 RX ORDER — ONDANSETRON 2 MG/ML
4 INJECTION INTRAMUSCULAR; INTRAVENOUS EVERY 10 MIN PRN
Status: DISCONTINUED | OUTPATIENT
Start: 2022-06-02 | End: 2022-06-02 | Stop reason: HOSPADM

## 2022-06-02 RX ORDER — SODIUM CHLORIDE 0.9 % (FLUSH) 0.9 %
5-40 SYRINGE (ML) INJECTION EVERY 12 HOURS SCHEDULED
Status: DISCONTINUED | OUTPATIENT
Start: 2022-06-02 | End: 2022-06-02 | Stop reason: HOSPADM

## 2022-06-02 RX ORDER — TRAZODONE HYDROCHLORIDE 100 MG/1
200 TABLET ORAL NIGHTLY
Status: DISCONTINUED | OUTPATIENT
Start: 2022-06-02 | End: 2022-06-18 | Stop reason: HOSPADM

## 2022-06-02 RX ORDER — OXYCODONE HYDROCHLORIDE 5 MG/1
10 TABLET ORAL PRN
Status: DISCONTINUED | OUTPATIENT
Start: 2022-06-02 | End: 2022-06-02 | Stop reason: HOSPADM

## 2022-06-02 RX ORDER — BUPIVACAINE HYDROCHLORIDE 5 MG/ML
INJECTION, SOLUTION EPIDURAL; INTRACAUDAL PRN
Status: DISCONTINUED | OUTPATIENT
Start: 2022-06-02 | End: 2022-06-02 | Stop reason: ALTCHOICE

## 2022-06-02 RX ORDER — MORPHINE SULFATE 4 MG/ML
4 INJECTION, SOLUTION INTRAMUSCULAR; INTRAVENOUS ONCE
Status: COMPLETED | OUTPATIENT
Start: 2022-06-02 | End: 2022-06-02

## 2022-06-02 RX ORDER — HEPARIN SODIUM 5000 [USP'U]/ML
5000 INJECTION, SOLUTION INTRAVENOUS; SUBCUTANEOUS EVERY 8 HOURS SCHEDULED
Status: DISCONTINUED | OUTPATIENT
Start: 2022-06-02 | End: 2022-06-18 | Stop reason: HOSPADM

## 2022-06-02 RX ADMIN — ONDANSETRON HYDROCHLORIDE 4 MG: 2 INJECTION, SOLUTION INTRAMUSCULAR; INTRAVENOUS at 15:02

## 2022-06-02 RX ADMIN — SUCCINYLCHOLINE CHLORIDE 140 MG: 20 INJECTION, SOLUTION INTRAMUSCULAR; INTRAVENOUS at 18:36

## 2022-06-02 RX ADMIN — VANCOMYCIN HYDROCHLORIDE 1250 MG: 10 INJECTION, POWDER, LYOPHILIZED, FOR SOLUTION INTRAVENOUS at 18:22

## 2022-06-02 RX ADMIN — FENTANYL CITRATE 50 MCG: 50 INJECTION INTRAMUSCULAR; INTRAVENOUS at 18:47

## 2022-06-02 RX ADMIN — HYDROMORPHONE HYDROCHLORIDE 0.5 MG: 1 INJECTION, SOLUTION INTRAMUSCULAR; INTRAVENOUS; SUBCUTANEOUS at 21:15

## 2022-06-02 RX ADMIN — HYDROMORPHONE HYDROCHLORIDE 1 MG: 1 INJECTION, SOLUTION INTRAMUSCULAR; INTRAVENOUS; SUBCUTANEOUS at 16:30

## 2022-06-02 RX ADMIN — PROPOFOL 180 MG: 10 INJECTION, EMULSION INTRAVENOUS at 18:36

## 2022-06-02 RX ADMIN — ROCURONIUM BROMIDE 40 MG: 10 INJECTION, SOLUTION INTRAVENOUS at 18:39

## 2022-06-02 RX ADMIN — IOPAMIDOL 75 ML: 755 INJECTION, SOLUTION INTRAVENOUS at 16:10

## 2022-06-02 RX ADMIN — SUGAMMADEX 200 MG: 100 INJECTION, SOLUTION INTRAVENOUS at 20:19

## 2022-06-02 RX ADMIN — HYDROMORPHONE HYDROCHLORIDE 0.5 MG: 1 INJECTION, SOLUTION INTRAMUSCULAR; INTRAVENOUS; SUBCUTANEOUS at 20:45

## 2022-06-02 RX ADMIN — BUPIVACAINE HYDROCHLORIDE 270 ML: 5 INJECTION, SOLUTION EPIDURAL; INTRACAUDAL; PERINEURAL at 20:39

## 2022-06-02 RX ADMIN — ROCURONIUM BROMIDE 10 MG: 10 INJECTION, SOLUTION INTRAVENOUS at 19:46

## 2022-06-02 RX ADMIN — HYDROMORPHONE HYDROCHLORIDE 0.5 MG: 1 INJECTION, SOLUTION INTRAMUSCULAR; INTRAVENOUS; SUBCUTANEOUS at 20:34

## 2022-06-02 RX ADMIN — ROCURONIUM BROMIDE 10 MG: 10 INJECTION, SOLUTION INTRAVENOUS at 19:19

## 2022-06-02 RX ADMIN — TRAZODONE HYDROCHLORIDE 200 MG: 100 TABLET ORAL at 23:50

## 2022-06-02 RX ADMIN — METOPROLOL TARTRATE 25 MG: 25 TABLET, FILM COATED ORAL at 23:50

## 2022-06-02 RX ADMIN — FENTANYL CITRATE 50 MCG: 50 INJECTION INTRAMUSCULAR; INTRAVENOUS at 18:36

## 2022-06-02 RX ADMIN — PIPERACILLIN SODIUM AND TAZOBACTAM SODIUM 4500 MG: 4; .5 INJECTION, POWDER, LYOPHILIZED, FOR SOLUTION INTRAVENOUS at 17:18

## 2022-06-02 RX ADMIN — HEPARIN SODIUM 5000 UNITS: 5000 INJECTION INTRAVENOUS; SUBCUTANEOUS at 18:26

## 2022-06-02 RX ADMIN — HYDROMORPHONE HYDROCHLORIDE 0.5 MG: 1 INJECTION, SOLUTION INTRAMUSCULAR; INTRAVENOUS; SUBCUTANEOUS at 21:00

## 2022-06-02 RX ADMIN — MIDAZOLAM 2 MG: 1 INJECTION INTRAMUSCULAR; INTRAVENOUS at 18:33

## 2022-06-02 RX ADMIN — SODIUM CHLORIDE: 9 INJECTION, SOLUTION INTRAVENOUS at 23:53

## 2022-06-02 RX ADMIN — MORPHINE SULFATE 4 MG: 4 INJECTION, SOLUTION INTRAMUSCULAR; INTRAVENOUS at 15:05

## 2022-06-02 RX ADMIN — SODIUM CHLORIDE 1000 ML: 9 INJECTION, SOLUTION INTRAVENOUS at 15:06

## 2022-06-02 RX ADMIN — ONDANSETRON HYDROCHLORIDE 4 MG: 2 INJECTION, SOLUTION INTRAMUSCULAR; INTRAVENOUS at 18:36

## 2022-06-02 ASSESSMENT — PAIN SCALES - GENERAL
PAINLEVEL_OUTOF10: 7
PAINLEVEL_OUTOF10: 6
PAINLEVEL_OUTOF10: 8
PAINLEVEL_OUTOF10: 7
PAINLEVEL_OUTOF10: 8
PAINLEVEL_OUTOF10: 7
PAINLEVEL_OUTOF10: 8
PAINLEVEL_OUTOF10: 7
PAINLEVEL_OUTOF10: 8
PAINLEVEL_OUTOF10: 10
PAINLEVEL_OUTOF10: 10

## 2022-06-02 ASSESSMENT — ENCOUNTER SYMPTOMS
DIARRHEA: 1
NAUSEA: 1
RESPIRATORY NEGATIVE: 1
ABDOMINAL PAIN: 1

## 2022-06-02 ASSESSMENT — PAIN DESCRIPTION - LOCATION
LOCATION: ABDOMEN
LOCATION: ABDOMEN

## 2022-06-02 ASSESSMENT — PAIN - FUNCTIONAL ASSESSMENT: PAIN_FUNCTIONAL_ASSESSMENT: 0-10

## 2022-06-02 NOTE — ED NOTES
Pt belongings consist of pill bottle of protonix, cell phone and wallet. Pt has a pair of shoes, jeans, belt and a t shirt.       Scooter Tejeda RN  06/02/22 [id number]

## 2022-06-02 NOTE — ED NOTES
Consult to Gen Surg at [facility] 5  06/02/22 164    Dr. Alcira Muniz seen patient in the ED at [address] [place] [address]  06/02/22 [id number]

## 2022-06-02 NOTE — CONSULTS
Pharmacy to dose vancomycin one time dose in the ED:    Grabiel Green PA-C requested pharmacy to dose vancomycin IV one time dose in the ED for intra-abdominal infection to cover gram positive organisms, including MRSA on this 63year old male. HT 70 inches  .7 kg  IBW 73 kg Adjusted dosing wt 84.1 kg  BUN/SRCR 7/0.6   Est crcl = 158 ml/min   WBC 10                Tmax 99  Based on patient's age, weight, and renal function will give vancomycin 1250 mg IVPB X one dose in the ED. If patient is admitted and vancomycin is to be continued, please re-consult pharmacy to dose vancomycin as in-patient.

## 2022-06-02 NOTE — ED PROVIDER NOTES
I independently performed a history and physical on [facility]. All diagnostic, treatment, and disposition decisions were made by myself in conjunction with the advanced practice provider/resident. For further details of Reina Perez emergency department encounter, please see the KARON/resident's documentation. I personally saw the patient and performed a substantive portion of the visit including all aspects of the medical decision making. Briefly, this is a 53-year-old male with history of alcohol use, recent admission for evaluation of sepsis related to liver abscess status post interventional radiology drainage presenting for evaluation of abdominal pain. Patient states that he has had abdominal pain for the past several days he could not tolerate it anymore today. Denies any fevers. He is afebrile here, heart rate in the 80s to 70s. Otherwise hemodynamically stable. He is guarding on exam, peritoneal abdominal exam with mild distention. CT of the pelvis does reveal small bowel perforation, moderate pneumoperitoneum. General surgery has been consulted. Broad spectrum antibiotics have been ordered. He will require emergent management of CT findings and admission. These results were discussed with the patient. EKG  The Ekg interpreted by myself in the emergency department in the absence of a cardiologist.  normal sinus rhythm with a rate of 90  Axis is   Normal  QTc is  within an acceptable range  Intervals and Durations are unremarkable. No specific ST-T wave changes appreciated. No evidence of acute ischemia. No significant change from prior EKG dated 5 17,022      Comment: Please note this report has been produced using speech recognition software and may contain errors related to that system including errors in grammar, punctuation, and spelling, as well as words and phrases that may be inappropriate.  If there are any questions or concerns please feel free to contact the dictating provider for clarification.      Evelene Schaumann, MD  06/02/22 [id number]
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
## 2022-06-02 NOTE — ED NOTES
Pt called his daughter Zach Almaraz and made aware he is going to surgery [facility].       Breanne Le RN  06/02/22 7919
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
## 2022-06-03 ENCOUNTER — APPOINTMENT (OUTPATIENT)
Dept: CT IMAGING | Age: 62
DRG: 230 | End: 2022-06-03
Payer: COMMERCIAL

## 2022-06-03 LAB
ANION GAP SERPL CALCULATED.3IONS-SCNC: 14 MMOL/L (ref 3–16)
BASOPHILS ABSOLUTE: 0 K/UL (ref 0–0.2)
BASOPHILS RELATIVE PERCENT: 0.2 %
BUN BLDV-MCNC: 7 MG/DL (ref 7–20)
CALCIUM SERPL-MCNC: 9.2 MG/DL (ref 8.3–10.6)
CHLORIDE BLD-SCNC: 100 MMOL/L (ref 99–110)
CO2: 20 MMOL/L (ref 21–32)
CREAT SERPL-MCNC: 0.6 MG/DL (ref 0.9–1.3)
EOSINOPHILS ABSOLUTE: 0 K/UL (ref 0–0.6)
EOSINOPHILS RELATIVE PERCENT: 0.1 %
GFR AFRICAN AMERICAN: >60
GFR NON-AFRICAN AMERICAN: >60
GLUCOSE BLD-MCNC: 131 MG/DL (ref 70–99)
GLUCOSE BLD-MCNC: 138 MG/DL (ref 70–99)
GLUCOSE BLD-MCNC: 157 MG/DL (ref 70–99)
GLUCOSE BLD-MCNC: 160 MG/DL (ref 70–99)
GLUCOSE BLD-MCNC: 165 MG/DL (ref 70–99)
GLUCOSE BLD-MCNC: 168 MG/DL (ref 70–99)
HCT VFR BLD CALC: 45.2 % (ref 40.5–52.5)
HEMOGLOBIN: 14.9 G/DL (ref 13.5–17.5)
INR BLD: 1.3 (ref 0.87–1.14)
LYMPHOCYTES ABSOLUTE: 0.8 K/UL (ref 1–5.1)
LYMPHOCYTES RELATIVE PERCENT: 7.8 %
MCH RBC QN AUTO: 30.7 PG (ref 26–34)
MCHC RBC AUTO-ENTMCNC: 33.1 G/DL (ref 31–36)
MCV RBC AUTO: 92.9 FL (ref 80–100)
MONOCYTES ABSOLUTE: 0.7 K/UL (ref 0–1.3)
MONOCYTES RELATIVE PERCENT: 6.5 %
NEUTROPHILS ABSOLUTE: 9 K/UL (ref 1.7–7.7)
NEUTROPHILS RELATIVE PERCENT: 85.4 %
PDW BLD-RTO: 15.2 % (ref 12.4–15.4)
PERFORMED ON: ABNORMAL
PLATELET # BLD: 276 K/UL (ref 135–450)
PMV BLD AUTO: 9 FL (ref 5–10.5)
POTASSIUM SERPL-SCNC: 4.9 MMOL/L (ref 3.5–5.1)
PROTHROMBIN TIME: 16 SEC (ref 11.7–14.5)
RBC # BLD: 4.86 M/UL (ref 4.2–5.9)
SODIUM BLD-SCNC: 134 MMOL/L (ref 136–145)
WBC # BLD: 10.5 K/UL (ref 4–11)

## 2022-06-03 PROCEDURE — 0W9F3ZZ DRAINAGE OF ABDOMINAL WALL, PERCUTANEOUS APPROACH: ICD-10-PCS | Performed by: RADIOLOGY

## 2022-06-03 PROCEDURE — 1200000000 HC SEMI PRIVATE

## 2022-06-03 PROCEDURE — 6360000002 HC RX W HCPCS: Performed by: SURGERY

## 2022-06-03 PROCEDURE — 6370000000 HC RX 637 (ALT 250 FOR IP): Performed by: SURGERY

## 2022-06-03 PROCEDURE — 80048 BASIC METABOLIC PNL TOTAL CA: CPT

## 2022-06-03 PROCEDURE — 36415 COLL VENOUS BLD VENIPUNCTURE: CPT

## 2022-06-03 PROCEDURE — 85025 COMPLETE CBC W/AUTO DIFF WBC: CPT

## 2022-06-03 PROCEDURE — 87205 SMEAR GRAM STAIN: CPT

## 2022-06-03 PROCEDURE — 77012 CT SCAN FOR NEEDLE BIOPSY: CPT

## 2022-06-03 PROCEDURE — 99024 POSTOP FOLLOW-UP VISIT: CPT | Performed by: SURGERY

## 2022-06-03 PROCEDURE — 87070 CULTURE OTHR SPECIMN AEROBIC: CPT

## 2022-06-03 PROCEDURE — 6360000002 HC RX W HCPCS: Performed by: RADIOLOGY

## 2022-06-03 PROCEDURE — 85610 PROTHROMBIN TIME: CPT

## 2022-06-03 PROCEDURE — C1729 CATH, DRAINAGE: HCPCS

## 2022-06-03 PROCEDURE — 2580000003 HC RX 258: Performed by: SURGERY

## 2022-06-03 RX ORDER — KETOROLAC TROMETHAMINE 30 MG/ML
30 INJECTION, SOLUTION INTRAMUSCULAR; INTRAVENOUS EVERY 6 HOURS PRN
Status: DISPENSED | OUTPATIENT
Start: 2022-06-03 | End: 2022-06-08

## 2022-06-03 RX ORDER — MIDAZOLAM HYDROCHLORIDE 1 MG/ML
INJECTION INTRAMUSCULAR; INTRAVENOUS
Status: COMPLETED | OUTPATIENT
Start: 2022-06-03 | End: 2022-06-03

## 2022-06-03 RX ADMIN — HYDROMORPHONE HYDROCHLORIDE 1 MG: 1 INJECTION, SOLUTION INTRAMUSCULAR; INTRAVENOUS; SUBCUTANEOUS at 12:30

## 2022-06-03 RX ADMIN — HYDROMORPHONE HYDROCHLORIDE 1 MG: 1 INJECTION, SOLUTION INTRAMUSCULAR; INTRAVENOUS; SUBCUTANEOUS at 17:44

## 2022-06-03 RX ADMIN — HEPARIN SODIUM 5000 UNITS: 5000 INJECTION INTRAVENOUS; SUBCUTANEOUS at 04:56

## 2022-06-03 RX ADMIN — HYDROMORPHONE HYDROCHLORIDE 1 MG: 1 INJECTION, SOLUTION INTRAMUSCULAR; INTRAVENOUS; SUBCUTANEOUS at 20:16

## 2022-06-03 RX ADMIN — METOPROLOL TARTRATE 25 MG: 25 TABLET, FILM COATED ORAL at 09:23

## 2022-06-03 RX ADMIN — HYDROMORPHONE HYDROCHLORIDE 1 MG: 1 INJECTION, SOLUTION INTRAMUSCULAR; INTRAVENOUS; SUBCUTANEOUS at 04:56

## 2022-06-03 RX ADMIN — PIPERACILLIN AND TAZOBACTAM 3375 MG: 3; .375 INJECTION, POWDER, LYOPHILIZED, FOR SOLUTION INTRAVENOUS at 19:04

## 2022-06-03 RX ADMIN — SODIUM CHLORIDE: 9 INJECTION, SOLUTION INTRAVENOUS at 18:58

## 2022-06-03 RX ADMIN — HYDROMORPHONE HYDROCHLORIDE 1 MG: 1 INJECTION, SOLUTION INTRAMUSCULAR; INTRAVENOUS; SUBCUTANEOUS at 02:51

## 2022-06-03 RX ADMIN — METOPROLOL TARTRATE 25 MG: 25 TABLET, FILM COATED ORAL at 20:16

## 2022-06-03 RX ADMIN — KETOROLAC TROMETHAMINE 30 MG: 30 INJECTION, SOLUTION INTRAMUSCULAR; INTRAVENOUS at 10:10

## 2022-06-03 RX ADMIN — HYDROMORPHONE HYDROCHLORIDE 1 MG: 1 INJECTION, SOLUTION INTRAMUSCULAR; INTRAVENOUS; SUBCUTANEOUS at 00:04

## 2022-06-03 RX ADMIN — PIPERACILLIN AND TAZOBACTAM 3375 MG: 3; .375 INJECTION, POWDER, LYOPHILIZED, FOR SOLUTION INTRAVENOUS at 09:14

## 2022-06-03 RX ADMIN — MIDAZOLAM 2 MG: 1 INJECTION INTRAMUSCULAR; INTRAVENOUS at 16:15

## 2022-06-03 RX ADMIN — TRAZODONE HYDROCHLORIDE 200 MG: 100 TABLET ORAL at 20:16

## 2022-06-03 RX ADMIN — HYDROMORPHONE HYDROCHLORIDE 1 MG: 1 INJECTION, SOLUTION INTRAMUSCULAR; INTRAVENOUS; SUBCUTANEOUS at 09:20

## 2022-06-03 RX ADMIN — HYDROMORPHONE HYDROCHLORIDE 1 MG: 1 INJECTION, SOLUTION INTRAMUSCULAR; INTRAVENOUS; SUBCUTANEOUS at 22:13

## 2022-06-03 RX ADMIN — HEPARIN SODIUM 5000 UNITS: 5000 INJECTION INTRAVENOUS; SUBCUTANEOUS at 12:30

## 2022-06-03 RX ADMIN — LISINOPRIL 20 MG: 20 TABLET ORAL at 09:23

## 2022-06-03 RX ADMIN — HYDROMORPHONE HYDROCHLORIDE 1 MG: 1 INJECTION, SOLUTION INTRAMUSCULAR; INTRAVENOUS; SUBCUTANEOUS at 15:26

## 2022-06-03 RX ADMIN — HEPARIN SODIUM 5000 UNITS: 5000 INJECTION INTRAVENOUS; SUBCUTANEOUS at 20:16

## 2022-06-03 RX ADMIN — KETOROLAC TROMETHAMINE 30 MG: 30 INJECTION, SOLUTION INTRAMUSCULAR; INTRAVENOUS at 22:14

## 2022-06-03 RX ADMIN — HYDROMORPHONE HYDROCHLORIDE 1 MG: 1 INJECTION, SOLUTION INTRAMUSCULAR; INTRAVENOUS; SUBCUTANEOUS at 07:06

## 2022-06-03 RX ADMIN — PIPERACILLIN AND TAZOBACTAM 3375 MG: 3; .375 INJECTION, POWDER, LYOPHILIZED, FOR SOLUTION INTRAVENOUS at 01:13

## 2022-06-03 ASSESSMENT — PAIN DESCRIPTION - DESCRIPTORS
DESCRIPTORS: SHARP
DESCRIPTORS: SHARP
DESCRIPTORS: SHARP;ACHING

## 2022-06-03 ASSESSMENT — PAIN DESCRIPTION - LOCATION
LOCATION: ABDOMEN

## 2022-06-03 ASSESSMENT — PAIN DESCRIPTION - ORIENTATION
ORIENTATION: MID;UPPER
ORIENTATION: MID;UPPER

## 2022-06-03 ASSESSMENT — PAIN SCALES - GENERAL
PAINLEVEL_OUTOF10: 8
PAINLEVEL_OUTOF10: 10
PAINLEVEL_OUTOF10: 10
PAINLEVEL_OUTOF10: 6
PAINLEVEL_OUTOF10: 8
PAINLEVEL_OUTOF10: 7
PAINLEVEL_OUTOF10: 10
PAINLEVEL_OUTOF10: 10

## 2022-06-03 NOTE — PROGRESS NOTES
Pt alert and oriented. SR up x2,  Call light and bedside table within easy reach. Denies any needs at this time. Bedside report given to Jennifer Ramires [facility]. Care transferred.

## 2022-06-03 NOTE — PLAN OF CARE
Problem: Discharge Planning  Goal: Discharge to home or other facility with appropriate resources  Outcome: Progressing  Flowsheets (Taken 6/2/2022 2237)  Discharge to home or other facility with appropriate resources: Identify discharge learning needs (meds, wound care, etc)     Problem: Pain  Goal: Verbalizes/displays adequate comfort level or baseline comfort level  Outcome: Progressing     Problem: Safety - Adult  Goal: Free from fall injury  Outcome: Progressing     Problem: ABCDS Injury Assessment  Goal: Absence of physical injury  Outcome: Progressing

## 2022-06-03 NOTE — BRIEF OP NOTE
Brief Postoperative Note      Patient: Kahlil Hernandez  YOB: 1960  MRN: [id number]    Date of Procedure: 6/2/2022    Pre-Op Diagnosis: pneumoperitoneum secondary small raul perforation    Post-Op Diagnosis: Same and due to ingested foreign body       Procedure(s):  LAPAROTOMY EXPLORATORY, EXTENSIVE LYSIS OF ADHESIONS< SMALL BOWEL RESECTION    Surgeon(s):  Chaitanya Matthews MD    Assistant:  Surgical Assistant: Gigi Aschoff    Anesthesia: General    Estimated Blood Loss (mL): less than 672     Complications: None    Specimens:   ID Type Source Tests Collected by Time Destination   A : SMALL BOWEL Tissue Tissue SURGICAL PATHOLOGY Chaitanya Matthews MD 6/2/2022 2008        Implants:  * No implants in log *      Drains:   Urinary Catheter Coude (Active)       [REMOVED] Closed/Suction Drain Right;Lateral Abdomen Bulb (Removed)   Site Description Clean, dry & intact 05/23/22 0844   Dressing Status Clean, dry & intact 05/23/22 0844   Drainage Appearance Bloody;Brown 05/23/22 0844   Drain Status To bulb suction; Compressed 05/23/22 0844   Output (ml) 5 ml 05/23/22 0355       [REMOVED] Closed/Suction Drain Right; Anterior Abdomen Bulb (Removed)   Site Description Clean, dry & intact 05/23/22 0844   Dressing Status Clean, dry & intact 05/23/22 0844   Drainage Appearance Bloody;Purulent; Tan 05/23/22 0844   Drain Status To bulb suction; Compressed 05/23/22 0844   Output (ml) 5 ml 05/23/22 0355       Findings: as above    Electronically signed by Bridget Tellez MD on 6/2/2022 at 8:22 PM

## 2022-06-03 NOTE — PROCEDURES
Radiology Procedure Note    Patient Name: Ashly Ivey  Date of Birth 1960  MRN: [id number]    Procedure: CT guided abscess drainage    Pre-Procedure Diagnosis: Multiple liver abscesses    Post- Procedure Diagnosis: Same    Findings: The procedure was performed in the usual fashion, detailed in the full report provided separately. There were no immediate post-procedure complications.     Complications: None    Estimated Blood Loss: less than 50     Specimens: Was Obtained: 10 mL purulent fluid      Electronically signed by Yoav Gorman MD on 6/3/2022 at 4:36 PM

## 2022-06-03 NOTE — PROGRESS NOTES
Dr Noah Osborne here to see pt,says pt needs drain placed today for liver abscess. Radiology dept notified spoke to Dori.

## 2022-06-03 NOTE — PLAN OF CARE
Problem: Discharge Planning  Goal: Discharge to home or other facility with appropriate resources  6/3/2022 0957 by Aliyah Lagunas RN  Outcome: Progressing     Problem: Pain  Goal: Verbalizes/displays adequate comfort level or baseline comfort level  6/3/2022 0957 by Aliyah Lagunas RN  Outcome: Progressing     Problem: Safety - Adult  Goal: Free from fall injury  6/3/2022 0957 by Aliyah Lagunas RN  Outcome: Progressing     Problem: ABCDS Injury Assessment  Goal: Absence of physical injury  6/3/2022 0957 by Aliyah Lagunas RN  Outcome: Progressing

## 2022-06-03 NOTE — PROGRESS NOTES
Patient admitted to room 426 [id number] [phone] from PACU. Patient oriented to room, call light, bed rails, phone, lights and bathroom. Patient instructed about the schedule of the day including: vital sign frequency, lab draws, possible tests, frequency of MD and staff rounds, daily weights, I &O's and prescribed diet. Bed alarm armed. Bed locked, in lowest position, side rails up 2/4, call light within reach. Recliner Assessment:     Patient is not able to demonstrate the ability to move from a reclining position to an upright position within the recliner due to surgical procedure. 4 Eyes Skin Assessment     The patient is being assess for   Admission    I agree that 2 RN's have performed a thorough Head to Toe Skin Assessment on the patient. ALL assessment sites listed below have been assessed. Areas assessed for pressure by both nurses:   [x]   Head, Face, and Ears   [x]   Shoulders, Back, and Chest, Abdomen  [x]   Arms, Elbows, and Hands   [x]   Coccyx, Sacrum, and Ischium  [x]   Legs, Feet, and Heels        Skin Assessed Under all Medical Devices by both nurses:  N/A              All Mepilex Borders were peeled back and area peeked at by both nurses:  No: N/A  Please list where Mepilex Borders are located:  N/A    The patient has a surgical incision to his mid abdomen, dressing clean dry and intact. Dressing to remain in place             **SHARE this note so that the co-signing nurse is able to place an eSignature**    Co-signer eSignature: {Esignature:[id number]}    Does the Patient have Skin Breakdown related to pressure?   No     (Insert Photo here***)         Jesus Prevention initiated:  Yes   Wound Care Orders initiated:  No      [facility] nurse consulted for Pressure Injury (Stage 3,4, Unstageable, DTI, NWPT, Complex wounds)and New or Established Ostomies:  NA      Primary Nurse eSignature: Electronically signed by Frank Stanton RN on 6/3/22 at 6:55 AM EDT

## 2022-06-03 NOTE — PROGRESS NOTES
[facility] GENERAL SURGERY    Surgery Progress Note           POD # 1    PATIENT NAME: Chitra Dupree     TODAY'S DATE: 6/3/2022    INTERVAL HISTORY:    Pt complains of incisional pain. OBJECTIVE:   VITALS:  BP (!) 161/97   Pulse 91   Temp 97.8 °F (36.6 °C) (Oral)   Resp 16   Ht 5' 10\" (1.778 m)   Wt 222 lb (100.7 kg)   SpO2 96%   BMI 31.85 kg/m²     INTAKE/OUTPUT:    I/O last 3 completed shifts: In: 0   Out: 550 [Urine:550]  No intake/output data recorded. CONSTITUTIONAL:  awake and alert  LUNGS:  clear to auscultation  ABDOMEN:   hypoactive bowel sounds, firm, non-distended, tenderness noted diffusely   INCISION: clean, dry    Data:  CBC: Recent Labs     06/02/22  1507 06/03/22  0522   WBC 10.0 10.5   HGB 13.5 14.9   HCT 40.1* 45.2    276     BMP:    Recent Labs     06/02/22  1507 06/03/22  0522    134*   K 4.2 4.9    100   CO2 24 20*   BUN 7 7   CREATININE 0.6* 0.6*   GLUCOSE 116* 165*     Hepatic:   Recent Labs     06/02/22  1507   AST 19   ALT 20   BILITOT 0.5   ALKPHOS 107     Mag:    No results for input(s): MG in the last 72 hours. Phos:   No results for input(s): PHOS in the last 72 hours. INR: No results for input(s): INR in the last 72 hours. Radiology Review: N/A    ASSESSMENT AND PLAN:  63 y.o. male status post exploratory laparotomy with extensive lysis of adhesions and small bowel resection. Add Toradol for additional pain control. Continue IV antibiotics and supportive care.   Await resolution of postoperative ileus to discontinue nasogastric tube and advance diet        Electronically signed by Shukri Ochoa MD

## 2022-06-03 NOTE — CARE COORDINATION
Review of chart screened for potential discharge planning needs. Contact with  patient  Role of discharge planner explained with verbalized understanding. No Needs identified by pt/support person for barriers to discharge. Readmission Risk Score: 16%  No discharge needs noted not following. MD and bedside RN please notify CM if needs arise for any discharge interventions. Met with pt who states is IPTA with all care. Anticipate no needs please notify CM if needs arise.

## 2022-06-03 NOTE — OP NOTE
[address]                 [address] [address]                                OPERATIVE REPORT    PATIENT NAME: Varinder Dubon                    :        1960  MED REC NO:   [id number]                          ROOM:       19  ACCOUNT NO:   [de-identified]                           ADMIT DATE: 2022  PROVIDER:     Kamran Mccarthy MD    DATE OF PROCEDURE:  2022    PREOPERATIVE DIAGNOSIS:  Pneumoperitoneum secondary to perforated  viscus. POSTOPERATIVE DIAGNOSIS:  Pneumoperitoneum secondary to perforated  viscus, due to ingested foreign body. OPERATION PERFORMED:  Exploratory laparotomy with extensive lysis of  adhesions and small bowel resection. SURGEON:  Sanjay Mccarthy MD    ANESTHESIA:  General.    ESTIMATED BLOOD LOSS:  Less than 100 mL. INDICATIONS:  The patient is a 42-year-old gentleman, who presented with  abdominal pain, and was found to have pneumoperitoneum and a perforated  viscus secondary to what appeared to be a small bowel inflammatory  process. He is therefore brought to the operating room. OPERATIVE SUMMARY:  After preoperative evaluation, the patient was  brought in the operating suite and placed in a comfortable supine  position on the operating room table. Monitoring equipment was attached  and general anesthesia was induced. His abdomen was sterilely prepped  and draped and a midline incision was made through his old scar. This  was dissected down through the subcutaneous tissues to the fascia and  the peritoneal cavity was entered. He did not have a significant number  of adhesions to the upper abdominal wall; however, he had dense  adhesions tethering his small bowel loops. I was ultimately able to  identify the ligament of Treitz and started dissecting distally. There  was a dense inflammatory process, so I could not dissected it out.   I  then started at the neoterminal ileum and started dissecting back. I  spent about an hour and a half taking down extensive abdominal  adhesions. Ultimately, I got down to the central mesentery where there  was a dense inflammatory loop of bowel with a small bowel loop adjacent  to this. I was ultimately able to dissect this out free and identified  a toothpick protruding from one loop of bowel into the other, highly  suspicious as the cause of the underlying process. The small bowel was  divided proximally and distally with a RONIT stapler. The involved  segment was excised by using the LigaSure device to divide the  mesenteric vascular attachments. The proximal and distal small bowel  loops were placed adjacent to each other and secured with silk sutures. Enterotomies were made in a side-to-side, functional end-to-end  anastomosis was created with firing of the stapler. The corners, apex,  and staple line intersection were all oversewn with silk sutures. The  anastomosis was palpated and was found to be widely patent. I then ran  the small bowel from the ligament of Treitz to the terminal ileum. There was no identified injury. He did have one area of serosal tear  that was oversewn with a silk suture. Following this, the abdomen was  copiously irrigated and the fluid was evacuated. A small skin nick was  created superior to the midline incision. On-Q catheters were tunneled  bilaterally on either side. As he had no omentum, _____ was laid over  the intestine and the fascia was closed with a running suture of #1  Prolene. Subcutaneous tissues were irrigated and the skin was closed  with staples. Dry dressings were applied. All sponge, needle, and  instrument counts were correct at the end of the case. The patient  tolerated the procedure well and was taken to the recovery area in  stable condition.         Clarence Comer MD    D: 06/02/2022 21:02:44       T: 06/02/2022 21:06:58     [email or web address]  Job#: [id number]     Doc#: [id number]    CC: Jaret Rock, NP
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
## 2022-06-03 NOTE — ANESTHESIA POSTPROCEDURE EVALUATION
Department of Anesthesiology  Postprocedure Note    Patient: Milan Stoner  MRN: [id number]  YOB: 1960  Date of evaluation: 6/2/2022  Time:  8:58 PM     Procedure Summary     Date: 06/02/22 Room / Location: [place] [address] / [facility]    Anesthesia Start: 1830 Anesthesia Stop: 2033    Procedures:       LAPAROTOMY EXPLORATORY (N/A Abdomen)      Procedure Not Yet Scheduled Diagnosis:       Exploratory laparotomy scar      (pneumoperitoneum secondary small bowerl perforation)    Surgeons: Judson Bhakta MD Responsible Provider: Bib Greco MD    Anesthesia Type: general ASA Status: 3 - Emergent          Anesthesia Type: No value filed. Angelia Phase I: Angelia Score: 10    Angelia Phase II:      Last vitals: Reviewed and per EMR flowsheets.        Anesthesia Post Evaluation    Patient location during evaluation: PACU  Level of consciousness: awake  Airway patency: patent  Nausea & Vomiting: no nausea  Complications: no  Cardiovascular status: blood pressure returned to baseline  Respiratory status: acceptable  Hydration status: euvolemic
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
## 2022-06-04 LAB
GLUCOSE BLD-MCNC: 108 MG/DL (ref 70–99)
GLUCOSE BLD-MCNC: 108 MG/DL (ref 70–99)
GLUCOSE BLD-MCNC: 113 MG/DL (ref 70–99)
GLUCOSE BLD-MCNC: 120 MG/DL (ref 70–99)
GLUCOSE BLD-MCNC: 122 MG/DL (ref 70–99)
GLUCOSE BLD-MCNC: 125 MG/DL (ref 70–99)
PERFORMED ON: ABNORMAL

## 2022-06-04 PROCEDURE — 99024 POSTOP FOLLOW-UP VISIT: CPT | Performed by: SURGERY

## 2022-06-04 PROCEDURE — 2580000003 HC RX 258: Performed by: SURGERY

## 2022-06-04 PROCEDURE — 1200000000 HC SEMI PRIVATE

## 2022-06-04 PROCEDURE — 6360000002 HC RX W HCPCS: Performed by: SURGERY

## 2022-06-04 PROCEDURE — 6370000000 HC RX 637 (ALT 250 FOR IP): Performed by: SURGERY

## 2022-06-04 RX ORDER — 0.9 % SODIUM CHLORIDE 0.9 %
500 INTRAVENOUS SOLUTION INTRAVENOUS ONCE
Status: COMPLETED | OUTPATIENT
Start: 2022-06-04 | End: 2022-06-04

## 2022-06-04 RX ORDER — PROMETHAZINE HYDROCHLORIDE 25 MG/ML
6.25 INJECTION, SOLUTION INTRAMUSCULAR; INTRAVENOUS EVERY 6 HOURS PRN
Status: DISCONTINUED | OUTPATIENT
Start: 2022-06-04 | End: 2022-06-18 | Stop reason: HOSPADM

## 2022-06-04 RX ADMIN — PIPERACILLIN AND TAZOBACTAM 3375 MG: 3; .375 INJECTION, POWDER, LYOPHILIZED, FOR SOLUTION INTRAVENOUS at 12:25

## 2022-06-04 RX ADMIN — HYDROMORPHONE HYDROCHLORIDE 1 MG: 1 INJECTION, SOLUTION INTRAMUSCULAR; INTRAVENOUS; SUBCUTANEOUS at 23:53

## 2022-06-04 RX ADMIN — PIPERACILLIN AND TAZOBACTAM 3375 MG: 3; .375 INJECTION, POWDER, LYOPHILIZED, FOR SOLUTION INTRAVENOUS at 03:41

## 2022-06-04 RX ADMIN — KETOROLAC TROMETHAMINE 30 MG: 30 INJECTION, SOLUTION INTRAMUSCULAR; INTRAVENOUS at 19:09

## 2022-06-04 RX ADMIN — KETOROLAC TROMETHAMINE 30 MG: 30 INJECTION, SOLUTION INTRAMUSCULAR; INTRAVENOUS at 14:26

## 2022-06-04 RX ADMIN — HYDROMORPHONE HYDROCHLORIDE 1 MG: 1 INJECTION, SOLUTION INTRAMUSCULAR; INTRAVENOUS; SUBCUTANEOUS at 12:19

## 2022-06-04 RX ADMIN — ONDANSETRON HYDROCHLORIDE 4 MG: 2 INJECTION, SOLUTION INTRAMUSCULAR; INTRAVENOUS at 16:48

## 2022-06-04 RX ADMIN — Medication 1 SPRAY: at 19:04

## 2022-06-04 RX ADMIN — HEPARIN SODIUM 5000 UNITS: 5000 INJECTION INTRAVENOUS; SUBCUTANEOUS at 14:27

## 2022-06-04 RX ADMIN — SODIUM CHLORIDE: 9 INJECTION, SOLUTION INTRAVENOUS at 19:06

## 2022-06-04 RX ADMIN — HYDROMORPHONE HYDROCHLORIDE 1 MG: 1 INJECTION, SOLUTION INTRAMUSCULAR; INTRAVENOUS; SUBCUTANEOUS at 06:40

## 2022-06-04 RX ADMIN — HEPARIN SODIUM 5000 UNITS: 5000 INJECTION INTRAVENOUS; SUBCUTANEOUS at 06:37

## 2022-06-04 RX ADMIN — SODIUM CHLORIDE 500 ML: 9 INJECTION, SOLUTION INTRAVENOUS at 01:21

## 2022-06-04 RX ADMIN — ONDANSETRON HYDROCHLORIDE 4 MG: 2 INJECTION, SOLUTION INTRAMUSCULAR; INTRAVENOUS at 21:23

## 2022-06-04 RX ADMIN — PIPERACILLIN AND TAZOBACTAM 3375 MG: 3; .375 INJECTION, POWDER, LYOPHILIZED, FOR SOLUTION INTRAVENOUS at 19:08

## 2022-06-04 RX ADMIN — HYDROMORPHONE HYDROCHLORIDE 1 MG: 1 INJECTION, SOLUTION INTRAMUSCULAR; INTRAVENOUS; SUBCUTANEOUS at 21:23

## 2022-06-04 RX ADMIN — HYDROMORPHONE HYDROCHLORIDE 1 MG: 1 INJECTION, SOLUTION INTRAMUSCULAR; INTRAVENOUS; SUBCUTANEOUS at 03:11

## 2022-06-04 RX ADMIN — HYDROMORPHONE HYDROCHLORIDE 1 MG: 1 INJECTION, SOLUTION INTRAMUSCULAR; INTRAVENOUS; SUBCUTANEOUS at 17:01

## 2022-06-04 RX ADMIN — ONDANSETRON HYDROCHLORIDE 4 MG: 2 INJECTION, SOLUTION INTRAMUSCULAR; INTRAVENOUS at 03:43

## 2022-06-04 RX ADMIN — HEPARIN SODIUM 5000 UNITS: 5000 INJECTION INTRAVENOUS; SUBCUTANEOUS at 20:26

## 2022-06-04 RX ADMIN — SODIUM CHLORIDE: 9 INJECTION, SOLUTION INTRAVENOUS at 08:24

## 2022-06-04 RX ADMIN — TRAZODONE HYDROCHLORIDE 200 MG: 100 TABLET ORAL at 20:26

## 2022-06-04 ASSESSMENT — PAIN - FUNCTIONAL ASSESSMENT: PAIN_FUNCTIONAL_ASSESSMENT: ACTIVITIES ARE NOT PREVENTED

## 2022-06-04 ASSESSMENT — PAIN DESCRIPTION - LOCATION
LOCATION: ABDOMEN
LOCATION: THROAT
LOCATION: ABDOMEN

## 2022-06-04 ASSESSMENT — PAIN SCALES - GENERAL
PAINLEVEL_OUTOF10: 10
PAINLEVEL_OUTOF10: 8
PAINLEVEL_OUTOF10: 4
PAINLEVEL_OUTOF10: 3
PAINLEVEL_OUTOF10: 6
PAINLEVEL_OUTOF10: 10

## 2022-06-04 ASSESSMENT — PAIN DESCRIPTION - DESCRIPTORS
DESCRIPTORS: SHARP
DESCRIPTORS: SHARP

## 2022-06-04 ASSESSMENT — PAIN DESCRIPTION - PAIN TYPE: TYPE: ACUTE PAIN

## 2022-06-04 NOTE — PROGRESS NOTES
[facility] GENERAL SURGERY DAILY PROGRESS NOTE    SUBJECTIVE: Awake, alert. Having N/V.    OBJECTIVE: CURRENT VITALS:  BP 92/65   Pulse 88   Temp 98.3 °F (36.8 °C) (Oral)   Resp 16   Ht 5' 10\" (1.778 m)   Wt 222 lb (100.7 kg)   SpO2 90%   BMI 31.85 kg/m²          ABD: Soft.       LABS:    CBC: Recent Labs     06/02/22  1507 06/03/22  0522   WBC 10.0 10.5   RBC 4.36 4.86   HGB 13.5 14.9   HCT 40.1* 45.2   MCV 91.9 92.9   RDW 15.0 15.2    276     BMP:   Recent Labs     06/02/22  1507 06/03/22  0522    134*   K 4.2 4.9    100   CO2 24 20*   BUN 7 7   CREATININE 0.6* 0.6*           ASSESSMENT:   POD 2 SBR  /  RAMESH   Hepatic abscess s/p IR drainage      PLAN:   Antibiotics  Broaden anti emetics  OOB if tolerated  IS  Await return GI function         Hilary Moore MD

## 2022-06-04 NOTE — PROGRESS NOTES
Pharmacy wanted to confirm that Phenergan order was IV versus IM. Perfectserve sent to Dr. Tomer Perez. Report given to [facility].  Hannah Ferrara RN

## 2022-06-04 NOTE — PROGRESS NOTES
C/o abd pain and nausea/gagging. Dilaudid 1 mg IV given. Perfectserve sent to Dr. Kaelyn Cali regarding nausea.  Dominique Painting RN

## 2022-06-04 NOTE — FLOWSHEET NOTE
06/04/22 0024   Vital Signs   Temp 98.4 °F (36.9 °C)   Temp Source Oral   Heart Rate 72   Heart Rate Source Monitor   Resp 15   BP (!) 75/50   BP Location Left upper arm   MAP (Calculated) 58.33   Patient Position Supine   Level of Consciousness Alert (0)   MEWS Score 3   Oxygen Therapy   SpO2 92 %   O2 Device None (Room air)   perfectserve sent to Dr. Kim Tucker regarding low b/p. Recheck at this time is 89/59, pulse of 72. Pt is awake, alert and oriented times 4.

## 2022-06-04 NOTE — PROGRESS NOTES
New order noted to change Phenergan from IV to IM. Updated Sahra VALENTINE. Updated pharmacy.   Sharon Powell, RN

## 2022-06-04 NOTE — FLOWSHEET NOTE
06/03/22 2000   Vital Signs   Temp 99.3 °F (37.4 °C)   Temp Source Oral   Heart Rate 96   Resp 16   /82   BP Location Left upper arm   MAP (Calculated) 98.33   Level of Consciousness Alert (0)   MEWS Score 1   Pain Assessment   Pain Assessment 0-10   Pain Level 8   Oxygen Therapy   SpO2 93 %   O2 Device None (Room air)   Pt c/o abd pain, no n/v. Dilaudid 1 mg IV given. NGT to suction. Torrez draining well. PIERRE site intact.  Sharon Powell, RN

## 2022-06-04 NOTE — PROGRESS NOTES
500 ml NS bolus started. Pt aware that he is not able to get any pain medication until b/p is higher.  Luci Centeno, RN

## 2022-06-04 NOTE — FLOWSHEET NOTE
06/04/22 0306   Vital Signs   Temp 97.2 °F (36.2 °C)   Heart Rate 88   Resp 15   /79   MAP (Calculated) 98.67   Level of Consciousness Alert (0)   MEWS Score 1   Oxygen Therapy   SpO2 91 %   O2 Device None (Room air)   Dilaudid 1 mg IV given to pt for c/o abd pain.  Chuck Suarez, RN

## 2022-06-04 NOTE — PROGRESS NOTES
Pt alert and oriented. SR up x2,  Call light and bedside table within easy reach. Denies any needs at this time. Bedside report given to Radha Amaral RN. Care transferred.
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
## 2022-06-04 NOTE — PROGRESS NOTES
Pt A/O in bed and quiet. Denies any needs at this time. SR up x2, bed in lowest position,   wheels locked,  bed alarm on. Call light and bedside table in easy reach.    Lorena George RN

## 2022-06-04 NOTE — PLAN OF CARE
Problem: Discharge Planning  Goal: Discharge to home or other facility with appropriate resources  Outcome: Progressing     Problem: Pain  Goal: Verbalizes/displays adequate comfort level or baseline comfort level  Outcome: Progressing     Problem: Safety - Adult  Goal: Free from fall injury  Outcome: Progressing     Problem: ABCDS Injury Assessment  Goal: Absence of physical injury  Outcome: Progressing     Problem: Neurosensory - Adult  Goal: Achieves stable or improved neurological status  Outcome: Progressing     Problem: Respiratory - Adult  Goal: Achieves optimal ventilation and oxygenation  Outcome: Progressing     Problem: Cardiovascular - Adult  Goal: Maintains optimal cardiac output and hemodynamic stability  Outcome: Progressing     Problem: Skin/Tissue Integrity - Adult  Goal: Skin integrity remains intact  Outcome: Progressing     Problem: Musculoskeletal - Adult  Goal: Return mobility to safest level of function  Outcome: Progressing     Problem: Gastrointestinal - Adult  Goal: Minimal or absence of nausea and vomiting  Outcome: Progressing  Goal: Maintains or returns to baseline bowel function  Outcome: Progressing     Problem: Genitourinary - Adult  Goal: Absence of urinary retention  Outcome: Progressing     Problem: Infection - Adult  Goal: Absence of infection at discharge  Outcome: Progressing

## 2022-06-04 NOTE — PROGRESS NOTES
Pt given dilaudid 1 mg IV for c/o abd pain and Toradol given IV for pain. Ice packs replaced to pt's abd.  Pt given cool wet washcloth for his forehead per his request.  Nam Briseno RN

## 2022-06-04 NOTE — FLOWSHEET NOTE
06/04/22 0030   Vital Signs   Temp 98.6 °F (37 °C)   Temp Source Oral   Heart Rate 72   Resp 16   BP (!) 89/59   BP Location Left upper arm   MAP (Calculated) 69   Level of Consciousness Alert (0)   MEWS Score 2   Oxygen Therapy   SpO2 93 %   O2 Device None (Room air)   Awake, alert, stated pain level is 5/10 at rest and increases with movement. Aware that he is unable to get pain med until b/p is higher.  Master Higgins, RN

## 2022-06-05 LAB
ANION GAP SERPL CALCULATED.3IONS-SCNC: 10 MMOL/L (ref 3–16)
BASOPHILS ABSOLUTE: 0 K/UL (ref 0–0.2)
BASOPHILS RELATIVE PERCENT: 0 %
BUN BLDV-MCNC: 17 MG/DL (ref 7–20)
CALCIUM SERPL-MCNC: 9 MG/DL (ref 8.3–10.6)
CHLORIDE BLD-SCNC: 97 MMOL/L (ref 99–110)
CO2: 27 MMOL/L (ref 21–32)
CREAT SERPL-MCNC: 0.7 MG/DL (ref 0.9–1.3)
EOSINOPHILS ABSOLUTE: 0 K/UL (ref 0–0.6)
EOSINOPHILS RELATIVE PERCENT: 0.2 %
GFR AFRICAN AMERICAN: >60
GFR NON-AFRICAN AMERICAN: >60
GLUCOSE BLD-MCNC: 112 MG/DL (ref 70–99)
GLUCOSE BLD-MCNC: 113 MG/DL (ref 70–99)
GLUCOSE BLD-MCNC: 113 MG/DL (ref 70–99)
GLUCOSE BLD-MCNC: 124 MG/DL (ref 70–99)
GLUCOSE BLD-MCNC: 88 MG/DL (ref 70–99)
GLUCOSE BLD-MCNC: 95 MG/DL (ref 70–99)
GLUCOSE BLD-MCNC: 96 MG/DL (ref 70–99)
HCT VFR BLD CALC: 37.7 % (ref 40.5–52.5)
HEMOGLOBIN: 12.1 G/DL (ref 13.5–17.5)
LYMPHOCYTES ABSOLUTE: 0.8 K/UL (ref 1–5.1)
LYMPHOCYTES RELATIVE PERCENT: 6.7 %
MAGNESIUM: 2 MG/DL (ref 1.8–2.4)
MCH RBC QN AUTO: 29.9 PG (ref 26–34)
MCHC RBC AUTO-ENTMCNC: 32 G/DL (ref 31–36)
MCV RBC AUTO: 93.5 FL (ref 80–100)
MONOCYTES ABSOLUTE: 0.8 K/UL (ref 0–1.3)
MONOCYTES RELATIVE PERCENT: 6.5 %
NEUTROPHILS ABSOLUTE: 10.9 K/UL (ref 1.7–7.7)
NEUTROPHILS RELATIVE PERCENT: 86.6 %
PDW BLD-RTO: 15.2 % (ref 12.4–15.4)
PERFORMED ON: ABNORMAL
PERFORMED ON: NORMAL
PHOSPHORUS: 3.2 MG/DL (ref 2.5–4.9)
PLATELET # BLD: 252 K/UL (ref 135–450)
PMV BLD AUTO: 9.4 FL (ref 5–10.5)
POTASSIUM SERPL-SCNC: 4.5 MMOL/L (ref 3.5–5.1)
RBC # BLD: 4.04 M/UL (ref 4.2–5.9)
SODIUM BLD-SCNC: 134 MMOL/L (ref 136–145)
WBC # BLD: 12.6 K/UL (ref 4–11)

## 2022-06-05 PROCEDURE — 1200000000 HC SEMI PRIVATE

## 2022-06-05 PROCEDURE — 84100 ASSAY OF PHOSPHORUS: CPT

## 2022-06-05 PROCEDURE — 6370000000 HC RX 637 (ALT 250 FOR IP): Performed by: SURGERY

## 2022-06-05 PROCEDURE — 2580000003 HC RX 258: Performed by: SURGERY

## 2022-06-05 PROCEDURE — 99024 POSTOP FOLLOW-UP VISIT: CPT | Performed by: SURGERY

## 2022-06-05 PROCEDURE — 83735 ASSAY OF MAGNESIUM: CPT

## 2022-06-05 PROCEDURE — 85025 COMPLETE CBC W/AUTO DIFF WBC: CPT

## 2022-06-05 PROCEDURE — 6360000002 HC RX W HCPCS: Performed by: SURGERY

## 2022-06-05 PROCEDURE — 36415 COLL VENOUS BLD VENIPUNCTURE: CPT

## 2022-06-05 PROCEDURE — 80048 BASIC METABOLIC PNL TOTAL CA: CPT

## 2022-06-05 RX ADMIN — SODIUM CHLORIDE: 9 INJECTION, SOLUTION INTRAVENOUS at 13:55

## 2022-06-05 RX ADMIN — TRAZODONE HYDROCHLORIDE 200 MG: 100 TABLET ORAL at 21:20

## 2022-06-05 RX ADMIN — LISINOPRIL 20 MG: 20 TABLET ORAL at 10:07

## 2022-06-05 RX ADMIN — KETOROLAC TROMETHAMINE 30 MG: 30 INJECTION, SOLUTION INTRAMUSCULAR; INTRAVENOUS at 02:09

## 2022-06-05 RX ADMIN — HYDROMORPHONE HYDROCHLORIDE 1 MG: 1 INJECTION, SOLUTION INTRAMUSCULAR; INTRAVENOUS; SUBCUTANEOUS at 13:40

## 2022-06-05 RX ADMIN — KETOROLAC TROMETHAMINE 30 MG: 30 INJECTION, SOLUTION INTRAMUSCULAR; INTRAVENOUS at 19:50

## 2022-06-05 RX ADMIN — HYDROMORPHONE HYDROCHLORIDE 1 MG: 1 INJECTION, SOLUTION INTRAMUSCULAR; INTRAVENOUS; SUBCUTANEOUS at 10:08

## 2022-06-05 RX ADMIN — HYDROMORPHONE HYDROCHLORIDE 1 MG: 1 INJECTION, SOLUTION INTRAMUSCULAR; INTRAVENOUS; SUBCUTANEOUS at 04:10

## 2022-06-05 RX ADMIN — METOPROLOL TARTRATE 25 MG: 25 TABLET, FILM COATED ORAL at 10:07

## 2022-06-05 RX ADMIN — HEPARIN SODIUM 5000 UNITS: 5000 INJECTION INTRAVENOUS; SUBCUTANEOUS at 06:04

## 2022-06-05 RX ADMIN — PIPERACILLIN AND TAZOBACTAM 3375 MG: 3; .375 INJECTION, POWDER, LYOPHILIZED, FOR SOLUTION INTRAVENOUS at 18:38

## 2022-06-05 RX ADMIN — HYDROMORPHONE HYDROCHLORIDE 1 MG: 1 INJECTION, SOLUTION INTRAMUSCULAR; INTRAVENOUS; SUBCUTANEOUS at 16:10

## 2022-06-05 RX ADMIN — PIPERACILLIN AND TAZOBACTAM 3375 MG: 3; .375 INJECTION, POWDER, LYOPHILIZED, FOR SOLUTION INTRAVENOUS at 10:04

## 2022-06-05 RX ADMIN — METOPROLOL TARTRATE 25 MG: 25 TABLET, FILM COATED ORAL at 21:20

## 2022-06-05 RX ADMIN — PIPERACILLIN AND TAZOBACTAM 3375 MG: 3; .375 INJECTION, POWDER, LYOPHILIZED, FOR SOLUTION INTRAVENOUS at 02:09

## 2022-06-05 RX ADMIN — HYDROMORPHONE HYDROCHLORIDE 1 MG: 1 INJECTION, SOLUTION INTRAMUSCULAR; INTRAVENOUS; SUBCUTANEOUS at 19:48

## 2022-06-05 RX ADMIN — HEPARIN SODIUM 5000 UNITS: 5000 INJECTION INTRAVENOUS; SUBCUTANEOUS at 21:20

## 2022-06-05 RX ADMIN — HYDROMORPHONE HYDROCHLORIDE 1 MG: 1 INJECTION, SOLUTION INTRAMUSCULAR; INTRAVENOUS; SUBCUTANEOUS at 06:13

## 2022-06-05 RX ADMIN — ONDANSETRON HYDROCHLORIDE 4 MG: 2 INJECTION, SOLUTION INTRAMUSCULAR; INTRAVENOUS at 02:09

## 2022-06-05 RX ADMIN — HEPARIN SODIUM 5000 UNITS: 5000 INJECTION INTRAVENOUS; SUBCUTANEOUS at 13:41

## 2022-06-05 ASSESSMENT — PAIN SCALES - GENERAL
PAINLEVEL_OUTOF10: 6
PAINLEVEL_OUTOF10: 9
PAINLEVEL_OUTOF10: 7
PAINLEVEL_OUTOF10: 7
PAINLEVEL_OUTOF10: 6

## 2022-06-05 ASSESSMENT — PAIN DESCRIPTION - DESCRIPTORS: DESCRIPTORS: SHARP

## 2022-06-05 ASSESSMENT — PAIN DESCRIPTION - LOCATION
LOCATION: ABDOMEN

## 2022-06-05 ASSESSMENT — PAIN DESCRIPTION - ORIENTATION: ORIENTATION: MID;UPPER

## 2022-06-05 NOTE — FLOWSHEET NOTE
06/05/22 0013   Vital Signs   Temp 97.3 °F (36.3 °C)   Heart Rate 73   Resp 15   /62   MAP (Calculated) 84   Level of Consciousness Alert (0)   MEWS Score 1   Oxygen Therapy   SpO2 90 %   O2 Device None (Room air)   Pt resting in bed, no acute distress.  Chalino Modi RN
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
## 2022-06-05 NOTE — FLOWSHEET NOTE
06/04/22 1955   Vital Signs   Temp 97.8 °F (36.6 °C)   Temp Source Oral   Heart Rate 96   Heart Rate Source Monitor   Resp 18   /72   BP Location Left upper arm   BP Method Automatic   MAP (Calculated) 89.67   Patient Position Semi fowlers   Level of Consciousness Alert (0)   MEWS Score 1   Pain Assessment   Pain Assessment 0-10   Oxygen Therapy   SpO2 93 %   O2 Device None (Room air)   Pt c/o abd pain and nausea. NGT to suction. Dilaudid given IV. Zofran given IV.  Harriett Arnold, RN

## 2022-06-05 NOTE — PLAN OF CARE
Problem: Discharge Planning  Goal: Discharge to home or other facility with appropriate resources  6/4/2022 2324 by Chalino Modi RN  Outcome: Progressing  Flowsheets (Taken 6/4/2022 2028)  Discharge to home or other facility with appropriate resources: Identify barriers to discharge with patient and caregiver  6/4/2022 1059 by Juliana Stafford RN  Outcome: Progressing     Problem: Pain  Goal: Verbalizes/displays adequate comfort level or baseline comfort level  6/4/2022 2324 by Chalino Modi RN  Outcome: Progressing  6/4/2022 1059 by Juliana Stafford RN  Outcome: Progressing     Problem: Safety - Adult  Goal: Free from fall injury  6/4/2022 2324 by Chalino Modi RN  Outcome: Progressing  6/4/2022 1059 by Juliana Stafford RN  Outcome: Progressing     Problem: ABCDS Injury Assessment  Goal: Absence of physical injury  6/4/2022 2324 by Chalino Modi RN  Outcome: Progressing  6/4/2022 1059 by Juliana Stafford RN  Outcome: Progressing     Problem: Neurosensory - Adult  Goal: Achieves stable or improved neurological status  6/4/2022 2324 by Chalino Modi RN  Outcome: Progressing  Flowsheets (Taken 6/4/2022 2028)  Achieves stable or improved neurological status: Assess for and report changes in neurological status  6/4/2022 1059 by Juliana Stafford RN  Outcome: Progressing     Problem: Respiratory - Adult  Goal: Achieves optimal ventilation and oxygenation  6/4/2022 2324 by Chalino Modi RN  Outcome: Progressing  Flowsheets (Taken 6/4/2022 2028)  Achieves optimal ventilation and oxygenation:   Assess for changes in respiratory status   Assess for changes in mentation and behavior  6/4/2022 1059 by Juliana Stafford RN  Outcome: Progressing     Problem: Cardiovascular - Adult  Goal: Maintains optimal cardiac output and hemodynamic stability  6/4/2022 2324 by Chalino Modi RN  Outcome: Progressing  Flowsheets (Taken 6/4/2022 2028)  Maintains optimal cardiac output and hemodynamic stability: Monitor blood pressure and heart rate  6/4/2022 1059 by David Barragan RN  Outcome: Progressing     Problem: Skin/Tissue Integrity - Adult  Goal: Skin integrity remains intact  6/4/2022 2324 by Socrates Oreilly RN  Outcome: Progressing  Flowsheets (Taken 6/4/2022 2028)  Skin Integrity Remains Intact: Monitor for areas of redness and/or skin breakdown  6/4/2022 1059 by David Barragan RN  Outcome: Progressing     Problem: Musculoskeletal - Adult  Goal: Return mobility to safest level of function  6/4/2022 2324 by Socrates Oreilly RN  Outcome: Progressing  Flowsheets (Taken 6/4/2022 2028)  Return Mobility to Safest Level of Function: Assist with transfers and ambulation using safe patient handling equipment as needed  6/4/2022 1059 by David Barragan RN  Outcome: Progressing     Problem: Gastrointestinal - Adult  Goal: Minimal or absence of nausea and vomiting  6/4/2022 2324 by Socrates Oreilly RN  Outcome: Progressing  Flowsheets (Taken 6/4/2022 2028)  Minimal or absence of nausea and vomiting:   Administer IV fluids as ordered to ensure adequate hydration   Nasogastric tube to low intermittent suction as ordered  6/4/2022 1059 by David Barragan RN  Outcome: Progressing  Goal: Maintains or returns to baseline bowel function  6/4/2022 2324 by Socrates Oreilly RN  Outcome: Progressing  Flowsheets (Taken 6/4/2022 2028)  Maintains or returns to baseline bowel function: Assess bowel function  6/4/2022 1059 by David Barragan RN  Outcome: Progressing     Problem: Genitourinary - Adult  Goal: Absence of urinary retention  6/4/2022 2324 by Socrates Oreilly RN  Outcome: Progressing  Flowsheets (Taken 6/4/2022 2028)  Absence of urinary retention: Monitor intake/output and perform bladder scan as needed  6/4/2022 1059 by David Barragan RN  Outcome: Progressing     Problem: Infection - Adult  Goal: Absence of infection at discharge  6/4/2022 2324 by Socrates Oreilly RN  Outcome: Progressing  Flowsheets (Taken 6/4/2022 2028)  Absence of infection at discharge: Assess and monitor for signs and symptoms of infection  6/4/2022 1059 by Vanesa Macias RN  Outcome: Progressing     Problem: Skin/Tissue Integrity  Goal: Absence of new skin breakdown  Description: 1. Monitor for areas of redness and/or skin breakdown  2. Assess vascular access sites hourly  3. Every 4-6 hours minimum:  Change oxygen saturation probe site  4. Every 4-6 hours:  If on nasal continuous positive airway pressure, respiratory therapy assess nares and determine need for appliance change or resting period.   Outcome: Progressing

## 2022-06-05 NOTE — FLOWSHEET NOTE
06/05/22 0404   Vital Signs   Temp 97.9 °F (36.6 °C)   Temp Source Axillary   Heart Rate 92   Heart Rate Source Monitor   Resp 16   /66   BP Location Left upper arm   MAP (Calculated) 83.67   Patient Position Lying right side   Level of Consciousness Alert (0)   MEWS Score 1   Oxygen Therapy   SpO2 95 %   O2 Device None (Room air)   NGT clogged, pt having n/v large amount of green bile in emesis bag. Flushed NGT with sterile water and immediately got 700 ml of green bile in NGT container. Pt c/o abd pain, Dilaudid given IV at this time.  Colten Benjamin RN

## 2022-06-05 NOTE — PROGRESS NOTES
C/o nausea and abd pain. Toradol given IV. Zofran given IV. Wet washcloths given to pt. Pt spitting up phlegm into blue emesis bag.  Minus SERGE Gong

## 2022-06-05 NOTE — PROGRESS NOTES
NGT container changed. Pt c/o abd pain and nausea and dry heaving. Dilaudid 1 mg IV given to pt for pain.  Julia Gutierrez RN

## 2022-06-05 NOTE — PROGRESS NOTES
[facility] GENERAL SURGERY DAILY PROGRESS NOTE    SUBJECTIVE: Awake, alert. Feels some better today. No flatus. OBJECTIVE: CURRENT VITALS:  BP (!) 148/91   Pulse 84   Temp 97.7 °F (36.5 °C) (Oral)   Resp 16   Ht 5' 10\" (1.778 m)   Wt 222 lb (100.7 kg)   SpO2 91%   BMI 31.85 kg/m²          ABD: Soft but distended. Appropriately tender.      LABS:    CBC: Recent Labs     06/03/22 0522 06/05/22  0551   WBC 10.5 12.6*   RBC 4.86 4.04*   HGB 14.9 12.1*   HCT 45.2 37.7*   MCV 92.9 93.5   RDW 15.2 15.2    252     BMP:   Recent Labs     06/03/22 0522 06/05/22  0551   * 134*   K 4.9 4.5    97*   CO2 20* 27   PHOS  --  3.2   BUN 7 17   CREATININE 0.6* 0.7*     Recent Labs     06/05/22  0551   MG 2.00         ASSESSMENT:   POD 3 SBR  /  RAMESH   Hepatic abscess s/p IR drainage        PLAN:   Antibiotics  Anti emetics  NG  NPO  OOB if tolerated  IS  Await return GI function            Mark Jerez MD

## 2022-06-06 LAB
BLOOD CULTURE, ROUTINE: NORMAL
BODY FLUID CULTURE, STERILE: NORMAL
CULTURE, BLOOD 2: NORMAL
GLUCOSE BLD-MCNC: 100 MG/DL (ref 70–99)
GLUCOSE BLD-MCNC: 82 MG/DL (ref 70–99)
GLUCOSE BLD-MCNC: 83 MG/DL (ref 70–99)
GLUCOSE BLD-MCNC: 85 MG/DL (ref 70–99)
GLUCOSE BLD-MCNC: 93 MG/DL (ref 70–99)
GLUCOSE BLD-MCNC: 94 MG/DL (ref 70–99)
GRAM STAIN RESULT: NORMAL
PERFORMED ON: ABNORMAL
PERFORMED ON: NORMAL

## 2022-06-06 PROCEDURE — 2580000003 HC RX 258: Performed by: SURGERY

## 2022-06-06 PROCEDURE — 6360000002 HC RX W HCPCS: Performed by: SURGERY

## 2022-06-06 PROCEDURE — 6370000000 HC RX 637 (ALT 250 FOR IP): Performed by: SURGERY

## 2022-06-06 PROCEDURE — 1200000000 HC SEMI PRIVATE

## 2022-06-06 PROCEDURE — 99024 POSTOP FOLLOW-UP VISIT: CPT | Performed by: NURSE PRACTITIONER

## 2022-06-06 PROCEDURE — 99232 SBSQ HOSP IP/OBS MODERATE 35: CPT | Performed by: INTERNAL MEDICINE

## 2022-06-06 RX ADMIN — HYDROMORPHONE HYDROCHLORIDE 1 MG: 1 INJECTION, SOLUTION INTRAMUSCULAR; INTRAVENOUS; SUBCUTANEOUS at 22:25

## 2022-06-06 RX ADMIN — HYDROMORPHONE HYDROCHLORIDE 1 MG: 1 INJECTION, SOLUTION INTRAMUSCULAR; INTRAVENOUS; SUBCUTANEOUS at 06:11

## 2022-06-06 RX ADMIN — PIPERACILLIN AND TAZOBACTAM 3375 MG: 3; .375 INJECTION, POWDER, LYOPHILIZED, FOR SOLUTION INTRAVENOUS at 02:57

## 2022-06-06 RX ADMIN — HYDROMORPHONE HYDROCHLORIDE 1 MG: 1 INJECTION, SOLUTION INTRAMUSCULAR; INTRAVENOUS; SUBCUTANEOUS at 19:05

## 2022-06-06 RX ADMIN — KETOROLAC TROMETHAMINE 30 MG: 30 INJECTION, SOLUTION INTRAMUSCULAR; INTRAVENOUS at 09:16

## 2022-06-06 RX ADMIN — METOPROLOL TARTRATE 25 MG: 25 TABLET, FILM COATED ORAL at 09:08

## 2022-06-06 RX ADMIN — PIPERACILLIN AND TAZOBACTAM 3375 MG: 3; .375 INJECTION, POWDER, LYOPHILIZED, FOR SOLUTION INTRAVENOUS at 19:00

## 2022-06-06 RX ADMIN — HEPARIN SODIUM 5000 UNITS: 5000 INJECTION INTRAVENOUS; SUBCUTANEOUS at 13:36

## 2022-06-06 RX ADMIN — HYDROMORPHONE HYDROCHLORIDE 1 MG: 1 INJECTION, SOLUTION INTRAMUSCULAR; INTRAVENOUS; SUBCUTANEOUS at 04:22

## 2022-06-06 RX ADMIN — HYDROMORPHONE HYDROCHLORIDE 1 MG: 1 INJECTION, SOLUTION INTRAMUSCULAR; INTRAVENOUS; SUBCUTANEOUS at 16:00

## 2022-06-06 RX ADMIN — METOPROLOL TARTRATE 25 MG: 25 TABLET, FILM COATED ORAL at 22:24

## 2022-06-06 RX ADMIN — HYDROMORPHONE HYDROCHLORIDE 1 MG: 1 INJECTION, SOLUTION INTRAMUSCULAR; INTRAVENOUS; SUBCUTANEOUS at 11:10

## 2022-06-06 RX ADMIN — Medication 1 SPRAY: at 06:14

## 2022-06-06 RX ADMIN — HYDROMORPHONE HYDROCHLORIDE 1 MG: 1 INJECTION, SOLUTION INTRAMUSCULAR; INTRAVENOUS; SUBCUTANEOUS at 00:41

## 2022-06-06 RX ADMIN — LISINOPRIL 20 MG: 20 TABLET ORAL at 09:08

## 2022-06-06 RX ADMIN — HEPARIN SODIUM 5000 UNITS: 5000 INJECTION INTRAVENOUS; SUBCUTANEOUS at 22:24

## 2022-06-06 RX ADMIN — HEPARIN SODIUM 5000 UNITS: 5000 INJECTION INTRAVENOUS; SUBCUTANEOUS at 06:08

## 2022-06-06 RX ADMIN — TRAZODONE HYDROCHLORIDE 200 MG: 100 TABLET ORAL at 22:24

## 2022-06-06 RX ADMIN — PIPERACILLIN AND TAZOBACTAM 3375 MG: 3; .375 INJECTION, POWDER, LYOPHILIZED, FOR SOLUTION INTRAVENOUS at 11:13

## 2022-06-06 ASSESSMENT — PAIN SCALES - GENERAL
PAINLEVEL_OUTOF10: 6
PAINLEVEL_OUTOF10: 6
PAINLEVEL_OUTOF10: 8
PAINLEVEL_OUTOF10: 7
PAINLEVEL_OUTOF10: 8
PAINLEVEL_OUTOF10: 7
PAINLEVEL_OUTOF10: 6

## 2022-06-06 ASSESSMENT — PAIN DESCRIPTION - ORIENTATION
ORIENTATION: MID

## 2022-06-06 ASSESSMENT — PAIN DESCRIPTION - LOCATION
LOCATION: ABDOMEN

## 2022-06-06 ASSESSMENT — PAIN DESCRIPTION - DESCRIPTORS
DESCRIPTORS: ACHING
DESCRIPTORS: ACHING
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
## 2022-06-06 NOTE — PROGRESS NOTES
Report given to [facility].  Dominique Painting RN

## 2022-06-06 NOTE — PLAN OF CARE
Problem: Discharge Planning  Goal: Discharge to home or other facility with appropriate resources  6/6/2022 1133 by Flakito Granados RN  Outcome: Progressing  6/6/2022 0101 by Navin Marin RN  Outcome: Progressing  Flowsheets (Taken 6/5/2022 2100)  Discharge to home or other facility with appropriate resources: Identify barriers to discharge with patient and caregiver     Problem: Pain  Goal: Verbalizes/displays adequate comfort level or baseline comfort level  6/6/2022 1133 by Flakito Granados RN  Outcome: Progressing  6/6/2022 0101 by Navin Marin RN  Outcome: Progressing     Problem: Safety - Adult  Goal: Free from fall injury  6/6/2022 1133 by Flakito Granados RN  Outcome: Progressing  6/6/2022 0101 by Navin Marin RN  Outcome: Progressing     Problem: ABCDS Injury Assessment  Goal: Absence of physical injury  6/6/2022 1133 by Flakito Granados RN  Outcome: Progressing  6/6/2022 0101 by Navin Marin RN  Outcome: Progressing     Problem: Neurosensory - Adult  Goal: Achieves stable or improved neurological status  6/6/2022 1133 by Flakito Granados RN  Outcome: Progressing  6/6/2022 0101 by Navin Marin RN  Outcome: Progressing  Flowsheets  Taken 6/5/2022 2100 by Navin Marin RN  Achieves stable or improved neurological status: Assess for and report changes in neurological status  Taken 6/5/2022 1213 by Thai Watson RN  Achieves stable or improved neurological status: Assess for and report changes in neurological status     Problem: Respiratory - Adult  Goal: Achieves optimal ventilation and oxygenation  6/6/2022 1133 by Flakito Granados RN  Outcome: Progressing  6/6/2022 0101 by Navin Marin RN  Outcome: Progressing  Flowsheets  Taken 6/5/2022 2100 by Navin Marin RN  Achieves optimal ventilation and oxygenation: Assess for changes in respiratory status  Taken 6/5/2022 1213 by Thai Watson RN  Achieves optimal ventilation and oxygenation:   Assess for changes in respiratory status   Assess for changes in mentation and behavior   Position to facilitate oxygenation and minimize respiratory effort   Assess and instruct to report shortness of breath or any respiratory difficulty     Problem: Cardiovascular - Adult  Goal: Maintains optimal cardiac output and hemodynamic stability  6/6/2022 1133 by Ciro Saez RN  Outcome: Progressing  6/6/2022 0101 by Jen Balbuena RN  Outcome: Progressing  Flowsheets (Taken 6/5/2022 2100)  Maintains optimal cardiac output and hemodynamic stability: Monitor blood pressure and heart rate     Problem: Skin/Tissue Integrity - Adult  Goal: Skin integrity remains intact  6/6/2022 1133 by Ciro Saez RN  Outcome: Progressing  6/6/2022 0101 by Jen Balbuena RN  Outcome: Progressing  Flowsheets  Taken 6/5/2022 2100 by Jen Balbuena RN  Skin Integrity Remains Intact: Monitor for areas of redness and/or skin breakdown  Taken 6/5/2022 1738 by Bettie Hall RN  Skin Integrity Remains Intact: Monitor for areas of redness and/or skin breakdown  Taken 6/5/2022 1215 by Bettie Hall RN  Skin Integrity Remains Intact: Monitor for areas of redness and/or skin breakdown     Problem: Musculoskeletal - Adult  Goal: Return mobility to safest level of function  6/6/2022 1133 by Ciro Saez RN  Outcome: Progressing  6/6/2022 0101 by Jen Balbuena RN  Outcome: Progressing  Flowsheets  Taken 6/5/2022 2100 by Jen Balbuena RN  Return Mobility to Safest Level of Function: Assess patient stability and activity tolerance for standing, transferring and ambulating with or without assistive devices  Taken 6/5/2022 1215 by Bettie Hall RN  Return Mobility to Safest Level of Function: Assist with transfers and ambulation using safe patient handling equipment as needed     Problem: Gastrointestinal - Adult  Goal: Minimal or absence of nausea and vomiting  6/6/2022 1133 by Ciro Saez RN  Outcome: Progressing  6/6/2022 0101 by Jen Balbuena RN  Outcome: Progressing  Flowsheets  Taken 6/5/2022 2100 by Aziza Carrizales RN  Minimal or absence of nausea and vomiting: Administer IV fluids as ordered to ensure adequate hydration  Taken 6/5/2022 1213 by Heather Pineda RN  Minimal or absence of nausea and vomiting:   Administer IV fluids as ordered to ensure adequate hydration   Administer ordered antiemetic medications as needed   Provide nonpharmacologic comfort measures as appropriate  Goal: Maintains or returns to baseline bowel function  6/6/2022 1133 by Leanne Guidry RN  Outcome: Progressing  6/6/2022 0101 by Aziza Carrizales RN  Outcome: Progressing  Flowsheets  Taken 6/5/2022 2100 by Aziza Carrizales RN  Maintains or returns to baseline bowel function: Assess bowel function  Taken 6/5/2022 1213 by Heather Pineda RN  Maintains or returns to baseline bowel function:   Administer IV fluids as ordered to ensure adequate hydration   Administer ordered medications as needed     Problem: Genitourinary - Adult  Goal: Absence of urinary retention  6/6/2022 1133 by Leanne Guidry RN  Outcome: Progressing  6/6/2022 0101 by Aziza Carrizales RN  Outcome: Progressing  Flowsheets  Taken 6/5/2022 2100 by Aziza Carrizales RN  Absence of urinary retention: Assess patients ability to void and empty bladder  Taken 6/5/2022 1215 by Heather Pineda RN  Absence of urinary retention: Monitor intake/output and perform bladder scan as needed     Problem: Infection - Adult  Goal: Absence of infection at discharge  6/6/2022 1133 by Leanne Guidry RN  Outcome: Progressing  6/6/2022 0101 by Aziza Carrizales RN  Outcome: Progressing  Flowsheets (Taken 6/5/2022 2100)  Absence of infection at discharge: Assess and monitor for signs and symptoms of infection     Problem: Skin/Tissue Integrity  Goal: Absence of new skin breakdown  Description: 1. Monitor for areas of redness and/or skin breakdown  2. Assess vascular access sites hourly  3.   Every 4-6 hours minimum: Change oxygen saturation probe site  4. Every 4-6 hours:  If on nasal continuous positive airway pressure, respiratory therapy assess nares and determine need for appliance change or resting period.   6/6/2022 1133 by Jazzy Miranda RN  Outcome: Progressing  6/6/2022 0101 by Nam Briseno RN  Outcome: Progressing

## 2022-06-06 NOTE — PROGRESS NOTES
Pt c/o sore throat. Sore throat spray given to pt. Pt c/o abd pain. Dilaudid 1 mg IV given.  Jessie Mcnally, RN

## 2022-06-06 NOTE — FLOWSHEET NOTE
06/06/22 0408   Vital Signs   Temp 97.9 °F (36.6 °C)   Temp Source Oral   Heart Rate 75   Heart Rate Source Monitor   Resp 16   /76   BP Location Left upper arm   BP Method Automatic   MAP (Calculated) 92.67   Patient Position Semi fowlers   Level of Consciousness Alert (0)   MEWS Score 1   Oxygen Therapy   SpO2 92 %   O2 Device None (Room air)   Pt c/o abd pain. Dilaudid given IV. Pt passing lots of gas.  No n/v
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
## 2022-06-06 NOTE — FLOWSHEET NOTE
06/06/22 0900   Vital Signs   Temp 97.4 °F (36.3 °C)   Temp Source Oral   Heart Rate 79   Heart Rate Source Monitor   Resp 16   /89   BP Location Right upper arm   BP Method Automatic   MAP (Calculated) 104.67   Patient Position Semi fowlers   Level of Consciousness Alert (0)   MEWS Score 1   Oxygen Therapy   SpO2 93 %   O2 Device None (Room air)     Pt assessment completed, vss, see flow sheet. Pt given am meds and NG clamped for 30min . Pt has no c/o nausea.   Lyn Shannon RN

## 2022-06-06 NOTE — PROGRESS NOTES
General Surgery - Valeria Sullivan, APRN - CNP, CNP  Daily Progress Note    Pt Name: Jennifer Hubbard Meredith Record Number: [id number]  Date of Birth 1960   Today's Date: 6/6/2022    ASSESSMENT  1. POD # 4 s/p SBR RAMESH for SB perf 2/2 toothpick ingestion  2. S/P perc drain placement in hepatic abscess on 6/3   3. ABD: + distention, dressing removed: staples look good and left open to air, pain ball removed, NGT in place, no N/V, + flatus, no BM  4. VSS  5. Leuks 12.6  6. NGT 1.1L  7. Coude catheter: 1.1L  8. PIERRE none recorded: small amount of old brown drainage noted. 9. ETOH abuse hx      PLAN  1. Zosyn  2. IS q 1 hour while awake  3. Labs in the am  4. NPO  5. NGT to CLWS  6. DVT proph: heparin gtt  7. Start Pepcid  8. Pain control   9. OOB/Ambulate  10. Strict I&Os,continue lopez  11. PT/OT: ambulate pt  12. Pt is POD #4 s/p SBR, RAMESH: Awaiting better return of bowel function. Pt may need repeat CT scan of abd and pelvis to assess hepatic abscess prior to drain removal.       SUBJECTIVE  Yessica Garnett has slightly improved from yesterday. Pain appears well controlled. He has no nausea and no vomiting. He has passed flatus and has not had a bowel movement. He is NPO. Current activity is up with assistance    OBJECTIVE  VITALS:  height is 5' 10\" (1.778 m) and weight is 222 lb (100.7 kg). His axillary temperature is 97.8 °F (36.6 °C). His blood pressure is 149/47 (abnormal) and his pulse is 79. His respiration is 16 and oxygen saturation is 94%. VITALS:  BP (!) 149/47   Pulse 79   Temp 97.8 °F (36.6 °C) (Axillary)   Resp 16   Ht 5' 10\" (1.778 m)   Wt 222 lb (100.7 kg)   SpO2 94%   BMI 31.85 kg/m²   INTAKE/OUTPUT:    Intake/Output Summary (Last 24 hours) at 6/6/2022 1701  Last data filed at 6/6/2022 0520  Gross per 24 hour   Intake 3407.44 ml   Output 1000 ml   Net 2407.44 ml     GENERAL: alert, cooperative, no distress    I/O last 3 completed shifts: In: 7969 [P.O.:300; I.V.:4266.8;  IV Piggyback:476.1]  Out: 9369 [Urine:1975; Emesis/NG output:3000]  No intake/output data recorded.     LABS  Recent Labs     06/05/22  0551   WBC 12.6*   HGB 12.1*   HCT 37.7*      *   K 4.5   CL 97*   CO2 27   BUN 17   CREATININE 0.7*   MG 2.00   PHOS 3.2   CALCIUM 9.0     CBC with Differential:    Lab Results   Component Value Date    WBC 12.6 06/05/2022    RBC 4.04 06/05/2022    HGB 12.1 06/05/2022    HCT 37.7 06/05/2022     06/05/2022    MCV 93.5 06/05/2022    MCH 29.9 06/05/2022    MCHC 32.0 06/05/2022    RDW 15.2 06/05/2022    LYMPHOPCT 6.7 06/05/2022    MONOPCT 6.5 06/05/2022    BASOPCT 0.0 06/05/2022    MONOSABS 0.8 06/05/2022    LYMPHSABS 0.8 06/05/2022    EOSABS 0.0 06/05/2022    BASOSABS 0.0 06/05/2022     CMP:    Lab Results   Component Value Date     06/05/2022    K 4.5 06/05/2022    K 4.2 06/02/2022    CL 97 06/05/2022    CO2 27 06/05/2022    BUN 17 06/05/2022    CREATININE 0.7 06/05/2022    GFRAA >60 06/05/2022    AGRATIO 0.7 06/02/2022    LABGLOM >60 06/05/2022    GLUCOSE 124 06/05/2022    PROT 8.0 06/02/2022    LABALBU 3.4 06/02/2022    CALCIUM 9.0 06/05/2022    BILITOT 0.5 06/02/2022    ALKPHOS 107 06/02/2022    AST 19 06/02/2022    ALT 20 06/02/2022         HERRERA Heard Arm, CNP  Electronically signed 6/6/2022 at 4:31 PM

## 2022-06-06 NOTE — FLOWSHEET NOTE
06/05/22 1945   Vital Signs   Temp 99.8 °F (37.7 °C)   Temp Source Axillary   Heart Rate 85   Resp 16   /83   BP Location Right upper arm   MAP (Calculated) 98   Level of Consciousness Alert (0)   MEWS Score 1   Pain Assessment   Pain Assessment 0-10   Pain Level 6   Oxygen Therapy   SpO2 92 %   O2 Device None (Room air)   Toradol given for c/o abd pain and fever. Dilaudid 1 mg IV given for abd pain. Pt denies n/v. NGT to suction and working well.  Colten Benjamin RN

## 2022-06-06 NOTE — PLAN OF CARE
Problem: Discharge Planning  Goal: Discharge to home or other facility with appropriate resources  Outcome: Progressing     Problem: Pain  Goal: Verbalizes/displays adequate comfort level or baseline comfort level  Outcome: Progressing     Problem: Safety - Adult  Goal: Free from fall injury  Outcome: Progressing     Problem: ABCDS Injury Assessment  Goal: Absence of physical injury  Outcome: Progressing     Problem: Neurosensory - Adult  Goal: Achieves stable or improved neurological status  Outcome: Progressing  Flowsheets (Taken 6/5/2022 1213 by Jonh Kern RN)  Achieves stable or improved neurological status: Assess for and report changes in neurological status     Problem: Respiratory - Adult  Goal: Achieves optimal ventilation and oxygenation  Outcome: Progressing  Flowsheets (Taken 6/5/2022 1213 by Jonh Kern RN)  Achieves optimal ventilation and oxygenation:   Assess for changes in respiratory status   Assess for changes in mentation and behavior   Position to facilitate oxygenation and minimize respiratory effort   Assess and instruct to report shortness of breath or any respiratory difficulty     Problem: Cardiovascular - Adult  Goal: Maintains optimal cardiac output and hemodynamic stability  Outcome: Progressing     Problem: Skin/Tissue Integrity - Adult  Goal: Skin integrity remains intact  Outcome: Progressing  Flowsheets  Taken 6/5/2022 1738 by Jonh Kern RN  Skin Integrity Remains Intact: Monitor for areas of redness and/or skin breakdown  Taken 6/5/2022 1215 by Jonh Kern RN  Skin Integrity Remains Intact: Monitor for areas of redness and/or skin breakdown     Problem: Musculoskeletal - Adult  Goal: Return mobility to safest level of function  Outcome: Progressing  Flowsheets (Taken 6/5/2022 1215 by Jonh Kern RN)  Return Mobility to Safest Level of Function: Assist with transfers and ambulation using safe patient handling equipment as needed     Problem: Gastrointestinal - Adult  Goal: Minimal or absence of nausea and vomiting  Outcome: Progressing  Flowsheets (Taken 6/5/2022 1213 by Latoya Hurtado RN)  Minimal or absence of nausea and vomiting:   Administer IV fluids as ordered to ensure adequate hydration   Administer ordered antiemetic medications as needed   Provide nonpharmacologic comfort measures as appropriate  Goal: Maintains or returns to baseline bowel function  Outcome: Progressing  Flowsheets (Taken 6/5/2022 1213 by Latoya Hurtado RN)  Maintains or returns to baseline bowel function:   Administer IV fluids as ordered to ensure adequate hydration   Administer ordered medications as needed     Problem: Genitourinary - Adult  Goal: Absence of urinary retention  Outcome: Progressing  Flowsheets (Taken 6/5/2022 1215 by Latoya Hurtado RN)  Absence of urinary retention: Monitor intake/output and perform bladder scan as needed     Problem: Infection - Adult  Goal: Absence of infection at discharge  Outcome: Progressing     Problem: Skin/Tissue Integrity  Goal: Absence of new skin breakdown  Description: 1. Monitor for areas of redness and/or skin breakdown  2. Assess vascular access sites hourly  3. Every 4-6 hours minimum:  Change oxygen saturation probe site  4. Every 4-6 hours:  If on nasal continuous positive airway pressure, respiratory therapy assess nares and determine need for appliance change or resting period.   Outcome: Progressing

## 2022-06-07 LAB
ANION GAP SERPL CALCULATED.3IONS-SCNC: 10 MMOL/L (ref 3–16)
BASOPHILS ABSOLUTE: 0 K/UL (ref 0–0.2)
BASOPHILS RELATIVE PERCENT: 0.6 %
BUN BLDV-MCNC: 10 MG/DL (ref 7–20)
CALCIUM SERPL-MCNC: 8.6 MG/DL (ref 8.3–10.6)
CHLORIDE BLD-SCNC: 103 MMOL/L (ref 99–110)
CO2: 26 MMOL/L (ref 21–32)
CREAT SERPL-MCNC: <0.5 MG/DL (ref 0.9–1.3)
EOSINOPHILS ABSOLUTE: 0.2 K/UL (ref 0–0.6)
EOSINOPHILS RELATIVE PERCENT: 2.2 %
GFR AFRICAN AMERICAN: >60
GFR NON-AFRICAN AMERICAN: >60
GLUCOSE BLD-MCNC: 101 MG/DL (ref 70–99)
GLUCOSE BLD-MCNC: 108 MG/DL (ref 70–99)
GLUCOSE BLD-MCNC: 110 MG/DL (ref 70–99)
GLUCOSE BLD-MCNC: 89 MG/DL (ref 70–99)
GLUCOSE BLD-MCNC: 94 MG/DL (ref 70–99)
GLUCOSE BLD-MCNC: 94 MG/DL (ref 70–99)
GLUCOSE BLD-MCNC: 96 MG/DL (ref 70–99)
HCT VFR BLD CALC: 32.6 % (ref 40.5–52.5)
HEMOGLOBIN: 10.9 G/DL (ref 13.5–17.5)
LYMPHOCYTES ABSOLUTE: 1 K/UL (ref 1–5.1)
LYMPHOCYTES RELATIVE PERCENT: 14.4 %
MCH RBC QN AUTO: 30.7 PG (ref 26–34)
MCHC RBC AUTO-ENTMCNC: 33.5 G/DL (ref 31–36)
MCV RBC AUTO: 91.8 FL (ref 80–100)
MONOCYTES ABSOLUTE: 0.6 K/UL (ref 0–1.3)
MONOCYTES RELATIVE PERCENT: 8.4 %
NEUTROPHILS ABSOLUTE: 5.1 K/UL (ref 1.7–7.7)
NEUTROPHILS RELATIVE PERCENT: 74.4 %
PDW BLD-RTO: 14.7 % (ref 12.4–15.4)
PERFORMED ON: ABNORMAL
PERFORMED ON: ABNORMAL
PERFORMED ON: NORMAL
PLATELET # BLD: 238 K/UL (ref 135–450)
PMV BLD AUTO: 9.1 FL (ref 5–10.5)
POTASSIUM SERPL-SCNC: 3.4 MMOL/L (ref 3.5–5.1)
RBC # BLD: 3.55 M/UL (ref 4.2–5.9)
SODIUM BLD-SCNC: 139 MMOL/L (ref 136–145)
WBC # BLD: 6.8 K/UL (ref 4–11)

## 2022-06-07 PROCEDURE — 97535 SELF CARE MNGMENT TRAINING: CPT

## 2022-06-07 PROCEDURE — 6360000002 HC RX W HCPCS: Performed by: NURSE PRACTITIONER

## 2022-06-07 PROCEDURE — 6370000000 HC RX 637 (ALT 250 FOR IP): Performed by: SURGERY

## 2022-06-07 PROCEDURE — 97530 THERAPEUTIC ACTIVITIES: CPT

## 2022-06-07 PROCEDURE — 97116 GAIT TRAINING THERAPY: CPT

## 2022-06-07 PROCEDURE — 1200000000 HC SEMI PRIVATE

## 2022-06-07 PROCEDURE — 36415 COLL VENOUS BLD VENIPUNCTURE: CPT

## 2022-06-07 PROCEDURE — 2580000003 HC RX 258: Performed by: SURGERY

## 2022-06-07 PROCEDURE — 97161 PT EVAL LOW COMPLEX 20 MIN: CPT

## 2022-06-07 PROCEDURE — C9113 INJ PANTOPRAZOLE SODIUM, VIA: HCPCS | Performed by: NURSE PRACTITIONER

## 2022-06-07 PROCEDURE — 85025 COMPLETE CBC W/AUTO DIFF WBC: CPT

## 2022-06-07 PROCEDURE — 80048 BASIC METABOLIC PNL TOTAL CA: CPT

## 2022-06-07 PROCEDURE — 97112 NEUROMUSCULAR REEDUCATION: CPT

## 2022-06-07 PROCEDURE — 97166 OT EVAL MOD COMPLEX 45 MIN: CPT

## 2022-06-07 PROCEDURE — 6360000002 HC RX W HCPCS: Performed by: SURGERY

## 2022-06-07 PROCEDURE — 99024 POSTOP FOLLOW-UP VISIT: CPT | Performed by: NURSE PRACTITIONER

## 2022-06-07 RX ORDER — PANTOPRAZOLE SODIUM 40 MG/10ML
40 INJECTION, POWDER, LYOPHILIZED, FOR SOLUTION INTRAVENOUS DAILY
Status: DISCONTINUED | OUTPATIENT
Start: 2022-06-07 | End: 2022-06-18 | Stop reason: HOSPADM

## 2022-06-07 RX ADMIN — TRAZODONE HYDROCHLORIDE 200 MG: 100 TABLET ORAL at 21:25

## 2022-06-07 RX ADMIN — PIPERACILLIN AND TAZOBACTAM 3375 MG: 3; .375 INJECTION, POWDER, LYOPHILIZED, FOR SOLUTION INTRAVENOUS at 03:53

## 2022-06-07 RX ADMIN — METOPROLOL TARTRATE 25 MG: 25 TABLET, FILM COATED ORAL at 08:36

## 2022-06-07 RX ADMIN — KETOROLAC TROMETHAMINE 30 MG: 30 INJECTION, SOLUTION INTRAMUSCULAR; INTRAVENOUS at 18:27

## 2022-06-07 RX ADMIN — HYDROMORPHONE HYDROCHLORIDE 1 MG: 1 INJECTION, SOLUTION INTRAMUSCULAR; INTRAVENOUS; SUBCUTANEOUS at 10:48

## 2022-06-07 RX ADMIN — PANTOPRAZOLE SODIUM 40 MG: 40 INJECTION, POWDER, FOR SOLUTION INTRAVENOUS at 18:28

## 2022-06-07 RX ADMIN — HYDROMORPHONE HYDROCHLORIDE 1 MG: 1 INJECTION, SOLUTION INTRAMUSCULAR; INTRAVENOUS; SUBCUTANEOUS at 03:49

## 2022-06-07 RX ADMIN — LISINOPRIL 20 MG: 20 TABLET ORAL at 08:36

## 2022-06-07 RX ADMIN — HEPARIN SODIUM 5000 UNITS: 5000 INJECTION INTRAVENOUS; SUBCUTANEOUS at 13:49

## 2022-06-07 RX ADMIN — PIPERACILLIN AND TAZOBACTAM 3375 MG: 3; .375 INJECTION, POWDER, LYOPHILIZED, FOR SOLUTION INTRAVENOUS at 10:54

## 2022-06-07 RX ADMIN — METOPROLOL TARTRATE 25 MG: 25 TABLET, FILM COATED ORAL at 21:25

## 2022-06-07 RX ADMIN — KETOROLAC TROMETHAMINE 30 MG: 30 INJECTION, SOLUTION INTRAMUSCULAR; INTRAVENOUS at 10:47

## 2022-06-07 RX ADMIN — HYDROMORPHONE HYDROCHLORIDE 1 MG: 1 INJECTION, SOLUTION INTRAMUSCULAR; INTRAVENOUS; SUBCUTANEOUS at 18:27

## 2022-06-07 RX ADMIN — HEPARIN SODIUM 5000 UNITS: 5000 INJECTION INTRAVENOUS; SUBCUTANEOUS at 05:49

## 2022-06-07 RX ADMIN — PIPERACILLIN AND TAZOBACTAM 3375 MG: 3; .375 INJECTION, POWDER, LYOPHILIZED, FOR SOLUTION INTRAVENOUS at 18:35

## 2022-06-07 RX ADMIN — HYDROMORPHONE HYDROCHLORIDE 1 MG: 1 INJECTION, SOLUTION INTRAMUSCULAR; INTRAVENOUS; SUBCUTANEOUS at 08:36

## 2022-06-07 RX ADMIN — SODIUM CHLORIDE: 9 INJECTION, SOLUTION INTRAVENOUS at 09:08

## 2022-06-07 RX ADMIN — HYDROMORPHONE HYDROCHLORIDE 1 MG: 1 INJECTION, SOLUTION INTRAMUSCULAR; INTRAVENOUS; SUBCUTANEOUS at 13:53

## 2022-06-07 RX ADMIN — HYDROMORPHONE HYDROCHLORIDE 1 MG: 1 INJECTION, SOLUTION INTRAMUSCULAR; INTRAVENOUS; SUBCUTANEOUS at 21:25

## 2022-06-07 RX ADMIN — HEPARIN SODIUM 5000 UNITS: 5000 INJECTION INTRAVENOUS; SUBCUTANEOUS at 21:25

## 2022-06-07 ASSESSMENT — PAIN SCALES - GENERAL
PAINLEVEL_OUTOF10: 7
PAINLEVEL_OUTOF10: 4
PAINLEVEL_OUTOF10: 7
PAINLEVEL_OUTOF10: 7

## 2022-06-07 NOTE — PROGRESS NOTES
General Surgery - Valeria Maurer Stands, APRN - CNP, CNP  Daily Progress Note    Pt Name: Jennifer Hubbard [place] Record Number: [id number]  Date of Birth 1960   Today's Date: 6/7/2022    ASSESSMENT  1. POD # 5 s/p SBR RAMESH for SB perf 2/2 toothpick ingestion  2. S/P perc drain placement in hepatic abscess on 6/3   3. ABD: + mild distention, staples look good and left open to air, pain ball removed, NGT in place, no N/V, + flatus (lots per pt), no BM  4. VSS  5. Leuks 12.6->6.8  6.  ml out  7. Coude catheter: 1.1L  8. F/c 1.2 L out  9. PIERRE none recorded: small amount of old brown drainage noted. 10. ETOH abuse hx      PLAN  1. Zosyn  2. IS q 1 hour while awake  3. Clamp NGT and trial liquids  4. DVT proph: heparin sub q  5. Pecid unavailable: Protonix IVP  6. Pain control   7. OOB/Ambulate  8. D/C lopez: strict I&Os  9. PT/OT: ambulate pt  10. Pt is POD #5 s/p SBR, RAMESH: Awaiting better return of bowel function. If pt toelrates clears will d/c NGT in the am and trial fulls. Hopefully d/c home in 1-2 days. Perc drain per GI: discussed with [facility] has improved from yesterday. Pain appears well controlled. He has no nausea and no vomiting. He has passed flatus and has not had a bowel movement. He is NPO. Current activity is up with assistance    OBJECTIVE  VITALS:  height is 5' 10\" (1.778 m) and weight is 222 lb (100.7 kg). His oral temperature is 98.6 °F (37 °C). His blood pressure is 131/92 (abnormal) and his pulse is 71. His respiration is 16 and oxygen saturation is 97%.    VITALS:  BP (!) 131/92   Pulse 71   Temp 98.6 °F (37 °C) (Oral)   Resp 16   Ht 5' 10\" (1.778 m)   Wt 222 lb (100.7 kg)   SpO2 97%   BMI 31.85 kg/m²   INTAKE/OUTPUT:      Intake/Output Summary (Last 24 hours) at 6/7/2022 1725  Last data filed at 6/7/2022 1607  Gross per 24 hour   Intake 0 ml   Output 2580 ml   Net -2580 ml     GENERAL: alert, cooperative, no distress    I/O last 3 completed shifts: In: 3407.4 [P.O.:120; I.V.:2921.5; IV Piggyback:366]  Out: 2650 [Urine:2050; Emesis/NG XJLQWC:531]  I/O this shift: In: 0   Out: [address]     06/05/22  0551 06/05/22  0551 06/07/22  0545   WBC 12.6*   < > 6.8   HGB 12.1*   < > 10.9*   HCT 37.7*   < > 32.6*      < > 238   *   < > 139   K 4.5   < > 3.4*   CL 97*   < > 103   CO2 27   < > 26   BUN 17   < > 10   CREATININE 0.7*   < > <0.5*   MG 2.00  --   --    PHOS 3.2  --   --    CALCIUM 9.0   < > 8.6    < > = values in this interval not displayed.      CBC with Differential:    Lab Results   Component Value Date    WBC 6.8 06/07/2022    RBC 3.55 06/07/2022    HGB 10.9 06/07/2022    HCT 32.6 06/07/2022     06/07/2022    MCV 91.8 06/07/2022    MCH 30.7 06/07/2022    MCHC 33.5 06/07/2022    RDW 14.7 06/07/2022    LYMPHOPCT 14.4 06/07/2022    MONOPCT 8.4 06/07/2022    BASOPCT 0.6 06/07/2022    MONOSABS 0.6 06/07/2022    LYMPHSABS 1.0 06/07/2022    EOSABS 0.2 06/07/2022    BASOSABS 0.0 06/07/2022     CMP:    Lab Results   Component Value Date     06/07/2022    K 3.4 06/07/2022    K 4.2 06/02/2022     06/07/2022    CO2 26 06/07/2022    BUN 10 06/07/2022    CREATININE <0.5 06/07/2022    GFRAA >60 06/07/2022    AGRATIO 0.7 06/02/2022    LABGLOM >60 06/07/2022    GLUCOSE 101 06/07/2022    PROT 8.0 06/02/2022    LABALBU 3.4 06/02/2022    CALCIUM 8.6 06/07/2022    BILITOT 0.5 06/02/2022    ALKPHOS 107 06/02/2022    AST 19 06/02/2022    ALT 20 06/02/2022         HERRERA Morfin CNP  Electronically signed 6/7/2022 at 5:25 PM

## 2022-06-07 NOTE — PROGRESS NOTES
RN removed patient's catheter. Urinary catheter was a lopez, not a coude. 55ml charted from lopez. NGT clamped per surgery. IVF rate lowered. Urinal provided to patient. Clear liquids provided to patient. Patient educated on diet. Will continue to monitor.
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
## 2022-06-07 NOTE — PLAN OF CARE
Problem: Discharge Planning  Goal: Discharge to home or other facility with appropriate resources  Outcome: Progressing     Problem: Pain  Goal: Verbalizes/displays adequate comfort level or baseline comfort level  Outcome: Progressing     Problem: Safety - Adult  Goal: Free from fall injury  Outcome: Progressing     Problem: ABCDS Injury Assessment  Goal: Absence of physical injury  Outcome: Progressing     Problem: Neurosensory - Adult  Goal: Achieves stable or improved neurological status  Outcome: Progressing     Problem: Respiratory - Adult  Goal: Achieves optimal ventilation and oxygenation  Outcome: Progressing     Problem: Cardiovascular - Adult  Goal: Maintains optimal cardiac output and hemodynamic stability  Outcome: Progressing     Problem: Skin/Tissue Integrity - Adult  Goal: Skin integrity remains intact  Outcome: Progressing     Problem: Musculoskeletal - Adult  Goal: Return mobility to safest level of function  Outcome: Progressing     Problem: Gastrointestinal - Adult  Goal: Minimal or absence of nausea and vomiting  Outcome: Progressing  Goal: Maintains or returns to baseline bowel function  Outcome: Progressing     Problem: Genitourinary - Adult  Goal: Absence of urinary retention  Outcome: Progressing     Problem: Infection - Adult  Goal: Absence of infection at discharge  Outcome: Progressing     Problem: Skin/Tissue Integrity  Goal: Absence of new skin breakdown  Description: 1. Monitor for areas of redness and/or skin breakdown  2. Assess vascular access sites hourly  3. Every 4-6 hours minimum:  Change oxygen saturation probe site  4. Every 4-6 hours:  If on nasal continuous positive airway pressure, respiratory therapy assess nares and determine need for appliance change or resting period.   Outcome: Progressing

## 2022-06-07 NOTE — PROGRESS NOTES
BP (!) 167/100   Pulse 79   Temp 98.5 °F (36.9 °C) (Oral)   Resp 18   Ht 5' 10\" (1.778 m)   Wt 222 lb (100.7 kg)   SpO2 95%   BMI 31.85 kg/m²     Assessment complete. Meds passed. Pt denies needs at this time. PRN dilaudid provided for pain 7/10. RN messaged salina garcia NP regarding the low stock of IV pepcid, and the possible use of something else. Salina stated she will come look at this patient to decide. Bedside Mobility Assessment Tool (BMAT):     Assessment Level 1- Sit and Shake    1. From a semi-reclined position, ask patient to sit up and rotate to a seated position at the side of the bed. Can use the bedrail. 2. Ask patient to reach out and grab your hand and shake making sure patient reaches across his/her midline. Pass- Patient is able to come to a seated position, maintain core strength. Maintains seated balance while reaching across midline. Move on to Assessment Level 2. Assessment Level 2- Stretch and Point   1. With patient in seated position at the side of the bed, have patient place both feet on the floor (or stool) with knees no higher than hips. 2. Ask patient to stretch one leg and straighten the knee, then bend the ankle/flex and point the toes. If appropriate, repeat with the other leg. Pass- Patient is able to demonstrate appropriate quad strength on intended weight bearing limb(s). Move onto Assessment Level 3. Assessment Level 3- Stand   1. Ask patient to elevate off the bed or chair (seated to standing) using an assistive device (cane, bedrail). 2. Patient should be able to raise buttocks off be and hold for a count of five. May repeat once. Pass- Patient maintains standing stability for at least 5 seconds, proceed to assessment level 4. Assessment Level 4- Walk   1. Ask patient to march in place at bedside. 2. Then ask patient to advance step and return each foot.  Some medical conditions may render a patient from stepping backwards, use your best clinical judgement. Pass- Patient demonstrates balance while shifting weight and ability to step, takes independent steps, does not use assistive device patient is MOBILITY LEVEL 4.       Mobility Level- 4
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
## 2022-06-07 NOTE — PLAN OF CARE
Problem: Discharge Planning  Goal: Discharge to home or other facility with appropriate resources  6/7/2022 0134 by Valerie Calderón RN  Outcome: Progressing     Problem: Pain  Goal: Verbalizes/displays adequate comfort level or baseline comfort level  Recent Flowsheet Documentation  Taken 6/7/2022 0834 by Josh Isaac RN  Verbalizes/displays adequate comfort level or baseline comfort level: Encourage patient to monitor pain and request assistance  6/7/2022 0134 by Valerie Calderón RN  Outcome: Progressing     Problem: Safety - Adult  Goal: Free from fall injury  6/7/2022 0134 by Valerie Calderón RN  Outcome: Progressing     Problem: ABCDS Injury Assessment  Goal: Absence of physical injury  6/7/2022 0134 by Valerie Calderón RN  Outcome: Progressing     Problem: Neurosensory - Adult  Goal: Achieves stable or improved neurological status  6/7/2022 0134 by Valerie Calderón RN  Outcome: Progressing     Problem: Respiratory - Adult  Goal: Achieves optimal ventilation and oxygenation  Recent Flowsheet Documentation  Taken 6/7/2022 0834 by Josh Isaac RN  Achieves optimal ventilation and oxygenation:   Assess for changes in respiratory status   Assess for changes in mentation and behavior  6/7/2022 0134 by Valerie Calderón RN  Outcome: Progressing     Problem: Cardiovascular - Adult  Goal: Maintains optimal cardiac output and hemodynamic stability  Recent Flowsheet Documentation  Taken 6/7/2022 0834 by Josh Isaac RN  Maintains optimal cardiac output and hemodynamic stability:   Monitor blood pressure and heart rate   Monitor urine output and notify Licensed Independent Practitioner for values outside of normal range  6/7/2022 0134 by Valerie Calderón RN  Outcome: Progressing     Problem: Skin/Tissue Integrity - Adult  Goal: Skin integrity remains intact  Recent Flowsheet Documentation  Taken 6/7/2022 0834 by Josh Isaac RN  Skin Integrity Remains Intact: Monitor for areas of redness and/or skin breakdown  6/7/2022 0134 by Valerie Calderón RN  Outcome: Progressing     Problem: Musculoskeletal - Adult  Goal: Return mobility to safest level of function  Recent Flowsheet Documentation  Taken 6/7/2022 0834 by Josh Isaac RN  Return Mobility to Safest Level of Function: Assess patient stability and activity tolerance for standing, transferring and ambulating with or without assistive devices  6/7/2022 0134 by Valerie Calderón RN  Outcome: Progressing     Problem: Gastrointestinal - Adult  Goal: Minimal or absence of nausea and vomiting  Recent Flowsheet Documentation  Taken 6/7/2022 0834 by Josh Isaac RN  Minimal or absence of nausea and vomiting: Administer IV fluids as ordered to ensure adequate hydration  6/7/2022 0134 by Valerie Calderón RN  Outcome: Progressing  Goal: Maintains or returns to baseline bowel function  Recent Flowsheet Documentation  Taken 6/7/2022 0834 by Josh Isaac RN  Maintains or returns to baseline bowel function: Assess bowel function  6/7/2022 0134 by Valeire Calderón RN  Outcome: Progressing     Problem: Genitourinary - Adult  Goal: Absence of urinary retention  6/7/2022 1520 by Josh Isaac RN  Outcome: Progressing  Flowsheets (Taken 6/7/2022 0834)  Absence of urinary retention: Assess patients ability to void and empty bladder  6/7/2022 0134 by Valerie Calderón RN  Outcome: Progressing     Problem: Infection - Adult  Goal: Absence of infection at discharge  6/7/2022 1520 by Josh Isaac RN  Outcome: Progressing  6/7/2022 0134 by Valerie Calderón RN  Outcome: Progressing     Problem: Skin/Tissue Integrity  Goal: Absence of new skin breakdown  Description: 1. Monitor for areas of redness and/or skin breakdown  2. Assess vascular access sites hourly  3. Every 4-6 hours minimum:  Change oxygen saturation probe site  4. Every 4-6 hours:  If on nasal continuous positive airway pressure, respiratory therapy assess nares and determine need for appliance change or resting period.   6/7/2022 1520 by Josh Isaac RN  Outcome: Progressing  6/7/2022 0134 by Jennifer Loza RN  Outcome: Progressing

## 2022-06-08 ENCOUNTER — APPOINTMENT (OUTPATIENT)
Dept: GENERAL RADIOLOGY | Age: 62
DRG: 230 | End: 2022-06-08
Payer: COMMERCIAL

## 2022-06-08 LAB
ANION GAP SERPL CALCULATED.3IONS-SCNC: 11 MMOL/L (ref 3–16)
BASOPHILS ABSOLUTE: 0 K/UL (ref 0–0.2)
BASOPHILS RELATIVE PERCENT: 0.6 %
BUN BLDV-MCNC: 8 MG/DL (ref 7–20)
CALCIUM SERPL-MCNC: 8.2 MG/DL (ref 8.3–10.6)
CHLORIDE BLD-SCNC: 102 MMOL/L (ref 99–110)
CO2: 24 MMOL/L (ref 21–32)
CREAT SERPL-MCNC: <0.5 MG/DL (ref 0.9–1.3)
EOSINOPHILS ABSOLUTE: 0.1 K/UL (ref 0–0.6)
EOSINOPHILS RELATIVE PERCENT: 1.7 %
GFR AFRICAN AMERICAN: >60
GFR NON-AFRICAN AMERICAN: >60
GLUCOSE BLD-MCNC: 108 MG/DL (ref 70–99)
GLUCOSE BLD-MCNC: 120 MG/DL (ref 70–99)
GLUCOSE BLD-MCNC: 92 MG/DL (ref 70–99)
GLUCOSE BLD-MCNC: 96 MG/DL (ref 70–99)
GLUCOSE BLD-MCNC: 97 MG/DL (ref 70–99)
HCT VFR BLD CALC: 32.7 % (ref 40.5–52.5)
HEMOGLOBIN: 10.9 G/DL (ref 13.5–17.5)
LYMPHOCYTES ABSOLUTE: 1.2 K/UL (ref 1–5.1)
LYMPHOCYTES RELATIVE PERCENT: 17.2 %
MAGNESIUM: 1.5 MG/DL (ref 1.8–2.4)
MCH RBC QN AUTO: 30.1 PG (ref 26–34)
MCHC RBC AUTO-ENTMCNC: 33.4 G/DL (ref 31–36)
MCV RBC AUTO: 90.3 FL (ref 80–100)
MONOCYTES ABSOLUTE: 0.6 K/UL (ref 0–1.3)
MONOCYTES RELATIVE PERCENT: 8.7 %
NEUTROPHILS ABSOLUTE: 5.1 K/UL (ref 1.7–7.7)
NEUTROPHILS RELATIVE PERCENT: 71.8 %
PDW BLD-RTO: 15 % (ref 12.4–15.4)
PERFORMED ON: ABNORMAL
PERFORMED ON: NORMAL
PHOSPHORUS: 3.5 MG/DL (ref 2.5–4.9)
PLATELET # BLD: 239 K/UL (ref 135–450)
PMV BLD AUTO: 9.1 FL (ref 5–10.5)
POTASSIUM SERPL-SCNC: 3.4 MMOL/L (ref 3.5–5.1)
RBC # BLD: 3.62 M/UL (ref 4.2–5.9)
SODIUM BLD-SCNC: 137 MMOL/L (ref 136–145)
WBC # BLD: 7 K/UL (ref 4–11)

## 2022-06-08 PROCEDURE — 6360000002 HC RX W HCPCS: Performed by: NURSE PRACTITIONER

## 2022-06-08 PROCEDURE — 2580000003 HC RX 258: Performed by: NURSE PRACTITIONER

## 2022-06-08 PROCEDURE — 83735 ASSAY OF MAGNESIUM: CPT

## 2022-06-08 PROCEDURE — 99024 POSTOP FOLLOW-UP VISIT: CPT | Performed by: NURSE PRACTITIONER

## 2022-06-08 PROCEDURE — 36415 COLL VENOUS BLD VENIPUNCTURE: CPT

## 2022-06-08 PROCEDURE — 2580000003 HC RX 258: Performed by: SURGERY

## 2022-06-08 PROCEDURE — 6370000000 HC RX 637 (ALT 250 FOR IP): Performed by: SURGERY

## 2022-06-08 PROCEDURE — 84100 ASSAY OF PHOSPHORUS: CPT

## 2022-06-08 PROCEDURE — 74019 RADEX ABDOMEN 2 VIEWS: CPT

## 2022-06-08 PROCEDURE — 6360000002 HC RX W HCPCS: Performed by: SURGERY

## 2022-06-08 PROCEDURE — 97535 SELF CARE MNGMENT TRAINING: CPT

## 2022-06-08 PROCEDURE — 99232 SBSQ HOSP IP/OBS MODERATE 35: CPT | Performed by: INTERNAL MEDICINE

## 2022-06-08 PROCEDURE — 85025 COMPLETE CBC W/AUTO DIFF WBC: CPT

## 2022-06-08 PROCEDURE — C9113 INJ PANTOPRAZOLE SODIUM, VIA: HCPCS | Performed by: NURSE PRACTITIONER

## 2022-06-08 PROCEDURE — 80048 BASIC METABOLIC PNL TOTAL CA: CPT

## 2022-06-08 PROCEDURE — 1200000000 HC SEMI PRIVATE

## 2022-06-08 RX ADMIN — HYDROMORPHONE HYDROCHLORIDE 1 MG: 1 INJECTION, SOLUTION INTRAMUSCULAR; INTRAVENOUS; SUBCUTANEOUS at 14:50

## 2022-06-08 RX ADMIN — HYDROMORPHONE HYDROCHLORIDE 1 MG: 1 INJECTION, SOLUTION INTRAMUSCULAR; INTRAVENOUS; SUBCUTANEOUS at 09:58

## 2022-06-08 RX ADMIN — HYDROMORPHONE HYDROCHLORIDE 1 MG: 1 INJECTION, SOLUTION INTRAMUSCULAR; INTRAVENOUS; SUBCUTANEOUS at 03:51

## 2022-06-08 RX ADMIN — HYDROMORPHONE HYDROCHLORIDE 1 MG: 1 INJECTION, SOLUTION INTRAMUSCULAR; INTRAVENOUS; SUBCUTANEOUS at 12:19

## 2022-06-08 RX ADMIN — SODIUM CHLORIDE: 9 INJECTION, SOLUTION INTRAVENOUS at 16:55

## 2022-06-08 RX ADMIN — PIPERACILLIN AND TAZOBACTAM 3375 MG: 3; .375 INJECTION, POWDER, LYOPHILIZED, FOR SOLUTION INTRAVENOUS at 19:03

## 2022-06-08 RX ADMIN — HEPARIN SODIUM 5000 UNITS: 5000 INJECTION INTRAVENOUS; SUBCUTANEOUS at 14:49

## 2022-06-08 RX ADMIN — HYDROMORPHONE HYDROCHLORIDE 1 MG: 1 INJECTION, SOLUTION INTRAMUSCULAR; INTRAVENOUS; SUBCUTANEOUS at 17:46

## 2022-06-08 RX ADMIN — METOPROLOL TARTRATE 25 MG: 25 TABLET, FILM COATED ORAL at 20:58

## 2022-06-08 RX ADMIN — TRAZODONE HYDROCHLORIDE 200 MG: 100 TABLET ORAL at 20:58

## 2022-06-08 RX ADMIN — HEPARIN SODIUM 5000 UNITS: 5000 INJECTION INTRAVENOUS; SUBCUTANEOUS at 20:58

## 2022-06-08 RX ADMIN — HEPARIN SODIUM 5000 UNITS: 5000 INJECTION INTRAVENOUS; SUBCUTANEOUS at 05:19

## 2022-06-08 RX ADMIN — LISINOPRIL 20 MG: 20 TABLET ORAL at 09:57

## 2022-06-08 RX ADMIN — METOPROLOL TARTRATE 25 MG: 25 TABLET, FILM COATED ORAL at 09:57

## 2022-06-08 RX ADMIN — PIPERACILLIN AND TAZOBACTAM 3375 MG: 3; .375 INJECTION, POWDER, LYOPHILIZED, FOR SOLUTION INTRAVENOUS at 03:49

## 2022-06-08 RX ADMIN — PIPERACILLIN AND TAZOBACTAM 3375 MG: 3; .375 INJECTION, POWDER, LYOPHILIZED, FOR SOLUTION INTRAVENOUS at 11:48

## 2022-06-08 RX ADMIN — HYDROMORPHONE HYDROCHLORIDE 1 MG: 1 INJECTION, SOLUTION INTRAMUSCULAR; INTRAVENOUS; SUBCUTANEOUS at 20:59

## 2022-06-08 RX ADMIN — PANTOPRAZOLE SODIUM 40 MG: 40 INJECTION, POWDER, FOR SOLUTION INTRAVENOUS at 09:58

## 2022-06-08 RX ADMIN — HYDROMORPHONE HYDROCHLORIDE 1 MG: 1 INJECTION, SOLUTION INTRAMUSCULAR; INTRAVENOUS; SUBCUTANEOUS at 07:20

## 2022-06-08 ASSESSMENT — PAIN SCALES - GENERAL
PAINLEVEL_OUTOF10: 8
PAINLEVEL_OUTOF10: 7
PAINLEVEL_OUTOF10: 9
PAINLEVEL_OUTOF10: 7
PAINLEVEL_OUTOF10: 3
PAINLEVEL_OUTOF10: 8

## 2022-06-08 ASSESSMENT — PAIN DESCRIPTION - DESCRIPTORS: DESCRIPTORS: SHARP;SHOOTING;SORE

## 2022-06-08 ASSESSMENT — PAIN DESCRIPTION - LOCATION: LOCATION: ABDOMEN

## 2022-06-08 ASSESSMENT — PAIN DESCRIPTION - ONSET: ONSET: ON-GOING

## 2022-06-08 ASSESSMENT — PAIN DESCRIPTION - PAIN TYPE: TYPE: SURGICAL PAIN

## 2022-06-08 ASSESSMENT — PAIN DESCRIPTION - ORIENTATION: ORIENTATION: LEFT;LOWER

## 2022-06-08 ASSESSMENT — PAIN DESCRIPTION - FREQUENCY: FREQUENCY: CONTINUOUS

## 2022-06-08 NOTE — FLOWSHEET NOTE
06/08/22 0945   Vital Signs   Temp 97.4 °F (36.3 °C)   Temp Source Oral   Heart Rate 80   Heart Rate Source Monitor   Resp 18   BP (!) 150/96   BP Location Right upper arm   BP Method Automatic   MAP (Calculated) 114   Patient Position Semi fowlers   Level of Consciousness Alert (0)   MEWS Score 1   Pain Assessment   Pain Assessment 0-10   Pain Level 8   Opioid-Induced Sedation   POSS Score 1   Oxygen Therapy   SpO2 96 %   O2 Device None (Room air)   Shift assessment complete. See flow sheet. Scheduled medications given, See MAR. Head to toe complete. Vital signs logged and active bowel sounds in all 4 quadrants. Pt awake in bed. Pt had approximately 200ml of dark green/brownish emesis in emesis pan. Pt complaining of pain 8/10, PRN pain medication given per protocol. Medications given without difficulty. Pt passing flatus. No further needs noted at this time. Call light and bedside table within reach. Bed in lowest position, wheels locked and side rails up x2.      Jefferson Do RN
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
## 2022-06-08 NOTE — PLAN OF CARE
Problem: Discharge Planning  Goal: Discharge to home or other facility with appropriate resources  6/8/2022 1037 by Melinda Sanchez RN  Outcome: Progressing  Flowsheets (Taken 6/8/2022 0955)  Discharge to home or other facility with appropriate resources: Identify barriers to discharge with patient and caregiver  6/7/2022 2202 by Kj Almodovar RN  Outcome: Progressing     Problem: Pain  Goal: Verbalizes/displays adequate comfort level or baseline comfort level  6/8/2022 1037 by Melinda Sanchez RN  Outcome: Progressing  6/7/2022 2202 by Kj Almodovar RN  Outcome: Progressing  Flowsheets (Taken 6/7/2022 0834 by Amandeep Saxena RN)  Verbalizes/displays adequate comfort level or baseline comfort level: Encourage patient to monitor pain and request assistance     Problem: Safety - Adult  Goal: Free from fall injury  6/8/2022 1037 by Melinda Sanchez RN  Outcome: Progressing  6/7/2022 2202 by Kj Almodovar RN  Outcome: Progressing  Flowsheets (Taken 6/7/2022 1521 by Amandeep Saxena RN)  Free From Fall Injury: Instruct family/caregiver on patient safety     Problem: ABCDS Injury Assessment  Goal: Absence of physical injury  6/8/2022 1037 by Melinda Sanchez RN  Outcome: Progressing  Flowsheets (Taken 6/8/2022 1036)  Absence of Physical Injury: Implement safety measures based on patient assessment  6/7/2022 2202 by Kj Almodovar RN  Outcome: Progressing  Flowsheets (Taken 6/7/2022 1521 by Amandeep Saxena RN)  Absence of Physical Injury: Implement safety measures based on patient assessment     Problem: Neurosensory - Adult  Goal: Achieves stable or improved neurological status  6/8/2022 1037 by Melinda Sanchez RN  Outcome: Progressing  6/7/2022 2202 by Kj Almodovar RN  Outcome: Progressing     Problem: Respiratory - Adult  Goal: Achieves optimal ventilation and oxygenation  6/8/2022 1037 by Melinda Sanchez RN  Outcome: Progressing  6/7/2022 2202 by Kj Almodovar RN  Outcome: Progressing  Flowsheets (Taken 6/7/2022 0834 by Ruddy Jeronimo SERGE Castro)  Achieves optimal ventilation and oxygenation:   Assess for changes in respiratory status   Assess for changes in mentation and behavior     Problem: Cardiovascular - Adult  Goal: Maintains optimal cardiac output and hemodynamic stability  6/8/2022 1037 by Alyssia Machado RN  Outcome: Progressing  6/7/2022 2202 by Liz Camacho RN  Outcome: Progressing  Flowsheets (Taken 6/7/2022 0834 by Kaelyn Marcos RN)  Maintains optimal cardiac output and hemodynamic stability:   Monitor blood pressure and heart rate   Monitor urine output and notify Licensed Independent Practitioner for values outside of normal range     Problem: Skin/Tissue Integrity - Adult  Goal: Skin integrity remains intact  6/8/2022 1037 by Alyssia Machado RN  Outcome: Progressing  Flowsheets  Taken 6/8/2022 1036  Skin Integrity Remains Intact: Monitor for areas of redness and/or skin breakdown  Taken 6/8/2022 0955  Skin Integrity Remains Intact: Monitor for areas of redness and/or skin breakdown  6/7/2022 2202 by Liz Camacho RN  Outcome: Progressing  Flowsheets  Taken 6/7/2022 1521 by Kaelyn Marcos RN  Skin Integrity Remains Intact: Monitor for areas of redness and/or skin breakdown  Taken 6/7/2022 0834 by Kaelyn Marcos RN  Skin Integrity Remains Intact: Monitor for areas of redness and/or skin breakdown     Problem: Musculoskeletal - Adult  Goal: Return mobility to safest level of function  6/8/2022 1037 by Alyssia Machado RN  Outcome: Progressing  Flowsheets (Taken 6/8/2022 0955)  Return Mobility to Safest Level of Function: Assist with transfers and ambulation using safe patient handling equipment as needed  6/7/2022 2202 by Liz Camacho RN  Outcome: Progressing  Flowsheets (Taken 6/7/2022 0834 by Kaelyn Marcos RN)  Return Mobility to Safest Level of Function: Assess patient stability and activity tolerance for standing, transferring and ambulating with or without assistive devices     Problem: Gastrointestinal - Adult  Goal: Minimal or absence of nausea and vomiting  6/8/2022 1037 by Shelah Schlatter, RN  Outcome: Progressing  6/7/2022 2202 by Aleyda Cadena RN  Outcome: Progressing  Flowsheets (Taken 6/7/2022 0834 by Chico Carranza RN)  Minimal or absence of nausea and vomiting: Administer IV fluids as ordered to ensure adequate hydration  Goal: Maintains or returns to baseline bowel function  6/8/2022 1037 by Shelah Schlatter, RN  Outcome: Progressing  Flowsheets (Taken 6/8/2022 0955)  Maintains or returns to baseline bowel function: Assess bowel function  6/7/2022 2202 by Aleyda Cadena RN  Outcome: Progressing  Flowsheets (Taken 6/7/2022 0834 by Chico Carranza RN)  Maintains or returns to baseline bowel function: Assess bowel function     Problem: Genitourinary - Adult  Goal: Absence of urinary retention  6/8/2022 1037 by Shelah Schlatter, RN  Outcome: Progressing  Flowsheets (Taken 6/8/2022 0955)  Absence of urinary retention: Assess patients ability to void and empty bladder  6/7/2022 2202 by Aleyda Cadena RN  Outcome: Progressing     Problem: Infection - Adult  Goal: Absence of infection at discharge  6/8/2022 1037 by Shelah Schlatter, RN  Outcome: Progressing  Flowsheets (Taken 6/8/2022 0955)  Absence of infection at discharge: Assess and monitor for signs and symptoms of infection  6/7/2022 2202 by Aleyda Cadena RN  Outcome: Progressing     Problem: Skin/Tissue Integrity  Goal: Absence of new skin breakdown  Description: 1. Monitor for areas of redness and/or skin breakdown  2. Assess vascular access sites hourly  3. Every 4-6 hours minimum:  Change oxygen saturation probe site  4. Every 4-6 hours:  If on nasal continuous positive airway pressure, respiratory therapy assess nares and determine need for appliance change or resting period.   6/8/2022 1037 by Shelah Schlatter, RN  Outcome: Progressing  6/7/2022 2202 by Aleyda aCdena RN  Outcome: Progressing     Problem: Nutrition Deficit:  Goal: Optimize nutritional status  6/8/2022 1037 by Michaelle Lanza RN  Outcome: Progressing  6/7/2022 2202 by Eliel Stock RN  Outcome: Progressing

## 2022-06-08 NOTE — PLAN OF CARE
Problem: Discharge Planning  Goal: Discharge to home or other facility with appropriate resources  Outcome: Progressing     Problem: Pain  Goal: Verbalizes/displays adequate comfort level or baseline comfort level  Outcome: Progressing  Flowsheets (Taken 6/7/2022 0834 by Lillie Craig RN)  Verbalizes/displays adequate comfort level or baseline comfort level: Encourage patient to monitor pain and request assistance     Problem: Safety - Adult  Goal: Free from fall injury  Outcome: Progressing  Flowsheets (Taken 6/7/2022 1521 by Lillie Craig RN)  Free From Fall Injury: Instruct family/caregiver on patient safety     Problem: ABCDS Injury Assessment  Goal: Absence of physical injury  Outcome: Progressing  Flowsheets (Taken 6/7/2022 1521 by Lillie Craig RN)  Absence of Physical Injury: Implement safety measures based on patient assessment     Problem: Neurosensory - Adult  Goal: Achieves stable or improved neurological status  Outcome: Progressing     Problem: Respiratory - Adult  Goal: Achieves optimal ventilation and oxygenation  Outcome: Progressing  Flowsheets (Taken 6/7/2022 0834 by Lillie Craig RN)  Achieves optimal ventilation and oxygenation:   Assess for changes in respiratory status   Assess for changes in mentation and behavior     Problem: Cardiovascular - Adult  Goal: Maintains optimal cardiac output and hemodynamic stability  Outcome: Progressing  Flowsheets (Taken 6/7/2022 0834 by Lillie Craig RN)  Maintains optimal cardiac output and hemodynamic stability:   Monitor blood pressure and heart rate   Monitor urine output and notify Licensed Independent Practitioner for values outside of normal range     Problem: Skin/Tissue Integrity - Adult  Goal: Skin integrity remains intact  Outcome: Progressing  Flowsheets  Taken 6/7/2022 1521 by Lillie Craig RN  Skin Integrity Remains Intact: Monitor for areas of redness and/or skin breakdown  Taken 6/7/2022 0834 by Lillie Craig RN  Skin Integrity Remains Intact: Monitor for areas of redness and/or skin breakdown     Problem: Musculoskeletal - Adult  Goal: Return mobility to safest level of function  Outcome: Progressing  Flowsheets (Taken 6/7/2022 0834 by Oren Kearney RN)  Return Mobility to Safest Level of Function: Assess patient stability and activity tolerance for standing, transferring and ambulating with or without assistive devices     Problem: Gastrointestinal - Adult  Goal: Minimal or absence of nausea and vomiting  Outcome: Progressing  Flowsheets (Taken 6/7/2022 0834 by Oren Kearney RN)  Minimal or absence of nausea and vomiting: Administer IV fluids as ordered to ensure adequate hydration  Goal: Maintains or returns to baseline bowel function  Outcome: Progressing  Flowsheets (Taken 6/7/2022 0834 by Oren Kearney RN)  Maintains or returns to baseline bowel function: Assess bowel function     Problem: Genitourinary - Adult  Goal: Absence of urinary retention  6/7/2022 2202 by Maria Ramirez RN  Outcome: Progressing  6/7/2022 1520 by Oren Kearney RN  Outcome: Progressing  Flowsheets (Taken 6/7/2022 9588)  Absence of urinary retention: Assess patients ability to void and empty bladder     Problem: Infection - Adult  Goal: Absence of infection at discharge  6/7/2022 2202 by Maria Ramirez RN  Outcome: Progressing  6/7/2022 1520 by Oren Kearney RN  Outcome: Progressing     Problem: Skin/Tissue Integrity  Goal: Absence of new skin breakdown  Description: 1. Monitor for areas of redness and/or skin breakdown  2. Assess vascular access sites hourly  3. Every 4-6 hours minimum:  Change oxygen saturation probe site  4. Every 4-6 hours:  If on nasal continuous positive airway pressure, respiratory therapy assess nares and determine need for appliance change or resting period.   6/7/2022 2202 by Maria Ramirez RN  Outcome: Progressing  6/7/2022 1520 by Oren Kearney RN  Outcome: Progressing     Problem: Nutrition Deficit:  Goal: Optimize nutritional status  Outcome: Progressing
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
## 2022-06-08 NOTE — PROGRESS NOTES
General Surgery - Valeria Kuhn Re, APRN - CNP, CNP  Daily Progress Note    Pt Name: Jennifer Hubbard [place] Record Number: [id number]  Date of Birth 1960   Today's Date: 6/8/2022    ASSESSMENT  1. POD # 6 s/p SBR RAMESH for SB perf 2/2 toothpick ingestion  2. S/P perc drain placement in hepatic abscess on 6/3   3. GI: +distention, staples look good and left open to air, pain ball removed, NGT clamped yesterday, + V this am, + flatus (lots per pt), no BM  4. VSS  5. Leuks 12.6->6.8->7  6. Mg 1.5  7. K+ 3.4  8. NGT: clamped yesterday with clears; pt with emesis this am  9. UO good. 10. Per drain: small mount of brown drainage noted  11. ETOH abuse hx      PLAN  1. Zosyn  2. IS q 1 hour while awake  3. NGT to CLWS  4. ABD xray  5. DVT proph: heparin sub q  6. Pecid unavailable: Protonix IVP  7. Pain control   8. OOB/Ambulate  9. PT/OT: ambulate pt  10. Pt is POD #6 s/p SBR, RAMESH: Awaiting better return of bowel function. Pt failed NGT clamping with clears probable post-op ileus await results. Dimitri Sanchez has worsened in some ways from yesterday. Pain appears well controlled. He has nausea and V x 1. He has passed flatus and has not had a bowel movement. He was made NPO again. Current activity is up with assistance    OBJECTIVE  VITALS:  height is 5' 10\" (1.778 m) and weight is 222 lb (100.7 kg). His oral temperature is 97.4 °F (36.3 °C). His blood pressure is 150/96 (abnormal) and his pulse is 80. His respiration is 16 and oxygen saturation is 96%. VITALS:  BP (!) 150/96   Pulse 80   Temp 97.4 °F (36.3 °C) (Oral)   Resp 16   Ht 5' 10\" (1.778 m)   Wt 222 lb (100.7 kg)   SpO2 96%   BMI 31.85 kg/m²   INTAKE/OUTPUT:      Intake/Output Summary (Last 24 hours) at 6/8/2022 1223  Last data filed at 6/8/2022 1056  Gross per 24 hour   Intake --   Output 2105 ml   Net -2105 ml     GENERAL: alert, cooperative, no distress    I/O last 3 completed shifts:   In: 0   Out: 3380 [Urine:2980; Emesis/NG output:400]  I/O this shift:  In: -   Out: 1125 [Urine:925; Emesis/NG output:200]    LABS  Recent Labs     06/08/22 0831   WBC 7.0   HGB 10.9*   HCT 32.7*         K 3.4*      CO2 24   BUN 8   CREATININE <0.5*   MG 1.50*   PHOS 3.5   CALCIUM 8.2*     CBC with Differential:    Lab Results   Component Value Date    WBC 7.0 06/08/2022    RBC 3.62 06/08/2022    HGB 10.9 06/08/2022    HCT 32.7 06/08/2022     06/08/2022    MCV 90.3 06/08/2022    MCH 30.1 06/08/2022    MCHC 33.4 06/08/2022    RDW 15.0 06/08/2022    LYMPHOPCT 17.2 06/08/2022    MONOPCT 8.7 06/08/2022    BASOPCT 0.6 06/08/2022    MONOSABS 0.6 06/08/2022    LYMPHSABS 1.2 06/08/2022    EOSABS 0.1 06/08/2022    BASOSABS 0.0 06/08/2022     CMP:    Lab Results   Component Value Date     06/08/2022    K 3.4 06/08/2022    K 4.2 06/02/2022     06/08/2022    CO2 24 06/08/2022    BUN 8 06/08/2022    CREATININE <0.5 06/08/2022    GFRAA >60 06/08/2022    AGRATIO 0.7 06/02/2022    LABGLOM >60 06/08/2022    GLUCOSE 108 06/08/2022    PROT 8.0 06/02/2022    LABALBU 3.4 06/02/2022    CALCIUM 8.2 06/08/2022    BILITOT 0.5 06/02/2022    ALKPHOS 107 06/02/2022    AST 19 06/02/2022    ALT 20 06/02/2022         HERRERA Sanches - CNP  Electronically signed 6/8/2022 at 12:23 PM

## 2022-06-09 ENCOUNTER — APPOINTMENT (OUTPATIENT)
Dept: GENERAL RADIOLOGY | Age: 62
DRG: 230 | End: 2022-06-09
Payer: COMMERCIAL

## 2022-06-09 ENCOUNTER — APPOINTMENT (OUTPATIENT)
Dept: CT IMAGING | Age: 62
DRG: 230 | End: 2022-06-09
Payer: COMMERCIAL

## 2022-06-09 LAB
ANION GAP SERPL CALCULATED.3IONS-SCNC: 12 MMOL/L (ref 3–16)
BUN BLDV-MCNC: 6 MG/DL (ref 7–20)
CALCIUM SERPL-MCNC: 8.5 MG/DL (ref 8.3–10.6)
CHLORIDE BLD-SCNC: 101 MMOL/L (ref 99–110)
CO2: 24 MMOL/L (ref 21–32)
CREAT SERPL-MCNC: <0.5 MG/DL (ref 0.9–1.3)
GFR AFRICAN AMERICAN: >60
GFR NON-AFRICAN AMERICAN: >60
GLUCOSE BLD-MCNC: 100 MG/DL (ref 70–99)
GLUCOSE BLD-MCNC: 109 MG/DL (ref 70–99)
GLUCOSE BLD-MCNC: 87 MG/DL (ref 70–99)
GLUCOSE BLD-MCNC: 93 MG/DL (ref 70–99)
GLUCOSE BLD-MCNC: 95 MG/DL (ref 70–99)
GLUCOSE BLD-MCNC: 96 MG/DL (ref 70–99)
GLUCOSE BLD-MCNC: 98 MG/DL (ref 70–99)
MAGNESIUM: 1.5 MG/DL (ref 1.8–2.4)
PERFORMED ON: ABNORMAL
PERFORMED ON: NORMAL
PHOSPHORUS: 4 MG/DL (ref 2.5–4.9)
POTASSIUM SERPL-SCNC: 3.4 MMOL/L (ref 3.5–5.1)
SODIUM BLD-SCNC: 137 MMOL/L (ref 136–145)

## 2022-06-09 PROCEDURE — 6360000004 HC RX CONTRAST MEDICATION: Performed by: INTERNAL MEDICINE

## 2022-06-09 PROCEDURE — 83735 ASSAY OF MAGNESIUM: CPT

## 2022-06-09 PROCEDURE — 6360000002 HC RX W HCPCS: Performed by: SURGERY

## 2022-06-09 PROCEDURE — 2580000003 HC RX 258

## 2022-06-09 PROCEDURE — 80048 BASIC METABOLIC PNL TOTAL CA: CPT

## 2022-06-09 PROCEDURE — 6370000000 HC RX 637 (ALT 250 FOR IP): Performed by: SURGERY

## 2022-06-09 PROCEDURE — 6360000002 HC RX W HCPCS

## 2022-06-09 PROCEDURE — 71045 X-RAY EXAM CHEST 1 VIEW: CPT

## 2022-06-09 PROCEDURE — C9113 INJ PANTOPRAZOLE SODIUM, VIA: HCPCS | Performed by: NURSE PRACTITIONER

## 2022-06-09 PROCEDURE — 2580000003 HC RX 258: Performed by: NURSE PRACTITIONER

## 2022-06-09 PROCEDURE — 99024 POSTOP FOLLOW-UP VISIT: CPT | Performed by: NURSE PRACTITIONER

## 2022-06-09 PROCEDURE — 99221 1ST HOSP IP/OBS SF/LOW 40: CPT

## 2022-06-09 PROCEDURE — 74177 CT ABD & PELVIS W/CONTRAST: CPT

## 2022-06-09 PROCEDURE — 1200000000 HC SEMI PRIVATE

## 2022-06-09 PROCEDURE — 2580000003 HC RX 258: Performed by: SURGERY

## 2022-06-09 PROCEDURE — 84100 ASSAY OF PHOSPHORUS: CPT

## 2022-06-09 PROCEDURE — 36415 COLL VENOUS BLD VENIPUNCTURE: CPT

## 2022-06-09 PROCEDURE — 6360000002 HC RX W HCPCS: Performed by: NURSE PRACTITIONER

## 2022-06-09 RX ORDER — MAGNESIUM SULFATE IN WATER 40 MG/ML
2000 INJECTION, SOLUTION INTRAVENOUS ONCE
Status: COMPLETED | OUTPATIENT
Start: 2022-06-09 | End: 2022-06-09

## 2022-06-09 RX ORDER — SODIUM CHLORIDE AND POTASSIUM CHLORIDE .9; .15 G/100ML; G/100ML
SOLUTION INTRAVENOUS CONTINUOUS
Status: DISCONTINUED | OUTPATIENT
Start: 2022-06-09 | End: 2022-06-09

## 2022-06-09 RX ORDER — HYDRALAZINE HYDROCHLORIDE 20 MG/ML
10 INJECTION INTRAMUSCULAR; INTRAVENOUS EVERY 6 HOURS PRN
Status: DISCONTINUED | OUTPATIENT
Start: 2022-06-09 | End: 2022-06-09

## 2022-06-09 RX ORDER — LABETALOL HYDROCHLORIDE 5 MG/ML
20 INJECTION, SOLUTION INTRAVENOUS EVERY 4 HOURS PRN
Status: DISCONTINUED | OUTPATIENT
Start: 2022-06-09 | End: 2022-06-18 | Stop reason: HOSPADM

## 2022-06-09 RX ORDER — HYDRALAZINE HYDROCHLORIDE 20 MG/ML
10 INJECTION INTRAMUSCULAR; INTRAVENOUS EVERY 6 HOURS PRN
Status: DISCONTINUED | OUTPATIENT
Start: 2022-06-09 | End: 2022-06-18 | Stop reason: HOSPADM

## 2022-06-09 RX ADMIN — PIPERACILLIN AND TAZOBACTAM 3375 MG: 3; .375 INJECTION, POWDER, LYOPHILIZED, FOR SOLUTION INTRAVENOUS at 03:22

## 2022-06-09 RX ADMIN — HYDROMORPHONE HYDROCHLORIDE 1 MG: 1 INJECTION, SOLUTION INTRAMUSCULAR; INTRAVENOUS; SUBCUTANEOUS at 01:40

## 2022-06-09 RX ADMIN — HEPARIN SODIUM 5000 UNITS: 5000 INJECTION INTRAVENOUS; SUBCUTANEOUS at 06:02

## 2022-06-09 RX ADMIN — PANTOPRAZOLE SODIUM 40 MG: 40 INJECTION, POWDER, FOR SOLUTION INTRAVENOUS at 09:17

## 2022-06-09 RX ADMIN — PIPERACILLIN AND TAZOBACTAM 3375 MG: 3; .375 INJECTION, POWDER, LYOPHILIZED, FOR SOLUTION INTRAVENOUS at 10:50

## 2022-06-09 RX ADMIN — HEPARIN SODIUM 5000 UNITS: 5000 INJECTION INTRAVENOUS; SUBCUTANEOUS at 20:49

## 2022-06-09 RX ADMIN — SODIUM CHLORIDE: 9 INJECTION, SOLUTION INTRAVENOUS at 09:35

## 2022-06-09 RX ADMIN — POTASSIUM CHLORIDE: 2 INJECTION, SOLUTION, CONCENTRATE INTRAVENOUS at 16:54

## 2022-06-09 RX ADMIN — HYDROMORPHONE HYDROCHLORIDE 1 MG: 1 INJECTION, SOLUTION INTRAMUSCULAR; INTRAVENOUS; SUBCUTANEOUS at 16:57

## 2022-06-09 RX ADMIN — HYDROMORPHONE HYDROCHLORIDE 1 MG: 1 INJECTION, SOLUTION INTRAMUSCULAR; INTRAVENOUS; SUBCUTANEOUS at 20:56

## 2022-06-09 RX ADMIN — METOPROLOL TARTRATE 25 MG: 25 TABLET, FILM COATED ORAL at 09:17

## 2022-06-09 RX ADMIN — HYDROMORPHONE HYDROCHLORIDE 1 MG: 1 INJECTION, SOLUTION INTRAMUSCULAR; INTRAVENOUS; SUBCUTANEOUS at 09:17

## 2022-06-09 RX ADMIN — IOPAMIDOL 75 ML: 755 INJECTION, SOLUTION INTRAVENOUS at 15:31

## 2022-06-09 RX ADMIN — HEPARIN SODIUM 5000 UNITS: 5000 INJECTION INTRAVENOUS; SUBCUTANEOUS at 14:04

## 2022-06-09 RX ADMIN — MAGNESIUM SULFATE 2000 MG: 2 INJECTION INTRAVENOUS at 16:46

## 2022-06-09 RX ADMIN — METOPROLOL TARTRATE 25 MG: 25 TABLET, FILM COATED ORAL at 20:50

## 2022-06-09 RX ADMIN — HYDROMORPHONE HYDROCHLORIDE 1 MG: 1 INJECTION, SOLUTION INTRAMUSCULAR; INTRAVENOUS; SUBCUTANEOUS at 06:02

## 2022-06-09 RX ADMIN — HYDROMORPHONE HYDROCHLORIDE 1 MG: 1 INJECTION, SOLUTION INTRAMUSCULAR; INTRAVENOUS; SUBCUTANEOUS at 14:04

## 2022-06-09 RX ADMIN — LISINOPRIL 20 MG: 20 TABLET ORAL at 09:17

## 2022-06-09 RX ADMIN — PIPERACILLIN AND TAZOBACTAM 3375 MG: 3; .375 INJECTION, POWDER, LYOPHILIZED, FOR SOLUTION INTRAVENOUS at 19:06

## 2022-06-09 RX ADMIN — HYDRALAZINE HYDROCHLORIDE 10 MG: 20 INJECTION INTRAMUSCULAR; INTRAVENOUS at 16:41

## 2022-06-09 RX ADMIN — VANCOMYCIN HYDROCHLORIDE 1250 MG: 10 INJECTION, POWDER, LYOPHILIZED, FOR SOLUTION INTRAVENOUS at 20:45

## 2022-06-09 RX ADMIN — TRAZODONE HYDROCHLORIDE 200 MG: 100 TABLET ORAL at 20:50

## 2022-06-09 ASSESSMENT — PAIN DESCRIPTION - ONSET
ONSET: ON-GOING
ONSET: ON-GOING

## 2022-06-09 ASSESSMENT — PAIN DESCRIPTION - ORIENTATION
ORIENTATION: LEFT
ORIENTATION: LEFT;LOWER

## 2022-06-09 ASSESSMENT — PAIN SCALES - GENERAL
PAINLEVEL_OUTOF10: 9
PAINLEVEL_OUTOF10: 3
PAINLEVEL_OUTOF10: 0
PAINLEVEL_OUTOF10: 7
PAINLEVEL_OUTOF10: 9
PAINLEVEL_OUTOF10: 3
PAINLEVEL_OUTOF10: 7
PAINLEVEL_OUTOF10: 10
PAINLEVEL_OUTOF10: 9
PAINLEVEL_OUTOF10: 1

## 2022-06-09 ASSESSMENT — PAIN DESCRIPTION - FREQUENCY
FREQUENCY: CONTINUOUS
FREQUENCY: CONTINUOUS

## 2022-06-09 ASSESSMENT — PAIN DESCRIPTION - PAIN TYPE
TYPE: ACUTE PAIN
TYPE: SURGICAL PAIN

## 2022-06-09 ASSESSMENT — PAIN DESCRIPTION - DESCRIPTORS
DESCRIPTORS: SHARP;SHOOTING;SORE
DESCRIPTORS: SHARP;SHOOTING

## 2022-06-09 ASSESSMENT — PAIN DESCRIPTION - LOCATION
LOCATION: ABDOMEN
LOCATION: ABDOMEN

## 2022-06-09 NOTE — PLAN OF CARE
Problem: Discharge Planning  Goal: Discharge to home or other facility with appropriate resources  Recent Flowsheet Documentation  Taken 6/9/2022 0925 by Roula Navarro RN  Discharge to home or other facility with appropriate resources:   Identify discharge learning needs (meds, wound care, etc)   Arrange for needed discharge resources and transportation as appropriate   Identify barriers to discharge with patient and caregiver  6/9/2022 0216 by Morales Franks RN  Outcome: Progressing  Flowsheets (Taken 6/8/2022 2050)  Discharge to home or other facility with appropriate resources: Identify barriers to discharge with patient and caregiver     Problem: Pain  Goal: Verbalizes/displays adequate comfort level or baseline comfort level  Recent Flowsheet Documentation  Taken 6/9/2022 0900 by Roula Navarro RN  Verbalizes/displays adequate comfort level or baseline comfort level:   Assess pain using appropriate pain scale   Encourage patient to monitor pain and request assistance  6/9/2022 0216 by Morales Franks RN  Outcome: Progressing  Flowsheets (Taken 6/8/2022 2058)  Verbalizes/displays adequate comfort level or baseline comfort level:   Encourage patient to monitor pain and request assistance   Assess pain using appropriate pain scale   Administer analgesics based on type and severity of pain and evaluate response   Implement non-pharmacological measures as appropriate and evaluate response     Problem: Safety - Adult  Goal: Free from fall injury  6/9/2022 0216 by Morales Franks RN  Outcome: Progressing     Problem: ABCDS Injury Assessment  Goal: Absence of physical injury  Recent Flowsheet Documentation  Taken 6/9/2022 0219 by Morales Franks RN  Absence of Physical Injury: Implement safety measures based on patient assessment  6/9/2022 0216 by Morales Franks RN  Outcome: Progressing     Problem: Neurosensory - Adult  Goal: Achieves stable or improved neurological status  6/9/2022 0216 by Alem Cole [place], RN  Outcome: Progressing     Problem: Respiratory - Adult  Goal: Achieves optimal ventilation and oxygenation  6/9/2022 1206 by Frankie Cardenas RN  Outcome: Progressing  6/9/2022 0216 by Deborah Reddy RN  Outcome: Progressing  Flowsheets (Taken 6/8/2022 2050)  Achieves optimal ventilation and oxygenation:   Assess for changes in respiratory status   Assess for changes in mentation and behavior     Problem: Cardiovascular - Adult  Goal: Maintains optimal cardiac output and hemodynamic stability  6/9/2022 1206 by Frankie Cardenas RN  Outcome: Progressing  Flowsheets (Taken 6/9/2022 0925)  Maintains optimal cardiac output and hemodynamic stability: Monitor blood pressure and heart rate  6/9/2022 0216 by Deborah Reddy RN  Outcome: Progressing  Flowsheets (Taken 6/8/2022 2050)  Maintains optimal cardiac output and hemodynamic stability: Monitor blood pressure and heart rate     Problem: Skin/Tissue Integrity - Adult  Goal: Skin integrity remains intact  Recent Flowsheet Documentation  Taken 6/9/2022 0925 by Frankie Cardenas RN  Skin Integrity Remains Intact: Monitor for areas of redness and/or skin breakdown  6/9/2022 0216 by Deborah Reddy RN  Outcome: Progressing  Flowsheets (Taken 6/8/2022 2050)  Skin Integrity Remains Intact: Monitor for areas of redness and/or skin breakdown     Problem: Musculoskeletal - Adult  Goal: Return mobility to safest level of function  Recent Flowsheet Documentation  Taken 6/9/2022 0925 by Frankie Cardenas RN  Return Mobility to Safest Level of Function: Assess patient stability and activity tolerance for standing, transferring and ambulating with or without assistive devices  6/9/2022 0216 by Deborha Reddy RN  Outcome: Progressing  Flowsheets (Taken 6/8/2022 2050)  Return Mobility to Safest Level of Function: Assess patient stability and activity tolerance for standing, transferring and ambulating with or without assistive devices     Problem: Gastrointestinal - Adult  Goal: Minimal or absence of nausea and vomiting  6/9/2022 0216 by Nirmala Buck RN  Outcome: Progressing  Flowsheets (Taken 6/8/2022 2050)  Minimal or absence of nausea and vomiting:   Administer IV fluids as ordered to ensure adequate hydration   Nasogastric tube to low intermittent suction as ordered  Goal: Maintains or returns to baseline bowel function  6/9/2022 0216 by Nirmala Buck RN  Outcome: Progressing  [address] (Taken 6/8/2022 2050)  Maintains or returns to baseline bowel function: Assess bowel function     Problem: Genitourinary - Adult  Goal: Absence of urinary retention  6/9/2022 0216 by Nirmala Buck RN  Outcome: Progressing  Flowsheets (Taken 6/8/2022 2050)  Absence of urinary retention: Assess patients ability to void and empty bladder     Problem: Infection - Adult  Goal: Absence of infection at discharge  Recent Flowsheet Documentation  Taken 6/9/2022 0925 by Cyndy Castleman, RN  Absence of infection at discharge:   Monitor lab/diagnostic results   Assess and monitor for signs and symptoms of infection  6/9/2022 0216 by Nirmala Buck RN  Outcome: Progressing  Flowsheets (Taken 6/8/2022 2050)  Absence of infection at discharge:   Assess and monitor for signs and symptoms of infection   Monitor lab/diagnostic results   Monitor all insertion sites i.e., indwelling lines, tubes and drains     Problem: Skin/Tissue Integrity  Goal: Absence of new skin breakdown  Description: 1. Monitor for areas of redness and/or skin breakdown  2. Assess vascular access sites hourly  3. Every 4-6 hours minimum:  Change oxygen saturation probe site  4. Every 4-6 hours:  If on nasal continuous positive airway pressure, respiratory therapy assess nares and determine need for appliance change or resting period.   6/9/2022 1206 by Cyndy Castleman, RN  Outcome: Progressing  6/9/2022 0216 by Nirmala Buck RN  Outcome: Progressing     Problem: Nutrition Deficit:  Goal: Optimize nutritional status  6/9/2022 0216 by Deborah Reddy, RN  Outcome: Progressing
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
## 2022-06-09 NOTE — PROGRESS NOTES
Consult has been called to Dr. Kaden Dominguez on 6/9/22. Spoke with dr Kaden Dominguez .  2:59 PM    Devi Albarran  6/9/2022

## 2022-06-09 NOTE — FLOWSHEET NOTE
Pt A/Ox4. VSS. Surgical pain to abd 9/10, see assessment below. Provided pt with ice pack to abd. Ronny N/V, passing flatus. Pt unlabored; respirations even, regular, effortless. RA. No distress noted. Shift assessment complete. See flowsheet. PM meds given. PRN dilaudid 1 mg given for pain. See MAR. NGT clamped at this time d/t PO medication administration, will connect to CLWS in 1 hour. Denies further needs at this time. Side rails 2/4. Bed in low position. Call light within reach. 06/08/22 2018 06/08/22 2050 06/08/22 2058   Vital Signs   Temp 98 °F (36.7 °C)  --   --    Temp Source Oral  --   --    Heart Rate 76  --   --    Heart Rate Source Monitor  --   --    Resp 18  --  18   BP (!) 162/93  --   --    BP Location Right upper arm  --   --    BP Method Automatic  --   --    MAP (Calculated) 116  --   --    Patient Position Semi fowlers  --   --    Level of Consciousness Alert (0) Alert (0)  --    MEWS Score 1  --   --    Pain Assessment   Pain Assessment  --   --  0-10   Pain Level  --   --  9   Patient's Stated Pain Goal  --   --  0 - No pain   Pain Location  --   --  Abdomen   Pain Orientation  --   --  Left; Lower   Pain Descriptors  --   --  Arnold Held; Shooting; Sore   Pain Type  --   --  Surgical pain   Pain Frequency  --   --  Continuous   Pain Onset  --   --  On-going   Non-Pharmaceutical Pain Intervention(s)  --   --  Rest;Cold pack   Care Plan - Pain Goals   Verbalizes/displays adequate comfort level or baseline comfort level  --   --  Encourage patient to monitor pain and request assistance;Assess pain using appropriate pain scale; Administer analgesics based on type and severity of pain and evaluate response;Implement non-pharmacological measures as appropriate and evaluate response   Opioid-Induced Sedation   POSS Score  --   --  1   RASS   Jeff Agitation Sedation Scale (RASS)  --  0  --    Oxygen Therapy   SpO2 94 %  --   --    O2 Device None (Room air)  --   --
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
## 2022-06-09 NOTE — FLOWSHEET NOTE
Rounded on pt. Pt c/o of surgical pain. Prn dilaudid 1 mg given as prescribed. See MAR.        06/09/22 0139 06/09/22 0140   Vital Signs   Heart Rate 73  --    Heart Rate Source Monitor  --    Resp 18  --    BP (!) 166/96  --    BP Location Right upper arm  --    BP Method Automatic  --    MAP (Calculated) 119.33  --    Patient Position Semi fowlers  --    Level of Consciousness Alert (0)  --    Pain Assessment   Pain Assessment  --  0-10   Pain Level  --  10   Patient's Stated Pain Goal  --  0 - No pain   Pain Location  --  Abdomen   Pain Orientation  --  Left; Lower   Pain Descriptors  --  Tariq Keel; Shooting; Sore   Functional Pain Assessment  --  Prevents or interferes some active activities and ADLs   Pain Type  --  Surgical pain   Pain Frequency  --  Continuous   Pain Onset  --  On-going   Non-Pharmaceutical Pain Intervention(s)  --  Rest;Cold pack   Opioid-Induced Sedation   POSS Score  --  1   Oxygen Therapy   SpO2 94 %  --    O2 Device None (Room air)  --

## 2022-06-09 NOTE — FLOWSHEET NOTE
06/09/22 0714   Handoff   Communication Given Shift Handoff   Handoff Given To Tayler Received From [facility] Communication Face to Face; At bedside   Time Handoff Given 0201   End of Shift Check Performed Yes

## 2022-06-09 NOTE — PLAN OF CARE
Problem: Discharge Planning  Goal: Discharge to home or other facility with appropriate resources  Outcome: Progressing  Flowsheets (Taken 6/8/2022 2050)  Discharge to home or other facility with appropriate resources: Identify barriers to discharge with patient and caregiver     Problem: Pain  Goal: Verbalizes/displays adequate comfort level or baseline comfort level  Outcome: Progressing  Flowsheets (Taken 6/8/2022 2058)  Verbalizes/displays adequate comfort level or baseline comfort level:   Encourage patient to monitor pain and request assistance   Assess pain using appropriate pain scale   Administer analgesics based on type and severity of pain and evaluate response   Implement non-pharmacological measures as appropriate and evaluate response     Problem: Safety - Adult  Goal: Free from fall injury  Outcome: Progressing     Problem: ABCDS Injury Assessment  Goal: Absence of physical injury  Outcome: Progressing     Problem: Neurosensory - Adult  Goal: Achieves stable or improved neurological status  Outcome: Progressing     Problem: Respiratory - Adult  Goal: Achieves optimal ventilation and oxygenation  Outcome: Progressing  Flowsheets (Taken 6/8/2022 2050)  Achieves optimal ventilation and oxygenation:   Assess for changes in respiratory status   Assess for changes in mentation and behavior     Problem: Cardiovascular - Adult  Goal: Maintains optimal cardiac output and hemodynamic stability  Outcome: Progressing  Flowsheets (Taken 6/8/2022 2050)  Maintains optimal cardiac output and hemodynamic stability: Monitor blood pressure and heart rate     Problem: Skin/Tissue Integrity - Adult  Goal: Skin integrity remains intact  Outcome: Progressing  Flowsheets (Taken 6/8/2022 2050)  Skin Integrity Remains Intact: Monitor for areas of redness and/or skin breakdown     Problem: Musculoskeletal - Adult  Goal: Return mobility to safest level of function  Outcome: Progressing  Flowsheets (Taken 6/8/2022 2050)  Return Mobility to Safest Level of Function: Assess patient stability and activity tolerance for standing, transferring and ambulating with or without assistive devices     Problem: Gastrointestinal - Adult  Goal: Minimal or absence of nausea and vomiting  Outcome: Progressing  Flowsheets (Taken 6/8/2022 2050)  Minimal or absence of nausea and vomiting:   Administer IV fluids as ordered to ensure adequate hydration   Nasogastric tube to low intermittent suction as ordered  Goal: Maintains or returns to baseline bowel function  Outcome: Progressing  Flowsheets (Taken 6/8/2022 2050)  Maintains or returns to baseline bowel function: Assess bowel function     Problem: Genitourinary - Adult  Goal: Absence of urinary retention  Outcome: Progressing  Flowsheets (Taken 6/8/2022 2050)  Absence of urinary retention: Assess patients ability to void and empty bladder     Problem: Infection - Adult  Goal: Absence of infection at discharge  Outcome: Progressing  Flowsheets (Taken 6/8/2022 2050)  Absence of infection at discharge:   Assess and monitor for signs and symptoms of infection   Monitor lab/diagnostic results   Monitor all insertion sites i.e., indwelling lines, tubes and drains     Problem: Skin/Tissue Integrity  Goal: Absence of new skin breakdown  Description: 1. Monitor for areas of redness and/or skin breakdown  2. Assess vascular access sites hourly  3. Every 4-6 hours minimum:  Change oxygen saturation probe site  4. Every 4-6 hours:  If on nasal continuous positive airway pressure, respiratory therapy assess nares and determine need for appliance change or resting period.   Outcome: Progressing     Problem: Nutrition Deficit:  Goal: Optimize nutritional status  Outcome: Progressing

## 2022-06-09 NOTE — FLOWSHEET NOTE
06/09/22 0930   Hygiene   Level of Assistance Independent   Skin Care Chlorhexidine wipes  (all over)     CHG bath performed    BP (!) 155/100   Pulse 84   Temp 97.5 °F (36.4 °C) (Oral)   Resp 16   Ht 5' 10\" (1.778 m)   Wt 222 lb (100.7 kg)   SpO2 94%   BMI 31.85 kg/m²     Assessment complete. Meds passed. Pt denies needs at this time. Valeria NP notified of Blood pressure result. Pt denies abd pain, nausea or vomiting 600ml of bile removed from cannister and charted. States pain 7/10, prn dilaudid provided        Bedside Mobility Assessment Tool (BMAT):     Assessment Level 1- Sit and Shake    1. From a semi-reclined position, ask patient to sit up and rotate to a seated position at the side of the bed. Can use the bedrail. 2. Ask patient to reach out and grab your hand and shake making sure patient reaches across his/her midline. Pass- Patient is able to come to a seated position, maintain core strength. Maintains seated balance while reaching across midline. Move on to Assessment Level 2. Assessment Level 2- Stretch and Point   1. With patient in seated position at the side of the bed, have patient place both feet on the floor (or stool) with knees no higher than hips. 2. Ask patient to stretch one leg and straighten the knee, then bend the ankle/flex and point the toes. If appropriate, repeat with the other leg. Pass- Patient is able to demonstrate appropriate quad strength on intended weight bearing limb(s). Move onto Assessment Level 3. Assessment Level 3- Stand   1. Ask patient to elevate off the bed or chair (seated to standing) using an assistive device (cane, bedrail). 2. Patient should be able to raise buttocks off be and hold for a count of five. May repeat once. Pass- Patient maintains standing stability for at least 5 seconds, proceed to assessment level 4. Assessment Level 4- Walk   1. Ask patient to march in place at bedside.     2. Then ask patient to advance step and return each foot. Some medical conditions may render a patient from stepping backwards, use your best clinical judgement. Fail- Patient not able to complete tasks OR requires use of assistive device. Patient is MOBILITY LEVEL 3.        Mobility Level- 3

## 2022-06-09 NOTE — PROGRESS NOTES
General Surgery - aVleria Snow, APRN - CNP, CNP  Daily Progress Note    Pt Name: Jennifer Hubbard [place] Record Number: [id number]  Date of Birth 1960   Today's Date: 6/9/2022    ASSESSMENT  1. POD # 7 s/p SBR RAMESH for SB perf 2/2 toothpick ingestion  2. S/P perc drain placement in hepatic abscess on 6/3   3. GI: +mild distention, staples look good and left open to air, NGT no N/V, + flatus (lots per pt), + 1 small BM  4. VSS  5. Leuks 12.6->6.8->7  6. Mg 1.5  7. K+ 3.4  8. NGT: 1.9L  9. UO good. 10. Per drain: small mount of brown drainage noted  11. ETOH abuse hx      PLAN  1. Zosyn  2. IS q 1 hour while awake  3. NGT to [facility]  4. ABD xray in the am  5. Replete electrolytes  6. Hospitalist consultation for persistent HTN despite PO meds  7. DVT proph: heparin sub q  8. Pecid unavailable: Protonix IVP  9. Pain control   10. OOB/Ambulate  11. PT/OT: ambulate pt  12. Pt is POD #6 s/p SBR, RAMESH with post op ileus: Awaiting better return of bowel function. NGT to [facility], x-ray in the am.        Sebastián Molina has improved from yesterday. Pain appears well controlled. He has no N/V. He has passed flatus and has had a small a bowel movement. He is NPO. Current activity is up with assistance    OBJECTIVE  VITALS:  height is 5' 10\" (1.778 m) and weight is 222 lb (100.7 kg). His oral temperature is 97.7 °F (36.5 °C). His blood pressure is 169/103 (abnormal) and his pulse is 82. His respiration is 16 and oxygen saturation is 96%. VITALS:  BP (!) 169/103   Pulse 82   Temp 97.7 °F (36.5 °C) (Oral)   Resp 16   Ht 5' 10\" (1.778 m)   Wt 222 lb (100.7 kg)   SpO2 96%   BMI 31.85 kg/m²   INTAKE/OUTPUT:      Intake/Output Summary (Last 24 hours) at 6/9/2022 1439  Last data filed at 6/9/2022 0911  Gross per 24 hour   Intake 6333.87 ml   Output 3210 ml   Net 3123.87 ml     GENERAL: alert, cooperative, no distress    I/O last 3 completed shifts: In: 6333.9 [I.V.:5449.4;  IV Piggyback:884.5]  Out: 8864 [HASLX:4920; Emesis/NG output:1900; Drains:10]  I/O this shift:  In: -   Out: 600 [Emesis/NG output:600]    LABS  Recent Labs     06/08/22  0831 06/08/22 0831 06/09/22 0916   WBC 7.0  --   --    HGB 10.9*  --   --    HCT 32.7*  --   --      --   --       < > 137   K 3.4*   < > 3.4*      < > 101   CO2 24   < > 24   BUN 8   < > 6*   CREATININE <0.5*   < > <0.5*   MG 1.50*   < > 1.50*   PHOS 3.5   < > 4.0   CALCIUM 8.2*   < > 8.5    < > = values in this interval not displayed.      CBC with Differential:    Lab Results   Component Value Date    WBC 7.0 06/08/2022    RBC 3.62 06/08/2022    HGB 10.9 06/08/2022    HCT 32.7 06/08/2022     06/08/2022    MCV 90.3 06/08/2022    MCH 30.1 06/08/2022    MCHC 33.4 06/08/2022    RDW 15.0 06/08/2022    LYMPHOPCT 17.2 06/08/2022    MONOPCT 8.7 06/08/2022    BASOPCT 0.6 06/08/2022    MONOSABS 0.6 06/08/2022    LYMPHSABS 1.2 06/08/2022    EOSABS 0.1 06/08/2022    BASOSABS 0.0 06/08/2022     CMP:    Lab Results   Component Value Date     06/09/2022    K 3.4 06/09/2022    K 4.2 06/02/2022     06/09/2022    CO2 24 06/09/2022    BUN 6 06/09/2022    CREATININE <0.5 06/09/2022    GFRAA >60 06/09/2022    AGRATIO 0.7 06/02/2022    LABGLOM >60 06/09/2022    GLUCOSE 109 06/09/2022    PROT 8.0 06/02/2022    LABALBU 3.4 06/02/2022    CALCIUM 8.5 06/09/2022    BILITOT 0.5 06/02/2022    ALKPHOS 107 06/02/2022    AST 19 06/02/2022    ALT 20 06/02/2022         HERRERA Sanches - CNP  Electronically signed 6/9/2022 at 2:39 PM

## 2022-06-10 ENCOUNTER — APPOINTMENT (OUTPATIENT)
Dept: CT IMAGING | Age: 62
DRG: 230 | End: 2022-06-10
Payer: COMMERCIAL

## 2022-06-10 LAB
ANION GAP SERPL CALCULATED.3IONS-SCNC: 16 MMOL/L (ref 3–16)
ANISOCYTOSIS: ABNORMAL
BASOPHILS ABSOLUTE: 0.1 K/UL (ref 0–0.2)
BASOPHILS RELATIVE PERCENT: 1 %
BUN BLDV-MCNC: 5 MG/DL (ref 7–20)
CALCIUM SERPL-MCNC: 8.2 MG/DL (ref 8.3–10.6)
CHLORIDE BLD-SCNC: 102 MMOL/L (ref 99–110)
CO2: 20 MMOL/L (ref 21–32)
CREAT SERPL-MCNC: <0.5 MG/DL (ref 0.9–1.3)
EOSINOPHILS ABSOLUTE: 0.1 K/UL (ref 0–0.6)
EOSINOPHILS RELATIVE PERCENT: 1 %
GFR AFRICAN AMERICAN: >60
GFR NON-AFRICAN AMERICAN: >60
GLUCOSE BLD-MCNC: 111 MG/DL (ref 70–99)
GLUCOSE BLD-MCNC: 52 MG/DL (ref 70–99)
GLUCOSE BLD-MCNC: 69 MG/DL (ref 70–99)
GLUCOSE BLD-MCNC: 81 MG/DL (ref 70–99)
GLUCOSE BLD-MCNC: 81 MG/DL (ref 70–99)
GLUCOSE BLD-MCNC: 88 MG/DL (ref 70–99)
GLUCOSE BLD-MCNC: 88 MG/DL (ref 70–99)
GLUCOSE BLD-MCNC: 91 MG/DL (ref 70–99)
GLUCOSE BLD-MCNC: 91 MG/DL (ref 70–99)
GLUCOSE BLD-MCNC: 93 MG/DL (ref 70–99)
HCT VFR BLD CALC: 35.2 % (ref 40.5–52.5)
HEMOGLOBIN: 11.3 G/DL (ref 13.5–17.5)
HYPOCHROMIA: ABNORMAL
LYMPHOCYTES ABSOLUTE: 0.7 K/UL (ref 1–5.1)
LYMPHOCYTES RELATIVE PERCENT: 9 %
MCH RBC QN AUTO: 30.7 PG (ref 26–34)
MCHC RBC AUTO-ENTMCNC: 32.2 G/DL (ref 31–36)
MCV RBC AUTO: 95.3 FL (ref 80–100)
MONOCYTES ABSOLUTE: 0.5 K/UL (ref 0–1.3)
MONOCYTES RELATIVE PERCENT: 6 %
NEUTROPHILS ABSOLUTE: 6.6 K/UL (ref 1.7–7.7)
NEUTROPHILS RELATIVE PERCENT: 83 %
PDW BLD-RTO: 15.6 % (ref 12.4–15.4)
PERFORMED ON: ABNORMAL
PERFORMED ON: NORMAL
PLATELET # BLD: 255 K/UL (ref 135–450)
PLATELET SLIDE REVIEW: ADEQUATE
PMV BLD AUTO: 9.2 FL (ref 5–10.5)
POTASSIUM REFLEX MAGNESIUM: 3.7 MMOL/L (ref 3.5–5.1)
RBC # BLD: 3.69 M/UL (ref 4.2–5.9)
SLIDE REVIEW: ABNORMAL
SODIUM BLD-SCNC: 138 MMOL/L (ref 136–145)
WBC # BLD: 7.9 K/UL (ref 4–11)

## 2022-06-10 PROCEDURE — 99232 SBSQ HOSP IP/OBS MODERATE 35: CPT | Performed by: INTERNAL MEDICINE

## 2022-06-10 PROCEDURE — 6360000002 HC RX W HCPCS: Performed by: NURSE PRACTITIONER

## 2022-06-10 PROCEDURE — 80048 BASIC METABOLIC PNL TOTAL CA: CPT

## 2022-06-10 PROCEDURE — 75989 ABSCESS DRAINAGE UNDER X-RAY: CPT

## 2022-06-10 PROCEDURE — 2580000003 HC RX 258

## 2022-06-10 PROCEDURE — 6360000002 HC RX W HCPCS: Performed by: STUDENT IN AN ORGANIZED HEALTH CARE EDUCATION/TRAINING PROGRAM

## 2022-06-10 PROCEDURE — 6370000000 HC RX 637 (ALT 250 FOR IP): Performed by: SURGERY

## 2022-06-10 PROCEDURE — 10030 IMG GID FLU COLL DRG SFT TIS: CPT

## 2022-06-10 PROCEDURE — 87205 SMEAR GRAM STAIN: CPT

## 2022-06-10 PROCEDURE — 2580000003 HC RX 258: Performed by: SURGERY

## 2022-06-10 PROCEDURE — 99153 MOD SED SAME PHYS/QHP EA: CPT

## 2022-06-10 PROCEDURE — 87075 CULTR BACTERIA EXCEPT BLOOD: CPT

## 2022-06-10 PROCEDURE — C9113 INJ PANTOPRAZOLE SODIUM, VIA: HCPCS | Performed by: NURSE PRACTITIONER

## 2022-06-10 PROCEDURE — 0F903ZZ DRAINAGE OF LIVER, PERCUTANEOUS APPROACH: ICD-10-PCS | Performed by: SURGERY

## 2022-06-10 PROCEDURE — 36415 COLL VENOUS BLD VENIPUNCTURE: CPT

## 2022-06-10 PROCEDURE — 2580000003 HC RX 258: Performed by: NURSE PRACTITIONER

## 2022-06-10 PROCEDURE — 99024 POSTOP FOLLOW-UP VISIT: CPT | Performed by: NURSE PRACTITIONER

## 2022-06-10 PROCEDURE — 6360000002 HC RX W HCPCS: Performed by: SURGERY

## 2022-06-10 PROCEDURE — 6360000002 HC RX W HCPCS

## 2022-06-10 PROCEDURE — 85025 COMPLETE CBC W/AUTO DIFF WBC: CPT

## 2022-06-10 PROCEDURE — 87070 CULTURE OTHR SPECIMN AEROBIC: CPT

## 2022-06-10 PROCEDURE — 1200000000 HC SEMI PRIVATE

## 2022-06-10 PROCEDURE — 99152 MOD SED SAME PHYS/QHP 5/>YRS: CPT

## 2022-06-10 RX ORDER — FENTANYL CITRATE 50 UG/ML
INJECTION, SOLUTION INTRAMUSCULAR; INTRAVENOUS
Status: COMPLETED | OUTPATIENT
Start: 2022-06-10 | End: 2022-06-10

## 2022-06-10 RX ORDER — MIDAZOLAM HYDROCHLORIDE 1 MG/ML
INJECTION INTRAMUSCULAR; INTRAVENOUS
Status: COMPLETED | OUTPATIENT
Start: 2022-06-10 | End: 2022-06-10

## 2022-06-10 RX ADMIN — METOPROLOL TARTRATE 25 MG: 25 TABLET, FILM COATED ORAL at 20:41

## 2022-06-10 RX ADMIN — POTASSIUM CHLORIDE: 2 INJECTION, SOLUTION, CONCENTRATE INTRAVENOUS at 22:48

## 2022-06-10 RX ADMIN — PIPERACILLIN AND TAZOBACTAM 3375 MG: 3; .375 INJECTION, POWDER, LYOPHILIZED, FOR SOLUTION INTRAVENOUS at 17:55

## 2022-06-10 RX ADMIN — DIPHENHYDRAMINE HYDROCHLORIDE 25 MG: 50 INJECTION INTRAMUSCULAR; INTRAVENOUS at 22:15

## 2022-06-10 RX ADMIN — TRAZODONE HYDROCHLORIDE 200 MG: 100 TABLET ORAL at 20:41

## 2022-06-10 RX ADMIN — PIPERACILLIN AND TAZOBACTAM 3375 MG: 3; .375 INJECTION, POWDER, LYOPHILIZED, FOR SOLUTION INTRAVENOUS at 10:46

## 2022-06-10 RX ADMIN — VANCOMYCIN HYDROCHLORIDE 1250 MG: 10 INJECTION, POWDER, LYOPHILIZED, FOR SOLUTION INTRAVENOUS at 08:51

## 2022-06-10 RX ADMIN — HYDROMORPHONE HYDROCHLORIDE 1 MG: 1 INJECTION, SOLUTION INTRAMUSCULAR; INTRAVENOUS; SUBCUTANEOUS at 02:58

## 2022-06-10 RX ADMIN — METOPROLOL TARTRATE 25 MG: 25 TABLET, FILM COATED ORAL at 08:42

## 2022-06-10 RX ADMIN — HYDROMORPHONE HYDROCHLORIDE 1 MG: 1 INJECTION, SOLUTION INTRAMUSCULAR; INTRAVENOUS; SUBCUTANEOUS at 16:30

## 2022-06-10 RX ADMIN — HYDROMORPHONE HYDROCHLORIDE 1 MG: 1 INJECTION, SOLUTION INTRAMUSCULAR; INTRAVENOUS; SUBCUTANEOUS at 12:57

## 2022-06-10 RX ADMIN — HYDROMORPHONE HYDROCHLORIDE 1 MG: 1 INJECTION, SOLUTION INTRAMUSCULAR; INTRAVENOUS; SUBCUTANEOUS at 20:17

## 2022-06-10 RX ADMIN — PANTOPRAZOLE SODIUM 40 MG: 40 INJECTION, POWDER, FOR SOLUTION INTRAVENOUS at 08:43

## 2022-06-10 RX ADMIN — HYDROMORPHONE HYDROCHLORIDE 1 MG: 1 INJECTION, SOLUTION INTRAMUSCULAR; INTRAVENOUS; SUBCUTANEOUS at 10:56

## 2022-06-10 RX ADMIN — HYDROMORPHONE HYDROCHLORIDE 1 MG: 1 INJECTION, SOLUTION INTRAMUSCULAR; INTRAVENOUS; SUBCUTANEOUS at 08:43

## 2022-06-10 RX ADMIN — POTASSIUM CHLORIDE: 2 INJECTION, SOLUTION, CONCENTRATE INTRAVENOUS at 06:33

## 2022-06-10 RX ADMIN — HEPARIN SODIUM 5000 UNITS: 5000 INJECTION INTRAVENOUS; SUBCUTANEOUS at 22:10

## 2022-06-10 RX ADMIN — FENTANYL CITRATE 50 MCG: 50 INJECTION, SOLUTION INTRAMUSCULAR; INTRAVENOUS at 12:26

## 2022-06-10 RX ADMIN — HEPARIN SODIUM 5000 UNITS: 5000 INJECTION INTRAVENOUS; SUBCUTANEOUS at 13:30

## 2022-06-10 RX ADMIN — HEPARIN SODIUM 5000 UNITS: 5000 INJECTION INTRAVENOUS; SUBCUTANEOUS at 06:30

## 2022-06-10 RX ADMIN — PIPERACILLIN AND TAZOBACTAM 3375 MG: 3; .375 INJECTION, POWDER, LYOPHILIZED, FOR SOLUTION INTRAVENOUS at 03:06

## 2022-06-10 RX ADMIN — LISINOPRIL 20 MG: 20 TABLET ORAL at 08:42

## 2022-06-10 RX ADMIN — MIDAZOLAM 1 MG: 1 INJECTION INTRAMUSCULAR; INTRAVENOUS at 12:25

## 2022-06-10 ASSESSMENT — PAIN DESCRIPTION - ORIENTATION
ORIENTATION: RIGHT;LEFT;LOWER;UPPER
ORIENTATION: LEFT
ORIENTATION: LOWER
ORIENTATION: LEFT

## 2022-06-10 ASSESSMENT — PAIN - FUNCTIONAL ASSESSMENT
PAIN_FUNCTIONAL_ASSESSMENT: ACTIVITIES ARE NOT PREVENTED
PAIN_FUNCTIONAL_ASSESSMENT: PREVENTS OR INTERFERES SOME ACTIVE ACTIVITIES AND ADLS

## 2022-06-10 ASSESSMENT — PAIN DESCRIPTION - DESCRIPTORS
DESCRIPTORS: ACHING
DESCRIPTORS: ACHING
DESCRIPTORS: SPASM
DESCRIPTORS: SHOOTING;SHARP
DESCRIPTORS: SHARP

## 2022-06-10 ASSESSMENT — PAIN SCALES - GENERAL
PAINLEVEL_OUTOF10: 0
PAINLEVEL_OUTOF10: 8
PAINLEVEL_OUTOF10: 8
PAINLEVEL_OUTOF10: 9
PAINLEVEL_OUTOF10: 8
PAINLEVEL_OUTOF10: 9
PAINLEVEL_OUTOF10: 9

## 2022-06-10 ASSESSMENT — PAIN SCALES - WONG BAKER
WONGBAKER_NUMERICALRESPONSE: 0
WONGBAKER_NUMERICALRESPONSE: 0

## 2022-06-10 ASSESSMENT — PAIN DESCRIPTION - FREQUENCY
FREQUENCY: CONTINUOUS
FREQUENCY: CONTINUOUS

## 2022-06-10 ASSESSMENT — PAIN DESCRIPTION - PAIN TYPE
TYPE: SURGICAL PAIN
TYPE: SURGICAL PAIN

## 2022-06-10 ASSESSMENT — PAIN DESCRIPTION - LOCATION
LOCATION: ABDOMEN

## 2022-06-10 ASSESSMENT — PAIN DESCRIPTION - ONSET
ONSET: ON-GOING
ONSET: ON-GOING

## 2022-06-10 NOTE — PROGRESS NOTES
PCA obtained glucose; FS 52 in right hand, 69 in left hand. Re-checked, glucose 81. Glucose inconsistent. Changed glucometer, glucose re-checked again, FS 81. No interventions at this time. Will reassess glucose at 4 AM.     0313: Blood glucose 88.

## 2022-06-10 NOTE — FLOWSHEET NOTE
Pt called out, c/o pain. PRN 1 mg dilaudid given as prescribed. See MAR. VSS. Will reassess. 06/10/22 0257   Vital Signs   Temp 98 °F (36.7 °C)   Temp Source Oral   Heart Rate 80   Heart Rate Source Monitor   Resp 18   BP (!) 151/91   BP Location Left upper arm   BP Method Automatic   MAP (Calculated) 111   Patient Position Semi fowlers   Level of Consciousness Alert (0)   MEWS Score 1   Pain Assessment   Pain Assessment 0-10   Pain Level 9   Patient's Stated Pain Goal 0 - No pain   Pain Location Abdomen   Pain Orientation Left   Pain Descriptors Shooting; Sharp   Functional Pain Assessment Prevents or interferes some active activities and ADLs   Pain Type Surgical pain   Pain Frequency Continuous   Pain Onset On-going   Non-Pharmaceutical Pain Intervention(s) Cold pack   Opioid-Induced Sedation   POSS Score 1   RASS   Jeff Agitation Sedation Scale (RASS) 0   Oxygen Therapy   SpO2 95 %   O2 Device None (Room air)

## 2022-06-10 NOTE — PLAN OF CARE
Problem: Discharge Planning  Goal: Discharge to home or other facility with appropriate resources  6/10/2022 1136 by Kevin Riley RN  Outcome: Progressing  6/9/2022 2352 by Vito Stein RN  Outcome: Progressing  Flowsheets (Taken 6/9/2022 2035)  Discharge to home or other facility with appropriate resources: Identify barriers to discharge with patient and caregiver     Problem: Pain  Goal: Verbalizes/displays adequate comfort level or baseline comfort level  6/10/2022 1136 by Kevin Riley RN  Outcome: Progressing  6/9/2022 2352 by Vito Stein RN  Outcome: Progressing     Problem: Safety - Adult  Goal: Free from fall injury  6/10/2022 1136 by Kevin Riley RN  Outcome: Progressing  Flowsheets (Taken 6/10/2022 1134)  Free From Fall Injury:   Instruct family/caregiver on patient safety   Based on caregiver fall risk screen, instruct family/caregiver to ask for assistance with transferring infant if caregiver noted to have fall risk factors  6/9/2022 2352 by Vito Stein RN  Outcome: Progressing  [address] (Taken 6/9/2022 1207 by Jonathan Her RN)  Free From Fall Injury: Instruct family/caregiver on patient safety     Problem: ABCDS Injury Assessment  Goal: Absence of physical injury  6/10/2022 1136 by Kevin Riley RN  Outcome: Progressing  Flowsheets  Taken 6/10/2022 1134 by Kevin Riley RN  Absence of Physical Injury: Implement safety measures based on patient assessment  Taken 6/9/2022 2353 by Vito Stein RN  Absence of Physical Injury: Implement safety measures based on patient assessment  6/9/2022 2352 by Vito Stein RN  Outcome: Progressing  Flowsheets (Taken 6/9/2022 1207 by Jonathan Her RN)  Absence of Physical Injury: Implement safety measures based on patient assessment     Problem: Neurosensory - Adult  Goal: Achieves stable or improved neurological status  6/10/2022 1136 by Kevin Riley RN  Outcome: Progressing  6/9/2022 2352 by [facility] Leonardo Parks RN  Outcome: Progressing  Flowsheets (Taken 6/9/2022 2035)  Achieves stable or improved neurological status: Assess for and report changes in neurological status     Problem: Respiratory - Adult  Goal: Achieves optimal ventilation and oxygenation  6/10/2022 1136 by Tomas Kumar RN  Outcome: Progressing  6/9/2022 2352 by Rex Childs RN  Outcome: Progressing  Flowsheets (Taken 6/9/2022 2035)  Achieves optimal ventilation and oxygenation: Assess for changes in respiratory status     Problem: Cardiovascular - Adult  Goal: Maintains optimal cardiac output and hemodynamic stability  6/10/2022 1136 by Tomas Kumar RN  Outcome: Progressing  6/9/2022 2352 by Rex Childs RN  Outcome: Progressing  Flowsheets (Taken 6/9/2022 2035)  Maintains optimal cardiac output and hemodynamic stability:   Monitor blood pressure and heart rate   Monitor urine output and notify Licensed Independent Practitioner for values outside of normal range   Assess for signs of decreased cardiac output     Problem: Skin/Tissue Integrity - Adult  Goal: Skin integrity remains intact  6/10/2022 1136 by Tomas Kumar RN  Outcome: Progressing  Flowsheets (Taken 6/10/2022 1135)  Skin Integrity Remains Intact:   Monitor for areas of redness and/or skin breakdown   Assess vascular access sites hourly   Every 4-6 hours minimum: Change oxygen saturation probe site   Every 4-6 hours: If on nasal continuous positive airway pressure, respiratory therapy assesses nares and determine need for appliance change or resting period  6/9/2022 2352 by Rex Childs RN  Outcome: Progressing  Flowsheets  Taken 6/9/2022 2035 by Rex Childs RN  Skin Integrity Remains Intact: Monitor for areas of redness and/or skin breakdown  Taken 6/9/2022 1208 by Adrianna Grant RN  Skin Integrity Remains Intact: Monitor for areas of redness and/or skin breakdown     Problem: Musculoskeletal - Adult  Goal: Return mobility to safest level of function  6/10/2022 1136 by Josiane Pappas RN  Outcome: Progressing  6/9/2022 2352 by Gurdeep Cuevas RN  Outcome: Progressing  Flowsheets (Taken 6/9/2022 2035)  Return Mobility to Safest Level of Function:   Assess patient stability and activity tolerance for standing, transferring and ambulating with or without assistive devices   Assist with transfers and ambulation using safe patient handling equipment as needed     Problem: Gastrointestinal - Adult  Goal: Minimal or absence of nausea and vomiting  6/10/2022 1136 by Josiane Pappas RN  Outcome: Progressing  6/9/2022 2352 by Gurdeep Cuevas RN  Outcome: Progressing  Flowsheets (Taken 6/9/2022 2035)  Minimal or absence of nausea and vomiting: Maintain NPO status until nausea and vomiting are resolved  Goal: Maintains or returns to baseline bowel function  6/10/2022 1136 by Josiane Pappas RN  Outcome: Progressing  6/9/2022 2352 by Gurdeep Cuevas RN  Outcome: Progressing  Flowsheets (Taken 6/9/2022 2035)  Maintains or returns to baseline bowel function: Assess bowel function     Problem: Genitourinary - Adult  Goal: Absence of urinary retention  6/10/2022 1136 by Josiane Pappas RN  Outcome: Progressing  6/9/2022 2352 by Gurdeep Cuevas RN  Outcome: Progressing  Flowsheets (Taken 6/9/2022 2035)  Absence of urinary retention: Assess patients ability to void and empty bladder     Problem: Infection - Adult  Goal: Absence of infection at discharge  6/10/2022 1136 by Josiane Pappas RN  Outcome: Progressing  6/9/2022 2352 by Gurdeep Cuevas RN  Outcome: Progressing  Flowsheets (Taken 6/9/2022 2035)  Absence of infection at discharge: Assess and monitor for signs and symptoms of infection     Problem: Skin/Tissue Integrity  Goal: Absence of new skin breakdown  6/10/2022 1136 by Josiane Pappas RN  Outcome: Progressing  6/9/2022 2352 by Gurdeep Cuevas RN  Outcome: Progressing     Problem: Nutrition Deficit:  Goal: Optimize nutritional status  6/10/2022 1136 by Jamal Phelps RN  Outcome: Progressing  6/9/2022 2352 by Lor Waters RN  Outcome: Progressing

## 2022-06-10 NOTE — PROGRESS NOTES
Occupational Therapy Attempt Note    Attempted occupational therapy treatment, however patient declined therapy at this time. Will attempt again as patient is able and schedule allows.      13 [facility], OTR/L 0166

## 2022-06-10 NOTE — PROGRESS NOTES
Patient sitting up in chair without complains of . NG to LWS. Call bell and bedside table within reach. Will continue to monitor.

## 2022-06-10 NOTE — CONSULTS
Pharmacy Note  Vancomycin Consult    Michaelle Baker is a 63 y.o. male started on Vancomycin for Pneumonia (HAP) x 7 days of therapy; consult received from  [facility] [place] [place] to manage therapy. Also receiving the following antibiotics: Zosyn IV. Allergies:  Patient has no known allergies. Tmax: 98.4    Recent Labs     06/08/22  0831 06/09/22  0916   CREATININE <0.5* <0.5*       Recent Labs     06/07/22  0545 06/08/22  0831   WBC 6.8 7.0       Estimated Creatinine Clearance: 189 mL/min (based on SCr of 0.5 mg/dL). Intake/Output Summary (Last 24 hours) at 6/9/2022 2044  Last data filed at 6/9/2022 1700  Gross per 24 hour   Intake 6333.87 ml   Output 3325 ml   Net 3008.87 ml       Wt Readings from Last 1 Encounters:   06/02/22 222 lb (100.7 kg)         Body mass index is 31.85 kg/m². Assessment/Plan:  Will initiate Vancomycin 1250 mg IV every 12 hours. Predicted . Vancomycin trough ordered for 6/10/22 at 1900. Pharmacy will continue to monitor and adjust as necessary. Thank you for the consult.
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
## 2022-06-10 NOTE — FLOWSHEET NOTE
06/10/22 0830   Vital Signs   Temp 97.4 °F (36.3 °C)   Temp Source Oral   Heart Rate 98   Heart Rate Source Monitor   Resp 20   BP (!) 160/96   BP Location Left upper arm   MAP (Calculated) 117.33   Patient Position Semi fowlers   Level of Consciousness Alert (0)   MEWS Score 1   Pain Assessment   Pain Assessment 0-10   Pain Level 9   Pain Location Abdomen   Pain Orientation Left   Pain Descriptors Sharp   Functional Pain Assessment Prevents or interferes some active activities and ADLs   Pain Type Surgical pain   Pain Frequency Continuous   Pain Onset On-going   Non-Pharmaceutical Pain Intervention(s) Cold pack   Response to Pain Intervention None (pain unchanged or no improvement)   Opioid-Induced Sedation   POSS Score 1   Oxygen Therapy   SpO2 97 %   O2 Device None (Room air)     Increased BP, BP medications given as ordered. NG to LWS with 200 ml of dark green colored fluid in bag. Abdominal incision well approximated, skin edges red, warm. Chlorhexidine bath given. Passing Flatus. Patient without any complains of a this time. Bed in low position, call bell and bedside table within reach. Will continue to monitor.
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
## 2022-06-10 NOTE — FLOWSHEET NOTE
06/10/22 0159   Handoff   Communication Given Shift Handoff   Handoff Given To Toan Blanca. Handoff Received From Chencho ALMEIDA Handoff Communication Face to Face; At bedside   Time Handoff Given 3249   End of Shift Check Performed Yes

## 2022-06-10 NOTE — PROGRESS NOTES
Vancomycin Day: 2  Current Regimen: 1250 mg IV every 12 hours    Patient's labs, cultures, vitals, and vancomycin regimen reviewed.  No changes today level due @ 1900

## 2022-06-10 NOTE — BRIEF OP NOTE
Brief Postoperative Note    Aurea Chicas  YOB: 1960  [id number]    Pre-operative Diagnosis: anterior abdominal abscess    Post-operative Diagnosis: Same    Procedure: image guided drain placement    Anesthesia: Local and Moderate Sedation    Surgeons/Assistants: Roslyn Gracia MD    Estimated Blood Loss: less than 5    Complications: None    Specimens: Was Obtained: 15cc    Findings: successful placement of 10F drain into collection.     Electronically signed by Roslyn Gracia MD on 6/10/2022 at 12:43 PM

## 2022-06-10 NOTE — PROGRESS NOTES
IM Progress Note    Admit Date:  6/2/2022    Admitted with Pneumoperitoneum with SB perforation    s/p SB resection and lysis of adhesions    Post-operative Ileus    medicine consulted for co management     Subjective:  Mr. Felipe Venegas  Is back from IR .   abd drain in place     Objective:   Patient Vitals for the past 4 hrs:   BP Temp Temp src Pulse Resp SpO2   06/10/22 1300 (!) 147/90 97.3 °F (36.3 °C) Oral 72 18 97 %   06/10/22 1235 (!) 156/95 -- -- 70 -- 97 %   06/10/22 1229 (!) 146/84 -- -- 69 -- 99 %   06/10/22 1226 (!) 160/94 -- -- 71 -- 98 %   06/10/22 1219 -- -- -- -- -- 98 %   06/10/22 1216 (!) 168/89 -- -- 71 -- 97 %   06/10/22 1126 -- -- -- -- 18 --   06/10/22 1114 (!) 149/93 97.9 °F (36.6 °C) Oral 67 18 95 %   06/10/22 1045 (!) 158/88 97.8 °F (36.6 °C) Oral 88 18 97 %            Intake/Output Summary (Last 24 hours) at 6/10/2022 1438  Last data filed at 6/10/2022 1314  Gross per 24 hour   Intake 2613.03 ml   Output 3524 ml   Net -910.97 ml       Physical Exam:    Gen: No distress. Alert. Appears ill   + NG   Eyes: PERRL. No sclera icterus. No conjunctival injection. Neck: No JVD. No Carotid Bruit. Trachea midline. Resp: No accessory muscle use. No crackles. No wheezes. No rhonchi. + rales throughout lungs bilaterally   CV: Regular rate. Regular rhythm. No murmur. No rub. No edema. Peripheral Pulses: +2 palpable, equal bilaterally   GI:  tender. drain in place   +  bowel sounds. Skin: Warm and dry. No nodule on exposed extremities. No rash on exposed extremities. M/S: No cyanosis. No joint deformity. No clubbing. Neuro: Awake. Grossly nonfocal    Psych: Oriented x 3.  No anxiety or agitation.        Scheduled Meds:   vancomycin  1,250 mg IntraVENous Q12H    pantoprazole  40 mg IntraVENous Daily    heparin (porcine)  5,000 Units SubCUTAneous 3 times per day    lisinopril  20 mg Oral Daily    metoprolol tartrate  25 mg Oral BID    traZODone  200 mg Oral Nightly    piperacillin-tazobactam 3,375 mg IntraVENous Q8H       Continuous Infusions:   IV infusion builder 75 mL/hr at 06/10/22 6552    bupivacaine 0.5% 270 mL (06/02/22 2039)       PRN Meds:  hydrALAZINE, labetalol, promethazine, ondansetron, HYDROmorphone **OR** HYDROmorphone, diphenhydrAMINE, acetaminophen, phenol      Data:  CBC:   Recent Labs     06/08/22  0831 06/10/22  0500   WBC 7.0 7.9   HGB 10.9* 11.3*   HCT 32.7* 35.2*   MCV 90.3 95.3    255     BMP:   Recent Labs     06/08/22  0831 06/09/22  0916 06/10/22  0500    137 138   K 3.4* 3.4* 3.7    101 102   CO2 24 24 20*   PHOS 3.5 4.0  --    BUN 8 6* 5*   CREATININE <0.5* <0.5* <0.5*     LIVER PROFILE: No results for input(s): AST, ALT, LIPASE, BILIDIR, BILITOT, ALKPHOS in the last 72 hours. Invalid input(s): AMYLASE,  ALB  PT/INR: No results for input(s): PROTIME, INR in the last 72 hours. CULTURES  Blood cx x2: NGTD  Cultures: pending   COVID/Influenza negative      RADIOLOGY  IR ABSCESS DRAINAGE PERC   Final Result   Successful ultrasound-guided placement of 10 [place] drainage catheter into   anterior abdominal fluid collection. XR CHEST PORTABLE   Final Result   Mild left basilar airspace disease. This could represent atelectasis or in   the appropriate clinical setting pneumonia. CT ABDOMEN PELVIS W IV CONTRAST Additional Contrast? None   Final Result   There are 2 rim enhancing fluid collections with surrounding edema to the   liver, one to the right lobe and one to the left lobe. These both measure   smaller than on prior exam 06/02/2022 although only the right lobe collection   has a drainage catheter in place. Interval surgical intervention with partial small-bowel resection. There are   some persistent distended air-filled loops of small bowel in the abdomen with   overall decreased extent of involvement and decreased distension from prior   exam compatible with improving small-bowel obstruction.       New rim enhancing fluid collection intraperitoneal to the abdomen anteriorly   measuring grossly 12.0 x 24.7 x 4.0 cm compatible with abscess. Gas internal   to this dominant collection may relate to the infection itself or could   relate to the recent surgical intervention. There are multiple additional   smaller similar fluid collections which could reflect additional loculations   of this dominant collection or could reflect separate abscesses with the two   largest described as above. Shotty mesenteric and retroperitoneal lymph nodes are likely reactive. New foci of consolidation to bilateral lower lobes, left more than right,   more likely on the basis of atelectasis although pneumonia or aspiration   pneumonitis could have a similar appearance in the appropriate clinical   setting. New trace bilateral pleural effusions. Diffuse urinary bladder wall thickening does not appear appreciably changed   given differences in degree of distention of the urinary bladder as compared   to the prior CT exam.  Findings may relate at least in part to the   underdistention, but nonspecific superimposed cystitis is not excluded. Clinical and laboratory correlation can be made. XR ABDOMEN (2 VIEWS)   Final Result   Moderate small bowel distention in the left mid abdomen with transition   distally remains concerning for obstruction. No free air. CT DRAINAGE VISCERAL PERCUTANEOUS   Final Result   1. Successful placement of a 12 [place] drainage catheter within a right   hepatic abscess. 2. Despite optimum positioning of catheter, no fluid could be aspirated from   a left hepatic abscess that was seen on the prior CT. Obvious fluid cannot   be seen on the current noncontrast series, and some interval improvement may   account for this finding. A drainage catheter was not placed in the left   hepatic abscess. CT GUIDED NEEDLE PLACEMENT   Final Result   1.  Successful placement of a 12 [place] drainage catheter within a right   hepatic abscess. 2. Despite optimum positioning of catheter, no fluid could be aspirated from   a left hepatic abscess that was seen on the prior CT. Obvious fluid cannot   be seen on the current noncontrast series, and some interval improvement may   account for this finding. A drainage catheter was not placed in the left   hepatic abscess. CT ABDOMEN PELVIS W IV CONTRAST Additional Contrast? None   Final Result   Moderate pneumoperitoneum in the left upper quadrant, likely small bowel   perforation. There is an abnormal segment of ileum in the right lower   quadrant, similar to 05/22/2022, inflammatory bowel disease versus infectious   enteritis. There is upstream moderate dilation of the mid small bowel with   possible pneumatosis. Closed loop obstruction is a consideration. Small amount of free fluid. Bilobar hepatic abscesses are persistent, with decreasing surrounding edema. Critical results were called by Dr. Cristal Carson MD to [facility]   on 6/2/2022 at 16:43. Small amount of free fluid. RECOMMENDATIONS:   Unavailable         XR CHEST PORTABLE   Final Result   Clear lungs. Assessment/Plan:    #Hypertension, uncontrolled  -on lopressor and lisinopril, continue   -added prn IV hydralazine for SBP >160  -continue to monitor     #Possible PNA ?  Aspiration pneumonitis   #Rales  -pt is asymptomatic   -CXR and CT as above    - already on Zosyn, added Vancomycin on 6/10    #Pleural Effusions  - noted on CT - trace     #Bladder distention  - check urinalysis      #Pneumoperitoneum with SB perforation   -POD #8, s/p SB resection and lysis of adhesions   -IV zosyn   - new fluid collection noted on CT 6/10--> to IR -> drain placed   -general surgery following     #Post-operative Ileus   -GI and general surgery following   -NGT decompression      #Liver abscesses  #Transaminitis   -on IV abx   -continue percutaneous drainage of abscess  -GI following            DVT Prophylaxis: Lovenox   Diet: Diet NPO Exceptions are: Sips of Water with Meds, Ice Chips  Code Status: Full Code       Lorraine Alfonso MD

## 2022-06-10 NOTE — PROGRESS NOTES
General Surgery - Valeria Young, APRN - CNP, CNP  Daily Progress Note    Pt Name: Jennifer Hubbard [place] Record Number: [id number]  Date of Birth 1960   Today's Date: 6/10/2022    ASSESSMENT  1. POD # 8 s/p SBR RAMESH for SB perf 2/2 toothpick ingestion  2. S/P perc drain placement in hepatic abscess on 6/3   3. Repeat CT 6/9 to evaluate hepatic abscess: intraperitoneal to the anterior abdomen with new abscess 24.7 x 4 x 12; central pelvis 5.4 x 6.7 cm, right pericolic gutter 3.8 x 2.8 cm  4. GI: +mild distention, staples look good and left open to air, NGT no N/V, + flatus (lots per pt), + 1 small BM  5. VSS  6. Leuks 12.6->6.8->7  7. Mg 1.5  8. K+ 3.4->3.7  9. NGT: 1.4L  10. UO good. 11. Per drain: small mount of brown drainage noted  12. ETOH abuse hx      PLAN  1. Zosyn/Vanc  2. IS q 1 hour while awake  3. NGT to CLWS  4. Appreciate hospitalist consultation  5. DVT proph: heparin sub q  6. Pecid unavailable: Protonix IVP  7. Pain control   8. OOB/Ambulate  9. PT/OT: ambulate pt  10. Pt is POD #6 s/p SBR, RAMESH with post op ileus: Discussed with Dr. Estella Whelan and IR; pt is agreeable to perc drain, send culture. Await return of bowel function. Will need repeat CT scan in a few days to reassess intraabdominal fluid collections. Mod[place] Sanfordr has improved and worsened in some ways from yesterday. Pain appears well controlled. He has no N/V. He has passed flatus and has had a small a bowel movement. He is NPO. Current activity is up with assistance    OBJECTIVE  VITALS:  height is 5' 10\" (1.778 m) and weight is 222 lb (100.7 kg). His oral temperature is 97.3 °F (36.3 °C). His blood pressure is 147/90 (abnormal) and his pulse is 72. His respiration is 18 and oxygen saturation is 97%.    VITALS:  BP (!) 147/90   Pulse 72   Temp 97.3 °F (36.3 °C) (Oral)   Resp 18   Ht 5' 10\" (1.778 m)   Wt 222 lb (100.7 kg)   SpO2 97%   BMI 31.85 kg/m²   INTAKE/OUTPUT:      Intake/Output Summary (Last 24 hours) at 6/10/2022 1323  Last data filed at 6/10/2022 1314  Gross per 24 hour   Intake 2613.03 ml   Output 3524 ml   Net -910.97 ml     GENERAL: alert, cooperative, no distress    I/O last 3 completed shifts: In: 7865.4 [I.V.:6494.4; IV [id number]]  Out: 7402 [Urine:3075; Emesis/NG ZSZZXM:9051]  I/O this shift:  In: 1081.5 [I.V.:831.5; IV Piggyback:250]  Out: 1202 [Urine:700; Emesis/NG output:350; Drains:24]    LABS  Recent Labs     22  0922  0916 06/10/22  0500   WBC 7.0  --   --   --   --    HGB 10.9*  --   --   --   --    HCT 32.7*  --   --   --   --      --   --   --   --       < > 137   < > 138   K 3.4*   < > 3.4*  --  3.7      < > 101   < > 102   CO2 24   < > 24   < > 20*   BUN 8   < > 6*   < > 5*   CREATININE <0.5*   < > <0.5*   < > <0.5*   MG 1.50*   < > 1.50*  --   --    PHOS 3.5   < > 4.0  --   --    CALCIUM 8.2*   < > 8.5   < > 8.2*    < > = values in this interval not displayed.      CBC with Differential:    Lab Results   Component Value Date    WBC 7.0 2022    RBC 3.62 2022    HGB 10.9 2022    HCT 32.7 2022     2022    MCV 90.3 2022    MCH 30.1 2022    MCHC 33.4 2022    RDW 15.0 2022    LYMPHOPCT 17.2 2022    MONOPCT 8.7 2022    BASOPCT 0.6 2022    MONOSABS 0.6 2022    LYMPHSABS 1.2 2022    EOSABS 0.1 2022    BASOSABS 0.0 2022     CMP:    Lab Results   Component Value Date     06/10/2022    K 3.7 06/10/2022     06/10/2022    CO2 20 06/10/2022    BUN 5 06/10/2022    CREATININE <0.5 06/10/2022    GFRAA >60 06/10/2022    AGRATIO 0.7 2022    LABGLOM >60 06/10/2022    GLUCOSE 93 06/10/2022    PROT 8.0 2022    LABALBU 3.4 2022    CALCIUM 8.2 06/10/2022    BILITOT 0.5 2022    ALKPHOS 107 2022    AST 19 2022    ALT 20 2022         Valeria Maurer Stands, APRN - CNP  Electronically signed 6/10/2022 at 1:23 PM

## 2022-06-10 NOTE — FLOWSHEET NOTE
06/10/22 1300   Vital Signs   Temp 97.3 °F (36.3 °C)   Temp Source Oral   Heart Rate 72   Heart Rate Source Monitor   Resp 18   BP (!) 147/90   MAP (Calculated) 109   Level of Consciousness Alert (0)   MEWS Score 1   Pain Assessment   Pain Assessment None - Denies Pain   Opioid-Induced Sedation   POSS Score 1   Oxygen Therapy   SpO2 97 %   O2 Device None (Room air)     Patient returned to room. denies any complains of. NG to LWS. Post op abdominal drain drsg CDI drng bulb compressed with small amount of bloody drng in bulb. PIERRE drain patent bulb compressed with small amount of bloody drng in bulb. Staples remain intact midline incision, skin edges well approximated skin edges are red no active drng at site. VSS. Will continue to monitor. Bed in low position. Call bell and bedside table within reach.

## 2022-06-10 NOTE — FLOWSHEET NOTE
Pt A/Ox4. VSS. Surgical pain to abd 9/10, see assessment below. Ronny N/V, passing flatus. Pt unlabored; respirations even, regular, effortless. RA. No distress noted. Shift assessment complete. See flowsheet. PM meds given. PRN dilaudid 1 mg given for pain. See MAR. NGT clamped at this time d/t PO medication administration, will connect to CLWS in 1 hour. Denies further needs at this time. Side rails 2/4. Bed in low position. Telemetry in place. Call light within reach.         06/09/22 2034   Vital Signs   Temp 98.1 °F (36.7 °C)   Temp Source Oral   Heart Rate 77   Heart Rate Source Monitor   Resp 16   BP (!) 151/89   BP Location Left upper arm   MAP (Calculated) 109.67   Patient Position Semi fowlers   Level of Consciousness Alert (0)   MEWS Score 1   Pain Assessment   Pain Assessment 0-10   Pain Level 9   Patient's Stated Pain Goal 0 - No pain   Pain Location Abdomen   Pain Orientation Left   Pain Descriptors Sharp; Shooting   Functional Pain Assessment Prevents or interferes some active activities and ADLs   Pain Type Acute pain   Pain Frequency Continuous   Pain Onset On-going   Non-Pharmaceutical Pain Intervention(s) Rest   Opioid-Induced Sedation   POSS Score 1   RASS   Jeff Agitation Sedation Scale (RASS) 0   Oxygen Therapy   SpO2 96 %   O2 Device None (Room air)

## 2022-06-10 NOTE — PLAN OF CARE
Problem: Discharge Planning  Goal: Discharge to home or other facility with appropriate resources  Outcome: Progressing  Flowsheets (Taken 6/9/2022 2035)  Discharge to home or other facility with appropriate resources: Identify barriers to discharge with patient and caregiver     Problem: Pain  Goal: Verbalizes/displays adequate comfort level or baseline comfort level  Outcome: Progressing     Problem: Safety - Adult  Goal: Free from fall injury  Outcome: Progressing  Flowsheets (Taken 6/9/2022 1207 by Raven Hernadnes RN)  Free From Fall Injury: Instruct family/caregiver on patient safety     Problem: ABCDS Injury Assessment  Goal: Absence of physical injury  Outcome: Progressing  Flowsheets (Taken 6/9/2022 1207 by Raven Hernandes RN)  Absence of Physical Injury: Implement safety measures based on patient assessment     Problem: Neurosensory - Adult  Goal: Achieves stable or improved neurological status  Outcome: Progressing     Problem: Respiratory - Adult  Goal: Achieves optimal ventilation and oxygenation  6/9/2022 2352 by Marcie Elizondo RN  Outcome: Progressing  6/9/2022 1206 by Raven Hernandes RN  Outcome: Progressing     Problem: Cardiovascular - Adult  Goal: Maintains optimal cardiac output and hemodynamic stability  6/9/2022 2352 by Marcie Elizondo RN  Outcome: Progressing  6/9/2022 1206 by Raven Hernandes RN  Outcome: Progressing  Flowsheets (Taken 6/9/2022 0925)  Maintains optimal cardiac output and hemodynamic stability: Monitor blood pressure and heart rate     Problem: Skin/Tissue Integrity - Adult  Goal: Skin integrity remains intact  Outcome: Progressing  Flowsheets  Taken 6/9/2022 2035 by Marcie Elizondo RN  Skin Integrity Remains Intact: Monitor for areas of redness and/or skin breakdown  Taken 6/9/2022 1208 by Raven Hernandes RN  Skin Integrity Remains Intact: Monitor for areas of redness and/or skin breakdown     Problem: Musculoskeletal - Adult  Goal: Return mobility to safest level of function  Outcome: Progressing  Flowsheets (Taken 6/9/2022 2035)  Return Mobility to Safest Level of Function:   Assess patient stability and activity tolerance for standing, transferring and ambulating with or without assistive devices   Assist with transfers and ambulation using safe patient handling equipment as needed     Problem: Gastrointestinal - Adult  Goal: Minimal or absence of nausea and vomiting  Outcome: Progressing  Flowsheets (Taken 6/9/2022 2035)  Minimal or absence of nausea and vomiting: Maintain NPO status until nausea and vomiting are resolved  Goal: Maintains or returns to baseline bowel function  Outcome: Progressing  Flowsheets (Taken 6/9/2022 2035)  Maintains or returns to baseline bowel function: Assess bowel function     Problem: Genitourinary - Adult  Goal: Absence of urinary retention  Outcome: Progressing  Flowsheets (Taken 6/9/2022 2035)  Absence of urinary retention: Assess patients ability to void and empty bladder     Problem: Infection - Adult  Goal: Absence of infection at discharge  Outcome: Progressing  Flowsheets (Taken 6/9/2022 2035)  Absence of infection at discharge: Assess and monitor for signs and symptoms of infection     Problem: Skin/Tissue Integrity  Goal: Absence of new skin breakdown  Description: 1. Monitor for areas of redness and/or skin breakdown  2. Assess vascular access sites hourly  3. Every 4-6 hours minimum:  Change oxygen saturation probe site  4. Every 4-6 hours:  If on nasal continuous positive airway pressure, respiratory therapy assess nares and determine need for appliance change or resting period.   6/9/2022 2352 by Marjorie Guzman RN  Outcome: Progressing  6/9/2022 1206 by Naty Zamora RN  Outcome: Progressing     Problem: Nutrition Deficit:  Goal: Optimize nutritional status  Outcome: Progressing
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
## 2022-06-11 LAB
ANION GAP SERPL CALCULATED.3IONS-SCNC: 15 MMOL/L (ref 3–16)
BUN BLDV-MCNC: 5 MG/DL (ref 7–20)
CALCIUM SERPL-MCNC: 8.5 MG/DL (ref 8.3–10.6)
CHLORIDE BLD-SCNC: 101 MMOL/L (ref 99–110)
CO2: 22 MMOL/L (ref 21–32)
CREAT SERPL-MCNC: <0.5 MG/DL (ref 0.9–1.3)
GFR AFRICAN AMERICAN: >60
GFR NON-AFRICAN AMERICAN: >60
GLUCOSE BLD-MCNC: 76 MG/DL (ref 70–99)
GLUCOSE BLD-MCNC: 77 MG/DL (ref 70–99)
GLUCOSE BLD-MCNC: 79 MG/DL (ref 70–99)
GLUCOSE BLD-MCNC: 85 MG/DL (ref 70–99)
GLUCOSE BLD-MCNC: 89 MG/DL (ref 70–99)
PERFORMED ON: NORMAL
POTASSIUM REFLEX MAGNESIUM: 3.7 MMOL/L (ref 3.5–5.1)
SODIUM BLD-SCNC: 138 MMOL/L (ref 136–145)

## 2022-06-11 PROCEDURE — 6370000000 HC RX 637 (ALT 250 FOR IP): Performed by: SURGERY

## 2022-06-11 PROCEDURE — 6360000002 HC RX W HCPCS: Performed by: NURSE PRACTITIONER

## 2022-06-11 PROCEDURE — 1200000000 HC SEMI PRIVATE

## 2022-06-11 PROCEDURE — 99024 POSTOP FOLLOW-UP VISIT: CPT | Performed by: SURGERY

## 2022-06-11 PROCEDURE — C9113 INJ PANTOPRAZOLE SODIUM, VIA: HCPCS | Performed by: NURSE PRACTITIONER

## 2022-06-11 PROCEDURE — 36415 COLL VENOUS BLD VENIPUNCTURE: CPT

## 2022-06-11 PROCEDURE — 97116 GAIT TRAINING THERAPY: CPT

## 2022-06-11 PROCEDURE — 2580000003 HC RX 258: Performed by: SURGERY

## 2022-06-11 PROCEDURE — 99232 SBSQ HOSP IP/OBS MODERATE 35: CPT | Performed by: INTERNAL MEDICINE

## 2022-06-11 PROCEDURE — 6360000002 HC RX W HCPCS: Performed by: SURGERY

## 2022-06-11 PROCEDURE — 80048 BASIC METABOLIC PNL TOTAL CA: CPT

## 2022-06-11 RX ORDER — SODIUM CHLORIDE AND POTASSIUM CHLORIDE .9; .15 G/100ML; G/100ML
SOLUTION INTRAVENOUS CONTINUOUS
Status: DISCONTINUED | OUTPATIENT
Start: 2022-06-11 | End: 2022-06-15

## 2022-06-11 RX ADMIN — HYDROMORPHONE HYDROCHLORIDE 0.5 MG: 1 INJECTION, SOLUTION INTRAMUSCULAR; INTRAVENOUS; SUBCUTANEOUS at 19:01

## 2022-06-11 RX ADMIN — HYDROMORPHONE HYDROCHLORIDE 1 MG: 1 INJECTION, SOLUTION INTRAMUSCULAR; INTRAVENOUS; SUBCUTANEOUS at 22:02

## 2022-06-11 RX ADMIN — METOPROLOL TARTRATE 25 MG: 25 TABLET, FILM COATED ORAL at 10:26

## 2022-06-11 RX ADMIN — HEPARIN SODIUM 5000 UNITS: 5000 INJECTION INTRAVENOUS; SUBCUTANEOUS at 21:54

## 2022-06-11 RX ADMIN — HEPARIN SODIUM 5000 UNITS: 5000 INJECTION INTRAVENOUS; SUBCUTANEOUS at 14:02

## 2022-06-11 RX ADMIN — HYDROMORPHONE HYDROCHLORIDE 1 MG: 1 INJECTION, SOLUTION INTRAMUSCULAR; INTRAVENOUS; SUBCUTANEOUS at 06:02

## 2022-06-11 RX ADMIN — HEPARIN SODIUM 5000 UNITS: 5000 INJECTION INTRAVENOUS; SUBCUTANEOUS at 05:59

## 2022-06-11 RX ADMIN — METOPROLOL TARTRATE 25 MG: 25 TABLET, FILM COATED ORAL at 21:18

## 2022-06-11 RX ADMIN — PIPERACILLIN AND TAZOBACTAM 3375 MG: 3; .375 INJECTION, POWDER, LYOPHILIZED, FOR SOLUTION INTRAVENOUS at 14:09

## 2022-06-11 RX ADMIN — PIPERACILLIN AND TAZOBACTAM 3375 MG: 3; .375 INJECTION, POWDER, LYOPHILIZED, FOR SOLUTION INTRAVENOUS at 21:22

## 2022-06-11 RX ADMIN — PANTOPRAZOLE SODIUM 40 MG: 40 INJECTION, POWDER, FOR SOLUTION INTRAVENOUS at 10:26

## 2022-06-11 RX ADMIN — PIPERACILLIN AND TAZOBACTAM 3375 MG: 3; .375 INJECTION, POWDER, LYOPHILIZED, FOR SOLUTION INTRAVENOUS at 05:13

## 2022-06-11 RX ADMIN — Medication 1 SPRAY: at 05:07

## 2022-06-11 RX ADMIN — HYDROMORPHONE HYDROCHLORIDE 1 MG: 1 INJECTION, SOLUTION INTRAMUSCULAR; INTRAVENOUS; SUBCUTANEOUS at 10:26

## 2022-06-11 RX ADMIN — HYDROMORPHONE HYDROCHLORIDE 1 MG: 1 INJECTION, SOLUTION INTRAMUSCULAR; INTRAVENOUS; SUBCUTANEOUS at 00:22

## 2022-06-11 RX ADMIN — HYDROMORPHONE HYDROCHLORIDE 0.5 MG: 1 INJECTION, SOLUTION INTRAMUSCULAR; INTRAVENOUS; SUBCUTANEOUS at 15:00

## 2022-06-11 RX ADMIN — TRAZODONE HYDROCHLORIDE 200 MG: 100 TABLET ORAL at 21:18

## 2022-06-11 RX ADMIN — LISINOPRIL 20 MG: 20 TABLET ORAL at 10:26

## 2022-06-11 RX ADMIN — POTASSIUM CHLORIDE AND SODIUM CHLORIDE: 900; 150 INJECTION, SOLUTION INTRAVENOUS at 16:50

## 2022-06-11 ASSESSMENT — PAIN SCALES - GENERAL
PAINLEVEL_OUTOF10: 10
PAINLEVEL_OUTOF10: 5
PAINLEVEL_OUTOF10: 7
PAINLEVEL_OUTOF10: 5
PAINLEVEL_OUTOF10: 8
PAINLEVEL_OUTOF10: 4
PAINLEVEL_OUTOF10: 8

## 2022-06-11 ASSESSMENT — PAIN DESCRIPTION - DESCRIPTORS
DESCRIPTORS: SHARP;SHOOTING
DESCRIPTORS: SHARP
DESCRIPTORS: ACHING

## 2022-06-11 ASSESSMENT — PAIN DESCRIPTION - PAIN TYPE: TYPE: ACUTE PAIN

## 2022-06-11 ASSESSMENT — PAIN DESCRIPTION - LOCATION
LOCATION: ABDOMEN
LOCATION: THROAT
LOCATION: ABDOMEN

## 2022-06-11 NOTE — FLOWSHEET NOTE
06/11/22 1939   Handoff   Communication Given Shift Handoff   Handoff Given To [facility] Received From [facility] Communication Face to Face; At bedside   Time Handoff Given 1939   End of Shift Check Performed Yes

## 2022-06-11 NOTE — PROGRESS NOTES
IM Progress Note    Admit Date:  6/2/2022    Admitted with Pneumoperitoneum with SB perforation    s/p SB resection and lysis of adhesions    Post-operative Ileus    medicine consulted for co management   6/10-> s/p abdominal drain placement in IR    Subjective:  Mr. Jordan Fenton is up in chair today. NG has been clamped, started on clear liquids by mouth and is tolerating it. Blood pressure still elevated. abd drain in place     Objective:   Patient Vitals for the past 4 hrs:   BP Temp Temp src Pulse Resp SpO2   06/11/22 1129 (!) 146/89 98.1 °F (36.7 °C) Oral 77 16 96 %            Intake/Output Summary (Last 24 hours) at 6/11/2022 1326  Last data filed at 6/11/2022 1000  Gross per 24 hour   Intake 2705.5 ml   Output 3926 ml   Net -1220.5 ml       Physical Exam:    Gen: No distress. Alert. Awake and oriented.   + NG   Eyes: PERRL. No sclera icterus. No conjunctival injection. Neck: No JVD. No Carotid Bruit. Trachea midline. Resp: No accessory muscle use. No crackles. No wheezes. No rhonchi. Diminished breath sounds   CV: Regular rate. Regular rhythm. No murmur. No rub. No edema. Peripheral Pulses: +2 palpable, equal bilaterally   GI:  Soft , non tender. drain in place in RLQ  +  bowel sounds. Skin: Warm and dry. No nodule on exposed extremities. No rash on exposed extremities. M/S: No cyanosis. No joint deformity. No clubbing. Neuro: Awake. Grossly nonfocal    Psych: Oriented x 3.  No anxiety or agitation.        Scheduled Meds:   pantoprazole  40 mg IntraVENous Daily    heparin (porcine)  5,000 Units SubCUTAneous 3 times per day    lisinopril  20 mg Oral Daily    metoprolol tartrate  25 mg Oral BID    traZODone  200 mg Oral Nightly    piperacillin-tazobactam  3,375 mg IntraVENous Q8H       Continuous Infusions:   IV infusion builder 75 mL/hr at 06/10/22 2248       PRN Meds:  hydrALAZINE, labetalol, promethazine, ondansetron, HYDROmorphone **OR** HYDROmorphone, diphenhydrAMINE, acetaminophen, phenol      Data:  CBC:   Recent Labs     06/10/22  0500   WBC 7.9   HGB 11.3*   HCT 35.2*   MCV 95.3        BMP:   Recent Labs     06/09/22  0916 06/10/22  0500 06/11/22  0616    138 138   K 3.4* 3.7 3.7    102 101   CO2 24 20* 22   PHOS 4.0  --   --    BUN 6* 5* 5*   CREATININE <0.5* <0.5* <0.5*     LIVER PROFILE: No results for input(s): AST, ALT, LIPASE, BILIDIR, BILITOT, ALKPHOS in the last 72 hours. Invalid input(s): AMYLASE,  ALB  PT/INR: No results for input(s): PROTIME, INR in the last 72 hours. CULTURES  Blood cx x2: NGTD  Cultures: pending   COVID/Influenza negative      RADIOLOGY  IR ABSCESS DRAINAGE PERC   Final Result   Successful ultrasound-guided placement of 10 Indonesian drainage catheter into   anterior abdominal fluid collection. XR CHEST PORTABLE   Final Result   Mild left basilar airspace disease. This could represent atelectasis or in   the appropriate clinical setting pneumonia. CT ABDOMEN PELVIS W IV CONTRAST Additional Contrast? None   Final Result   There are 2 rim enhancing fluid collections with surrounding edema to the   liver, one to the right lobe and one to the left lobe. These both measure   smaller than on prior exam 06/02/2022 although only the right lobe collection   has a drainage catheter in place. Interval surgical intervention with partial small-bowel resection. There are   some persistent distended air-filled loops of small bowel in the abdomen with   overall decreased extent of involvement and decreased distension from prior   exam compatible with improving small-bowel obstruction. New rim enhancing fluid collection intraperitoneal to the abdomen anteriorly   measuring grossly 12.0 x 24.7 x 4.0 cm compatible with abscess. Gas internal   to this dominant collection may relate to the infection itself or could   relate to the recent surgical intervention.   There are multiple additional   smaller similar fluid collections which could reflect additional loculations   of this dominant collection or could reflect separate abscesses with the two   largest described as above. Shotty mesenteric and retroperitoneal lymph nodes are likely reactive. New foci of consolidation to bilateral lower lobes, left more than right,   more likely on the basis of atelectasis although pneumonia or aspiration   pneumonitis could have a similar appearance in the appropriate clinical   setting. New trace bilateral pleural effusions. Diffuse urinary bladder wall thickening does not appear appreciably changed   given differences in degree of distention of the urinary bladder as compared   to the prior CT exam.  Findings may relate at least in part to the   underdistention, but nonspecific superimposed cystitis is not excluded. Clinical and laboratory correlation can be made. XR ABDOMEN (2 VIEWS)   Final Result   Moderate small bowel distention in the left mid abdomen with transition   distally remains concerning for obstruction. No free air. CT DRAINAGE VISCERAL PERCUTANEOUS   Final Result   1. Successful placement of a 12 Swedish drainage catheter within a right   hepatic abscess. 2. Despite optimum positioning of catheter, no fluid could be aspirated from   a left hepatic abscess that was seen on the prior CT. Obvious fluid cannot   be seen on the current noncontrast series, and some interval improvement may   account for this finding. A drainage catheter was not placed in the left   hepatic abscess. CT GUIDED NEEDLE PLACEMENT   Final Result   1. Successful placement of a 12 Swedish drainage catheter within a right   hepatic abscess. 2. Despite optimum positioning of catheter, no fluid could be aspirated from   a left hepatic abscess that was seen on the prior CT. Obvious fluid cannot   be seen on the current noncontrast series, and some interval improvement may   account for this finding.   A drainage catheter was not placed in the left   hepatic abscess. CT ABDOMEN PELVIS W IV CONTRAST Additional Contrast? None   Final Result   Moderate pneumoperitoneum in the left upper quadrant, likely small bowel   perforation. There is an abnormal segment of ileum in the right lower   quadrant, similar to 05/22/2022, inflammatory bowel disease versus infectious   enteritis. There is upstream moderate dilation of the mid small bowel with   possible pneumatosis. Closed loop obstruction is a consideration. Small amount of free fluid. Bilobar hepatic abscesses are persistent, with decreasing surrounding edema. Critical results were called by Dr. Dieudonne Moscoso MD to [facility]   on 6/2/2022 at 16:43. Small amount of free fluid. RECOMMENDATIONS:   Unavailable         XR CHEST PORTABLE   Final Result   Clear lungs. Assessment/Plan:    #Hypertension, uncontrolled  -on lopressor and lisinopril, continue   -added prn IV hydralazine , Prn labatelol .   -continue to monitor     #Possible Aspiration pneumonitis   -pt is asymptomatic   -CXR and CT as above    - already on Zosyn    #Pleural Effusions  - noted on CT - trace      #Pneumoperitoneum with SB perforation  # abdominal abscess  -POD #9, s/p SB resection and lysis of adhesions   -IV zosyn   -new fluid collection noted on CT 6/10 , --> to IR -> drain placed  In RLQ  -general surgery following     #Post-operative Ileus   -GI and general surgery following   -NGT decompression   NG tube clamped today. Started on clear liquids     #Liver abscesses  #Transaminitis   -on IV abx   -continue percutaneous drainage of abscess  -GI following            DVT Prophylaxis: Lovenox   Diet: ADULT DIET;  Clear Liquid  ADULT ORAL NUTRITION SUPPLEMENT; Breakfast, Lunch, Dinner; Clear Liquid Oral Supplement  Code Status: Full Code       Kaleb Bell MD   6/11/2022

## 2022-06-11 NOTE — PROGRESS NOTES
procedure including risks, benefits, and alternatives. Questions were answered and the patient agrees to proceed. 6/3: S/P perc drain placement in hepatic abscess    Home Health S4 Level Recommendation: Level 3 Safety  AM-PAC Mobility Score   AM-PAC Inpatient Mobility Raw Score : 24  AM-PAC Inpatient Mobility without Stair Climbing Raw Score : 20    Treatment Time:  0329-2822 (initial attempt), 9817-0182  Treatment number: 2  Timed Code Treatment Minutes: 21 minutes  Total Treatment Minutes:  21  minutes    Cognition    A&O orientation not directly assessed. Able to follow 2 step commands    Subjective  Patient lying supine in bed with no family present   Pt agreeable to this PT tx. Pain   No  Location:   Rating:    NA/10  Pain Medicine Status: No request made     Bed Mobility   Supine to Sit:    Supervision  Sit to Supine:   Not Tested  Rolling:   Supervision  Scooting:   Supervision    Transfer Training     Sit to stand:   Supervision performed 4 times  Stand to sit:   Supervision  Bed to Chair:   Supervision with use of No AD performed 2 times    Gait Training gait completed as indicated below  Distance:      300 ft  Deviations (firm surface/linoleum):  none  Assistive Device Used:    No AD  Level of Assist:    Supervision  Comment: pt managing IV pole without assistance from therapist    Stair Training deferred, pt unsafe/not appropriate to complete stairs at this time  # of Steps:   N/A  Level of Assist:  Not Tested  UE Support:  NA  Assistive Device:  N/A  Pattern:   N/A  Comments:     Therapeutic Exercise Terrie deferred secondary to treatment focus on functional mobility  NA    Balance  Sitting:  Normal; Independent  Comments:     Standing: Normal; Modified Independent  Comments: holding onto IV pole    Patient Education      Role of PT, POC, Discharge recommendations, DC recommendations, pacing activity, HEP and calling for assist with mobility.      Positioning Needs       Pt up in chair, no alarm needed, positioned in proper neutral alignment and pressure relief provided. Call light provided and all needs within reach    Activity Tolerance   Pt completed therapy session with No adverse symptoms noted w/activity. Other      Assessment :  Patient demonstrated ability to perform transfers and to ambulated community distances without an AD or use of external support from therapist. Pt was encouraged to continue ambulating in the hallway with nursing staff while inpatient. Pt demonstrates the balance, strength, and endurance needed to return to living at home alone, pending his other medical issues. Recommending Home PRN assist upon discharge as patient functioning close to baseline level    Goals (all goals ongoing unless otherwise indicated)  To be met in 3 visits:  1). Independent with LE Ex x 10 reps     To be met in 6 visits:  1). Supine to/from sit: SBA  2). Sit to/from stand: Supervision  3). Bed to chair: Supervision  4). Gait: Ambulate 150 ft.  with  Supervision and use of LRAD (least restrictive assistive device)  5). Tolerate B LE exercises 3 sets of 10-15 reps  6). Ascend/descend 4 steps with Supervision with use of hand rail unilateral and LRAD (least restrictive assistive device)    Plan   Perform stair climbing at next session. Continue with plan of care. Signature: Annabella Jimenez PT, DPT, [facility] #[id number]    If patient discharges from this facility prior to next visit, this note will serve as the Discharge Summary.

## 2022-06-11 NOTE — PROGRESS NOTES
[facility] GENERAL SURGERY    Surgery Progress Note           POD # 9    PATIENT NAME: Daija Allison     TODAY'S DATE: 6/11/2022    INTERVAL HISTORY:    Pt states he feels well. He is hungry and would like to eat. He is passing gas. OBJECTIVE:   VITALS:  BP (!) 165/97   Pulse 82   Temp 97.5 °F (36.4 °C) (Oral)   Resp 16   Ht 5' 10\" (1.778 m)   Wt 222 lb (100.7 kg)   SpO2 96%   BMI 31.85 kg/m²     INTAKE/OUTPUT:    I/O last 3 completed shifts: In: 5318.5 [I.V.:4485; IV Piggyback:833.6]  Out: 6424 [Urine:4225; Emesis/NG output:1950; Drains:249]  I/O this shift:  In: -   Out: 431 [Urine:400; Drains:31]              CONSTITUTIONAL:  awake and alert  LUNGS:  clear to auscultation  ABDOMEN:   hypoactive bowel sounds, soft, non-distended, abdominal drain with serous drainage  INCISION: clean, dry    Data:  CBC: Recent Labs     06/10/22  0500   WBC 7.9   HGB 11.3*   HCT 35.2*        BMP:    Recent Labs     06/09/22  0916 06/10/22  0500 06/11/22  0616    138 138   K 3.4* 3.7 3.7    102 101   CO2 24 20* 22   BUN 6* 5* 5*   CREATININE <0.5* <0.5* <0.5*   GLUCOSE 109* 93 89     Hepatic: No results for input(s): AST, ALT, ALB, BILITOT, ALKPHOS in the last 72 hours. Mag:      Recent Labs     06/09/22  0916   MG 1.50*      Phos:     Recent Labs     06/09/22  0916   PHOS 4.0      INR: No results for input(s): INR in the last 72 hours. Radiology Review: CT yesterday showed intra-abdominal abscess and percutaneous drain was placed    ASSESSMENT AND PLAN:  63 y.o. male status post exploratory laparotomy with small bowel resection and percutaneous drainage of abdominal abscess yesterday. Continue antibiotics and supportive care. Try nasogastric tube clamping and clear liquid diet today.   Advance diet once he has better GI function        Electronically signed by Irvin Emerson MD

## 2022-06-11 NOTE — FLOWSHEET NOTE
06/11/22 0737   Vital Signs   Temp 97.5 °F (36.4 °C)   Temp Source Oral   Heart Rate 82   Heart Rate Source Monitor   Resp 16   BP (!) 165/97   BP Location Left upper arm   BP Method Automatic   MAP (Calculated) 119.67   Patient Position Semi fowlers   Level of Consciousness Alert (0)   MEWS Score 1   Oxygen Therapy   SpO2 96 %   O2 Device None (Room air)     AM assessment completed and vital signs completed, see flowsheet. Patient is alert & oriented. Side rails up X 2. Bed in the lowest position. Call light within reach.

## 2022-06-11 NOTE — PLAN OF CARE
Problem: Discharge Planning  Goal: Discharge to home or other facility with appropriate resources  6/11/2022 0011 by Shane Alfredo RN  Outcome: Progressing  6/10/2022 1136 by Lindsey Valentin RN  Outcome: Progressing     Problem: Pain  Goal: Verbalizes/displays adequate comfort level or baseline comfort level  6/11/2022 0011 by Shane Alfredo RN  Outcome: Progressing  6/10/2022 1136 by Lindsey Valentin RN  Outcome: Progressing     Problem: Safety - Adult  Goal: Free from fall injury  6/11/2022 0011 by Shane Alfredo RN  Outcome: Progressing  6/10/2022 1136 by Lindsey Valentin RN  Outcome: Progressing  Flowsheets (Taken 6/10/2022 1134)  Free From Fall Injury:   Instruct family/caregiver on patient safety   Based on caregiver fall risk screen, instruct family/caregiver to ask for assistance with transferring infant if caregiver noted to have fall risk factors     Problem: ABCDS Injury Assessment  Goal: Absence of physical injury  6/11/2022 0011 by Shane Alfredo RN  Outcome: Progressing  6/10/2022 1136 by Lindsey Valentin RN  Outcome: Progressing  Flowsheets  Taken 6/10/2022 1134 by Lindsey Valentin RN  Absence of Physical Injury: Implement safety measures based on patient assessment  Taken 6/9/2022 2353 by Varun Crockett RN  Absence of Physical Injury: Implement safety measures based on patient assessment     Problem: Neurosensory - Adult  Goal: Achieves stable or improved neurological status  6/11/2022 0011 by Shane Alfredo RN  Outcome: Progressing  6/10/2022 1136 by Lindsey Valentin RN  Outcome: Progressing     Problem: Neurosensory - Adult  Goal: Achieves stable or improved neurological status  6/11/2022 0011 by Shane Alfredo RN  Outcome: Progressing  6/10/2022 1136 by Lindsey Valentin RN  Outcome: Progressing     Problem: Respiratory - Adult  Goal: Achieves optimal ventilation and oxygenation  6/11/2022 0011 by Shane Alfredo RN  Outcome: Progressing  6/10/2022 1136 by Cee Rosales RN  Outcome: Progressing     Problem: Cardiovascular - Adult  Goal: Maintains optimal cardiac output and hemodynamic stability  6/11/2022 0011 by Kehinde Covarrubias RN  Outcome: Progressing  6/10/2022 1136 by Cee Rosales RN  Outcome: Progressing     Problem: Skin/Tissue Integrity - Adult  Goal: Skin integrity remains intact  6/10/2022 1136 by Cee Rosales RN  Outcome: Progressing  Flowsheets (Taken 6/10/2022 1135)  Skin Integrity Remains Intact:   Monitor for areas of redness and/or skin breakdown   Assess vascular access sites hourly   Every 4-6 hours minimum: Change oxygen saturation probe site   Every 4-6 hours: If on nasal continuous positive airway pressure, respiratory therapy assesses nares and determine need for appliance change or resting period     Problem: Musculoskeletal - Adult  Goal: Return mobility to safest level of function  6/11/2022 0011 by Kehinde Covarrubias RN  Outcome: Progressing  6/10/2022 1136 by Cee Rosales RN  Outcome: Progressing     Problem: Gastrointestinal - Adult  Goal: Minimal or absence of nausea and vomiting  6/11/2022 0011 by Kehinde Covarrubias RN  Outcome: Progressing  6/10/2022 1136 by Cee Rosales RN  Outcome: Progressing  Goal: Maintains or returns to baseline bowel function  6/11/2022 0011 by Kehinde Covarrubias RN  Outcome: Progressing  6/10/2022 1136 by Cee Rosales RN  Outcome: Progressing     Problem: Genitourinary - Adult  Goal: Absence of urinary retention  6/11/2022 0011 by Kehinde Covarrubias RN  Outcome: Progressing  6/10/2022 1136 by Cee Rosales RN  Outcome: Progressing     Problem: Infection - Adult  Goal: Absence of infection at discharge  6/11/2022 0011 by Kehinde Covarrubias RN  Outcome: Progressing  6/10/2022 1136 by Cee Rosales RN  Outcome: Progressing     Problem: Nutrition Deficit:  Goal: Optimize nutritional status  6/11/2022 0011 by Bethany Peace SERGE Saleh  Outcome: Progressing  Flowsheets (Taken 6/10/2022 1953 by Loc Montero, YOHAN, LD)  Nutrient intake appropriate for improving, restoring, or maintaining nutritional needs: Assess nutritional status and recommend course of action  6/10/2022 1136 by Diana Pacheco, RN  Outcome: Progressing

## 2022-06-11 NOTE — FLOWSHEET NOTE
06/11/22 1000   Pain Assessment   Pain Assessment 0-10   Pain Level 8   Patient's Stated Pain Goal 4   Pain Location Abdomen   Pain Descriptors Sharp; Shooting   Pain Type Acute pain   Care Plan - Pain Goals   Verbalizes/displays adequate comfort level or baseline comfort level Encourage patient to monitor pain and request assistance   Opioid-Induced Sedation   POSS Score 1   RASS   Jeff Agitation Sedation Scale (RASS) 0     PRN pain medication given, view mAR

## 2022-06-11 NOTE — PLAN OF CARE
Problem: Discharge Planning  Goal: Discharge to home or other facility with appropriate resources  6/11/2022 1156 by Vasyl Mesa RN  Outcome: Progressing  Flowsheets (Taken 6/11/2022 1000)  Discharge to home or other facility with appropriate resources:   Identify barriers to discharge with patient and caregiver   Arrange for needed discharge resources and transportation as appropriate   Identify discharge learning needs (meds, wound care, etc)   Arrange for interpreters to assist at discharge as needed   Refer to discharge planning if patient needs post-hospital services based on physician order or complex needs related to functional status, cognitive ability or social support system  6/11/2022 0011 by Jaciel Nixon RN  Outcome: Progressing     Problem: Pain  Goal: Verbalizes/displays adequate comfort level or baseline comfort level  6/11/2022 1156 by Vasyl Mesa RN  Outcome: Progressing  Flowsheets (Taken 6/11/2022 1000)  Verbalizes/displays adequate comfort level or baseline comfort level: Encourage patient to monitor pain and request assistance  6/11/2022 0011 by Jaciel Nixon RN  Outcome: Progressing     Problem: Safety - Adult  Goal: Free from fall injury  6/11/2022 1156 by Vasyl Mesa RN  Outcome: Progressing  6/11/2022 0011 by Jaciel Nixon RN  Outcome: Progressing     Problem: ABCDS Injury Assessment  Goal: Absence of physical injury  6/11/2022 1156 by Vasyl Mesa RN  Outcome: Progressing  6/11/2022 0011 by Jaciel Nixon RN  Outcome: Progressing     Problem: Neurosensory - Adult  Goal: Achieves stable or improved neurological status  6/11/2022 1156 by Vasyl Mesa RN  Outcome: Progressing  Flowsheets (Taken 6/11/2022 1000)  Achieves stable or improved neurological status:   Monitor temperature, glucose, and sodium.  Initiate appropriate interventions as ordered   Assess for and report changes in neurological status  6/11/2022 0011 by Marissa Bedoya SERGE Saleh  Outcome: Progressing     Problem: Respiratory - Adult  Goal: Achieves optimal ventilation and oxygenation  6/11/2022 1156 by Jessie Wakefield RN  Outcome: Progressing  Flowsheets (Taken 6/11/2022 1000)  Achieves optimal ventilation and oxygenation:   Assess for changes in respiratory status   Assess for changes in mentation and behavior   Position to facilitate oxygenation and minimize respiratory effort   Oxygen supplementation based on oxygen saturation or arterial blood gases  6/11/2022 0011 by Foreign Still RN  Outcome: Progressing     Problem: Cardiovascular - Adult  Goal: Maintains optimal cardiac output and hemodynamic stability  6/11/2022 1156 by Jessie Wakefield RN  Outcome: Progressing  Flowsheets (Taken 6/11/2022 1000)  Maintains optimal cardiac output and hemodynamic stability: Monitor blood pressure and heart rate  6/11/2022 0011 by Foreign Still RN  Outcome: Progressing     Problem: Skin/Tissue Integrity - Adult  Goal: Skin integrity remains intact  Outcome: Progressing  Flowsheets (Taken 6/11/2022 1000)  Skin Integrity Remains Intact: Monitor for areas of redness and/or skin breakdown     Problem: Musculoskeletal - Adult  Goal: Return mobility to safest level of function  6/11/2022 1156 by Jessie Wakefield RN  Outcome: Progressing  Flowsheets (Taken 6/11/2022 1000)  Return Mobility to Safest Level of Function:   Assess patient stability and activity tolerance for standing, transferring and ambulating with or without assistive devices   Assist with transfers and ambulation using safe patient handling equipment as needed   Ensure adequate protection for wounds/incisions during mobilization   Obtain physical therapy/occupational therapy consults as needed   Apply continuous passive motion per provider or physical therapy orders to increase flexion toward goal   Instruct patient/family in ordered activity level  6/11/2022 0011 by Foreign Still RN  Outcome: Progressing     Problem: Gastrointestinal - Adult  Goal: Minimal or absence of nausea and vomiting  6/11/2022 1156 by Osiel oCtton RN  Outcome: Progressing  Flowsheets (Taken 6/11/2022 1000)  Minimal or absence of nausea and vomiting:   Administer IV fluids as ordered to ensure adequate hydration   Maintain NPO status until nausea and vomiting are resolved   Nasogastric tube to low intermittent suction as ordered  6/11/2022 0011 by Melissa Au RN  Outcome: Progressing  Goal: Maintains or returns to baseline bowel function  6/11/2022 1156 by Osiel Cotton RN  Outcome: Progressing  Flowsheets (Taken 6/11/2022 1000)  Maintains or returns to baseline bowel function:   Assess bowel function   Administer IV fluids as ordered to ensure adequate hydration  6/11/2022 0011 by Melissa Au RN  Outcome: Progressing     Problem: Genitourinary - Adult  Goal: Absence of urinary retention  6/11/2022 1156 by Osiel Cotton RN  Outcome: Progressing  Flowsheets (Taken 6/11/2022 1000)  Absence of urinary retention:   Assess patients ability to void and empty bladder   Monitor intake/output and perform bladder scan as needed  6/11/2022 0011 by Melissa Au RN  Outcome: Progressing     Problem: Infection - Adult  Goal: Absence of infection at discharge  6/11/2022 1156 by Osiel Cotton RN  Outcome: Progressing  Flowsheets (Taken 6/11/2022 1000)  Absence of infection at discharge:   Assess and monitor for signs and symptoms of infection   Monitor lab/diagnostic results   Monitor all insertion sites i.e., indwelling lines, tubes and drains  6/11/2022 0011 by Melissa Au RN  Outcome: Progressing     Problem: Skin/Tissue Integrity  Goal: Absence of new skin breakdown  Description: 1. Monitor for areas of redness and/or skin breakdown  2. Assess vascular access sites hourly  3. Every 4-6 hours minimum:  Change oxygen saturation probe site  4.   Every 4-6 hours:  If on nasal continuous positive airway pressure, respiratory therapy assess nares and determine need for appliance change or resting period.   6/11/2022 1156 by Shivam Dacosta RN  Outcome: Progressing  6/11/2022 0011 by Yumiko Taylor RN  Outcome: Progressing     Problem: Nutrition Deficit:  Goal: Optimize nutritional status  6/11/2022 1156 by Shivam Dacosta RN  Outcome: Progressing  6/11/2022 0011 by Yumiko Taylor RN  Outcome: Progressing  Flowsheets (Taken 6/10/2022 1953 by Brock Ybarra, YOHAN, LD)  Nutrient intake appropriate for improving, restoring, or maintaining nutritional needs: Assess nutritional status and recommend course of action

## 2022-06-11 NOTE — PROGRESS NOTES
Comprehensive Nutrition Assessment    Type and Reason for Visit:  Reassess    Nutrition Recommendations/Plan:   1. Continue NPO      Malnutrition Assessment:  Malnutrition Status: At risk for malnutrition (Comment) (06/07/22 1615)    Context:  Acute Illness     Findings of the 6 clinical characteristics of malnutrition:  Energy Intake:  50% or less of estimated energy requirements for 5 or more days  Weight Loss:   (- 6# or 2.7% weight loss since 5/19/22)     Body Fat Loss:  No significant body fat loss     Muscle Mass Loss:  No significant muscle mass loss    Fluid Accumulation:  No significant fluid accumulation     Strength:  Not Performed    Nutrition Assessment:    patient remains nutritionally compromised AEB patient NPO x 8 with 3% weight loss noted  r/t POD # 8 s/p SBR RAMESH for SB perf 2/2 toothpick ingestion at risk for further compromise d/t NPO; will continue to [facility]   Nutrition Related Findings:    POD # 8 s/p SBR RAMESH for SB perf 2/2 toothpick ingestion; S/P perc drain placement in hepatic abscess on 6/3;  hepatic abscess; intraperitoneal to the anterior abdomen with new abscess drain placed today ; +mild distention, NGT no N/V, + flatus, + 1 small BM;Mg 1.58; K+ 3.4; NGT 1.4L Wound Type: Surgical Incision (surgical incision on abdomen)       Current Nutrition Intake & Therapies:    Average Meal Intake: NPO  Average Supplements Intake: NPO  Diet NPO Exceptions are: Sips of Water with Meds, Ice Chips    Anthropometric Measures:  Height: 5' 10\" (177.8 cm)  Ideal Body Weight (IBW): 166 lbs (75 kg)    Admission Body Weight: 222 lb (100.7 kg) (obtained on 6/2/22; actual weight)  Current Body Weight: 214 lb (97.1 kg), 133.7 % IBW.  Weight Source: Standing Scale  Current BMI (kg/m2): 30.7  Usual Body Weight: 228 lb 3 oz (103.5 kg) (obtained on 5/19/22; actual weight)  % Weight Change (Calculated): -2.7  Weight Adjustment For: No Adjustment                 BMI Categories: Obese Class 1 (BMI 30.0-34. 9)    Estimated Daily Nutrient Needs:  Energy Requirements Based On: Kcal/kg  Weight Used for Energy Requirements: Current  Energy (kcal/day): 1515 - 1818 kcals based on 15-18 kcals/kg/CBW  Weight Used for Protein Requirements: Ideal  Protein (g/day): 105 - 113 g protein based on 1.4-1.5 g/kg/IBW  Method Used for Fluid Requirements: 1 ml/kcal  Fluid (ml/day): 1515 - 1818 ml    Nutrition Diagnosis:   · Inadequate oral intake related to inadequate protein-energy intake,altered GI function,altered GI structure,pain as evidenced by poor intake prior to admission,intake 0-25%,wounds,lab values,GI abnormality,constipation      Nutrition Interventions:   Food and/or Nutrient Delivery: Continue NPO  Nutrition Education/Counseling: No recommendation at this time  Coordination of Nutrition Care: Continue to monitor while inpatient       Goals:  Previous Goal Met: No Progress toward Goal(s)  Goals: Initiate PO diet,by next RD assessment       Nutrition Monitoring and Evaluation:   Behavioral-Environmental Outcomes: None Identified  Food/Nutrient Intake Outcomes: Diet Advancement/Tolerance  Physical Signs/Symptoms Outcomes: GI Status,Nausea or Vomiting,Diarrhea,Constipation,Weight,Nutrition Focused Physical Findings,Biochemical Data    Discharge Planning:     Too soon to determine     Winfield Gosselin, [address], [place]  Contact: [phone]

## 2022-06-12 LAB
ANION GAP SERPL CALCULATED.3IONS-SCNC: 14 MMOL/L (ref 3–16)
BUN BLDV-MCNC: 4 MG/DL (ref 7–20)
CALCIUM SERPL-MCNC: 9 MG/DL (ref 8.3–10.6)
CHLORIDE BLD-SCNC: 102 MMOL/L (ref 99–110)
CO2: 24 MMOL/L (ref 21–32)
CREAT SERPL-MCNC: <0.5 MG/DL (ref 0.9–1.3)
GFR AFRICAN AMERICAN: >60
GFR NON-AFRICAN AMERICAN: >60
GLUCOSE BLD-MCNC: 107 MG/DL (ref 70–99)
GLUCOSE BLD-MCNC: 121 MG/DL (ref 70–99)
PERFORMED ON: ABNORMAL
POTASSIUM REFLEX MAGNESIUM: 4.1 MMOL/L (ref 3.5–5.1)
SODIUM BLD-SCNC: 140 MMOL/L (ref 136–145)

## 2022-06-12 PROCEDURE — 6360000002 HC RX W HCPCS: Performed by: NURSE PRACTITIONER

## 2022-06-12 PROCEDURE — 80048 BASIC METABOLIC PNL TOTAL CA: CPT

## 2022-06-12 PROCEDURE — C9113 INJ PANTOPRAZOLE SODIUM, VIA: HCPCS | Performed by: NURSE PRACTITIONER

## 2022-06-12 PROCEDURE — 99024 POSTOP FOLLOW-UP VISIT: CPT | Performed by: SURGERY

## 2022-06-12 PROCEDURE — 2580000003 HC RX 258: Performed by: SURGERY

## 2022-06-12 PROCEDURE — 99232 SBSQ HOSP IP/OBS MODERATE 35: CPT | Performed by: INTERNAL MEDICINE

## 2022-06-12 PROCEDURE — 36415 COLL VENOUS BLD VENIPUNCTURE: CPT

## 2022-06-12 PROCEDURE — 6370000000 HC RX 637 (ALT 250 FOR IP): Performed by: SURGERY

## 2022-06-12 PROCEDURE — 6360000002 HC RX W HCPCS: Performed by: SURGERY

## 2022-06-12 PROCEDURE — 1200000000 HC SEMI PRIVATE

## 2022-06-12 RX ORDER — OXYCODONE HYDROCHLORIDE AND ACETAMINOPHEN 5; 325 MG/1; MG/1
2 TABLET ORAL EVERY 4 HOURS PRN
Status: DISCONTINUED | OUTPATIENT
Start: 2022-06-12 | End: 2022-06-18 | Stop reason: HOSPADM

## 2022-06-12 RX ORDER — POLYETHYLENE GLYCOL 3350 17 G/17G
17 POWDER, FOR SOLUTION ORAL DAILY PRN
Status: DISCONTINUED | OUTPATIENT
Start: 2022-06-12 | End: 2022-06-18 | Stop reason: HOSPADM

## 2022-06-12 RX ORDER — MECOBALAMIN 5000 MCG
10 TABLET,DISINTEGRATING ORAL NIGHTLY PRN
Status: DISCONTINUED | OUTPATIENT
Start: 2022-06-12 | End: 2022-06-18 | Stop reason: HOSPADM

## 2022-06-12 RX ORDER — DOCUSATE SODIUM 100 MG/1
100 CAPSULE, LIQUID FILLED ORAL DAILY
Status: DISCONTINUED | OUTPATIENT
Start: 2022-06-12 | End: 2022-06-18 | Stop reason: HOSPADM

## 2022-06-12 RX ORDER — OXYCODONE HYDROCHLORIDE AND ACETAMINOPHEN 5; 325 MG/1; MG/1
1 TABLET ORAL EVERY 4 HOURS PRN
Status: DISCONTINUED | OUTPATIENT
Start: 2022-06-12 | End: 2022-06-18 | Stop reason: HOSPADM

## 2022-06-12 RX ADMIN — HYDROMORPHONE HYDROCHLORIDE 0.5 MG: 1 INJECTION, SOLUTION INTRAMUSCULAR; INTRAVENOUS; SUBCUTANEOUS at 05:42

## 2022-06-12 RX ADMIN — OXYCODONE HYDROCHLORIDE AND ACETAMINOPHEN 2 TABLET: 5; 325 TABLET ORAL at 21:54

## 2022-06-12 RX ADMIN — PIPERACILLIN AND TAZOBACTAM 3375 MG: 3; .375 INJECTION, POWDER, LYOPHILIZED, FOR SOLUTION INTRAVENOUS at 05:35

## 2022-06-12 RX ADMIN — LISINOPRIL 20 MG: 20 TABLET ORAL at 10:59

## 2022-06-12 RX ADMIN — PIPERACILLIN AND TAZOBACTAM 3375 MG: 3; .375 INJECTION, POWDER, LYOPHILIZED, FOR SOLUTION INTRAVENOUS at 15:59

## 2022-06-12 RX ADMIN — Medication 10 MG: at 21:54

## 2022-06-12 RX ADMIN — HEPARIN SODIUM 5000 UNITS: 5000 INJECTION INTRAVENOUS; SUBCUTANEOUS at 14:31

## 2022-06-12 RX ADMIN — POTASSIUM CHLORIDE AND SODIUM CHLORIDE: 900; 150 INJECTION, SOLUTION INTRAVENOUS at 05:33

## 2022-06-12 RX ADMIN — METOPROLOL TARTRATE 25 MG: 25 TABLET, FILM COATED ORAL at 10:59

## 2022-06-12 RX ADMIN — HYDROMORPHONE HYDROCHLORIDE 0.5 MG: 1 INJECTION, SOLUTION INTRAMUSCULAR; INTRAVENOUS; SUBCUTANEOUS at 10:50

## 2022-06-12 RX ADMIN — PIPERACILLIN AND TAZOBACTAM 3375 MG: 3; .375 INJECTION, POWDER, LYOPHILIZED, FOR SOLUTION INTRAVENOUS at 23:14

## 2022-06-12 RX ADMIN — METOPROLOL TARTRATE 25 MG: 25 TABLET, FILM COATED ORAL at 21:54

## 2022-06-12 RX ADMIN — HEPARIN SODIUM 5000 UNITS: 5000 INJECTION INTRAVENOUS; SUBCUTANEOUS at 20:49

## 2022-06-12 RX ADMIN — HYDROMORPHONE HYDROCHLORIDE 0.5 MG: 1 INJECTION, SOLUTION INTRAMUSCULAR; INTRAVENOUS; SUBCUTANEOUS at 02:54

## 2022-06-12 RX ADMIN — HEPARIN SODIUM 5000 UNITS: 5000 INJECTION INTRAVENOUS; SUBCUTANEOUS at 05:36

## 2022-06-12 RX ADMIN — PANTOPRAZOLE SODIUM 40 MG: 40 INJECTION, POWDER, FOR SOLUTION INTRAVENOUS at 10:59

## 2022-06-12 RX ADMIN — HYDROMORPHONE HYDROCHLORIDE 0.5 MG: 1 INJECTION, SOLUTION INTRAMUSCULAR; INTRAVENOUS; SUBCUTANEOUS at 20:49

## 2022-06-12 RX ADMIN — TRAZODONE HYDROCHLORIDE 200 MG: 100 TABLET ORAL at 21:54

## 2022-06-12 RX ADMIN — ONDANSETRON HYDROCHLORIDE 4 MG: 2 INJECTION, SOLUTION INTRAMUSCULAR; INTRAVENOUS at 20:49

## 2022-06-12 ASSESSMENT — PAIN SCALES - GENERAL
PAINLEVEL_OUTOF10: 6
PAINLEVEL_OUTOF10: 6
PAINLEVEL_OUTOF10: 8
PAINLEVEL_OUTOF10: 7
PAINLEVEL_OUTOF10: 6

## 2022-06-12 ASSESSMENT — PAIN DESCRIPTION - LOCATION
LOCATION: THROAT
LOCATION: ABDOMEN

## 2022-06-12 ASSESSMENT — PAIN DESCRIPTION - DESCRIPTORS
DESCRIPTORS: SORE
DESCRIPTORS: ACHING
DESCRIPTORS: CRAMPING
DESCRIPTORS: CRAMPING
DESCRIPTORS: ACHING;DISCOMFORT

## 2022-06-12 ASSESSMENT — PAIN DESCRIPTION - FREQUENCY: FREQUENCY: CONTINUOUS

## 2022-06-12 ASSESSMENT — PAIN - FUNCTIONAL ASSESSMENT: PAIN_FUNCTIONAL_ASSESSMENT: PREVENTS OR INTERFERES SOME ACTIVE ACTIVITIES AND ADLS

## 2022-06-12 ASSESSMENT — PAIN DESCRIPTION - ONSET: ONSET: ON-GOING

## 2022-06-12 NOTE — PROGRESS NOTES
Pt up in hallway walking. He did several laps around and returned to his room. Pt tolerated ambulation. He was stating that his pain was an 8/10 before walking and it will probably go up to 9/10 upon his return. Pt was given his trazodone for sleep, he also wanted dilaudid upon his return to his room.

## 2022-06-12 NOTE — FLOWSHEET NOTE
06/12/22 1050   Closed/Suction Drain Right;Lateral Abdomen Bulb   Placement Date/Time: 06/03/22 1624   Present on Admission/Arrival: No  Description (optional): skater [id number] pigtail  Inserted by: dr Marina Starr ir  Tube Number: 2  Orientation: Right;Lateral  Location: Abdomen  Drain Tube Type: Bulb  Size : 12f  Tub. .. Site Description Unable to view   Dressing Status Clean, dry & intact   Drainage Appearance Yellow;Cloudy   Drain Status To bulb suction   Output (ml) 1 ml   Closed/Suction Drain Right RLQ Bulb   Placement Date/Time: 06/10/22 1230   Present on Admission/Arrival: No  Description (optional): secured at 19cm exodus array pigtail 10f x 25cm  Inserted by: dr Thomas Miller IR  Tube Number: 2  Orientation: Right  Location: RLQ  Drain Tube Type: Bulb  Size . .. Site Description Unable to view   Dressing Status Clean, dry & intact   Drainage Appearance Serosanguinous   Drain Status Compressed   Output (ml) 30 ml       Drains irrigated uses 10 mL sterile water each. Patient tolerated well.  Drains remain intact

## 2022-06-12 NOTE — PLAN OF CARE
Problem: Discharge Planning  Goal: Discharge to home or other facility with appropriate resources  6/12/2022 0127 by Foreign Still RN  Outcome: Progressing  6/11/2022 1156 by Jessie Wakefield RN  Outcome: Progressing  Flowsheets (Taken 6/11/2022 1000)  Discharge to home or other facility with appropriate resources:   Identify barriers to discharge with patient and caregiver   Arrange for needed discharge resources and transportation as appropriate   Identify discharge learning needs (meds, wound care, etc)   Arrange for interpreters to assist at discharge as needed   Refer to discharge planning if patient needs post-hospital services based on physician order or complex needs related to functional status, cognitive ability or social support system     Problem: Pain  Goal: Verbalizes/displays adequate comfort level or baseline comfort level  6/12/2022 0127 by Foreign Still RN  Outcome: Progressing  6/11/2022 1156 by Jessie Wakefield RN  Outcome: Progressing  Flowsheets (Taken 6/11/2022 1000)  Verbalizes/displays adequate comfort level or baseline comfort level: Encourage patient to monitor pain and request assistance     Problem: Safety - Adult  Goal: Free from fall injury  6/12/2022 0127 by Foreign Still RN  Outcome: Progressing  6/11/2022 1156 by Jessie Wakefield RN  Outcome: Progressing     Problem: ABCDS Injury Assessment  Goal: Absence of physical injury  6/12/2022 0127 by Foreign Still RN  Outcome: Progressing  Flowsheets (Taken 6/11/2022 1157 by Jessie Wakefield RN)  Absence of Physical Injury: Implement safety measures based on patient assessment  6/11/2022 1156 by Jessie Wakefield RN  Outcome: Progressing     Problem: Neurosensory - Adult  Goal: Achieves stable or improved neurological status  6/12/2022 0127 by Foreign Still RN  Outcome: Progressing  6/11/2022 1156 by Jessie Wakefield RN  Outcome: Progressing  Flowsheets (Taken 6/11/2022 1000)  Achieves stable or improved neurological status:   Monitor temperature, glucose, and sodium.  Initiate appropriate interventions as ordered   Assess for and report changes in neurological status     Problem: Respiratory - Adult  Goal: Achieves optimal ventilation and oxygenation  6/12/2022 0127 by Julian Ibarra RN  Outcome: Progressing  6/11/2022 1156 by Camilla Cuevas RN  Outcome: Progressing  Flowsheets (Taken 6/11/2022 1000)  Achieves optimal ventilation and oxygenation:   Assess for changes in respiratory status   Assess for changes in mentation and behavior   Position to facilitate oxygenation and minimize respiratory effort   Oxygen supplementation based on oxygen saturation or arterial blood gases     Problem: Cardiovascular - Adult  Goal: Maintains optimal cardiac output and hemodynamic stability  6/12/2022 0127 by Julian Ibarra RN  Outcome: Progressing  6/11/2022 1156 by Camilla Cuevas RN  Outcome: Progressing  Flowsheets (Taken 6/11/2022 1000)  Maintains optimal cardiac output and hemodynamic stability: Monitor blood pressure and heart rate     Problem: Skin/Tissue Integrity - Adult  Goal: Skin integrity remains intact  Recent Flowsheet Documentation  Taken 6/11/2022 1157 by Camilla Cuevas RN  Skin Integrity Remains Intact: Monitor for areas of redness and/or skin breakdown  6/11/2022 1156 by Camilla Cuevas RN  Outcome: Progressing  Flowsheets (Taken 6/11/2022 1000)  Skin Integrity Remains Intact: Monitor for areas of redness and/or skin breakdown     Problem: Musculoskeletal - Adult  Goal: Return mobility to safest level of function  6/12/2022 0127 by Julian Ibarra RN  Outcome: Progressing  6/11/2022 1156 by Camilla Cuevas RN  Outcome: Progressing  Flowsheets (Taken 6/11/2022 1000)  Return Mobility to Safest Level of Function:   Assess patient stability and activity tolerance for standing, transferring and ambulating with or without assistive devices   Assist with transfers and ambulation using safe patient handling equipment as needed   Ensure adequate protection for wounds/incisions during mobilization   Obtain physical therapy/occupational therapy consults as needed   Apply continuous passive motion per provider or physical therapy orders to increase flexion toward goal   Instruct patient/family in ordered activity level     Problem: Gastrointestinal - Adult  Goal: Minimal or absence of nausea and vomiting  6/12/2022 0127 by Leila Hyman RN  Outcome: Progressing  6/11/2022 1156 by Jacqueline Whitman RN  Outcome: Progressing  Flowsheets (Taken 6/11/2022 1000)  Minimal or absence of nausea and vomiting:   Administer IV fluids as ordered to ensure adequate hydration   Maintain NPO status until nausea and vomiting are resolved   Nasogastric tube to low intermittent suction as ordered  Goal: Maintains or returns to baseline bowel function  6/12/2022 0127 by Leila Hyman RN  Outcome: Progressing  6/11/2022 1156 by Jacqueline Whitman RN  Outcome: Progressing  Flowsheets (Taken 6/11/2022 1000)  Maintains or returns to baseline bowel function:   Assess bowel function   Administer IV fluids as ordered to ensure adequate hydration     Problem: Genitourinary - Adult  Goal: Absence of urinary retention  6/12/2022 0127 by Leila Hyman RN  Outcome: Progressing  6/11/2022 1156 by Jacqueline Whitman RN  Outcome: Progressing  Flowsheets (Taken 6/11/2022 1000)  Absence of urinary retention:   Assess patients ability to void and empty bladder   Monitor intake/output and perform bladder scan as needed

## 2022-06-12 NOTE — PLAN OF CARE
Problem: Gastrointestinal - Adult  Goal: Maintains or returns to baseline bowel function  6/12/2022 1415 by Paul Be, RN  Outcome: Progressing  Flowsheets (Taken 6/12/2022 1047)  Maintains or returns to baseline bowel function:   Assess bowel function   Encourage oral fluids to ensure adequate hydration   Administer IV fluids as ordered to ensure adequate hydration   Administer ordered medications as needed   Encourage mobilization and activity  6/12/2022 0127 by Jennifer Morelos, RN  Outcome: Progressing

## 2022-06-12 NOTE — FLOWSHEET NOTE
06/12/22 0737   Vital Signs   Temp 97.2 °F (36.2 °C)   Temp Source Oral   Heart Rate 90   Heart Rate Source Monitor   Resp 16   BP (!) 159/92   BP Location Left upper arm   BP Method Automatic   MAP (Calculated) 114.33   Patient Position Semi fowlers   Level of Consciousness Alert (0)   MEWS Score 1   Oxygen Therapy   SpO2 93 %   O2 Device None (Room air)     AM assessment completed and vital signs completed, see flowsheet. Patient is alert & oriented. No complaints voiced. Patient denies further needs. Side rails up X 2. Bed in the lowest position. Call light within reach.

## 2022-06-12 NOTE — PROGRESS NOTES
IM Progress Note    Admit Date:  6/2/2022    Admitted with Pneumoperitoneum with SB perforation due to foreign body. s/p exploratory lap , SB resection and lysis of adhesions    Post-operative Ileus; s/p NG decompression  He developedIntraperitoneal abscess,  s/p abdominal drain placement in IR on 6/10    Subjective:  Mr. Mancil Severance  is improving . NG tube. Has been removed   Clear liquid diet  He has had bowel movement  Abdominal pain is controlled  Blood pressure still elevated. abd drain in place     Objective:         Vitals:    06/12/22 0026 06/12/22 0443 06/12/22 0737 06/12/22 1142   BP: (!) 154/89 (!) 151/87 (!) 159/92 (!) 162/102   Pulse: 89 81 90 79   Resp: 16 16 16 16   Temp: 97.9 °F (36.6 °C) 98 °F (36.7 °C) 97.2 °F (36.2 °C) 97.8 °F (36.6 °C)   TempSrc: Oral Oral Oral Oral   SpO2: 97% 97% 93% 97%   Weight:       Height:              Intake/Output Summary (Last 24 hours) at 6/12/2022 1158  Last data filed at 6/12/2022 1104  Gross per 24 hour   Intake 990 ml   Output 3266 ml   Net -2276 ml       Physical Exam:    Gen: No distress. Alert. Awake and oriented. Eyes: PERRL. No sclera icterus. No conjunctival injection. Neck: No JVD. No Carotid Bruit. Trachea midline. Resp: No accessory muscle use. No crackles. No wheezes. No rhonchi. Diminished breath sounds   CV: Regular rate. Regular rhythm. No murmur. No rub. No edema. Peripheral Pulses: +2 palpable, equal bilaterally   GI:  Soft , non tender. drain in place in RLQ  +  bowel sounds. Skin: Warm and dry. No nodule on exposed extremities. No rash on exposed extremities. M/S: No cyanosis. No joint deformity. No clubbing. Neuro: Awake. Grossly nonfocal    Psych: Oriented x 3.  No anxiety or agitation.        Scheduled Meds:   pantoprazole  40 mg IntraVENous Daily    heparin (porcine)  5,000 Units SubCUTAneous 3 times per day    lisinopril  20 mg Oral Daily    metoprolol tartrate  25 mg Oral BID    traZODone  200 mg Oral Nightly    piperacillin-tazobactam  3,375 mg IntraVENous Q8H       Continuous Infusions:   0.9% NaCl with KCl 20 mEq 50 mL/hr at 06/12/22 1050       PRN Meds:  HYDROmorphone **OR** HYDROmorphone, oxyCODONE-acetaminophen **OR** oxyCODONE-acetaminophen, hydrALAZINE, labetalol, promethazine, ondansetron, diphenhydrAMINE, acetaminophen, phenol      Data:  CBC:   Recent Labs     06/10/22  0500   WBC 7.9   HGB 11.3*   HCT 35.2*   MCV 95.3        BMP:   Recent Labs     06/10/22  0500 06/11/22  0616 06/12/22  0601    138 140   K 3.7 3.7 4.1    101 102   CO2 20* 22 24   BUN 5* 5* 4*   CREATININE <0.5* <0.5* <0.5*     LIVER PROFILE: No results for input(s): AST, ALT, LIPASE, BILIDIR, BILITOT, ALKPHOS in the last 72 hours. Invalid input(s): AMYLASE,  ALB  PT/INR: No results for input(s): PROTIME, INR in the last 72 hours. CULTURES  Blood cx x2: NGTD  Cultures: pending   COVID/Influenza negative      RADIOLOGY  IR ABSCESS DRAINAGE PERC   Final Result   Successful ultrasound-guided placement of 10 Serbian drainage catheter into   anterior abdominal fluid collection. XR CHEST PORTABLE   Final Result   Mild left basilar airspace disease. This could represent atelectasis or in   the appropriate clinical setting pneumonia. CT ABDOMEN PELVIS W IV CONTRAST Additional Contrast? None   Final Result   There are 2 rim enhancing fluid collections with surrounding edema to the   liver, one to the right lobe and one to the left lobe. These both measure   smaller than on prior exam 06/02/2022 although only the right lobe collection   has a drainage catheter in place. Interval surgical intervention with partial small-bowel resection. There are   some persistent distended air-filled loops of small bowel in the abdomen with   overall decreased extent of involvement and decreased distension from prior   exam compatible with improving small-bowel obstruction.       New rim enhancing fluid collection intraperitoneal to the abdomen anteriorly   measuring grossly 12.0 x 24.7 x 4.0 cm compatible with abscess. Gas internal   to this dominant collection may relate to the infection itself or could   relate to the recent surgical intervention. There are multiple additional   smaller similar fluid collections which could reflect additional loculations   of this dominant collection or could reflect separate abscesses with the two   largest described as above. Shotty mesenteric and retroperitoneal lymph nodes are likely reactive. New foci of consolidation to bilateral lower lobes, left more than right,   more likely on the basis of atelectasis although pneumonia or aspiration   pneumonitis could have a similar appearance in the appropriate clinical   setting. New trace bilateral pleural effusions. Diffuse urinary bladder wall thickening does not appear appreciably changed   given differences in degree of distention of the urinary bladder as compared   to the prior CT exam.  Findings may relate at least in part to the   underdistention, but nonspecific superimposed cystitis is not excluded. Clinical and laboratory correlation can be made. XR ABDOMEN (2 VIEWS)   Final Result   Moderate small bowel distention in the left mid abdomen with transition   distally remains concerning for obstruction. No free air. CT DRAINAGE VISCERAL PERCUTANEOUS   Final Result   1. Successful placement of a 12 Malay drainage catheter within a right   hepatic abscess. 2. Despite optimum positioning of catheter, no fluid could be aspirated from   a left hepatic abscess that was seen on the prior CT. Obvious fluid cannot   be seen on the current noncontrast series, and some interval improvement may   account for this finding. A drainage catheter was not placed in the left   hepatic abscess. CT GUIDED NEEDLE PLACEMENT   Final Result   1.  Successful placement of a 12 Malay drainage catheter within a right   hepatic abscess. 2. Despite optimum positioning of catheter, no fluid could be aspirated from   a left hepatic abscess that was seen on the prior CT. Obvious fluid cannot   be seen on the current noncontrast series, and some interval improvement may   account for this finding. A drainage catheter was not placed in the left   hepatic abscess. CT ABDOMEN PELVIS W IV CONTRAST Additional Contrast? None   Final Result   Moderate pneumoperitoneum in the left upper quadrant, likely small bowel   perforation. There is an abnormal segment of ileum in the right lower   quadrant, similar to 05/22/2022, inflammatory bowel disease versus infectious   enteritis. There is upstream moderate dilation of the mid small bowel with   possible pneumatosis. Closed loop obstruction is a consideration. Small amount of free fluid. Bilobar hepatic abscesses are persistent, with decreasing surrounding edema. Critical results were called by Dr. Diana Polk MD to [facility]   on 6/2/2022 at 16:43. Small amount of free fluid. RECOMMENDATIONS:   Unavailable         XR CHEST PORTABLE   Final Result   Clear lungs. Assessment/Plan:    #Hypertension, uncontrolled  -on lopressor and lisinopril, continue   -added prn IV hydralazine , Prn labatelol .   -continue to monitor     #Possible Aspiration pneumonitis   -pt is asymptomatic   -CXR and CT as above    - already on Zosyn    #Pleural Effusions  - noted on CT - trace      #Pneumoperitoneum with SB perforation  # abdominal abscess  -POD #10, s/p SB resection and lysis of adhesions   -cont IV zosyn   -new fluid collection noted on CT 6/10 , --> to IR -> drain placed  In RLQ. Cultures from the drain negative so far  -general surgery following     #Post-operative Ileus   -GI and general surgery following   -NGT decompression   NG tube clamped- > removed  today.   Started on clear liquids     #Liver abscesses  #Transaminitis   -on IV abx   -continue percutaneous drainage of abscess  -GI following            DVT Prophylaxis: Lovenox   Diet: ADULT ORAL NUTRITION SUPPLEMENT; Breakfast, Lunch, Dinner; Standard High Calorie/High Protein Oral Supplement  ADULT DIET;  Full Liquid  Code Status: Full Code       Karen Culver MD   6/12/2022

## 2022-06-12 NOTE — PROGRESS NOTES
[facility] GENERAL SURGERY    Surgery Progress Note           POD # 10    PATIENT NAME: Yoon Redmond     TODAY'S DATE: 6/12/2022    INTERVAL HISTORY:    Pt feels well except for incisional pain. He is tolerating clear liquids. He had a bowel movement this morning. OBJECTIVE:   VITALS:  BP (!) 159/92   Pulse 90   Temp 97.2 °F (36.2 °C) (Oral)   Resp 16   Ht 5' 10\" (1.778 m)   Wt 222 lb (100.7 kg)   SpO2 93%   BMI 31.85 kg/m²     INTAKE/OUTPUT:    I/O last 3 completed shifts: In: 2718 [I.V.:2531; IV Piggyback:187]  Out: 9152 [Urine:4875; Emesis/NG output:400; Drains:141]  I/O this shift:  In: -   Out: 431 [Urine:400; Drains:31]              CONSTITUTIONAL:  awake and alert  LUNGS:  clear to auscultation  ABDOMEN:   normal bowel sounds, soft, non-distended, drain with clear drainage  INCISION: clean, dry    Data:  CBC: Recent Labs     06/10/22  0500   WBC 7.9   HGB 11.3*   HCT 35.2*        BMP:    Recent Labs     06/10/22  0500 06/11/22  0616 06/12/22  0601    138 140   K 3.7 3.7 4.1    101 102   CO2 20* 22 24   BUN 5* 5* 4*   CREATININE <0.5* <0.5* <0.5*   GLUCOSE 93 89 121*     Hepatic: No results for input(s): AST, ALT, ALB, BILITOT, ALKPHOS in the last 72 hours. Mag:    No results for input(s): MG in the last 72 hours. Phos:   No results for input(s): PHOS in the last 72 hours. INR: No results for input(s): INR in the last 72 hours. Radiology Review: N/A    ASSESSMENT AND PLAN:  63 y.o. male status post for her laparotomy with small bowel resection and percutaneous drainage of abdominal fluid collection. Continue antibiotics and supportive care. Advance diet to full liquids. Start oral pain medication.   Await final culture results of abdominal fluid collection        Electronically signed by Justa Meredith MD

## 2022-06-12 NOTE — PROGRESS NOTES
NG tube removed. Patient tolerated fairly well. Pt vomited, mostly mucus, approximately 100 mL Denied need for anti-emetic. Patient was able to drink 140 mL water.

## 2022-06-12 NOTE — PROGRESS NOTES
Pt continues to use the dilaudid. 3 doses used this shift. Pt was up once this shift to walk. Clear liquids tolerated.

## 2022-06-12 NOTE — FLOWSHEET NOTE
06/12/22 1045   Pain Assessment   Pain Assessment 0-10   Pain Level 6   Patient's Stated Pain Goal 3   Pain Location Throat   Pain Descriptors Sore   Functional Pain Assessment Prevents or interferes some active activities and ADLs   Pain Frequency Continuous   Pain Onset On-going   Care Plan - Pain Goals   Verbalizes/displays adequate comfort level or baseline comfort level Encourage patient to monitor pain and request assistance     PRN pain medications given.
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
## 2022-06-13 LAB
ANION GAP SERPL CALCULATED.3IONS-SCNC: 12 MMOL/L (ref 3–16)
BASOPHILS ABSOLUTE: 0.1 K/UL (ref 0–0.2)
BASOPHILS RELATIVE PERCENT: 1.2 %
BUN BLDV-MCNC: 4 MG/DL (ref 7–20)
CALCIUM SERPL-MCNC: 8.7 MG/DL (ref 8.3–10.6)
CHLORIDE BLD-SCNC: 102 MMOL/L (ref 99–110)
CO2: 24 MMOL/L (ref 21–32)
CREAT SERPL-MCNC: 0.6 MG/DL (ref 0.9–1.3)
EOSINOPHILS ABSOLUTE: 0.2 K/UL (ref 0–0.6)
EOSINOPHILS RELATIVE PERCENT: 1.4 %
GFR AFRICAN AMERICAN: >60
GFR NON-AFRICAN AMERICAN: >60
GLUCOSE BLD-MCNC: 120 MG/DL (ref 70–99)
HCT VFR BLD CALC: 36.6 % (ref 40.5–52.5)
HEMOGLOBIN: 11.8 G/DL (ref 13.5–17.5)
LYMPHOCYTES ABSOLUTE: 1.5 K/UL (ref 1–5.1)
LYMPHOCYTES RELATIVE PERCENT: 14 %
MCH RBC QN AUTO: 30.3 PG (ref 26–34)
MCHC RBC AUTO-ENTMCNC: 32.2 G/DL (ref 31–36)
MCV RBC AUTO: 93.8 FL (ref 80–100)
MONOCYTES ABSOLUTE: 0.6 K/UL (ref 0–1.3)
MONOCYTES RELATIVE PERCENT: 5.4 %
NEUTROPHILS ABSOLUTE: 8.3 K/UL (ref 1.7–7.7)
NEUTROPHILS RELATIVE PERCENT: 78 %
PDW BLD-RTO: 15.4 % (ref 12.4–15.4)
PLATELET # BLD: 296 K/UL (ref 135–450)
PMV BLD AUTO: 9.8 FL (ref 5–10.5)
POTASSIUM REFLEX MAGNESIUM: 4.5 MMOL/L (ref 3.5–5.1)
RBC # BLD: 3.9 M/UL (ref 4.2–5.9)
SODIUM BLD-SCNC: 138 MMOL/L (ref 136–145)
WBC # BLD: 10.6 K/UL (ref 4–11)

## 2022-06-13 PROCEDURE — 6360000002 HC RX W HCPCS: Performed by: SURGERY

## 2022-06-13 PROCEDURE — C9113 INJ PANTOPRAZOLE SODIUM, VIA: HCPCS | Performed by: NURSE PRACTITIONER

## 2022-06-13 PROCEDURE — 6370000000 HC RX 637 (ALT 250 FOR IP): Performed by: SURGERY

## 2022-06-13 PROCEDURE — 1200000000 HC SEMI PRIVATE

## 2022-06-13 PROCEDURE — 99024 POSTOP FOLLOW-UP VISIT: CPT | Performed by: NURSE PRACTITIONER

## 2022-06-13 PROCEDURE — 6360000002 HC RX W HCPCS: Performed by: NURSE PRACTITIONER

## 2022-06-13 PROCEDURE — 80048 BASIC METABOLIC PNL TOTAL CA: CPT

## 2022-06-13 PROCEDURE — 36415 COLL VENOUS BLD VENIPUNCTURE: CPT

## 2022-06-13 PROCEDURE — 99232 SBSQ HOSP IP/OBS MODERATE 35: CPT | Performed by: INTERNAL MEDICINE

## 2022-06-13 PROCEDURE — 2580000003 HC RX 258: Performed by: SURGERY

## 2022-06-13 PROCEDURE — 85025 COMPLETE CBC W/AUTO DIFF WBC: CPT

## 2022-06-13 RX ADMIN — METOPROLOL TARTRATE 25 MG: 25 TABLET, FILM COATED ORAL at 08:42

## 2022-06-13 RX ADMIN — TRAZODONE HYDROCHLORIDE 200 MG: 100 TABLET ORAL at 20:49

## 2022-06-13 RX ADMIN — HYDROMORPHONE HYDROCHLORIDE 0.5 MG: 1 INJECTION, SOLUTION INTRAMUSCULAR; INTRAVENOUS; SUBCUTANEOUS at 06:20

## 2022-06-13 RX ADMIN — HEPARIN SODIUM 5000 UNITS: 5000 INJECTION INTRAVENOUS; SUBCUTANEOUS at 06:20

## 2022-06-13 RX ADMIN — POTASSIUM CHLORIDE AND SODIUM CHLORIDE: 900; 150 INJECTION, SOLUTION INTRAVENOUS at 04:20

## 2022-06-13 RX ADMIN — HEPARIN SODIUM 5000 UNITS: 5000 INJECTION INTRAVENOUS; SUBCUTANEOUS at 15:23

## 2022-06-13 RX ADMIN — DOCUSATE SODIUM 100 MG: 100 CAPSULE, LIQUID FILLED ORAL at 08:42

## 2022-06-13 RX ADMIN — OXYCODONE HYDROCHLORIDE AND ACETAMINOPHEN 2 TABLET: 5; 325 TABLET ORAL at 17:06

## 2022-06-13 RX ADMIN — PIPERACILLIN AND TAZOBACTAM 3375 MG: 3; .375 INJECTION, POWDER, LYOPHILIZED, FOR SOLUTION INTRAVENOUS at 08:53

## 2022-06-13 RX ADMIN — HYDROMORPHONE HYDROCHLORIDE 1 MG: 1 INJECTION, SOLUTION INTRAMUSCULAR; INTRAVENOUS; SUBCUTANEOUS at 20:49

## 2022-06-13 RX ADMIN — PIPERACILLIN AND TAZOBACTAM 3375 MG: 3; .375 INJECTION, POWDER, LYOPHILIZED, FOR SOLUTION INTRAVENOUS at 15:23

## 2022-06-13 RX ADMIN — Medication 10 MG: at 20:54

## 2022-06-13 RX ADMIN — HEPARIN SODIUM 5000 UNITS: 5000 INJECTION INTRAVENOUS; SUBCUTANEOUS at 20:48

## 2022-06-13 RX ADMIN — PANTOPRAZOLE SODIUM 40 MG: 40 INJECTION, POWDER, FOR SOLUTION INTRAVENOUS at 08:47

## 2022-06-13 RX ADMIN — OXYCODONE HYDROCHLORIDE AND ACETAMINOPHEN 2 TABLET: 5; 325 TABLET ORAL at 08:46

## 2022-06-13 RX ADMIN — METOPROLOL TARTRATE 25 MG: 25 TABLET, FILM COATED ORAL at 20:48

## 2022-06-13 RX ADMIN — OXYCODONE HYDROCHLORIDE AND ACETAMINOPHEN 2 TABLET: 5; 325 TABLET ORAL at 04:24

## 2022-06-13 RX ADMIN — LISINOPRIL 20 MG: 20 TABLET ORAL at 08:42

## 2022-06-13 ASSESSMENT — PAIN SCALES - WONG BAKER
WONGBAKER_NUMERICALRESPONSE: 0

## 2022-06-13 ASSESSMENT — PAIN SCALES - GENERAL
PAINLEVEL_OUTOF10: 7
PAINLEVEL_OUTOF10: 8
PAINLEVEL_OUTOF10: 3
PAINLEVEL_OUTOF10: 8
PAINLEVEL_OUTOF10: 9
PAINLEVEL_OUTOF10: 3

## 2022-06-13 ASSESSMENT — PAIN DESCRIPTION - LOCATION
LOCATION: ABDOMEN

## 2022-06-13 ASSESSMENT — PAIN DESCRIPTION - PAIN TYPE: TYPE: ACUTE PAIN;SURGICAL PAIN

## 2022-06-13 ASSESSMENT — PAIN DESCRIPTION - ONSET: ONSET: ON-GOING

## 2022-06-13 ASSESSMENT — PAIN - FUNCTIONAL ASSESSMENT
PAIN_FUNCTIONAL_ASSESSMENT: ACTIVITIES ARE NOT PREVENTED
PAIN_FUNCTIONAL_ASSESSMENT: ACTIVITIES ARE NOT PREVENTED

## 2022-06-13 ASSESSMENT — PAIN DESCRIPTION - DESCRIPTORS
DESCRIPTORS: ACHING;CRAMPING;DISCOMFORT
DESCRIPTORS: ACHING;CRAMPING;DISCOMFORT
DESCRIPTORS: ACHING;DISCOMFORT

## 2022-06-13 ASSESSMENT — PAIN DESCRIPTION - FREQUENCY: FREQUENCY: CONTINUOUS

## 2022-06-13 ASSESSMENT — PAIN DESCRIPTION - ORIENTATION
ORIENTATION: MID
ORIENTATION: MID

## 2022-06-13 NOTE — PROGRESS NOTES
mg IntraVENous Q8H       Continuous Infusions:   0.9% NaCl with KCl 20 mEq 50 mL/hr at 06/13/22 0420       PRN Meds:  HYDROmorphone **OR** HYDROmorphone, oxyCODONE-acetaminophen **OR** oxyCODONE-acetaminophen, polyethylene glycol, melatonin, hydrALAZINE, labetalol, promethazine, ondansetron, diphenhydrAMINE, acetaminophen, phenol      Data:  CBC:   No results for input(s): WBC, HGB, HCT, MCV, PLT in the last 72 hours. BMP:   Recent Labs     06/11/22  0616 06/12/22  0601 06/13/22  0522    140 138   K 3.7 4.1 4.5    102 102   CO2 22 24 24   BUN 5* 4* 4*   CREATININE <0.5* <0.5* 0.6*     LIVER PROFILE: No results for input(s): AST, ALT, LIPASE, BILIDIR, BILITOT, ALKPHOS in the last 72 hours. Invalid input(s): AMYLASE,  ALB  PT/INR: No results for input(s): PROTIME, INR in the last 72 hours. CULTURES  Blood cx x2: NGTD  Cultures: pending   COVID/Influenza negative      RADIOLOGY  IR ABSCESS DRAINAGE PERC   Final Result   Successful ultrasound-guided placement of 10 Chinese drainage catheter into   anterior abdominal fluid collection. XR CHEST PORTABLE   Final Result   Mild left basilar airspace disease. This could represent atelectasis or in   the appropriate clinical setting pneumonia. CT ABDOMEN PELVIS W IV CONTRAST Additional Contrast? None   Final Result   There are 2 rim enhancing fluid collections with surrounding edema to the   liver, one to the right lobe and one to the left lobe. These both measure   smaller than on prior exam 06/02/2022 although only the right lobe collection   has a drainage catheter in place. Interval surgical intervention with partial small-bowel resection. There are   some persistent distended air-filled loops of small bowel in the abdomen with   overall decreased extent of involvement and decreased distension from prior   exam compatible with improving small-bowel obstruction.       New rim enhancing fluid collection intraperitoneal to the abdomen anteriorly   measuring grossly 12.0 x 24.7 x 4.0 cm compatible with abscess. Gas internal   to this dominant collection may relate to the infection itself or could   relate to the recent surgical intervention. There are multiple additional   smaller similar fluid collections which could reflect additional loculations   of this dominant collection or could reflect separate abscesses with the two   largest described as above. Shotty mesenteric and retroperitoneal lymph nodes are likely reactive. New foci of consolidation to bilateral lower lobes, left more than right,   more likely on the basis of atelectasis although pneumonia or aspiration   pneumonitis could have a similar appearance in the appropriate clinical   setting. New trace bilateral pleural effusions. Diffuse urinary bladder wall thickening does not appear appreciably changed   given differences in degree of distention of the urinary bladder as compared   to the prior CT exam.  Findings may relate at least in part to the   underdistention, but nonspecific superimposed cystitis is not excluded. Clinical and laboratory correlation can be made. XR ABDOMEN (2 VIEWS)   Final Result   Moderate small bowel distention in the left mid abdomen with transition   distally remains concerning for obstruction. No free air. CT DRAINAGE VISCERAL PERCUTANEOUS   Final Result   1. Successful placement of a 12 Turkish drainage catheter within a right   hepatic abscess. 2. Despite optimum positioning of catheter, no fluid could be aspirated from   a left hepatic abscess that was seen on the prior CT. Obvious fluid cannot   be seen on the current noncontrast series, and some interval improvement may   account for this finding. A drainage catheter was not placed in the left   hepatic abscess. CT GUIDED NEEDLE PLACEMENT   Final Result   1.  Successful placement of a 12 Turkish drainage catheter within a right   hepatic abscess. 2. Despite optimum positioning of catheter, no fluid could be aspirated from   a left hepatic abscess that was seen on the prior CT. Obvious fluid cannot   be seen on the current noncontrast series, and some interval improvement may   account for this finding. A drainage catheter was not placed in the left   hepatic abscess. CT ABDOMEN PELVIS W IV CONTRAST Additional Contrast? None   Final Result   Moderate pneumoperitoneum in the left upper quadrant, likely small bowel   perforation. There is an abnormal segment of ileum in the right lower   quadrant, similar to 05/22/2022, inflammatory bowel disease versus infectious   enteritis. There is upstream moderate dilation of the mid small bowel with   possible pneumatosis. Closed loop obstruction is a consideration. Small amount of free fluid. Bilobar hepatic abscesses are persistent, with decreasing surrounding edema. Critical results were called by Dr. Ambreen Mansfield MD to [facility]   on 6/2/2022 at 16:43. Small amount of free fluid. RECOMMENDATIONS:   Unavailable         XR CHEST PORTABLE   Final Result   Clear lungs. Assessment/Plan:    #Hypertension, uncontrolled  -on lopressor and lisinopril, continue   -added prn IV hydralazine , Prn labatelol .   -continue to monitor   Blood pressure readings are currently stable    #Possible Aspiration pneumonitis   -pt is asymptomatic   -CXR and CT as above    - already on Zosyn    #Pleural Effusions  - noted on CT - trace      #Pneumoperitoneum with SB perforation  # abdominal abscess  -POD #11, s/p SB resection and lysis of adhesions   -cont IV zosyn day #12  -new fluid collection noted on CT 6/10 , --> to IR -> drain placed  In RLQ. Cultures from the drain negative so far  -general surgery following     #Post-operative Ileus   -GI and general surgery following   -NGT decompression   NG tube clamped- > removed  today.   Started on clear liquids     #Liver abscesses  #Transaminitis   -on IV abx   -continue percutaneous drainage of abscess  -GI following         DVT Prophylaxis: Lovenox   Diet: ADULT ORAL NUTRITION SUPPLEMENT; Breakfast, Lunch, Dinner; Standard High Calorie/High Protein Oral Supplement  ADULT DIET;  Full Liquid  Code Status: Full Code       Magali Hoff MD   6/13/2022

## 2022-06-13 NOTE — PROGRESS NOTES
Shift assessment complete. See doc flow. Patient actively vomiting, PO meds not given at this time, IV meds given, PRN Zofran see MAR. A/O x4. IVF infusing. Urinal at the bedside. Ambulates independently. Patient c/o abdominal pain 8/10 PRN Dilaudid given over PO Percocet due to the current emesis. PIERRE drains x2 in place. Midline incision RAFAEL w/ staples in place, some redness noted around incision area, dry, no drainage noted. Call light and bedside table within easy reach.
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
## 2022-06-13 NOTE — PLAN OF CARE
Problem: Discharge Planning  Goal: Discharge to home or other facility with appropriate resources  Outcome: Progressing     Problem: Pain  Goal: Verbalizes/displays adequate comfort level or baseline comfort level  Outcome: Progressing  Flowsheets (Taken 6/13/2022 8598)  Verbalizes/displays adequate comfort level or baseline comfort level: Encourage patient to monitor pain and request assistance     Problem: Safety - Adult  Goal: Free from fall injury  Outcome: Progressing     Problem: ABCDS Injury Assessment  Goal: Absence of physical injury  Outcome: Progressing     Problem: Neurosensory - Adult  Goal: Achieves stable or improved neurological status  Outcome: Progressing     Problem: Respiratory - Adult  Goal: Achieves optimal ventilation and oxygenation  Outcome: Progressing     Problem: Cardiovascular - Adult  Goal: Maintains optimal cardiac output and hemodynamic stability  Outcome: Progressing     Problem: Skin/Tissue Integrity - Adult  Goal: Skin integrity remains intact  Outcome: Progressing     Problem: Musculoskeletal - Adult  Goal: Return mobility to safest level of function  Outcome: Progressing     Problem: Gastrointestinal - Adult  Goal: Minimal or absence of nausea and vomiting  Outcome: Progressing  Goal: Maintains or returns to baseline bowel function  Outcome: Progressing     Problem: Genitourinary - Adult  Goal: Absence of urinary retention  Outcome: Progressing     Problem: Infection - Adult  Goal: Absence of infection at discharge  Outcome: Progressing     Problem: Skin/Tissue Integrity  Goal: Absence of new skin breakdown  Description: 1. Monitor for areas of redness and/or skin breakdown  2. Assess vascular access sites hourly  3. Every 4-6 hours minimum:  Change oxygen saturation probe site  4. Every 4-6 hours:  If on nasal continuous positive airway pressure, respiratory therapy assess nares and determine need for appliance change or resting period.   Outcome: Progressing     Problem: Nutrition Deficit:  Goal: Optimize nutritional status  Outcome: Progressing

## 2022-06-13 NOTE — PROGRESS NOTES
General Surgery - Valeria Cisneros, APRN - CNP, CNP  Daily Progress Note    Pt Name: Jennifer Hubbard [place] Record Number: [id number]  Date of Birth 1960   Today's Date: 6/13/2022    ASSESSMENT  1. POD # 11` s/p SBR RAMESH for SB perf 2/2 toothpick ingestion  2. S/P perc drain placement in large intraabdominal fluid collection 6/10  3. S/P perc drain placement in hepatic abscess on 6/3   4. Repeat CT 6/9 to evaluate hepatic abscess: intraperitoneal to the anterior abdomen with new abscess 24.7 x 4 x 12; central pelvis 5.4 x 6.7 cm, right pericolic gutter 3.8 x 2.8 cm  5. GI: +mild distention, staples look good and left open to air, NGT is out, + V x 1 yesterday morning, denies nay N/V since, + flatus, + BMs yesterday  6. VSS  7. Leuks 10  8. K+ 4.5  9. NGT: 1.4L  10. UO good. 11. Per drain liver abcess: small mount of brown drainage noted  12. Perc drain intraabdominal fluid collection: serosanguinous, little cloudy: no growth this far  13. ETOH abuse hx      PLAN  1. Zosyn  2. IS q 1 hour while awake  3. DVT proph: heparin sub q  4. Pecid unavailable: Protonix IVP  5. Pain control   6. OOB/Ambulate  7. PT/OT: ambulate pt: pt has been refusing therapy  8. Pt is POD #11 s/p SBR, RAMESH with post op ileus s/p perc drain intraabdominal fluid collection. Pt vomited once yesterday but, he denies this and reports \"it's the liquids making me sick. \" Reiterated the importance of ambulation and OOB. Planning repeat CT tomorrow. Beth Ok has improved and worsened in some ways from yesterday. Pain appears well controlled. He has vomited x 1 yesterday. He has passed flatus and has had BMs yesterday. He is tolerating clears today. Current activity is up with assistance    OBJECTIVE  VITALS:  height is 5' 10\" (1.778 m) and weight is 222 lb (100.7 kg). His oral temperature is 98.3 °F (36.8 °C). His blood pressure is 143/96 (abnormal) and his pulse is 81. His respiration is 16 and oxygen saturation is 93%. VITALS:  BP (!) 143/96   Pulse 81   Temp 98.3 °F (36.8 °C) (Oral)   Resp 16   Ht 5' 10\" (1.778 m)   Wt 222 lb (100.7 kg)   SpO2 93%   BMI 31.85 kg/m²   INTAKE/OUTPUT:      Intake/Output Summary (Last 24 hours) at 6/13/2022 1421  Last data filed at 6/13/2022 1210  Gross per 24 hour   Intake 720 ml   Output 2180 ml   Net -1460 ml     GENERAL: alert, cooperative, no distress    I/O last 3 completed shifts: In: 4834 [P.O.:600;  I.V.:900; IV Piggyback:90]  Out: 3054 [Urine:3300; Emesis/NG output:500; Drains:146]  I/O this shift:  In: 120 [P.O.:120]  Out: 700 [Urine:700]    LABS  Recent Labs     06/13/22  0522   WBC 10.6   HGB 11.8*   HCT 36.6*         K 4.5      CO2 24   BUN 4*   CREATININE 0.6*   CALCIUM 8.7     CBC with Differential:    Lab Results   Component Value Date    WBC 10.6 06/13/2022    RBC 3.90 06/13/2022    HGB 11.8 06/13/2022    HCT 36.6 06/13/2022     06/13/2022    MCV 93.8 06/13/2022    MCH 30.3 06/13/2022    MCHC 32.2 06/13/2022    RDW 15.4 06/13/2022    LYMPHOPCT 14.0 06/13/2022    MONOPCT 5.4 06/13/2022    BASOPCT 1.2 06/13/2022    MONOSABS 0.6 06/13/2022    LYMPHSABS 1.5 06/13/2022    EOSABS 0.2 06/13/2022    BASOSABS 0.1 06/13/2022     CMP:    Lab Results   Component Value Date     06/13/2022    K 4.5 06/13/2022     06/13/2022    CO2 24 06/13/2022    BUN 4 06/13/2022    CREATININE 0.6 06/13/2022    GFRAA >60 06/13/2022    AGRATIO 0.7 06/02/2022    LABGLOM >60 06/13/2022    GLUCOSE 120 06/13/2022    PROT 8.0 06/02/2022    LABALBU 3.4 06/02/2022    CALCIUM 8.7 06/13/2022    BILITOT 0.5 06/02/2022    ALKPHOS 107 06/02/2022    AST 19 06/02/2022    ALT 20 06/02/2022         HERRERA Garibay - CNP  Electronically signed 6/13/2022 at 2:21 PM

## 2022-06-13 NOTE — PROGRESS NOTES
Patient resting, eyes closed, respirations witnessed as e/e. No signs of distress. Call light and bedside table is easy reach.

## 2022-06-13 NOTE — PROGRESS NOTES
Occupational Therapy Attempt    Unit: 2 [place]   Date:  6/13/2022  Patient Name:    Poli Martins  Admitting diagnosis:  Small bowel perforation ([facility] [address] [K63.1]  Pneumoperitoneum [K66.8]  Admit Date:  6/2/2022    Attempt @ 1135: Decline. Pt reports he is getting around the room independently. States no needs at d/c. Therapy rec home with 24/7 supervision and shower chair.      Brina Ciara, MOT, OTR/L   [id number]      No Charge

## 2022-06-13 NOTE — PLAN OF CARE
Problem: Discharge Planning  Goal: Discharge to home or other facility with appropriate resources  6/12/2022 2048 by Dagoberto Scherer RN  Outcome: Progressing  6/12/2022 1415 by Claudy Beckham RN  Outcome: Progressing  Flowsheets (Taken 6/12/2022 1047)  Discharge to home or other facility with appropriate resources:   Identify barriers to discharge with patient and caregiver   Arrange for needed discharge resources and transportation as appropriate   Identify discharge learning needs (meds, wound care, etc)   Arrange for interpreters to assist at discharge as needed   Refer to discharge planning if patient needs post-hospital services based on physician order or complex needs related to functional status, cognitive ability or social support system     Problem: Pain  Goal: Verbalizes/displays adequate comfort level or baseline comfort level  6/12/2022 2048 by Dagoberto Scherer RN  Outcome: Progressing  6/12/2022 1415 by Claudy Beckham RN  Outcome: Progressing  Flowsheets (Taken 6/12/2022 1045)  Verbalizes/displays adequate comfort level or baseline comfort level: Encourage patient to monitor pain and request assistance     Problem: Safety - Adult  Goal: Free from fall injury  6/12/2022 2048 by Dagoberto Scherer RN  Outcome: Progressing  6/12/2022 1415 by Claudy Beckham RN  Outcome: Progressing     Problem: ABCDS Injury Assessment  Goal: Absence of physical injury  6/12/2022 2048 by Dagoberto Scherer RN  Outcome: Progressing  6/12/2022 1415 by Claudy Beckham RN  Outcome: Progressing  Flowsheets (Taken 6/12/2022 1414)  Absence of Physical Injury: Implement safety measures based on patient assessment     Problem: Neurosensory - Adult  Goal: Achieves stable or improved neurological status  6/12/2022 2048 by Dagoberto Scherer RN  Outcome: Progressing  6/12/2022 1415 by Claudy Beckham RN  Outcome: Progressing  Flowsheets (Taken 6/12/2022 1047)  Achieves stable or improved neurological status: Assess for and report changes in neurological status     Problem: Respiratory - Adult  Goal: Achieves optimal ventilation and oxygenation  6/12/2022 2048 by Jessica London RN  Outcome: Progressing  6/12/2022 1415 by Camilla Cuevas RN  Outcome: Progressing  Flowsheets (Taken 6/12/2022 1047)  Achieves optimal ventilation and oxygenation:   Assess for changes in respiratory status   Assess for changes in mentation and behavior   Oxygen supplementation based on oxygen saturation or arterial blood gases     Problem: Cardiovascular - Adult  Goal: Maintains optimal cardiac output and hemodynamic stability  6/12/2022 2048 by Jessica London RN  Outcome: Progressing  6/12/2022 1415 by Camilla Cuevas RN  Outcome: Progressing  Flowsheets (Taken 6/12/2022 1047)  Maintains optimal cardiac output and hemodynamic stability:   Monitor blood pressure and heart rate   Assess for signs of decreased cardiac output     Problem: Skin/Tissue Integrity - Adult  Goal: Skin integrity remains intact  6/12/2022 2048 by Jessica London RN  Outcome: Progressing  6/12/2022 1415 by Camilla Cuevas RN  Outcome: Progressing  Flowsheets  Taken 6/12/2022 1415  Skin Integrity Remains Intact: Monitor for areas of redness and/or skin breakdown  Taken 6/12/2022 1047  Skin Integrity Remains Intact: Monitor for areas of redness and/or skin breakdown     Problem: Musculoskeletal - Adult  Goal: Return mobility to safest level of function  6/12/2022 2048 by Jessica London RN  Outcome: Progressing  6/12/2022 1415 by Camilla Cuevas RN  Outcome: Progressing  Flowsheets (Taken 6/12/2022 1047)  Return Mobility to Safest Level of Function:   Assess patient stability and activity tolerance for standing, transferring and ambulating with or without assistive devices   Assist with transfers and ambulation using safe patient handling equipment as needed     Problem: Gastrointestinal - Adult  Goal: Minimal or absence of nausea and vomiting  6/12/2022 2048 by Natalio Iyer Rose Mcpherson RN  Outcome: Progressing  6/12/2022 1415 by Titi Hernández RN  Outcome: Progressing  Flowsheets (Taken 6/12/2022 1047)  Minimal or absence of nausea and vomiting:   Administer IV fluids as ordered to ensure adequate hydration   Administer ordered antiemetic medications as needed  Goal: Maintains or returns to baseline bowel function  6/12/2022 2048 by Tao Zamora RN  Outcome: Progressing  6/12/2022 1415 by Titi Hernández RN  Outcome: Progressing  Flowsheets (Taken 6/12/2022 1047)  Maintains or returns to baseline bowel function:   Assess bowel function   Encourage oral fluids to ensure adequate hydration   Administer IV fluids as ordered to ensure adequate hydration   Administer ordered medications as needed   Encourage mobilization and activity     Problem: Genitourinary - Adult  Goal: Absence of urinary retention  6/12/2022 2048 by Tao Zamora RN  Outcome: Progressing  6/12/2022 1415 by Titi Hernández RN  Outcome: Progressing  Flowsheets (Taken 6/12/2022 1047)  Absence of urinary retention: Monitor intake/output and perform bladder scan as needed     Problem: Infection - Adult  Goal: Absence of infection at discharge  6/12/2022 2048 by Tao Zamora RN  Outcome: Progressing  6/12/2022 1415 by Titi Hernández RN  Outcome: Progressing  Flowsheets (Taken 6/12/2022 1047)  Absence of infection at discharge:   Assess and monitor for signs and symptoms of infection   Monitor lab/diagnostic results   Monitor all insertion sites i.e., indwelling lines, tubes and drains   Administer medications as ordered   Instruct and encourage patient and family to use good hand hygiene technique   Identify and instruct in appropriate isolation precautions for identified infection/condition     Problem: Skin/Tissue Integrity  Goal: Absence of new skin breakdown  Description: 1. Monitor for areas of redness and/or skin breakdown  2. Assess vascular access sites hourly  3.   Every 4-6 hours minimum: Change oxygen saturation probe site  4. Every 4-6 hours:  If on nasal continuous positive airway pressure, respiratory therapy assess nares and determine need for appliance change or resting period.   6/12/2022 2048 by Waldo Martinez RN  Outcome: Progressing  6/12/2022 1415 by Jessie Wakefield RN  Outcome: Progressing     Problem: Nutrition Deficit:  Goal: Optimize nutritional status  6/12/2022 2048 by Waldo Martinez RN  Outcome: Progressing  6/12/2022 1415 by Jessie Wakefield RN  Outcome: Progressing

## 2022-06-13 NOTE — PROGRESS NOTES
Monitor tech called reporting 2 runs of PVCs. Patient resting, eyes closed, respirations witnessed as e/e. No signs of distress. Call light and bedside table is easy reach.

## 2022-06-13 NOTE — FLOWSHEET NOTE
06/13/22 0742   Vital Signs   Temp 98.1 °F (36.7 °C)   Temp Source Oral   Heart Rate 92   Resp 16   BP (!) 140/82   BP Location Left upper arm   BP Method Automatic   MAP (Calculated) 101.33   Patient Position Semi fowlers   Level of Consciousness Alert (0)   MEWS Score 1   Pain Assessment   Pain Assessment 0-10   Pain Level 8   Pain Location Abdomen   Pain Descriptors Aching;Discomfort   Pain Type Acute pain;Surgical pain   Pain Frequency Continuous   Pain Onset On-going   Non-Pharmaceutical Pain Intervention(s) Rest   Care Plan - Pain Goals   Verbalizes/displays adequate comfort level or baseline comfort level Encourage patient to monitor pain and request assistance   Opioid-Induced Sedation   POSS Score 1   RASS   Jeff Agitation Sedation Scale (RASS) 0   Oxygen Therapy   SpO2 92 %   O2 Device None (Room air)     Alert and oriented. Continues full liquid diet. No emesis this a.m. zosyn IV atb as ordered. Perc drains to right abd side. Staples RAFAEL. Nursing asmt completed. No s/s distress noted. Call light in easy reach.

## 2022-06-13 NOTE — PROGRESS NOTES
Monitor room called and stated patient had 6 beats of PAT and HR got up to around 103.   Debora mesa MD
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
## 2022-06-14 ENCOUNTER — APPOINTMENT (OUTPATIENT)
Dept: CT IMAGING | Age: 62
DRG: 230 | End: 2022-06-14
Payer: COMMERCIAL

## 2022-06-14 PROCEDURE — 74177 CT ABD & PELVIS W/CONTRAST: CPT

## 2022-06-14 PROCEDURE — 6360000002 HC RX W HCPCS: Performed by: SURGERY

## 2022-06-14 PROCEDURE — 99232 SBSQ HOSP IP/OBS MODERATE 35: CPT | Performed by: INTERNAL MEDICINE

## 2022-06-14 PROCEDURE — 1200000000 HC SEMI PRIVATE

## 2022-06-14 PROCEDURE — 6370000000 HC RX 637 (ALT 250 FOR IP): Performed by: SURGERY

## 2022-06-14 PROCEDURE — 6360000002 HC RX W HCPCS: Performed by: NURSE PRACTITIONER

## 2022-06-14 PROCEDURE — 2580000003 HC RX 258: Performed by: SURGERY

## 2022-06-14 PROCEDURE — C9113 INJ PANTOPRAZOLE SODIUM, VIA: HCPCS | Performed by: NURSE PRACTITIONER

## 2022-06-14 PROCEDURE — 6360000004 HC RX CONTRAST MEDICATION

## 2022-06-14 PROCEDURE — 99024 POSTOP FOLLOW-UP VISIT: CPT | Performed by: NURSE PRACTITIONER

## 2022-06-14 PROCEDURE — 6360000004 HC RX CONTRAST MEDICATION: Performed by: SURGERY

## 2022-06-14 PROCEDURE — 6370000000 HC RX 637 (ALT 250 FOR IP): Performed by: INTERNAL MEDICINE

## 2022-06-14 RX ORDER — GABAPENTIN 400 MG/1
400 CAPSULE ORAL 3 TIMES DAILY
Status: DISCONTINUED | OUTPATIENT
Start: 2022-06-14 | End: 2022-06-18 | Stop reason: HOSPADM

## 2022-06-14 RX ADMIN — IOPAMIDOL 75 ML: 755 INJECTION, SOLUTION INTRAVENOUS at 11:52

## 2022-06-14 RX ADMIN — Medication 10 MG: at 21:11

## 2022-06-14 RX ADMIN — TRAZODONE HYDROCHLORIDE 200 MG: 100 TABLET ORAL at 21:11

## 2022-06-14 RX ADMIN — PIPERACILLIN AND TAZOBACTAM 3375 MG: 3; .375 INJECTION, POWDER, LYOPHILIZED, FOR SOLUTION INTRAVENOUS at 08:52

## 2022-06-14 RX ADMIN — OXYCODONE HYDROCHLORIDE AND ACETAMINOPHEN 2 TABLET: 5; 325 TABLET ORAL at 14:25

## 2022-06-14 RX ADMIN — PIPERACILLIN AND TAZOBACTAM 3375 MG: 3; .375 INJECTION, POWDER, LYOPHILIZED, FOR SOLUTION INTRAVENOUS at 23:49

## 2022-06-14 RX ADMIN — PANTOPRAZOLE SODIUM 40 MG: 40 INJECTION, POWDER, FOR SOLUTION INTRAVENOUS at 08:50

## 2022-06-14 RX ADMIN — GABAPENTIN 400 MG: 400 CAPSULE ORAL at 10:41

## 2022-06-14 RX ADMIN — DOCUSATE SODIUM 100 MG: 100 CAPSULE, LIQUID FILLED ORAL at 08:50

## 2022-06-14 RX ADMIN — POTASSIUM CHLORIDE AND SODIUM CHLORIDE: 900; 150 INJECTION, SOLUTION INTRAVENOUS at 06:45

## 2022-06-14 RX ADMIN — OXYCODONE HYDROCHLORIDE AND ACETAMINOPHEN 2 TABLET: 5; 325 TABLET ORAL at 08:56

## 2022-06-14 RX ADMIN — METOPROLOL TARTRATE 25 MG: 25 TABLET, FILM COATED ORAL at 08:50

## 2022-06-14 RX ADMIN — GABAPENTIN 400 MG: 400 CAPSULE ORAL at 21:12

## 2022-06-14 RX ADMIN — GABAPENTIN 400 MG: 400 CAPSULE ORAL at 14:20

## 2022-06-14 RX ADMIN — OXYCODONE HYDROCHLORIDE AND ACETAMINOPHEN 2 TABLET: 5; 325 TABLET ORAL at 21:11

## 2022-06-14 RX ADMIN — ONDANSETRON HYDROCHLORIDE 4 MG: 2 INJECTION, SOLUTION INTRAMUSCULAR; INTRAVENOUS at 20:03

## 2022-06-14 RX ADMIN — METOPROLOL TARTRATE 25 MG: 25 TABLET, FILM COATED ORAL at 21:11

## 2022-06-14 RX ADMIN — OXYCODONE HYDROCHLORIDE AND ACETAMINOPHEN 2 TABLET: 5; 325 TABLET ORAL at 00:13

## 2022-06-14 RX ADMIN — HEPARIN SODIUM 5000 UNITS: 5000 INJECTION INTRAVENOUS; SUBCUTANEOUS at 06:45

## 2022-06-14 RX ADMIN — HEPARIN SODIUM 5000 UNITS: 5000 INJECTION INTRAVENOUS; SUBCUTANEOUS at 21:11

## 2022-06-14 RX ADMIN — IOHEXOL 50 ML: 240 INJECTION, SOLUTION INTRATHECAL; INTRAVASCULAR; INTRAVENOUS; ORAL at 10:28

## 2022-06-14 RX ADMIN — LISINOPRIL 20 MG: 20 TABLET ORAL at 08:50

## 2022-06-14 RX ADMIN — HEPARIN SODIUM 5000 UNITS: 5000 INJECTION INTRAVENOUS; SUBCUTANEOUS at 14:20

## 2022-06-14 RX ADMIN — PIPERACILLIN AND TAZOBACTAM 3375 MG: 3; .375 INJECTION, POWDER, LYOPHILIZED, FOR SOLUTION INTRAVENOUS at 16:35

## 2022-06-14 RX ADMIN — PIPERACILLIN AND TAZOBACTAM 3375 MG: 3; .375 INJECTION, POWDER, LYOPHILIZED, FOR SOLUTION INTRAVENOUS at 00:16

## 2022-06-14 ASSESSMENT — PAIN DESCRIPTION - LOCATION
LOCATION: ABDOMEN

## 2022-06-14 ASSESSMENT — PAIN DESCRIPTION - DESCRIPTORS
DESCRIPTORS: ACHING;CRAMPING
DESCRIPTORS: ACHING;DISCOMFORT
DESCRIPTORS: CRAMPING

## 2022-06-14 ASSESSMENT — PAIN SCALES - GENERAL
PAINLEVEL_OUTOF10: 8
PAINLEVEL_OUTOF10: 8
PAINLEVEL_OUTOF10: 0
PAINLEVEL_OUTOF10: 8
PAINLEVEL_OUTOF10: 10
PAINLEVEL_OUTOF10: 8

## 2022-06-14 ASSESSMENT — PAIN DESCRIPTION - ORIENTATION
ORIENTATION: MID

## 2022-06-14 ASSESSMENT — PAIN SCALES - WONG BAKER
WONGBAKER_NUMERICALRESPONSE: 0

## 2022-06-14 ASSESSMENT — PAIN - FUNCTIONAL ASSESSMENT
PAIN_FUNCTIONAL_ASSESSMENT: ACTIVITIES ARE NOT PREVENTED

## 2022-06-14 ASSESSMENT — PAIN DESCRIPTION - ONSET: ONSET: ON-GOING

## 2022-06-14 ASSESSMENT — PAIN DESCRIPTION - FREQUENCY: FREQUENCY: CONTINUOUS

## 2022-06-14 NOTE — PROGRESS NOTES
Comprehensive Nutrition Assessment    Type and Reason for Visit:  Reassess    Nutrition Recommendations/Plan:   1. Continue ADULT DIET; Full Liquid diet order - diet advancement, per surgery. 2. Continue Ensure Enlive with meals. 3. Monitor appetite, meal intake, acceptance/intake of ONS, and whether diet is advanced. 4. Please obtain an updated weight for this patient - last weight was obtained on 6/2/22. 5. Monitor nutrition-related labs, GI status + bowel function, and weight trends. Malnutrition Assessment:  Malnutrition Status: At risk for malnutrition (06/14/22 1049)    Context:  Acute Illness     Findings of the 6 clinical characteristics of malnutrition:  Energy Intake:  50% or less of estimated energy requirements for 5 or more days  Weight Loss:   (- 6# or 2.7% weight loss since 5/19/22)     Body Fat Loss:  No significant body fat loss     Muscle Mass Loss:  No significant muscle mass loss    Fluid Accumulation:  No significant fluid accumulation     Strength:  Not Performed    Nutrition Assessment:    patient has slightly improved from a nutritional standpoint AEB po diet advancement to full liquids + he consumed 26-50% x 1 meal and 51-75% x 1 meal on 6/13/22, however, patient remains at risk for further compromise d/t NPO status majority of this admission and patient is POD #12 s/p ex-lap, small bowel resection, and RAMESH + post-op ileus with NG for decompression + he developed an intraperitoneal abscess, s/p abdominal drain placed on 6/10/22; will continue ADULT DIET;  Full Liquid diet order + continue Ensure Enlive with meals    Nutrition Related Findings:    patient is A & O x 4; he is POD #12 s/p ex-lap, small bowel resection, and RAMESH + post-op ileus with NG for decompression + he developed an intraperitoneal abscess, s/p abdominal drain placed on 6/10/22; patient vomited on 6/12/22, per notes; abdomen is round, distended, non-tender, and bowel sounds are active; last documented BM was Goal(s) Achieved  Goals: PO intake 50% or greater,by next RD assessment       Nutrition Monitoring and Evaluation:   Behavioral-Environmental Outcomes: None Identified  Food/Nutrient Intake Outcomes: Food and Nutrient Intake,Supplement Intake,Diet Advancement/Tolerance,IVF Intake  Physical Signs/Symptoms Outcomes: Biochemical Data,GI Status,Nausea or Vomiting,Meal Time Behavior,Nutrition Focused Physical Findings,Skin,Weight    Discharge Planning:     Too soon to determine     Edward Livingston, [address], [place]  Contact: [phone]

## 2022-06-14 NOTE — PROGRESS NOTES
Occupational Therapy    Attempted a therapy visit this afternoon. Pt was seated on EOB feeding himself. He declined therapy at this time. He reports he has been up walking in his room without difficulty. Does not feel that he needs therapy. Spoke to Estelita Gordon, the [address] [place] [address], who agreed. Both are agreeable to Occupational Therapy discharge this date. Discharge to take place.     Janeth Lynn [address], OTR/L  #[id number]
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
## 2022-06-14 NOTE — PLAN OF CARE
Problem: Discharge Planning  Goal: Discharge to home or other facility with appropriate resources  6/13/2022 2046 by Ry Toney RN  Outcome: Progressing  6/13/2022 1057 by Kiana Gupta RN  Outcome: Progressing  Flowsheets (Taken 6/13/2022 0754)  Discharge to home or other facility with appropriate resources: Identify barriers to discharge with patient and caregiver     Problem: Pain  Goal: Verbalizes/displays adequate comfort level or baseline comfort level  6/13/2022 2046 by Ry Toney RN  Outcome: Progressing  6/13/2022 1057 by Kiana Gupta RN  Outcome: Progressing  Flowsheets (Taken 6/13/2022 0742)  Verbalizes/displays adequate comfort level or baseline comfort level: Encourage patient to monitor pain and request assistance     Problem: Safety - Adult  Goal: Free from fall injury  6/13/2022 2046 by Ry Toney RN  Outcome: Progressing  6/13/2022 1057 by Kiana Gupta RN  Outcome: Progressing     Problem: ABCDS Injury Assessment  Goal: Absence of physical injury  6/13/2022 2046 by Ry Toney RN  Outcome: Progressing  6/13/2022 1057 by Kiana Gupta RN  Outcome: Progressing     Problem: Neurosensory - Adult  Goal: Achieves stable or improved neurological status  6/13/2022 2046 by Ry Toney RN  Outcome: Progressing  6/13/2022 1057 by Kiana Gupta RN  Outcome: Progressing  Flowsheets (Taken 6/13/2022 0754)  Achieves stable or improved neurological status: Assess for and report changes in neurological status     Problem: Respiratory - Adult  Goal: Achieves optimal ventilation and oxygenation  6/13/2022 2046 by Ry Toney RN  Outcome: Progressing  6/13/2022 1057 by Kiana Gupta RN  Outcome: Progressing     Problem: Cardiovascular - Adult  Goal: Maintains optimal cardiac output and hemodynamic stability  6/13/2022 2046 by Ry Toney RN  Outcome: Progressing  6/13/2022 1057 by Kiana Gupta RN  Outcome: Progressing     Problem: Skin/Tissue Integrity - Adult  Goal: Skin integrity remains intact  6/13/2022 2046 by Dhaval Noe RN  Outcome: Progressing  Flowsheets (Taken 6/13/2022 1059 by Steven Chavez RN)  Skin Integrity Remains Intact:   Monitor for areas of redness and/or skin breakdown   Assess vascular access sites hourly  6/13/2022 1057 by Steven Chavez RN  Outcome: Progressing  Flowsheets (Taken 6/13/2022 0754)  Skin Integrity Remains Intact: Monitor for areas of redness and/or skin breakdown     Problem: Musculoskeletal - Adult  Goal: Return mobility to safest level of function  6/13/2022 2046 by Dhaval Noe RN  Outcome: Progressing  6/13/2022 1057 by Steven Chavez RN  Outcome: Progressing  Flowsheets (Taken 6/13/2022 0754)  Return Mobility to Safest Level of Function: Assess patient stability and activity tolerance for standing, transferring and ambulating with or without assistive devices     Problem: Gastrointestinal - Adult  Goal: Minimal or absence of nausea and vomiting  6/13/2022 2046 by Dhaval Noe RN  Outcome: Progressing  6/13/2022 1057 by Steven Chavez RN  Outcome: Progressing  Goal: Maintains or returns to baseline bowel function  6/13/2022 2046 by Dhaval Noe RN  Outcome: Progressing  6/13/2022 1057 by Steven Chavez RN  Outcome: Progressing     Problem: Genitourinary - Adult  Goal: Absence of urinary retention  6/13/2022 2046 by Dhaval Noe RN  Outcome: Progressing  6/13/2022 1057 by Steven Chavez RN  Outcome: Progressing     Problem: Infection - Adult  Goal: Absence of infection at discharge  6/13/2022 2046 by Dhaval Noe RN  Outcome: Progressing  6/13/2022 1057 by Steven Chavez RN  Outcome: Progressing  Flowsheets (Taken 6/13/2022 0754)  Absence of infection at discharge: Assess and monitor for signs and symptoms of infection     Problem: Skin/Tissue Integrity  Goal: Absence of new skin breakdown  Description: 1. Monitor for areas of redness and/or skin breakdown  2. Assess vascular access sites hourly  3.   Every 4-6 hours minimum:  Change oxygen saturation probe site  4. Every 4-6 hours:  If on nasal continuous positive airway pressure, respiratory therapy assess nares and determine need for appliance change or resting period.   6/13/2022 2046 by Tao Zamora RN  Outcome: Progressing  6/13/2022 1057 by Renee Hanks RN  Outcome: Progressing     Problem: Nutrition Deficit:  Goal: Optimize nutritional status  6/13/2022 2046 by Tao Zamora RN  Outcome: Progressing  6/13/2022 1057 by Renee Hanks RN  Outcome: Progressing

## 2022-06-14 NOTE — PROGRESS NOTES
C/o abdominal pain rate as 8/10. Requesting pain medication. Oxycodone 10 mg pO given see mar. Call light in reach.

## 2022-06-14 NOTE — PROGRESS NOTES
IM Progress Note    Admit Date:  6/2/2022    Admitted with Pneumoperitoneum with SB perforation due to foreign body. s/p exploratory lap , SB resection and lysis of adhesions    Post-operative Ileus; s/p NG decompression  He developedIntraperitoneal abscess,  s/p abdominal drain placement in IR on 6/10    Subjective:  Mr. Ran Rose  is tolerating full liquid diet     Objective:         Vitals:    06/14/22 0021 06/14/22 0518 06/14/22 0730 06/14/22 0850   BP: (!) 136/92 (!) 160/90 (!) 145/93 (!) 145/93   Pulse: 78 77 80 80   Resp: 16 16 14    Temp: 97.5 °F (36.4 °C) 97.9 °F (36.6 °C) 97.8 °F (36.6 °C)    TempSrc: Oral Oral Oral    SpO2: 95% 94% 95%    Weight:       Height:              Intake/Output Summary (Last 24 hours) at 6/14/2022 0912  Last data filed at 6/14/2022 0859  Gross per 24 hour   Intake 600 ml   Output 3860 ml   Net -3260 ml       Physical Exam:    Gen: No distress. Alert. Awake and oriented. Eyes: PERRL. No sclera icterus. No conjunctival injection. Neck: No JVD. No Carotid Bruit. Trachea midline. Resp: No accessory muscle use. No crackles. No wheezes. No rhonchi. Diminished breath sounds   CV: Regular rate. Regular rhythm. No murmur. No rub. No edema. Peripheral Pulses: +2 palpable, equal bilaterally   GI:  Soft    drain in place in RUQ  +  bowel sounds. Skin: Warm and dry. No nodule on exposed extremities. No rash on exposed extremities. M/S: No cyanosis. No joint deformity. No clubbing. Neuro: Awake. Grossly nonfocal    Psych: Oriented x 3.  No anxiety or agitation.        Scheduled Meds:   docusate sodium  100 mg Oral Daily    pantoprazole  40 mg IntraVENous Daily    heparin (porcine)  5,000 Units SubCUTAneous 3 times per day    lisinopril  20 mg Oral Daily    metoprolol tartrate  25 mg Oral BID    traZODone  200 mg Oral Nightly    piperacillin-tazobactam  3,375 mg IntraVENous Q8H       Continuous Infusions:   0.9% NaCl with KCl 20 mEq 50 mL/hr at 06/14/22 0645       PRN Meds:  HYDROmorphone **OR** HYDROmorphone, oxyCODONE-acetaminophen **OR** oxyCODONE-acetaminophen, polyethylene glycol, melatonin, hydrALAZINE, labetalol, promethazine, ondansetron, diphenhydrAMINE, acetaminophen, phenol      Data:  CBC:   Recent Labs     06/13/22  0522   WBC 10.6   HGB 11.8*   HCT 36.6*   MCV 93.8        BMP:   Recent Labs     06/12/22  0601 06/13/22  0522    138   K 4.1 4.5    102   CO2 24 24   BUN 4* 4*   CREATININE <0.5* 0.6*     LIVER PROFILE: No results for input(s): AST, ALT, LIPASE, BILIDIR, BILITOT, ALKPHOS in the last 72 hours. Invalid input(s): AMYLASE,  ALB  PT/INR: No results for input(s): PROTIME, INR in the last 72 hours. CULTURES  Blood cx x2: NGTD  Cultures: pending   COVID/Influenza negative      RADIOLOGY  IR ABSCESS DRAINAGE PERC   Final Result   Successful ultrasound-guided placement of 10 [place] drainage catheter into   anterior abdominal fluid collection. XR CHEST PORTABLE   Final Result   Mild left basilar airspace disease. This could represent atelectasis or in   the appropriate clinical setting pneumonia. CT ABDOMEN PELVIS W IV CONTRAST Additional Contrast? None   Final Result   There are 2 rim enhancing fluid collections with surrounding edema to the   liver, one to the right lobe and one to the left lobe. These both measure   smaller than on prior exam 06/02/2022 although only the right lobe collection   has a drainage catheter in place. Interval surgical intervention with partial small-bowel resection. There are   some persistent distended air-filled loops of small bowel in the abdomen with   overall decreased extent of involvement and decreased distension from prior   exam compatible with improving small-bowel obstruction. New rim enhancing fluid collection intraperitoneal to the abdomen anteriorly   measuring grossly 12.0 x 24.7 x 4.0 cm compatible with abscess.   Gas internal   to this dominant collection may relate to the infection itself or could   relate to the recent surgical intervention. There are multiple additional   smaller similar fluid collections which could reflect additional loculations   of this dominant collection or could reflect separate abscesses with the two   largest described as above. Shotty mesenteric and retroperitoneal lymph nodes are likely reactive. New foci of consolidation to bilateral lower lobes, left more than right,   more likely on the basis of atelectasis although pneumonia or aspiration   pneumonitis could have a similar appearance in the appropriate clinical   setting. New trace bilateral pleural effusions. Diffuse urinary bladder wall thickening does not appear appreciably changed   given differences in degree of distention of the urinary bladder as compared   to the prior CT exam.  Findings may relate at least in part to the   underdistention, but nonspecific superimposed cystitis is not excluded. Clinical and laboratory correlation can be made. XR ABDOMEN (2 VIEWS)   Final Result   Moderate small bowel distention in the left mid abdomen with transition   distally remains concerning for obstruction. No free air. CT DRAINAGE VISCERAL PERCUTANEOUS   Final Result   1. Successful placement of a 12 [place] drainage catheter within a right   hepatic abscess. 2. Despite optimum positioning of catheter, no fluid could be aspirated from   a left hepatic abscess that was seen on the prior CT. Obvious fluid cannot   be seen on the current noncontrast series, and some interval improvement may   account for this finding. A drainage catheter was not placed in the left   hepatic abscess. CT GUIDED NEEDLE PLACEMENT   Final Result   1. Successful placement of a 12 [place] drainage catheter within a right   hepatic abscess.    2. Despite optimum positioning of catheter, no fluid could be aspirated from   a left hepatic abscess that was seen on the prior CT. Obvious fluid cannot   be seen on the current noncontrast series, and some interval improvement may   account for this finding. A drainage catheter was not placed in the left   hepatic abscess. CT ABDOMEN PELVIS W IV CONTRAST Additional Contrast? None   Final Result   Moderate pneumoperitoneum in the left upper quadrant, likely small bowel   perforation. There is an abnormal segment of ileum in the right lower   quadrant, similar to 05/22/2022, inflammatory bowel disease versus infectious   enteritis. There is upstream moderate dilation of the mid small bowel with   possible pneumatosis. Closed loop obstruction is a consideration. Small amount of free fluid. Bilobar hepatic abscesses are persistent, with decreasing surrounding edema. Critical results were called by Dr. Jean Junior MD to [facility]   on 6/2/2022 at 16:43. Small amount of free fluid. RECOMMENDATIONS:   Unavailable         XR CHEST PORTABLE   Final Result   Clear lungs. Assessment/Plan:    #Hypertension, uncontrolled  -on lopressor and lisinopril, continue   -added prn IV hydralazine , Prn labatelol .   -continue to monitor   Blood pressure readings are currently stable    #Possible Aspiration pneumonitis   -pt is asymptomatic   -CXR and CT as above    - already on Zosyn    #Pleural Effusions  - noted on CT - trace      #Pneumoperitoneum with SB perforation  # abdominal abscess  -POD #12, s/p SB resection and lysis of adhesions   -cont IV zosyn day #12  -new fluid collection noted on CT 6/10 , --> to IR -> drain placed  In RLQ. Cultures from the drain negative so far  -general surgery following  Repeat CT a/p tomorrow     #Post-operative Ileus   -GI and general surgery following   -NGT decompression   NG tube clamped- > removed  today.   Started on clear liquids     #Liver abscesses  #Transaminitis   -on IV abx   -continue percutaneous drainage of abscess  -GI following Gabapentin for neuropathy        DVT Prophylaxis: Lovenox   Diet: ADULT ORAL NUTRITION SUPPLEMENT; Breakfast, Lunch, Dinner; Standard High Calorie/High Protein Oral Supplement  ADULT DIET;  Full Liquid  Code Status: Full Code       Magali Hoff MD   6/14/2022

## 2022-06-14 NOTE — PROGRESS NOTES
General Surgery - Valeria Walker, APRN - CNP, CNP  Daily Progress Note    Pt Name: Jennifer RENDON [facility] Record Number: [id number]  Date of Birth 1960   Today's Date: 6/14/2022    ASSESSMENT  1. POD # 12 s/p SBR RAMESH for SB perf 2/2 toothpick ingestion  2. S/P perc drain placement in large intraabdominal fluid collection 6/10  3. S/P perc drain placement in hepatic abscess on 6/3   4. Repeat CT 6/9 to evaluate hepatic abscess: intraperitoneal to the anterior abdomen with new abscess 24.7 x 4 x 12; central pelvis 5.4 x 6.7 cm, right pericolic gutter 3.8 x 2.8 cm  5. GI: +mild distention unchanged, staples look good and open to air, no N/V , + flatus, + BMs 6/12  6. VSS  7. Leuks 10.6  8. K+ 4.5  9. UO good. 10. Per drain liver abcess: small mount of purulent drainage in bulb no output recorded   11. Perc drain intraabdominal fluid collection: serosanguinous, little cloudy: no growth this far  12. ETOH abuse hx      PLAN  1. Zosyn  2. IS q 1 hour while awake  3. DVT proph: heparin sub q  4. Protonix IVP  5. Pain control   6. OOB/Ambulate  7. PT/OT: ambulate pt: pt has been refusing therapy  8. Pt is POD #11 s/p SBR, RAMESH with post op ileus s/p perc drain intraabdominal fluid collection. Repat CT today. Juliana Goldman has improved in some ways from yesterday. Pain appears well controlled. He has no N/V. He has passed flatus and has had BMs yesterday. He is tolerating some fulls. Current activity is up with assistance    OBJECTIVE  VITALS:  height is 5' 10\" (1.778 m) and weight is 222 lb (100.7 kg). His oral temperature is 97.8 °F (36.6 °C). His blood pressure is 145/93 (abnormal) and his pulse is 80. His respiration is 14 and oxygen saturation is 95%.    VITALS:  BP (!) 145/93   Pulse 80   Temp 97.8 °F (36.6 °C) (Oral)   Resp 14   Ht 5' 10\" (1.778 m)   Wt 222 lb (100.7 kg)   SpO2 95%   BMI 31.85 kg/m²   INTAKE/OUTPUT:      Intake/Output Summary (Last 24 hours) at 6/14/2022 1145  Last data filed at 6/14/2022 0859  Gross per 24 hour   Intake 600 ml   Output 3860 ml   Net -3260 ml     GENERAL: alert, cooperative, no distress    I/O last 3 completed shifts:   In: 1320 [P.O.:1320]  Out: 5090 [Urine:4450; Emesis/NG output:500; Drains:140]  I/O this shift:  In: -   Out: 250 [Urine:250]    LABS  Recent Labs     06/13/22  0522   WBC 10.6   HGB 11.8*   HCT 36.6*         K 4.5      CO2 24   BUN 4*   CREATININE 0.6*   CALCIUM 8.7     CBC with Differential:    Lab Results   Component Value Date    WBC 10.6 06/13/2022    RBC 3.90 06/13/2022    HGB 11.8 06/13/2022    HCT 36.6 06/13/2022     06/13/2022    MCV 93.8 06/13/2022    MCH 30.3 06/13/2022    MCHC 32.2 06/13/2022    RDW 15.4 06/13/2022    LYMPHOPCT 14.0 06/13/2022    MONOPCT 5.4 06/13/2022    BASOPCT 1.2 06/13/2022    MONOSABS 0.6 06/13/2022    LYMPHSABS 1.5 06/13/2022    EOSABS 0.2 06/13/2022    BASOSABS 0.1 06/13/2022     CMP:    Lab Results   Component Value Date     06/13/2022    K 4.5 06/13/2022     06/13/2022    CO2 24 06/13/2022    BUN 4 06/13/2022    CREATININE 0.6 06/13/2022    GFRAA >60 06/13/2022    AGRATIO 0.7 06/02/2022    LABGLOM >60 06/13/2022    GLUCOSE 120 06/13/2022    PROT 8.0 06/02/2022    LABALBU 3.4 06/02/2022    CALCIUM 8.7 06/13/2022    BILITOT 0.5 06/02/2022    ALKPHOS 107 06/02/2022    AST 19 06/02/2022    ALT 20 06/02/2022         HERRERA Ramos - CNP  Electronically signed 6/14/2022 at 11:45 AM

## 2022-06-15 ENCOUNTER — APPOINTMENT (OUTPATIENT)
Dept: CT IMAGING | Age: 62
DRG: 230 | End: 2022-06-15
Payer: COMMERCIAL

## 2022-06-15 LAB
ANAEROBIC CULTURE: NORMAL
GRAM STAIN RESULT: NORMAL
WOUND/ABSCESS: NORMAL

## 2022-06-15 PROCEDURE — 6360000002 HC RX W HCPCS: Performed by: SURGERY

## 2022-06-15 PROCEDURE — 6370000000 HC RX 637 (ALT 250 FOR IP): Performed by: SURGERY

## 2022-06-15 PROCEDURE — 87070 CULTURE OTHR SPECIMN AEROBIC: CPT

## 2022-06-15 PROCEDURE — C1729 CATH, DRAINAGE: HCPCS

## 2022-06-15 PROCEDURE — 75989 ABSCESS DRAINAGE UNDER X-RAY: CPT

## 2022-06-15 PROCEDURE — 99024 POSTOP FOLLOW-UP VISIT: CPT | Performed by: NURSE PRACTITIONER

## 2022-06-15 PROCEDURE — 97116 GAIT TRAINING THERAPY: CPT

## 2022-06-15 PROCEDURE — 99232 SBSQ HOSP IP/OBS MODERATE 35: CPT | Performed by: INTERNAL MEDICINE

## 2022-06-15 PROCEDURE — 2580000003 HC RX 258: Performed by: SURGERY

## 2022-06-15 PROCEDURE — 6360000002 HC RX W HCPCS: Performed by: NURSE PRACTITIONER

## 2022-06-15 PROCEDURE — 6370000000 HC RX 637 (ALT 250 FOR IP): Performed by: INTERNAL MEDICINE

## 2022-06-15 PROCEDURE — 6360000002 HC RX W HCPCS: Performed by: RADIOLOGY

## 2022-06-15 PROCEDURE — 1200000000 HC SEMI PRIVATE

## 2022-06-15 PROCEDURE — C9113 INJ PANTOPRAZOLE SODIUM, VIA: HCPCS | Performed by: NURSE PRACTITIONER

## 2022-06-15 PROCEDURE — 87075 CULTR BACTERIA EXCEPT BLOOD: CPT

## 2022-06-15 PROCEDURE — 87205 SMEAR GRAM STAIN: CPT

## 2022-06-15 RX ORDER — MIDAZOLAM HYDROCHLORIDE 1 MG/ML
INJECTION INTRAMUSCULAR; INTRAVENOUS
Status: COMPLETED | OUTPATIENT
Start: 2022-06-15 | End: 2022-06-15

## 2022-06-15 RX ORDER — SODIUM CHLORIDE AND POTASSIUM CHLORIDE .9; .15 G/100ML; G/100ML
SOLUTION INTRAVENOUS CONTINUOUS
Status: DISCONTINUED | OUTPATIENT
Start: 2022-06-15 | End: 2022-06-16

## 2022-06-15 RX ORDER — FENTANYL CITRATE 50 UG/ML
INJECTION, SOLUTION INTRAMUSCULAR; INTRAVENOUS
Status: COMPLETED | OUTPATIENT
Start: 2022-06-15 | End: 2022-06-15

## 2022-06-15 RX ADMIN — METOPROLOL TARTRATE 25 MG: 25 TABLET, FILM COATED ORAL at 22:11

## 2022-06-15 RX ADMIN — MIDAZOLAM 1 MG: 1 INJECTION INTRAMUSCULAR; INTRAVENOUS at 13:44

## 2022-06-15 RX ADMIN — PANTOPRAZOLE SODIUM 40 MG: 40 INJECTION, POWDER, FOR SOLUTION INTRAVENOUS at 07:54

## 2022-06-15 RX ADMIN — GABAPENTIN 400 MG: 400 CAPSULE ORAL at 22:12

## 2022-06-15 RX ADMIN — TRAZODONE HYDROCHLORIDE 200 MG: 100 TABLET ORAL at 22:11

## 2022-06-15 RX ADMIN — OXYCODONE HYDROCHLORIDE AND ACETAMINOPHEN 2 TABLET: 5; 325 TABLET ORAL at 19:12

## 2022-06-15 RX ADMIN — FENTANYL CITRATE 50 MCG: 50 INJECTION, SOLUTION INTRAMUSCULAR; INTRAVENOUS at 13:45

## 2022-06-15 RX ADMIN — OXYCODONE HYDROCHLORIDE AND ACETAMINOPHEN 2 TABLET: 5; 325 TABLET ORAL at 06:25

## 2022-06-15 RX ADMIN — METOPROLOL TARTRATE 25 MG: 25 TABLET, FILM COATED ORAL at 07:54

## 2022-06-15 RX ADMIN — GABAPENTIN 400 MG: 400 CAPSULE ORAL at 13:01

## 2022-06-15 RX ADMIN — POTASSIUM CHLORIDE AND SODIUM CHLORIDE: 900; 150 INJECTION, SOLUTION INTRAVENOUS at 04:39

## 2022-06-15 RX ADMIN — DOCUSATE SODIUM 100 MG: 100 CAPSULE, LIQUID FILLED ORAL at 07:54

## 2022-06-15 RX ADMIN — HYDROMORPHONE HYDROCHLORIDE 1 MG: 1 INJECTION, SOLUTION INTRAMUSCULAR; INTRAVENOUS; SUBCUTANEOUS at 16:29

## 2022-06-15 RX ADMIN — Medication 10 MG: at 22:11

## 2022-06-15 RX ADMIN — PIPERACILLIN AND TAZOBACTAM 3375 MG: 3; .375 INJECTION, POWDER, LYOPHILIZED, FOR SOLUTION INTRAVENOUS at 16:26

## 2022-06-15 RX ADMIN — PIPERACILLIN AND TAZOBACTAM 3375 MG: 3; .375 INJECTION, POWDER, LYOPHILIZED, FOR SOLUTION INTRAVENOUS at 07:58

## 2022-06-15 RX ADMIN — LISINOPRIL 20 MG: 20 TABLET ORAL at 07:54

## 2022-06-15 RX ADMIN — GABAPENTIN 400 MG: 400 CAPSULE ORAL at 07:54

## 2022-06-15 RX ADMIN — HEPARIN SODIUM 5000 UNITS: 5000 INJECTION INTRAVENOUS; SUBCUTANEOUS at 06:25

## 2022-06-15 RX ADMIN — HYDROMORPHONE HYDROCHLORIDE 1 MG: 1 INJECTION, SOLUTION INTRAMUSCULAR; INTRAVENOUS; SUBCUTANEOUS at 22:12

## 2022-06-15 RX ADMIN — HEPARIN SODIUM 5000 UNITS: 5000 INJECTION INTRAVENOUS; SUBCUTANEOUS at 22:12

## 2022-06-15 RX ADMIN — OXYCODONE HYDROCHLORIDE AND ACETAMINOPHEN 2 TABLET: 5; 325 TABLET ORAL at 13:01

## 2022-06-15 ASSESSMENT — PAIN DESCRIPTION - ORIENTATION
ORIENTATION: RIGHT
ORIENTATION: LEFT
ORIENTATION: LOWER

## 2022-06-15 ASSESSMENT — PAIN SCALES - GENERAL
PAINLEVEL_OUTOF10: 4
PAINLEVEL_OUTOF10: 8
PAINLEVEL_OUTOF10: 7
PAINLEVEL_OUTOF10: 3
PAINLEVEL_OUTOF10: 8

## 2022-06-15 ASSESSMENT — PAIN DESCRIPTION - LOCATION
LOCATION: ABDOMEN

## 2022-06-15 ASSESSMENT — PAIN DESCRIPTION - DESCRIPTORS
DESCRIPTORS: ACHING;DISCOMFORT;SHARP
DESCRIPTORS: ACHING

## 2022-06-15 ASSESSMENT — PAIN - FUNCTIONAL ASSESSMENT: PAIN_FUNCTIONAL_ASSESSMENT: ACTIVITIES ARE NOT PREVENTED

## 2022-06-15 NOTE — PLAN OF CARE
Problem: Discharge Planning  Goal: Discharge to home or other facility with appropriate resources  Outcome: Progressing     Problem: Pain  Goal: Verbalizes/displays adequate comfort level or baseline comfort level  Outcome: Progressing     Problem: Skin/Tissue Integrity - Adult  Goal: Skin integrity remains intact  Outcome: Progressing     Problem: Gastrointestinal - Adult  Goal: Minimal or absence of nausea and vomiting  Outcome: Progressing     Problem: Gastrointestinal - Adult  Goal: Maintains or returns to baseline bowel function  Outcome: Progressing

## 2022-06-15 NOTE — FLOWSHEET NOTE
06/14/22 2007   Vital Signs   Temp 98.8 °F (37.1 °C)   Temp Source Oral   Heart Rate 84   Heart Rate Source Monitor   Resp 16   /84   BP Location Left upper arm   BP Method Automatic   MAP (Calculated) 101   Patient Position Semi fowlers   Level of Consciousness Alert (0)   MEWS Score 1   Oxygen Therapy   SpO2 95 %   O2 Device None (Room air)   Pt A/O assessment completed. Staples midline RAFAEL. 2 drains noted and emptied. Meds given per MAR. Pt denies any needs.  Call light within reach
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
## 2022-06-15 NOTE — PROGRESS NOTES
Bedside report given to Romana Umanzor RN  pt in stable condition no needs at this time.  Call light within reach

## 2022-06-15 NOTE — PROGRESS NOTES
General Surgery - Valeria Ty, APRN - CNP, CNP  Daily Progress Note    Pt Name: Jennifer Hubbard [place] Record Number: [id number]  Date of Birth 1960   Today's Date: 6/15/2022    ASSESSMENT  1. POD # 13 s/p SBR RAMESH for SB perf 2/2 toothpick ingestion  2. Repeat CT 6/14:midl distention of the stomach and duodenal sweep, distended SB loops   3. S/P perc drain placement in large intraabdominal fluid collection 6/10  4. S/P perc drain placement in hepatic abscess on 6/3   5. Repeat CT 6/9 to evaluate hepatic abscess: intraperitoneal to the anterior abdomen with new abscess 24.7 x 4 x 12; central pelvis 5.4 x 6.7 cm, right pericolic gutter 3.8 x 2.8 cm: reviewed with rads: he states he cam aspirate the right pericolic gutter fluid collection and place a drain in the left mid abdominal fluid collection. 6. GI: +mild distention unchanged, staples look good and open to air, no N/V , + flatus, + BMs 6/12  7. VSS  8. Leuks 10.6  9. K+ 4.5  10. UO good. 11. Per drain liver abcess: 5 ml: pruluent  12. Perc drain intraabdominal fluid collection: 90 ml out: humphrey serosanguinous drainage, no purulence, no growth  13. ETOH abuse hx      PLAN  1. Zosyn  2. IS q 1 hour while awake  3. DVT proph: heparin sub q  4. Protonix IVP  5. Pain control   6. OOB/Ambulate  7. NPO until drain s placed: discussed with RN  8. PT/OT: ambulate pt/disucsses with RN  9. Pt is POD #13 s/p SBR, RAMESH with post op ileus s/p perc drain intraabdominal fluid collection on 6/10: discussed with patient he needs fluid collection aspiration and perc drain placement in IR. Cultures are to be sent. He is agreeable with the plan of care. Case discussed with Dr. Jess Woods. Refugio Ceballos has improved in some ways from yesterday. Pain appears well controlled. He has no N/V. He has passed flatus and has had BMs yesterday. He is tolerating some fulls.  Current activity is up with assistance    OBJECTIVE  VITALS:  height is 5' 10\" (1.778 m) and weight is 222 lb (100.7 kg). His oral temperature is 98.8 °F (37.1 °C). His blood pressure is 126/85 and his pulse is 100. His respiration is 16 and oxygen saturation is 95%. VITALS:  /85   Pulse 100   Temp 98.8 °F (37.1 °C) (Oral)   Resp 16   Ht 5' 10\" (1.778 m)   Wt 222 lb (100.7 kg)   SpO2 95%   BMI 31.85 kg/m²   INTAKE/OUTPUT:      Intake/Output Summary (Last 24 hours) at 6/15/2022 1320  Last data filed at 6/15/2022 0810  Gross per 24 hour   Intake 4185.9 ml   Output 1805 ml   Net 2380.9 ml     GENERAL: alert, cooperative, no distress    I/O last 3 completed shifts: In: 4665.9 [P.O.:480; I.V.:3308.8;  IV Piggyback:877.1]  Out: 3951 [Urine:3950; Drains:110]  I/O this shift:  In: -   Out: 265 [Urine:250; Drains:15]    LABS  Recent Labs     06/13/22  0522   WBC 10.6   HGB 11.8*   HCT 36.6*         K 4.5      CO2 24   BUN 4*   CREATININE 0.6*   CALCIUM 8.7     CBC with Differential:    Lab Results   Component Value Date    WBC 10.6 06/13/2022    RBC 3.90 06/13/2022    HGB 11.8 06/13/2022    HCT 36.6 06/13/2022     06/13/2022    MCV 93.8 06/13/2022    MCH 30.3 06/13/2022    MCHC 32.2 06/13/2022    RDW 15.4 06/13/2022    LYMPHOPCT 14.0 06/13/2022    MONOPCT 5.4 06/13/2022    BASOPCT 1.2 06/13/2022    MONOSABS 0.6 06/13/2022    LYMPHSABS 1.5 06/13/2022    EOSABS 0.2 06/13/2022    BASOSABS 0.1 06/13/2022     CMP:    Lab Results   Component Value Date     06/13/2022    K 4.5 06/13/2022     06/13/2022    CO2 24 06/13/2022    BUN 4 06/13/2022    CREATININE 0.6 06/13/2022    GFRAA >60 06/13/2022    AGRATIO 0.7 06/02/2022    LABGLOM >60 06/13/2022    GLUCOSE 120 06/13/2022    PROT 8.0 06/02/2022    LABALBU 3.4 06/02/2022    CALCIUM 8.7 06/13/2022    BILITOT 0.5 06/02/2022    ALKPHOS 107 06/02/2022    AST 19 06/02/2022    ALT 20 06/02/2022         HERRERA Morfin - CNP  Electronically signed 6/15/2022 at 1:20 PM

## 2022-06-15 NOTE — PROGRESS NOTES
IM Progress Note    Admit Date:  6/2/2022    Admitted with Pneumoperitoneum with SB perforation due to foreign body. s/p exploratory lap , SB resection and lysis of adhesions    Post-operative Ileus; s/p NG decompression  He developedIntraperitoneal abscess,  s/p abdominal drain placement in IR on 6/10    Subjective:  Mr. Shaka Vogel  is tolerating full liquid diet     Objective:         Vitals:    06/14/22 2007 06/14/22 2357 06/15/22 0354 06/15/22 0736   BP: 135/84 124/68 (!) 124/91 127/79   Pulse: 84 72 96 94   Resp: 16 15 16 16   Temp: 98.8 °F (37.1 °C) 97.2 °F (36.2 °C) 97.7 °F (36.5 °C) 97.5 °F (36.4 °C)   TempSrc: Oral Axillary Oral Oral   SpO2: 95% 93% 93% 93%   Weight:       Height:              Intake/Output Summary (Last 24 hours) at 6/15/2022 0835  Last data filed at 6/15/2022 0810  Gross per 24 hour   Intake 4185.9 ml   Output 2055 ml   Net 2130.9 ml       Physical Exam:    Gen: No distress. Alert. Awake and oriented. Eyes: PERRL. No sclera icterus. No conjunctival injection. Neck: No JVD. No Carotid Bruit. Trachea midline. Resp: No accessory muscle use. No crackles. No wheezes. No rhonchi. Diminished breath sounds   CV: Regular rate. Regular rhythm. No murmur. No rub. No edema. Peripheral Pulses: +2 palpable, equal bilaterally   GI:  Soft    drain in place in RUQ  +  bowel sounds. Skin: Warm and dry. No nodule on exposed extremities. No rash on exposed extremities. M/S: No cyanosis. No joint deformity. No clubbing. Neuro: Awake. Grossly nonfocal    Psych: Oriented x 3.  No anxiety or agitation.        Scheduled Meds:   gabapentin  400 mg Oral TID    docusate sodium  100 mg Oral Daily    pantoprazole  40 mg IntraVENous Daily    heparin (porcine)  5,000 Units SubCUTAneous 3 times per day    lisinopril  20 mg Oral Daily    metoprolol tartrate  25 mg Oral BID    traZODone  200 mg Oral Nightly    piperacillin-tazobactam  3,375 mg IntraVENous Q8H       Continuous Infusions:   0.9% NaCl with KCl 20 mEq 50 mL/hr at 06/15/22 0439       PRN Meds:  HYDROmorphone **OR** HYDROmorphone, oxyCODONE-acetaminophen **OR** oxyCODONE-acetaminophen, polyethylene glycol, melatonin, hydrALAZINE, labetalol, promethazine, ondansetron, diphenhydrAMINE, acetaminophen, phenol      Data:  CBC:   Recent Labs     06/13/22  0522   WBC 10.6   HGB 11.8*   HCT 36.6*   MCV 93.8        BMP:   Recent Labs     06/13/22  0522      K 4.5      CO2 24   BUN 4*   CREATININE 0.6*     LIVER PROFILE: No results for input(s): AST, ALT, LIPASE, BILIDIR, BILITOT, ALKPHOS in the last 72 hours. Invalid input(s): AMYLASE,  ALB  PT/INR: No results for input(s): PROTIME, INR in the last 72 hours. CULTURES  Blood cx x2: NGTD  Cultures: pending   COVID/Influenza negative      RADIOLOGY  CT ABDOMEN PELVIS W IV CONTRAST Additional Contrast? Oral   Final Result   1. Stable right hepatic abscess with drainage catheter in place. Stable left   hepatic lesion, likely abscess with edematous changes extending to the   adjacent gastric antrum. No new abscess. 2. Interval percutaneous drainage of peritoneal collection adjacent to the   anterior abdominal wall, slightly improved. Decrease in loculated pelvic   collection with slight increase in fluid collection in the right pericolic   gutter. No new collection. Persistent edematous changes throughout the   mesentery with small interloop collections. 3. Gastric, duodenal and proximal small bowel distension with transition at   the level of the small bowel anastomosis consistent with a partial   obstruction. No contrast extravasation. 4. Mild bibasilar atelectasis with interval improvement. IR ABSCESS DRAINAGE PERC   Final Result   Successful ultrasound-guided placement of 10 Kinyarwanda drainage catheter into   anterior abdominal fluid collection. XR CHEST PORTABLE   Final Result   Mild left basilar airspace disease.   This could represent atelectasis or in   the appropriate clinical setting pneumonia. CT ABDOMEN PELVIS W IV CONTRAST Additional Contrast? None   Final Result   There are 2 rim enhancing fluid collections with surrounding edema to the   liver, one to the right lobe and one to the left lobe. These both measure   smaller than on prior exam 06/02/2022 although only the right lobe collection   has a drainage catheter in place. Interval surgical intervention with partial small-bowel resection. There are   some persistent distended air-filled loops of small bowel in the abdomen with   overall decreased extent of involvement and decreased distension from prior   exam compatible with improving small-bowel obstruction. New rim enhancing fluid collection intraperitoneal to the abdomen anteriorly   measuring grossly 12.0 x 24.7 x 4.0 cm compatible with abscess. Gas internal   to this dominant collection may relate to the infection itself or could   relate to the recent surgical intervention. There are multiple additional   smaller similar fluid collections which could reflect additional loculations   of this dominant collection or could reflect separate abscesses with the two   largest described as above. Shotty mesenteric and retroperitoneal lymph nodes are likely reactive. New foci of consolidation to bilateral lower lobes, left more than right,   more likely on the basis of atelectasis although pneumonia or aspiration   pneumonitis could have a similar appearance in the appropriate clinical   setting. New trace bilateral pleural effusions. Diffuse urinary bladder wall thickening does not appear appreciably changed   given differences in degree of distention of the urinary bladder as compared   to the prior CT exam.  Findings may relate at least in part to the   underdistention, but nonspecific superimposed cystitis is not excluded. Clinical and laboratory correlation can be made.          XR ABDOMEN (2 VIEWS)   Final Result Moderate small bowel distention in the left mid abdomen with transition   distally remains concerning for obstruction. No free air. CT DRAINAGE VISCERAL PERCUTANEOUS   Final Result   1. Successful placement of a 12 Armenian drainage catheter within a right   hepatic abscess. 2. Despite optimum positioning of catheter, no fluid could be aspirated from   a left hepatic abscess that was seen on the prior CT. Obvious fluid cannot   be seen on the current noncontrast series, and some interval improvement may   account for this finding. A drainage catheter was not placed in the left   hepatic abscess. CT GUIDED NEEDLE PLACEMENT   Final Result   1. Successful placement of a 12 Armenian drainage catheter within a right   hepatic abscess. 2. Despite optimum positioning of catheter, no fluid could be aspirated from   a left hepatic abscess that was seen on the prior CT. Obvious fluid cannot   be seen on the current noncontrast series, and some interval improvement may   account for this finding. A drainage catheter was not placed in the left   hepatic abscess. CT ABDOMEN PELVIS W IV CONTRAST Additional Contrast? None   Final Result   Moderate pneumoperitoneum in the left upper quadrant, likely small bowel   perforation. There is an abnormal segment of ileum in the right lower   quadrant, similar to 05/22/2022, inflammatory bowel disease versus infectious   enteritis. There is upstream moderate dilation of the mid small bowel with   possible pneumatosis. Closed loop obstruction is a consideration. Small amount of free fluid. Bilobar hepatic abscesses are persistent, with decreasing surrounding edema. Critical results were called by Dr. Diana Polk MD to [facility]   on 6/2/2022 at 16:43. Small amount of free fluid. RECOMMENDATIONS:   Unavailable         XR CHEST PORTABLE   Final Result   Clear lungs.                Assessment/Plan:    #Hypertension, uncontrolled  -on lopressor and lisinopril, continue   -added prn IV hydralazine , Prn labatelol .   -continue to monitor   Blood pressure readings are currently stable    #Possible Aspiration pneumonitis   -pt is asymptomatic   -CXR and CT as above    - already on Zosyn    #Pleural Effusions  - noted on CT - trace      #Pneumoperitoneum with SB perforation  # abdominal abscess  -POD #13, s/p SB resection and lysis of adhesions   -cont IV zosyn day #13  -new fluid collection noted on CT 6/10 , --> to IR -> drain placed  In RLQ. Cultures from the drain negative so far  -general surgery following  Repeat CT a/p reviewed     #Post-operative Ileus   -GI and general surgery following   -NGT decompression   NG tube clamped- > removed    Started on full liquids. Tolerating well. DC IV fluid     #Liver abscesses  #Transaminitis   -on IV abx   -continue percutaneous drainage of abscess  -GI following     Gabapentin for neuropathy        DVT Prophylaxis: Lovenox   Diet: ADULT ORAL NUTRITION SUPPLEMENT; Breakfast, Lunch, Dinner; Standard High Calorie/High Protein Oral Supplement  ADULT DIET;  Full Liquid  Code Status: Full Code       Magali Hoff MD   6/15/2022
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
## 2022-06-15 NOTE — PROGRESS NOTES
Report given @ bedside to [facility], for transferral of care. Pt resting in bed. Call light in reach.

## 2022-06-15 NOTE — PROGRESS NOTES
Pt c/o abdomen pain 8/10 when up OOB. Pt used CHG wipes prior to going to IR for perc drain placement. Held afternoon heparin for procedure. Transport here to take pt down to IR.

## 2022-06-16 LAB
ANION GAP SERPL CALCULATED.3IONS-SCNC: 12 MMOL/L (ref 3–16)
BUN BLDV-MCNC: 4 MG/DL (ref 7–20)
CALCIUM SERPL-MCNC: 8.9 MG/DL (ref 8.3–10.6)
CHLORIDE BLD-SCNC: 101 MMOL/L (ref 99–110)
CO2: 26 MMOL/L (ref 21–32)
CREAT SERPL-MCNC: <0.5 MG/DL (ref 0.9–1.3)
GFR AFRICAN AMERICAN: >60
GFR NON-AFRICAN AMERICAN: >60
GLUCOSE BLD-MCNC: 112 MG/DL (ref 70–99)
HCT VFR BLD CALC: 34.7 % (ref 40.5–52.5)
HEMOGLOBIN: 11.4 G/DL (ref 13.5–17.5)
MCH RBC QN AUTO: 29.9 PG (ref 26–34)
MCHC RBC AUTO-ENTMCNC: 32.9 G/DL (ref 31–36)
MCV RBC AUTO: 91 FL (ref 80–100)
PDW BLD-RTO: 15.2 % (ref 12.4–15.4)
PLATELET # BLD: 364 K/UL (ref 135–450)
PMV BLD AUTO: 9.2 FL (ref 5–10.5)
POTASSIUM SERPL-SCNC: 4.6 MMOL/L (ref 3.5–5.1)
RBC # BLD: 3.82 M/UL (ref 4.2–5.9)
SODIUM BLD-SCNC: 139 MMOL/L (ref 136–145)
WBC # BLD: 11.7 K/UL (ref 4–11)

## 2022-06-16 PROCEDURE — 6360000002 HC RX W HCPCS: Performed by: NURSE PRACTITIONER

## 2022-06-16 PROCEDURE — 36415 COLL VENOUS BLD VENIPUNCTURE: CPT

## 2022-06-16 PROCEDURE — 99232 SBSQ HOSP IP/OBS MODERATE 35: CPT | Performed by: INTERNAL MEDICINE

## 2022-06-16 PROCEDURE — 85027 COMPLETE CBC AUTOMATED: CPT

## 2022-06-16 PROCEDURE — 6360000002 HC RX W HCPCS: Performed by: SURGERY

## 2022-06-16 PROCEDURE — 6370000000 HC RX 637 (ALT 250 FOR IP): Performed by: INTERNAL MEDICINE

## 2022-06-16 PROCEDURE — 2580000003 HC RX 258: Performed by: SURGERY

## 2022-06-16 PROCEDURE — 99231 SBSQ HOSP IP/OBS SF/LOW 25: CPT | Performed by: INTERNAL MEDICINE

## 2022-06-16 PROCEDURE — 6370000000 HC RX 637 (ALT 250 FOR IP)

## 2022-06-16 PROCEDURE — 6370000000 HC RX 637 (ALT 250 FOR IP): Performed by: SURGERY

## 2022-06-16 PROCEDURE — 80048 BASIC METABOLIC PNL TOTAL CA: CPT

## 2022-06-16 PROCEDURE — 1200000000 HC SEMI PRIVATE

## 2022-06-16 PROCEDURE — C9113 INJ PANTOPRAZOLE SODIUM, VIA: HCPCS | Performed by: NURSE PRACTITIONER

## 2022-06-16 RX ORDER — BISACODYL 10 MG
10 SUPPOSITORY, RECTAL RECTAL ONCE
Status: COMPLETED | OUTPATIENT
Start: 2022-06-16 | End: 2022-06-16

## 2022-06-16 RX ORDER — BISACODYL 10 MG
SUPPOSITORY, RECTAL RECTAL
Status: COMPLETED
Start: 2022-06-16 | End: 2022-06-16

## 2022-06-16 RX ADMIN — GABAPENTIN 400 MG: 400 CAPSULE ORAL at 23:08

## 2022-06-16 RX ADMIN — BISACODYL 10 MG: 10 SUPPOSITORY RECTAL at 13:39

## 2022-06-16 RX ADMIN — HYDROMORPHONE HYDROCHLORIDE 1 MG: 1 INJECTION, SOLUTION INTRAMUSCULAR; INTRAVENOUS; SUBCUTANEOUS at 12:16

## 2022-06-16 RX ADMIN — OXYCODONE HYDROCHLORIDE AND ACETAMINOPHEN 2 TABLET: 5; 325 TABLET ORAL at 17:41

## 2022-06-16 RX ADMIN — METOPROLOL TARTRATE 25 MG: 25 TABLET, FILM COATED ORAL at 09:25

## 2022-06-16 RX ADMIN — PIPERACILLIN AND TAZOBACTAM 3375 MG: 3; .375 INJECTION, POWDER, LYOPHILIZED, FOR SOLUTION INTRAVENOUS at 17:46

## 2022-06-16 RX ADMIN — METOPROLOL TARTRATE 25 MG: 25 TABLET, FILM COATED ORAL at 23:08

## 2022-06-16 RX ADMIN — PIPERACILLIN AND TAZOBACTAM 3375 MG: 3; .375 INJECTION, POWDER, LYOPHILIZED, FOR SOLUTION INTRAVENOUS at 01:45

## 2022-06-16 RX ADMIN — LISINOPRIL 20 MG: 20 TABLET ORAL at 09:25

## 2022-06-16 RX ADMIN — HYDROMORPHONE HYDROCHLORIDE 1 MG: 1 INJECTION, SOLUTION INTRAMUSCULAR; INTRAVENOUS; SUBCUTANEOUS at 23:07

## 2022-06-16 RX ADMIN — GABAPENTIN 400 MG: 400 CAPSULE ORAL at 09:25

## 2022-06-16 RX ADMIN — PIPERACILLIN AND TAZOBACTAM 3375 MG: 3; .375 INJECTION, POWDER, LYOPHILIZED, FOR SOLUTION INTRAVENOUS at 09:31

## 2022-06-16 RX ADMIN — PANTOPRAZOLE SODIUM 40 MG: 40 INJECTION, POWDER, FOR SOLUTION INTRAVENOUS at 09:25

## 2022-06-16 RX ADMIN — Medication 10 MG: at 13:39

## 2022-06-16 RX ADMIN — HEPARIN SODIUM 5000 UNITS: 5000 INJECTION INTRAVENOUS; SUBCUTANEOUS at 23:08

## 2022-06-16 RX ADMIN — GABAPENTIN 400 MG: 400 CAPSULE ORAL at 13:37

## 2022-06-16 RX ADMIN — DOCUSATE SODIUM 100 MG: 100 CAPSULE, LIQUID FILLED ORAL at 09:28

## 2022-06-16 RX ADMIN — TRAZODONE HYDROCHLORIDE 200 MG: 100 TABLET ORAL at 23:08

## 2022-06-16 RX ADMIN — OXYCODONE HYDROCHLORIDE AND ACETAMINOPHEN 2 TABLET: 5; 325 TABLET ORAL at 09:27

## 2022-06-16 RX ADMIN — OXYCODONE HYDROCHLORIDE AND ACETAMINOPHEN 2 TABLET: 5; 325 TABLET ORAL at 13:45

## 2022-06-16 RX ADMIN — HEPARIN SODIUM 5000 UNITS: 5000 INJECTION INTRAVENOUS; SUBCUTANEOUS at 06:17

## 2022-06-16 RX ADMIN — HYDROMORPHONE HYDROCHLORIDE 1 MG: 1 INJECTION, SOLUTION INTRAMUSCULAR; INTRAVENOUS; SUBCUTANEOUS at 06:16

## 2022-06-16 RX ADMIN — Medication 10 MG: at 23:08

## 2022-06-16 RX ADMIN — HEPARIN SODIUM 5000 UNITS: 5000 INJECTION INTRAVENOUS; SUBCUTANEOUS at 13:38

## 2022-06-16 RX ADMIN — PIPERACILLIN AND TAZOBACTAM 3375 MG: 3; .375 INJECTION, POWDER, LYOPHILIZED, FOR SOLUTION INTRAVENOUS at 23:14

## 2022-06-16 ASSESSMENT — PAIN DESCRIPTION - ORIENTATION
ORIENTATION: RIGHT;LEFT
ORIENTATION: LOWER

## 2022-06-16 ASSESSMENT — PAIN DESCRIPTION - DESCRIPTORS
DESCRIPTORS: ACHING;DISCOMFORT
DESCRIPTORS: ACHING;DISCOMFORT;SHARP
DESCRIPTORS: ACHING;SHARP;SPASM
DESCRIPTORS: ACHING;DISCOMFORT

## 2022-06-16 ASSESSMENT — PAIN SCALES - GENERAL
PAINLEVEL_OUTOF10: 9
PAINLEVEL_OUTOF10: 6
PAINLEVEL_OUTOF10: 9
PAINLEVEL_OUTOF10: 7
PAINLEVEL_OUTOF10: 10
PAINLEVEL_OUTOF10: 8
PAINLEVEL_OUTOF10: 8
PAINLEVEL_OUTOF10: 9

## 2022-06-16 ASSESSMENT — PAIN DESCRIPTION - LOCATION
LOCATION: ABDOMEN

## 2022-06-16 ASSESSMENT — PAIN - FUNCTIONAL ASSESSMENT
PAIN_FUNCTIONAL_ASSESSMENT: ACTIVITIES ARE NOT PREVENTED

## 2022-06-16 ASSESSMENT — PAIN DESCRIPTION - PAIN TYPE
TYPE: ACUTE PAIN

## 2022-06-16 NOTE — PROGRESS NOTES
IM Progress Note    Admit Date:  6/2/2022    Admitted with Pneumoperitoneum with SB perforation due to foreign body. s/p exploratory lap , SB resection and lysis of adhesions    Post-operative Ileus; s/p NG decompression  He developedIntraperitoneal abscess,  s/p abdominal drain placement in IR on 6/10    Subjective:  Mr. Lisette Vazquez  is tolerating soft diet   Had another drain put in for drainage of liver abscess yesterday. Objective:         Vitals:    06/16/22 0034 06/16/22 0415 06/16/22 0727 06/16/22 0915   BP: (!) 146/86 (!) 128/91 (!) 146/78 136/82   Pulse: 90 93 83 94   Resp: 18 17 16 16   Temp: 97.6 °F (36.4 °C) 98.1 °F (36.7 °C) 97.2 °F (36.2 °C) 97 °F (36.1 °C)   TempSrc: Oral Oral Oral    SpO2: 95% 93% 96% 95%   Weight:       Height:              Intake/Output Summary (Last 24 hours) at 6/16/2022 1026  Last data filed at 6/16/2022 0921  Gross per 24 hour   Intake 360 ml   Output 2175 ml   Net -1815 ml       Physical Exam:    Gen: No distress. Alert. Awake and oriented. Eyes: PERRL. No sclera icterus. No conjunctival injection. Neck: No JVD. No Carotid Bruit. Trachea midline. Resp: No accessory muscle use. No crackles. No wheezes. No rhonchi. Diminished breath sounds   CV: Regular rate. Regular rhythm. No murmur. No rub. No edema. Peripheral Pulses: +2 palpable, equal bilaterally   GI:  Soft    drain in place in RUQ  +  bowel sounds. Skin: Warm and dry. No nodule on exposed extremities. No rash on exposed extremities. M/S: No cyanosis. No joint deformity. No clubbing. Neuro: Awake. Grossly nonfocal    Psych: Oriented x 3.  No anxiety or agitation.        Scheduled Meds:   gabapentin  400 mg Oral TID    docusate sodium  100 mg Oral Daily    pantoprazole  40 mg IntraVENous Daily    heparin (porcine)  5,000 Units SubCUTAneous 3 times per day    lisinopril  20 mg Oral Daily    metoprolol tartrate  25 mg Oral BID    traZODone  200 mg Oral Nightly    piperacillin-tazobactam  3,375 mg IntraVENous Q8H       Continuous Infusions:      PRN Meds:  HYDROmorphone **OR** HYDROmorphone, oxyCODONE-acetaminophen **OR** oxyCODONE-acetaminophen, polyethylene glycol, melatonin, hydrALAZINE, labetalol, promethazine, ondansetron, diphenhydrAMINE, acetaminophen, phenol      Data:  CBC:   Recent Labs     06/16/22  0536   WBC 11.7*   HGB 11.4*   HCT 34.7*   MCV 91.0        BMP:   Recent Labs     06/16/22  0536      K 4.6      CO2 26   BUN 4*   CREATININE <0.5*     LIVER PROFILE: No results for input(s): AST, ALT, LIPASE, BILIDIR, BILITOT, ALKPHOS in the last 72 hours. Invalid input(s): AMYLASE,  ALB  PT/INR: No results for input(s): PROTIME, INR in the last 72 hours. CULTURES  Blood cx x2: NGTD  Cultures: pending   COVID/Influenza negative      RADIOLOGY  CT GUIDED NEEDLE PLACEMENT   Final Result   1. Successful CT-guided aspiration of a right-sided abdominal collection   with removal of approximately 20 cc of serous dark serosanguineous fluid,   sent for analysis. 2.  Successful CT-guided placement of a 10 [place] pigtail drainage catheter   into an abdominal wall collection seen in the anterior left ambrosio-abdomen. Approximately 10 cc of turbid yellow serous fluid was sent for analysis. The   catheter was left for suction drainage. CT ABSCESS DRAINAGE W CATH PLACEMENT S&I   Final Result   1. Successful CT-guided aspiration of a right-sided abdominal collection   with removal of approximately 20 cc of serous dark serosanguineous fluid,   sent for analysis. 2.  Successful CT-guided placement of a 10 [place] pigtail drainage catheter   into an abdominal wall collection seen in the anterior left ambrosio-abdomen. Approximately 10 cc of turbid yellow serous fluid was sent for analysis. The   catheter was left for suction drainage. CT ABDOMEN PELVIS W IV CONTRAST Additional Contrast? Oral   Final Result   1.  Stable right hepatic abscess with drainage catheter in place.  Stable left   hepatic lesion, likely abscess with edematous changes extending to the   adjacent gastric antrum. No new abscess. 2. Interval percutaneous drainage of peritoneal collection adjacent to the   anterior abdominal wall, slightly improved. Decrease in loculated pelvic   collection with slight increase in fluid collection in the right pericolic   gutter. No new collection. Persistent edematous changes throughout the   mesentery with small interloop collections. 3. Gastric, duodenal and proximal small bowel distension with transition at   the level of the small bowel anastomosis consistent with a partial   obstruction. No contrast extravasation. 4. Mild bibasilar atelectasis with interval improvement. IR ABSCESS DRAINAGE PERC   Final Result   Successful ultrasound-guided placement of 10 Malay drainage catheter into   anterior abdominal fluid collection. XR CHEST PORTABLE   Final Result   Mild left basilar airspace disease. This could represent atelectasis or in   the appropriate clinical setting pneumonia. CT ABDOMEN PELVIS W IV CONTRAST Additional Contrast? None   Final Result   There are 2 rim enhancing fluid collections with surrounding edema to the   liver, one to the right lobe and one to the left lobe. These both measure   smaller than on prior exam 06/02/2022 although only the right lobe collection   has a drainage catheter in place. Interval surgical intervention with partial small-bowel resection. There are   some persistent distended air-filled loops of small bowel in the abdomen with   overall decreased extent of involvement and decreased distension from prior   exam compatible with improving small-bowel obstruction. New rim enhancing fluid collection intraperitoneal to the abdomen anteriorly   measuring grossly 12.0 x 24.7 x 4.0 cm compatible with abscess.   Gas internal   to this dominant collection may relate to the infection itself or could   relate to the recent surgical intervention. There are multiple additional   smaller similar fluid collections which could reflect additional loculations   of this dominant collection or could reflect separate abscesses with the two   largest described as above. Shotty mesenteric and retroperitoneal lymph nodes are likely reactive. New foci of consolidation to bilateral lower lobes, left more than right,   more likely on the basis of atelectasis although pneumonia or aspiration   pneumonitis could have a similar appearance in the appropriate clinical   setting. New trace bilateral pleural effusions. Diffuse urinary bladder wall thickening does not appear appreciably changed   given differences in degree of distention of the urinary bladder as compared   to the prior CT exam.  Findings may relate at least in part to the   underdistention, but nonspecific superimposed cystitis is not excluded. Clinical and laboratory correlation can be made. XR ABDOMEN (2 VIEWS)   Final Result   Moderate small bowel distention in the left mid abdomen with transition   distally remains concerning for obstruction. No free air. CT DRAINAGE VISCERAL PERCUTANEOUS   Final Result   1. Successful placement of a 12 Spanish drainage catheter within a right   hepatic abscess. 2. Despite optimum positioning of catheter, no fluid could be aspirated from   a left hepatic abscess that was seen on the prior CT. Obvious fluid cannot   be seen on the current noncontrast series, and some interval improvement may   account for this finding. A drainage catheter was not placed in the left   hepatic abscess. CT GUIDED NEEDLE PLACEMENT   Final Result   1. Successful placement of a 12 Spanish drainage catheter within a right   hepatic abscess. 2. Despite optimum positioning of catheter, no fluid could be aspirated from   a left hepatic abscess that was seen on the prior CT.   Obvious fluid cannot   be seen on the current noncontrast series, and some interval improvement may   account for this finding. A drainage catheter was not placed in the left   hepatic abscess. CT ABDOMEN PELVIS W IV CONTRAST Additional Contrast? None   Final Result   Moderate pneumoperitoneum in the left upper quadrant, likely small bowel   perforation. There is an abnormal segment of ileum in the right lower   quadrant, similar to 05/22/2022, inflammatory bowel disease versus infectious   enteritis. There is upstream moderate dilation of the mid small bowel with   possible pneumatosis. Closed loop obstruction is a consideration. Small amount of free fluid. Bilobar hepatic abscesses are persistent, with decreasing surrounding edema. Critical results were called by Dr. Jean Junior MD to [facility]   on 6/2/2022 at 16:43. Small amount of free fluid. RECOMMENDATIONS:   Unavailable         XR CHEST PORTABLE   Final Result   Clear lungs. Assessment/Plan:    #Hypertension, uncontrolled  -on lopressor and lisinopril, continue   -added prn IV hydralazine , Prn labatelol .   -continue to monitor   Blood pressure readings are currently stable    #Possible Aspiration pneumonitis   -pt is asymptomatic   -CXR and CT as above    - already on Zosyn    #Pleural Effusions  - noted on CT - trace      #Pneumoperitoneum with SB perforation  # abdominal abscess  -POD #13, s/p SB resection and lysis of adhesions   -cont IV zosyn day #14  -new fluid collection noted on CT 6/10 , --> to IR -> drain placed  In RLQ. Cultures from the drain negative so far  -general surgery following  Repeat CT a/p reviewed  Another drain inserted 6/15     #Post-operative Ileus   -GI and general surgery following   -NGT decompression   NG tube clamped- > removed    Started on full liquids. Tolerating well.   DC IV fluid  Tolerating soft diet      #Liver abscesses  #Transaminitis   -on IV abx   -continue percutaneous drainage of abscess  -GI following     Gabapentin for neuropathy        DVT Prophylaxis: Lovenox   Diet: ADULT ORAL NUTRITION SUPPLEMENT; Breakfast; Standard High Calorie/High Protein Oral Supplement  ADULT DIET; Easy to Chew  Code Status: Full Code    Will sign off.   Call us with questions     Perla Lewis MD   6/16/2022

## 2022-06-16 NOTE — PROGRESS NOTES
Shift assessment complete; see flow sheet. Scheduled medications administered; See MAR. IV infusing without difficulty. Pt c/o abdominal pain 8/10 PRN Dilaudid given at this time. Pt denies any needs at this time.  Pt educated on use of call light and to call out with needs, verbalized understanding, bed in low locked position for pt safety

## 2022-06-16 NOTE — PROGRESS NOTES
Handoff report and transfer of care given at bedside to [facility]. Patient in stable condition, denies needs/concerns at this time. Call light within reach.

## 2022-06-16 NOTE — PROGRESS NOTES
Pt had large BM. Pt reported prior to administer suppositiory. Pt reported suppository came out soon after administration into toilet. Pt up ambulated in hallway. Call light in reach.

## 2022-06-16 NOTE — PROGRESS NOTES
Report given @ bedside to [facility] , for transferral of care. Pt resting in bed. Call light in reach.

## 2022-06-16 NOTE — PLAN OF CARE
Problem: Discharge Planning  Goal: Discharge to home or other facility with appropriate resources  Outcome: Progressing     Problem: Pain  Goal: Verbalizes/displays adequate comfort level or baseline comfort level  Outcome: Progressing     Problem: Safety - Adult  Goal: Free from fall injury  Outcome: Progressing     Problem: ABCDS Injury Assessment  Goal: Absence of physical injury  Outcome: Progressing     Problem: Neurosensory - Adult  Goal: Achieves stable or improved neurological status  Outcome: Progressing     Problem: Respiratory - Adult  Goal: Achieves optimal ventilation and oxygenation  Outcome: Progressing     Problem: Cardiovascular - Adult  Goal: Maintains optimal cardiac output and hemodynamic stability  Outcome: Progressing     Problem: Skin/Tissue Integrity - Adult  Goal: Skin integrity remains intact  Outcome: Progressing     Problem: Musculoskeletal - Adult  Goal: Return mobility to safest level of function  Outcome: Progressing     Problem: Gastrointestinal - Adult  Goal: Minimal or absence of nausea and vomiting  Outcome: Progressing  Goal: Maintains or returns to baseline bowel function  Outcome: Progressing     Problem: Genitourinary - Adult  Goal: Absence of urinary retention  Outcome: Progressing     Problem: Infection - Adult  Goal: Absence of infection at discharge  Outcome: Progressing     Problem: Skin/Tissue Integrity  Goal: Absence of new skin breakdown  Description: 1. Monitor for areas of redness and/or skin breakdown  2. Assess vascular access sites hourly  3. Every 4-6 hours minimum:  Change oxygen saturation probe site  4. Every 4-6 hours:  If on nasal continuous positive airway pressure, respiratory therapy assess nares and determine need for appliance change or resting period.   Outcome: Progressing     Problem: Nutrition Deficit:  Goal: Optimize nutritional status  Outcome: Progressing

## 2022-06-16 NOTE — PROGRESS NOTES
General Surgery - Valeria Walker, APRN - CNP, CNP  Daily Progress Note    Pt Name: Jennifer RENDON [facility] Record Number: [id number]  Date of Birth 1960   Today's Date: 6/16/2022    ASSESSMENT  1. POD # 14 s/p SBR RAMESH for SB perf 2/2 toothpick ingestion  2. S/P Perc drain placement in IR: left intraabdominal fluid collection 6/15  3. S/P aspiration in IR of pelvic fluid collection  4. Repeat CT 6/14:midl distention of the stomach and duodenal sweep, distended SB loops   5. S/P perc drain placement in large intraabdominal fluid collection 6/10  6. S/P perc drain placement in hepatic abscess on 6/3   7. Repeat CT 6/9 to evaluate hepatic abscess: intraperitoneal to the anterior abdomen with new abscess 24.7 x 4 x 12; central pelvis 5.4 x 6.7 cm, right pericolic gutter 3.8 x 2.8 cm: reviewed with rads: he states he cam aspirate the right pericolic gutter fluid collection and place a drain in the left mid abdominal fluid collection. 8. GI: +mild distention unchanged, staples look good and open to air, no N/V , + flatus, + BMs   9. VSS  10. Leuks 10.6->11.7  11. K+ 4.6  12. UO good. 13. Per drain liver abcess: 10 ml: pruluent  14. Perc drain intraabdominal fluid collection RLQ: 55 ml out: humphrey serosanguinous drainage, no purulence, no growth  15. Perc drain left mid abd: gold serosanguinous  16. Cultures from 6/15: 1 is positive for gram neg rods but, not labelled as aspiration or perc drain placement. 17. ETOH abuse hx      PLAN  1. Zosyn  2. IS q 1 hour while awake  3. DVT proph: heparin sub q  4. Protonix IVP  5. Pain control   6. OOB/Ambulate  7. Soft diet  8. Labs in the am  9. PT/OT: ambulate pt/disucsses with RN  10. Pt is POD #15 s/p SBR, RAMESH with post op ileus s/p perc drain intraabdominal fluid collection x 2, aspiration fo pelvic collection x 1 (Gi had perc drain placed in liver abscess prior to SB perforation):  Soft diet, ambulate, await cultures: hopefully home soon with perc drains in place on PO antibiotics. Aye Dicskon has improved in some ways from yesterday. Pain appears well controlled. He has no N/V. He has passed flatus and has had BMs yesterday. He is tolerating fulls. Current activity is up with assistance    OBJECTIVE  VITALS:  height is 5' 10\" (1.778 m) and weight is 222 lb (100.7 kg). His temperature is 97 °F (36.1 °C). His blood pressure is 136/82 and his pulse is 94. His respiration is 16 and oxygen saturation is 95%. VITALS:  /82   Pulse 94   Temp 97 °F (36.1 °C)   Resp 16   Ht 5' 10\" (1.778 m)   Wt 222 lb (100.7 kg)   SpO2 95%   BMI 31.85 kg/m²   INTAKE/OUTPUT:      Intake/Output Summary (Last 24 hours) at 6/16/2022 1211  Last data filed at 6/16/2022 1201  Gross per 24 hour   Intake 1080 ml   Output 2475 ml   Net -1395 ml     GENERAL: alert, cooperative, no distress    I/O last 3 completed shifts: In: 4545.9 [P.O.:360; I.V.:3308.8; IV Piggyback:877.1]  Out: 2291 [Urine:2750; Drains:170]  I/O this shift:   In: 720 [P.O.:720]  Out: 860 [Urine:860]    LABS  Recent Labs     06/16/22  0536   WBC 11.7*   HGB 11.4*   HCT 34.7*         K 4.6      CO2 26   BUN 4*   CREATININE <0.5*   CALCIUM 8.9     CBC with Differential:    Lab Results   Component Value Date    WBC 11.7 06/16/2022    RBC 3.82 06/16/2022    HGB 11.4 06/16/2022    HCT 34.7 06/16/2022     06/16/2022    MCV 91.0 06/16/2022    MCH 29.9 06/16/2022    MCHC 32.9 06/16/2022    RDW 15.2 06/16/2022    LYMPHOPCT 14.0 06/13/2022    MONOPCT 5.4 06/13/2022    BASOPCT 1.2 06/13/2022    MONOSABS 0.6 06/13/2022    LYMPHSABS 1.5 06/13/2022    EOSABS 0.2 06/13/2022    BASOSABS 0.1 06/13/2022     CMP:    Lab Results   Component Value Date     06/16/2022    K 4.6 06/16/2022    K 4.5 06/13/2022     06/16/2022    CO2 26 06/16/2022    BUN 4 06/16/2022    CREATININE <0.5 06/16/2022    GFRAA >60 06/16/2022    AGRATIO 0.7 06/02/2022    LABGLOM >60 06/16/2022    GLUCOSE 112 06/16/2022
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
## 2022-06-17 LAB
ANION GAP SERPL CALCULATED.3IONS-SCNC: 12 MMOL/L (ref 3–16)
BASOPHILS ABSOLUTE: 0.1 K/UL (ref 0–0.2)
BASOPHILS RELATIVE PERCENT: 0.8 %
BUN BLDV-MCNC: 5 MG/DL (ref 7–20)
CALCIUM SERPL-MCNC: 9.1 MG/DL (ref 8.3–10.6)
CHLORIDE BLD-SCNC: 101 MMOL/L (ref 99–110)
CO2: 24 MMOL/L (ref 21–32)
CREAT SERPL-MCNC: <0.5 MG/DL (ref 0.9–1.3)
EOSINOPHILS ABSOLUTE: 0.2 K/UL (ref 0–0.6)
EOSINOPHILS RELATIVE PERCENT: 2 %
GFR AFRICAN AMERICAN: >60
GFR NON-AFRICAN AMERICAN: >60
GLUCOSE BLD-MCNC: 101 MG/DL (ref 70–99)
GLUCOSE BLD-MCNC: 110 MG/DL (ref 70–99)
HCT VFR BLD CALC: 36.5 % (ref 40.5–52.5)
HEMOGLOBIN: 11.9 G/DL (ref 13.5–17.5)
LYMPHOCYTES ABSOLUTE: 1.6 K/UL (ref 1–5.1)
LYMPHOCYTES RELATIVE PERCENT: 15.6 %
MCH RBC QN AUTO: 29.6 PG (ref 26–34)
MCHC RBC AUTO-ENTMCNC: 32.8 G/DL (ref 31–36)
MCV RBC AUTO: 90.3 FL (ref 80–100)
MONOCYTES ABSOLUTE: 0.6 K/UL (ref 0–1.3)
MONOCYTES RELATIVE PERCENT: 5.6 %
NEUTROPHILS ABSOLUTE: 7.8 K/UL (ref 1.7–7.7)
NEUTROPHILS RELATIVE PERCENT: 76 %
PDW BLD-RTO: 15.2 % (ref 12.4–15.4)
PERFORMED ON: ABNORMAL
PLATELET # BLD: 355 K/UL (ref 135–450)
PMV BLD AUTO: 9.7 FL (ref 5–10.5)
POTASSIUM SERPL-SCNC: 4.6 MMOL/L (ref 3.5–5.1)
RBC # BLD: 4.04 M/UL (ref 4.2–5.9)
SODIUM BLD-SCNC: 137 MMOL/L (ref 136–145)
WBC # BLD: 10.3 K/UL (ref 4–11)

## 2022-06-17 PROCEDURE — 80048 BASIC METABOLIC PNL TOTAL CA: CPT

## 2022-06-17 PROCEDURE — 99024 POSTOP FOLLOW-UP VISIT: CPT | Performed by: NURSE PRACTITIONER

## 2022-06-17 PROCEDURE — 6360000002 HC RX W HCPCS: Performed by: NURSE PRACTITIONER

## 2022-06-17 PROCEDURE — C9113 INJ PANTOPRAZOLE SODIUM, VIA: HCPCS | Performed by: NURSE PRACTITIONER

## 2022-06-17 PROCEDURE — 85025 COMPLETE CBC W/AUTO DIFF WBC: CPT

## 2022-06-17 PROCEDURE — 1200000000 HC SEMI PRIVATE

## 2022-06-17 PROCEDURE — 6360000002 HC RX W HCPCS: Performed by: SURGERY

## 2022-06-17 PROCEDURE — 6370000000 HC RX 637 (ALT 250 FOR IP): Performed by: INTERNAL MEDICINE

## 2022-06-17 PROCEDURE — 2580000003 HC RX 258: Performed by: SURGERY

## 2022-06-17 PROCEDURE — 36415 COLL VENOUS BLD VENIPUNCTURE: CPT

## 2022-06-17 PROCEDURE — 6370000000 HC RX 637 (ALT 250 FOR IP): Performed by: SURGERY

## 2022-06-17 RX ADMIN — LISINOPRIL 20 MG: 20 TABLET ORAL at 09:04

## 2022-06-17 RX ADMIN — ONDANSETRON HYDROCHLORIDE 4 MG: 2 INJECTION, SOLUTION INTRAMUSCULAR; INTRAVENOUS at 19:53

## 2022-06-17 RX ADMIN — HEPARIN SODIUM 5000 UNITS: 5000 INJECTION INTRAVENOUS; SUBCUTANEOUS at 20:07

## 2022-06-17 RX ADMIN — GABAPENTIN 400 MG: 400 CAPSULE ORAL at 19:56

## 2022-06-17 RX ADMIN — OXYCODONE HYDROCHLORIDE AND ACETAMINOPHEN 2 TABLET: 5; 325 TABLET ORAL at 14:59

## 2022-06-17 RX ADMIN — METOPROLOL TARTRATE 25 MG: 25 TABLET, FILM COATED ORAL at 09:03

## 2022-06-17 RX ADMIN — OXYCODONE HYDROCHLORIDE AND ACETAMINOPHEN 2 TABLET: 5; 325 TABLET ORAL at 19:55

## 2022-06-17 RX ADMIN — GABAPENTIN 400 MG: 400 CAPSULE ORAL at 14:48

## 2022-06-17 RX ADMIN — METOPROLOL TARTRATE 25 MG: 25 TABLET, FILM COATED ORAL at 19:56

## 2022-06-17 RX ADMIN — DOCUSATE SODIUM 100 MG: 100 CAPSULE, LIQUID FILLED ORAL at 09:03

## 2022-06-17 RX ADMIN — TRAZODONE HYDROCHLORIDE 200 MG: 100 TABLET ORAL at 19:56

## 2022-06-17 RX ADMIN — HEPARIN SODIUM 5000 UNITS: 5000 INJECTION INTRAVENOUS; SUBCUTANEOUS at 14:49

## 2022-06-17 RX ADMIN — PANTOPRAZOLE SODIUM 40 MG: 40 INJECTION, POWDER, FOR SOLUTION INTRAVENOUS at 09:04

## 2022-06-17 RX ADMIN — PIPERACILLIN AND TAZOBACTAM 3375 MG: 3; .375 INJECTION, POWDER, LYOPHILIZED, FOR SOLUTION INTRAVENOUS at 09:02

## 2022-06-17 RX ADMIN — Medication 1 SPRAY: at 09:02

## 2022-06-17 RX ADMIN — PIPERACILLIN AND TAZOBACTAM 3375 MG: 3; .375 INJECTION, POWDER, LYOPHILIZED, FOR SOLUTION INTRAVENOUS at 17:10

## 2022-06-17 RX ADMIN — HEPARIN SODIUM 5000 UNITS: 5000 INJECTION INTRAVENOUS; SUBCUTANEOUS at 06:55

## 2022-06-17 RX ADMIN — OXYCODONE HYDROCHLORIDE AND ACETAMINOPHEN 2 TABLET: 5; 325 TABLET ORAL at 06:54

## 2022-06-17 RX ADMIN — GABAPENTIN 400 MG: 400 CAPSULE ORAL at 09:04

## 2022-06-17 ASSESSMENT — PAIN - FUNCTIONAL ASSESSMENT
PAIN_FUNCTIONAL_ASSESSMENT: ACTIVITIES ARE NOT PREVENTED

## 2022-06-17 ASSESSMENT — PAIN DESCRIPTION - DESCRIPTORS
DESCRIPTORS: ACHING;DISCOMFORT
DESCRIPTORS: ACHING
DESCRIPTORS: ACHING
DESCRIPTORS: ACHING;DISCOMFORT

## 2022-06-17 ASSESSMENT — PAIN DESCRIPTION - ORIENTATION
ORIENTATION: LOWER
ORIENTATION: RIGHT;LEFT
ORIENTATION: LOWER

## 2022-06-17 ASSESSMENT — PAIN SCALES - GENERAL
PAINLEVEL_OUTOF10: 2
PAINLEVEL_OUTOF10: 8
PAINLEVEL_OUTOF10: 10
PAINLEVEL_OUTOF10: 8
PAINLEVEL_OUTOF10: 7

## 2022-06-17 ASSESSMENT — PAIN DESCRIPTION - LOCATION
LOCATION: ABDOMEN
LOCATION: THROAT

## 2022-06-17 NOTE — FLOWSHEET NOTE
06/17/22 0845   Vital Signs   Temp 97.5 °F (36.4 °C)   Temp Source Oral   Heart Rate 95   Heart Rate Source Monitor   Resp 18   /74   BP Location Right upper arm   MAP (Calculated) 88   Patient Position High fowlers   Level of Consciousness Alert (0)   MEWS Score 1   Pain Assessment   Pain Assessment 0-10   Pain Level 8   Patient's Stated Pain Goal 0 - No pain   Pain Location Abdomen   Pain Orientation Lower   Pain Descriptors Aching;Discomfort   Functional Pain Assessment Activities are not prevented   Non-Pharmaceutical Pain Intervention(s) Declines   Oxygen Therapy   SpO2 100 %   O2 Device None (Room air)   AM assessment completed, see flow sheet. Pt is alert and oriented. Vital signs are WNL. Respirations are even & easy. Bowel sounds hypoactive - pt is passing flatus. No complaints voiced. Pt denies needs at this time. SR up x 2, and bed in low position. Call light is within reach.

## 2022-06-17 NOTE — PLAN OF CARE
Problem: Discharge Planning  Goal: Discharge to home or other facility with appropriate resources  6/17/2022 0923 by Lamont Van RN  Outcome: Progressing  6/17/2022 0351 by Loulou Cornelius RN  Outcome: Progressing     Problem: Pain  Goal: Verbalizes/displays adequate comfort level or baseline comfort level  6/17/2022 0923 by Lamont Van RN  Outcome: Progressing  6/17/2022 0351 by Loulou Cornelius RN  Outcome: Progressing     Problem: Safety - Adult  Goal: Free from fall injury  6/17/2022 0923 by Lamont Van RN  Outcome: Progressing  6/17/2022 0351 by Loulou Cornelius RN  Outcome: Progressing     Problem: ABCDS Injury Assessment  Goal: Absence of physical injury  6/17/2022 0923 by Lamont Van RN  Outcome: Progressing  6/17/2022 0351 by Loulou Cornelius RN  Outcome: Progressing     Problem: Neurosensory - Adult  Goal: Achieves stable or improved neurological status  6/17/2022 0923 by Lamont Van RN  Outcome: Progressing  6/17/2022 0351 by Loulou Cornelius RN  Outcome: Progressing     Problem: Respiratory - Adult  Goal: Achieves optimal ventilation and oxygenation  6/17/2022 0923 by Lamont Van RN  Outcome: Progressing  6/17/2022 0351 by Loulou Cornelius RN  Outcome: Progressing     Problem: Cardiovascular - Adult  Goal: Maintains optimal cardiac output and hemodynamic stability  6/17/2022 0923 by Lamont Van RN  Outcome: Progressing  6/17/2022 0351 by Loulou Cornelius RN  Outcome: Progressing     Problem: Skin/Tissue Integrity - Adult  Goal: Skin integrity remains intact  6/17/2022 0923 by Lamont Van RN  Outcome: Progressing  6/17/2022 0351 by Loulou Cornelius RN  Outcome: Progressing     Problem: Musculoskeletal - Adult  Goal: Return mobility to safest level of function  6/17/2022 0923 by Lamont Van RN  Outcome: Progressing  6/17/2022 0351 by Loulou Cornelius RN  Outcome: Progressing     Problem: Gastrointestinal - Adult  Goal: Minimal or absence of nausea and vomiting  6/17/2022 0923 by Lamont Van RN  Outcome: Progressing  6/17/2022 7903 by Arnaud Delacruz RN  Outcome: Progressing  Goal: Maintains or returns to baseline bowel function  6/17/2022 0923 by Viviana Merlin, RN  Outcome: Progressing  6/17/2022 0351 by Arnaud Delacruz RN  Outcome: Progressing     Problem: Genitourinary - Adult  Goal: Absence of urinary retention  6/17/2022 0923 by Viviana Merlin, RN  Outcome: Progressing  6/17/2022 0351 by Arnaud Delacruz RN  Outcome: Progressing     Problem: Infection - Adult  Goal: Absence of infection at discharge  6/17/2022 0923 by Viviana Merlin, RN  Outcome: Progressing  6/17/2022 0351 by Arnaud Delacruz RN  Outcome: Progressing     Problem: Skin/Tissue Integrity  Goal: Absence of new skin breakdown  Description: 1. Monitor for areas of redness and/or skin breakdown  2. Assess vascular access sites hourly  3. Every 4-6 hours minimum:  Change oxygen saturation probe site  4. Every 4-6 hours:  If on nasal continuous positive airway pressure, respiratory therapy assess nares and determine need for appliance change or resting period.   6/17/2022 0923 by Viviana Merlin, RN  Outcome: Progressing  6/17/2022 0351 by Arnaud Delacruz RN  Outcome: Progressing     Problem: Nutrition Deficit:  Goal: Optimize nutritional status  6/17/2022 0923 by Viviana Merlin, RN  Outcome: Progressing  6/17/2022 0351 by Arnaud Delacruz RN  Outcome: Progressing

## 2022-06-17 NOTE — PROGRESS NOTES
Pt requesting pain medication for abdomnial pain rating 9/10. PRN  pain medication given, see MAR. SR up x2, Call light and bedside table in easy reach. Denies any other needs at this time.

## 2022-06-17 NOTE — PROGRESS NOTES
General Surgery - Valeria Obando, APRN - CNP, CNP  Daily Progress Note    Pt Name: Jennifer Hubbard [place] Record Number: [id number]  Date of Birth 1960   Today's Date: 6/17/2022    ASSESSMENT  1. POD # 15 s/p SBR RAMESH for SB perf 2/2 toothpick ingestion  2. S/P Perc drain placement in IR: left intraabdominal fluid collection 6/15  3. S/P aspiration in IR of pelvic fluid collection  4. Repeat CT 6/14:midl distention of the stomach and duodenal sweep, distended SB loops   5. S/P perc drain placement in large intraabdominal fluid collection 6/10  6. S/P perc drain placement in hepatic abscess on 6/3   7. Repeat CT 6/9 to evaluate hepatic abscess: intraperitoneal to the anterior abdomen with new abscess 24.7 x 4 x 12; central pelvis 5.4 x 6.7 cm, right pericolic gutter 3.8 x 2.8 cm: reviewed with rads: he states he cam aspirate the right pericolic gutter fluid collection and place a drain in the left mid abdominal fluid collection. 8. GI: +mild distention unchanged, staples look good and open to air, Pt states he vomited once last night but, hid it from the nurses. He states then, he got scared he would be discharged home and then vomit at home , + flatus, + BMs (2 large Bms today per pt)   9. VSS  10. Leuks 10.6->11.7->10.3  11. K+ 4.6  12. UO good. 13. Per drain liver abcess: 0 ml: small amount of purulent drainage in bulb  14. Perc drain intraabdominal fluid collection RLQ: 40 ml out: humphrey serosanguinous drainage, no purulence, no growth  15. Perc drain left mid abd: 40 ml out: gold serosanguinous  16. Cultures from 6/15: 1 is positive for gram neg rods but, is not growing anything, all other cultures negative   17. ETOH abuse hx  18. He denies any additional vomiting since last night: he reports increased \"large\" Bms today. PLAN  1. Zosyn  2. IS q 1 hour while awake  3. DVT proph: heparin sub q  4. Protonix IVP  5. Pain control   6. OOB/Ambulate  7. Soft diet  8.  PT/OT: ambulate pt/disucsses with RN  9. Pt is POD #15 s/p SBR, RAMESH with post op ileus s/p perc drain intraabdominal fluid collection x 2, aspiration fo pelvic collection x 1 (Gi had perc drain placed in liver abscess prior to SB perforation): Soft diet, ambulate, hopefully home soon on PO antibiotics with his perc drains in place. Mariella Brice has improved in some ways from yesterday. Pain appears well controlled. He vomited once last night, no N/V since. He has passed flatus and has had BMs. He is tolerating a soft diet. Current activity is up with assistance    OBJECTIVE  VITALS:  height is 5' 10\" (1.778 m) and weight is 222 lb (100.7 kg). His oral temperature is 97.7 °F (36.5 °C). His blood pressure is 124/84 and his pulse is 85. His respiration is 18 and oxygen saturation is 96%. VITALS:  /84   Pulse 85   Temp 97.7 °F (36.5 °C) (Oral)   Resp 18   Ht 5' 10\" (1.778 m)   Wt 222 lb (100.7 kg)   SpO2 96%   BMI 31.85 kg/m²   INTAKE/OUTPUT:      Intake/Output Summary (Last 24 hours) at 6/17/2022 1520  Last data filed at 6/17/2022 1232  Gross per 24 hour   Intake 240 ml   Output 880 ml   Net -640 ml     GENERAL: alert, cooperative, no distress    I/O last 3 completed shifts:   In: 960 [P.O.:960]  Out: 1980 [Urine:1860; Drains:120]  I/O this shift:  In: -   Out: 850 [Urine:850]    LABS  Recent Labs     06/17/22  0545   WBC 10.3   HGB 11.9*   HCT 36.5*         K 4.6      CO2 24   BUN 5*   CREATININE <0.5*   CALCIUM 9.1     CBC with Differential:    Lab Results   Component Value Date    WBC 10.3 06/17/2022    RBC 4.04 06/17/2022    HGB 11.9 06/17/2022    HCT 36.5 06/17/2022     06/17/2022    MCV 90.3 06/17/2022    MCH 29.6 06/17/2022    MCHC 32.8 06/17/2022    RDW 15.2 06/17/2022    LYMPHOPCT 15.6 06/17/2022    MONOPCT 5.6 06/17/2022    BASOPCT 0.8 06/17/2022    MONOSABS 0.6 06/17/2022    LYMPHSABS 1.6 06/17/2022    EOSABS 0.2 06/17/2022    BASOSABS 0.1 06/17/2022     CMP:    Lab Results Component Value Date     06/17/2022    K 4.6 06/17/2022    K 4.5 06/13/2022     06/17/2022    CO2 24 06/17/2022    BUN 5 06/17/2022    CREATININE <0.5 06/17/2022    GFRAA >60 06/17/2022    AGRATIO 0.7 06/02/2022    LABGLOM >60 06/17/2022    GLUCOSE 101 06/17/2022    PROT 8.0 06/02/2022    LABALBU 3.4 06/02/2022    CALCIUM 9.1 06/17/2022    BILITOT 0.5 06/02/2022    ALKPHOS 107 06/02/2022    AST 19 06/02/2022    ALT 20 06/02/2022         Valeria Griggs, APRN - CNP  Electronically signed 6/17/2022 at 3:20 PM

## 2022-06-17 NOTE — PROGRESS NOTES
Handoff report and transfer of care given at bedside to [facility] RN  Patient in stable condition, denies needs/concerns at this time. Call light within reach.

## 2022-06-17 NOTE — CARE COORDINATION
Reviewed chart and spoke with N.P d/t length of stay.  Will cont NN-NF as pt will have no needs at dc
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
## 2022-06-18 VITALS
DIASTOLIC BLOOD PRESSURE: 84 MMHG | HEIGHT: 70 IN | HEART RATE: 94 BPM | TEMPERATURE: 97.8 F | RESPIRATION RATE: 16 BRPM | BODY MASS INDEX: 31.78 KG/M2 | SYSTOLIC BLOOD PRESSURE: 124 MMHG | OXYGEN SATURATION: 95 % | WEIGHT: 222 LBS

## 2022-06-18 PROCEDURE — 6370000000 HC RX 637 (ALT 250 FOR IP): Performed by: SURGERY

## 2022-06-18 PROCEDURE — 6360000002 HC RX W HCPCS: Performed by: SURGERY

## 2022-06-18 PROCEDURE — 99024 POSTOP FOLLOW-UP VISIT: CPT | Performed by: SURGERY

## 2022-06-18 PROCEDURE — 2580000003 HC RX 258: Performed by: SURGERY

## 2022-06-18 PROCEDURE — 6370000000 HC RX 637 (ALT 250 FOR IP): Performed by: INTERNAL MEDICINE

## 2022-06-18 PROCEDURE — 6360000002 HC RX W HCPCS: Performed by: NURSE PRACTITIONER

## 2022-06-18 PROCEDURE — C9113 INJ PANTOPRAZOLE SODIUM, VIA: HCPCS | Performed by: NURSE PRACTITIONER

## 2022-06-18 RX ORDER — OXYCODONE HYDROCHLORIDE AND ACETAMINOPHEN 5; 325 MG/1; MG/1
1-2 TABLET ORAL EVERY 6 HOURS PRN
Qty: 20 TABLET | Refills: 0 | Status: SHIPPED | OUTPATIENT
Start: 2022-06-18 | End: 2022-06-23

## 2022-06-18 RX ADMIN — GABAPENTIN 400 MG: 400 CAPSULE ORAL at 09:07

## 2022-06-18 RX ADMIN — OXYCODONE HYDROCHLORIDE AND ACETAMINOPHEN 2 TABLET: 5; 325 TABLET ORAL at 09:07

## 2022-06-18 RX ADMIN — PIPERACILLIN AND TAZOBACTAM 3375 MG: 3; .375 INJECTION, POWDER, LYOPHILIZED, FOR SOLUTION INTRAVENOUS at 07:51

## 2022-06-18 RX ADMIN — PIPERACILLIN AND TAZOBACTAM 3375 MG: 3; .375 INJECTION, POWDER, LYOPHILIZED, FOR SOLUTION INTRAVENOUS at 00:24

## 2022-06-18 RX ADMIN — OXYCODONE HYDROCHLORIDE AND ACETAMINOPHEN 2 TABLET: 5; 325 TABLET ORAL at 04:12

## 2022-06-18 RX ADMIN — METOPROLOL TARTRATE 25 MG: 25 TABLET, FILM COATED ORAL at 09:07

## 2022-06-18 RX ADMIN — PANTOPRAZOLE SODIUM 40 MG: 40 INJECTION, POWDER, FOR SOLUTION INTRAVENOUS at 07:47

## 2022-06-18 RX ADMIN — HEPARIN SODIUM 5000 UNITS: 5000 INJECTION INTRAVENOUS; SUBCUTANEOUS at 04:12

## 2022-06-18 RX ADMIN — DOCUSATE SODIUM 100 MG: 100 CAPSULE, LIQUID FILLED ORAL at 09:07

## 2022-06-18 RX ADMIN — LISINOPRIL 20 MG: 20 TABLET ORAL at 09:07

## 2022-06-18 ASSESSMENT — PAIN DESCRIPTION - LOCATION
LOCATION: ABDOMEN

## 2022-06-18 ASSESSMENT — PAIN DESCRIPTION - DESCRIPTORS
DESCRIPTORS: ACHING
DESCRIPTORS: ACHING

## 2022-06-18 ASSESSMENT — PAIN - FUNCTIONAL ASSESSMENT: PAIN_FUNCTIONAL_ASSESSMENT: ACTIVITIES ARE NOT PREVENTED

## 2022-06-18 ASSESSMENT — PAIN SCALES - GENERAL
PAINLEVEL_OUTOF10: 7
PAINLEVEL_OUTOF10: 7
PAINLEVEL_OUTOF10: 8

## 2022-06-18 ASSESSMENT — PAIN DESCRIPTION - ORIENTATION: ORIENTATION: LEFT;RIGHT

## 2022-06-18 NOTE — PLAN OF CARE
Assess vascular access sites hourly  3. Every 4-6 hours minimum:  Change oxygen saturation probe site  4. Every 4-6 hours:  If on nasal continuous positive airway pressure, respiratory therapy assess nares and determine need for appliance change or resting period.   6/18/2022 0959 by Arpita Booker RN  Outcome: Completed     Problem: Nutrition Deficit:  Goal: Optimize nutritional status  6/18/2022 0959 by Arpita Booker RN  Outcome: Completed

## 2022-06-18 NOTE — PROGRESS NOTES
[facility] GENERAL SURGERY    Surgery Progress Note           POD # 16    PATIENT NAME: Justa Richter     TODAY'S DATE: 6/18/2022    INTERVAL HISTORY:    Pt with mild pain, no nausea. OBJECTIVE:   VITALS:  /84   Pulse 94   Temp 97.8 °F (36.6 °C) (Oral)   Resp 16   Ht 5' 10\" (1.778 m)   Wt 222 lb (100.7 kg)   SpO2 95%   BMI 31.85 kg/m²     INTAKE/OUTPUT:    I/O last 3 completed shifts: In: 120 [P.O.:120]  Out: 915 [Urine:850; Drains:65]  No intake/output data recorded. CONSTITUTIONAL:  awake and alert  LUNGS:  clear to auscultation  ABDOMEN:   normal bowel sounds, soft, non-distended, mild tender, drains serous   INCISION: clean, dry, no drainage    Data:  CBC: Recent Labs     06/16/22  0536 06/17/22  0545   WBC 11.7* 10.3   HGB 11.4* 11.9*   HCT 34.7* 36.5*    355     BMP:    Recent Labs     06/16/22  0536 06/17/22  0545    137   K 4.6 4.6    101   CO2 26 24   BUN 4* 5*   CREATININE <0.5* <0.5*   GLUCOSE 112* 101*     Hepatic: No results for input(s): AST, ALT, ALB, BILITOT, ALKPHOS in the last 72 hours. Mag:    No results for input(s): MG in the last 72 hours. Phos:   No results for input(s): PHOS in the last 72 hours. INR: No results for input(s): INR in the last 72 hours. Radiology Review:  NA    ASSESSMENT AND PLAN:  63 y.o. male status post small bowel resection, perc  Drain x3  1. Tolerating diet without nausea  2. Pain controlled  3. Perc drain output decreasing  4.   Plan discharge on oral abx with drains in place         Electronically signed by James Waggoner MD

## 2022-06-18 NOTE — PLAN OF CARE
Problem: Discharge Planning  Goal: Discharge to home or other facility with appropriate resources  6/17/2022 2004 by Krys Russell RN  Outcome: Progressing  6/17/2022 0923 by Ronny Jefferson RN  Outcome: Progressing     Problem: Pain  Goal: Verbalizes/displays adequate comfort level or baseline comfort level  6/17/2022 2004 by Krys Russell RN  Outcome: Progressing  6/17/2022 0923 by Ronny Jefferson RN  Outcome: Progressing     Problem: Safety - Adult  Goal: Free from fall injury  6/17/2022 2004 by Krys Russell RN  Outcome: Progressing  6/17/2022 0923 by Ronny Jefferson RN  Outcome: Progressing     Problem: ABCDS Injury Assessment  Goal: Absence of physical injury  6/17/2022 2004 by Krys Russell RN  Outcome: Progressing  6/17/2022 0923 by Ronny Jefferson RN  Outcome: Progressing     Problem: Neurosensory - Adult  Goal: Achieves stable or improved neurological status  6/17/2022 2004 by Krys Russell RN  Outcome: Progressing  6/17/2022 0923 by Ronny Jefferson RN  Outcome: Progressing     Problem: Respiratory - Adult  Goal: Achieves optimal ventilation and oxygenation  6/17/2022 2004 by Krys Russell RN  Outcome: Progressing  6/17/2022 0923 by Ronny Jefferson RN  Outcome: Progressing     Problem: Cardiovascular - Adult  Goal: Maintains optimal cardiac output and hemodynamic stability  6/17/2022 2004 by Krys Russell RN  Outcome: Progressing  6/17/2022 0923 by Ronny Jefferson RN  Outcome: Progressing     Problem: Skin/Tissue Integrity - Adult  Goal: Skin integrity remains intact  6/17/2022 2004 by Krys Russell RN  Outcome: Progressing  6/17/2022 0923 by Ronny Jefferson RN  Outcome: Progressing     Problem: Musculoskeletal - Adult  Goal: Return mobility to safest level of function  6/17/2022 2004 by Krys Russell RN  Outcome: Progressing  6/17/2022 0923 by Ronny Jefferson RN  Outcome: Progressing     Problem: Gastrointestinal - Adult  Goal: Minimal or absence of nausea and vomiting  6/17/2022 2004 by Kerri Motley Nany Tinajero RN  Outcome: Progressing  6/17/2022 0923 by Baljinder Ames RN  Outcome: Progressing  Goal: Maintains or returns to baseline bowel function  6/17/2022 2004 by Connor Sprague RN  Outcome: Progressing  6/17/2022 0923 by Baljinder Ames RN  Outcome: Progressing     Problem: Genitourinary - Adult  Goal: Absence of urinary retention  6/17/2022 2004 by Connor Sprague RN  Outcome: Progressing  6/17/2022 0923 by Baljinder Ames RN  Outcome: Progressing     Problem: Infection - Adult  Goal: Absence of infection at discharge  6/17/2022 2004 by Connor Sprague RN  Outcome: Progressing  6/17/2022 0923 by Baljinder Ames RN  Outcome: Progressing     Problem: Skin/Tissue Integrity  Goal: Absence of new skin breakdown  Description: 1. Monitor for areas of redness and/or skin breakdown  2. Assess vascular access sites hourly  3. Every 4-6 hours minimum:  Change oxygen saturation probe site  4. Every 4-6 hours:  If on nasal continuous positive airway pressure, respiratory therapy assess nares and determine need for appliance change or resting period.   6/17/2022 2004 by Connor Sprague RN  Outcome: Progressing  6/17/2022 0923 by Baljinder Ames RN  Outcome: Progressing     Problem: Nutrition Deficit:  Goal: Optimize nutritional status  6/17/2022 2004 by Connor Sprague RN  Outcome: Progressing  Flowsheets (Taken 6/17/2022 1329 by Darin Calle, RD, LD)  Nutrient intake appropriate for improving, restoring, or maintaining nutritional needs:   Assess nutritional status and recommend course of action   Monitor oral intake, labs, and treatment plans   Recommend appropriate diets, oral nutritional supplements, and vitamin/mineral supplements  6/17/2022 0923 by Baljinder Amse RN  Outcome: Progressing
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
## 2022-06-18 NOTE — PROGRESS NOTES
Percocet given per orders for c/o pain 8/10. Patient denies present needs at this time. Call light within reach.

## 2022-06-20 ENCOUNTER — TELEPHONE (OUTPATIENT)
Dept: SURGERY | Age: 62
End: 2022-06-20

## 2022-06-20 ENCOUNTER — CARE COORDINATION (OUTPATIENT)
Dept: CASE MANAGEMENT | Age: 62
End: 2022-06-20

## 2022-06-20 DIAGNOSIS — L90.5 EXPLORATORY LAPAROTOMY SCAR: Primary | ICD-10-CM

## 2022-06-20 DIAGNOSIS — Z98.890 EXPLORATORY LAPAROTOMY SCAR: Primary | ICD-10-CM

## 2022-06-20 LAB
ANAEROBIC CULTURE: NORMAL
ANAEROBIC CULTURE: NORMAL
GRAM STAIN RESULT: NORMAL
GRAM STAIN RESULT: NORMAL
WOUND/ABSCESS: NORMAL
WOUND/ABSCESS: NORMAL

## 2022-06-20 RX ORDER — ONDANSETRON 4 MG/1
4 TABLET, FILM COATED ORAL EVERY 8 HOURS PRN
COMMUNITY

## 2022-06-20 RX ORDER — ONDANSETRON 4 MG/1
4 TABLET, ORALLY DISINTEGRATING ORAL 3 TIMES DAILY PRN
Qty: 21 TABLET | Refills: 0 | Status: SHIPPED | OUTPATIENT
Start: 2022-06-20

## 2022-06-20 NOTE — CARE COORDINATION
[facility] 45 Transitions Initial Follow Up Call    Call within 2 business days of discharge: Yes    Patient: Kahlil Hernandez Patient : 1960   MRN: [id number]  Reason for Admission: abd pain, pneumoperitoneum 2/ small bowel perforation s/p exp lap with RAMESH and small bowel resection on  (Dr Shawn Murillo), abd abscess s/p drain placement 6/10 by IR -> home no services with drains in place  Discharge Date: 22 RARS: Readmission Risk Score: 14.3 ( )      Last Discharge [address] [address]       Complaint Diagnosis Description Type Department Provider    22 Abdominal Pain Small bowel perforation ([facility] [address] . .. ED to Hosp-Admission (Discharged) ([place]MIT[place] [facility] [place] Matthews MD; Rosalva Wong . .. Spoke with: Kahlil Hernandez (patient)    Facility: [facility]     Non-face-to-face services provided:  Obtained and reviewed discharge summary and/or continuity of care documents  Education of patient/family/caregiver/guardian to support self-management-s/s to report to surgeon, managing drains, restrictions, surgeon instructions  Assessment and support for treatment adherence and medication management-[id number] completed    Was this an external facility discharge? No Discharge Facility: NA    Challenges to be reviewed by the provider   Additional needs identified to be addressed with provider: Yes  CTN reported nausea with one episode of vomiting to Dr Shawn Murillo office via [address] of communication with provider : phone    Advance Care Planning:   Does patient have an Advance Directive: decision maker updated. Care Transition Nurse contacted the patient by telephone to perform post hospital discharge assessment. Provided introduction to self, and explanation of the CTN role. CTN reviewed discharge instructions, medical action plan and red flags with patient who verbalized understanding. Patient given an opportunity to ask questions and does not have any further questions or concerns at this time.  Were discharge instructions available to patient? Yes. Reviewed appropriate site of care based on symptoms and resources available to patient including: PCP  Specialist. The patient agrees to contact the PCP office for questions related to their healthcare. Medication reconciliation was performed with patient, who verbalizes understanding of administration of home medications. He denies fever, chills. He does c/o nausea and poor appetite since home. Had emesis x1 this morning. Has ondansetron 4mg at home (added to med list as it was not on there). He has 6 tablets left from this old rx. He has been taking once daily  since home. He currently rates pain 6-7/10 at this time. He took one Percocet earlier. Tried to take with food but has no appetite. He has been tolerating PO intake in small amounts and drinking fluids. Had partial egg sandwich earlier. Had normal BM last night and another today. Managing drains without issue. One had 50ml out the other 25ml the third with about nothing. He has not contacted Dr Shawn Murillo about the n/v. He has not scheduled f/up appt yet either. Has not taken his BP meds since home. He will resume today as able. His preferred pharmacy is [facility]. CTN instructed him to take the ondansetron scheduled q8h as well as offering of bland snacks and fluids hourly. Reviewed surgeon's instructions in AVS. He does not know plan for drain removal. CTN will contact surgeon office to report nausea with emesis x 1, need for additional nausea medication and follow up plan. He is in agreement with plan. CTN provided contact information. CTN contacted Dr Shawn Murillo office and left message of above with Tracy Maddox. She will send message to surgeon to ask plan and request then follow up with patient. Plan for follow-up call in 1-2 days based on severity of symptoms and risk factors.     Plan for next call: symptom management-n/v     Care Transitions 24 Hour Call    Do you have a copy of your discharge instructions?: Yes  Do you have all of your prescriptions and are they filled?: Yes  Have you been contacted by a [address]?: No  Have you scheduled your follow up appointment?: No  Do you feel like you have everything you need to keep you well at home?: Yes  Care Transitions Interventions         Follow Up  Future Appointments   Date Time Provider Lars Morales   6/30/2022  9:00 462 HERRERA Sweeney CNPP CLER CAR [facility]   7/6/2022  5:20 PM [facility] [place] [facility]   8/30/2022  4:20 PM HERRERA Anderson CNP [address] [id number]       Jhoana Rhoades RN

## 2022-06-20 NOTE — TELEPHONE ENCOUNTER
Airam Kenyon from care transition called saying she contacted patient and patient reported nausea and one episode of vomiting. . Pt has zofran prescribed. He has 6 pills left, he was wanting to get a refill. . [facility]

## 2022-06-21 ENCOUNTER — TELEPHONE (OUTPATIENT)
Dept: SURGERY | Age: 62
End: 2022-06-21

## 2022-06-21 ENCOUNTER — CARE COORDINATION (OUTPATIENT)
Dept: CASE MANAGEMENT | Age: 62
End: 2022-06-21

## 2022-06-21 NOTE — CARE COORDINATION
[facility] 45 Transitions Follow Up Call    2022    Patient: Alberto Mcintyre  Patient : 1960   MRN: [id number]  Reason for Admission: abd pain, pneumoperitoneum 2/ small bowel perforation s/p exp lap with RAMESH and small bowel resection on  (Dr Ra Hector), abd abscess s/p drain placement 6/10 by IR -> home no services with drains in place  Discharge Date: 22 RARS: Readmission Risk Score: 14.3 ( )      Spoke with: Alberto Mcintyre (patient)    CTN follow up from yesterday's outreach. He received new rx for ondansetron and is scheduled to see Dr Ra Hector as noted below. Still with underlying nausea and taking ondansetron prn. States he is overall uncomfortable and moving around makes his abdomen feel worse. He states it distends easily with PO intake. Drainage into 3 drains about the same. He would like to see the surgeon before the weekend if able. He is aware if drains are not ready to be removed he may have to return next week. CTN will contact GI office and ask. CTN contacted Dr Ra Hector office. Spoke with Austin Coto requesting appointment for this week. She will contact Mr Lanny Mariee to schedule for this week with Dr Ra Hector. Scheduled for  per chart refresh.     Follow Up  Future Appointments   Date Time Provider Lars Morales   2022  9:15 AM Manuel Pride MD [facility]   2022 10:15 AM MD LESLY GunterRMONT [facility]   2022  9:00 AM HERRERA Davenport CNP CLER CAR [facility]   2022  5:20 PM Paulla Lindau, APRN - CNP [facility] [place] - [place]   2022  4:20 PM HERRERA Uribe CNP FP [place] - [place]       Sarah Sharma RN

## 2022-06-23 ENCOUNTER — OFFICE VISIT (OUTPATIENT)
Dept: SURGERY | Age: 62
End: 2022-06-23

## 2022-06-23 VITALS
BODY MASS INDEX: 29.63 KG/M2 | HEIGHT: 70 IN | SYSTOLIC BLOOD PRESSURE: 124 MMHG | DIASTOLIC BLOOD PRESSURE: 72 MMHG | WEIGHT: 207 LBS

## 2022-06-23 DIAGNOSIS — Z98.890 POST-OPERATIVE STATE: Primary | ICD-10-CM

## 2022-06-23 PROCEDURE — 99024 POSTOP FOLLOW-UP VISIT: CPT | Performed by: SURGERY

## 2022-06-23 NOTE — PROGRESS NOTES
[facility] SURGERY      S:   Patient presents s/p exploratory laparotomy with small bowel resection for perforation due to a toothpick as well as drainage of postoperative fluid collections. He reports slowly improving but still not feeling great. His bowels are working but it is very hard. O:   Comfortable         Incision site healing well. Staples removed. Drains x2 with minimal clear serous drainage. These were removed              A:   S/P exploratory laparotomy with small bowel resection for perforation due to toothpick and postoperative drainage of fluid collections    P:   I reassured him that he will slowly improve. I encouraged him to cut back on the narcotics and just try taking ibuprofen in order to help his bowels work better. I also told him he could take a stool softener. Follow up as needed.

## 2022-06-28 ENCOUNTER — OFFICE VISIT (OUTPATIENT)
Dept: SURGERY | Age: 62
End: 2022-06-28

## 2022-06-28 VITALS
DIASTOLIC BLOOD PRESSURE: 84 MMHG | HEIGHT: 70 IN | BODY MASS INDEX: 30.06 KG/M2 | SYSTOLIC BLOOD PRESSURE: 138 MMHG | WEIGHT: 210 LBS

## 2022-06-28 DIAGNOSIS — Z98.890 POST-OPERATIVE STATE: Primary | ICD-10-CM

## 2022-06-28 PROCEDURE — 99024 POSTOP FOLLOW-UP VISIT: CPT | Performed by: SURGERY

## 2022-06-28 NOTE — PROGRESS NOTES
[facility] GENERAL SURGERY      S:   Patient presents s/p small bowel resection. He reports doing better. He is eating better. He does still vomit occasionally but this is improving. O:   Comfortable         Incision site healing well. A:   S/P small bowel resection    P:   Follow-up tomorrow as scheduled with GI for liver drain removal.  Follow up with me as needed.

## 2022-06-29 ENCOUNTER — CARE COORDINATION (OUTPATIENT)
Dept: CASE MANAGEMENT | Age: 62
End: 2022-06-29

## 2022-06-29 ENCOUNTER — HOSPITAL ENCOUNTER (OUTPATIENT)
Age: 62
Discharge: HOME | End: 2022-06-29
Payer: COMMERCIAL

## 2022-06-29 LAB
INR BLD: 1.07 (ref 0.87–1.14)
PROTHROMBIN TIME: 13.7 SEC (ref 11.7–14.5)

## 2022-06-29 PROCEDURE — 80053 COMPREHEN METABOLIC PANEL: CPT

## 2022-06-29 PROCEDURE — 85610 PROTHROMBIN TIME: CPT

## 2022-06-29 PROCEDURE — 36415 COLL VENOUS BLD VENIPUNCTURE: CPT

## 2022-06-29 PROCEDURE — 83690 ASSAY OF LIPASE: CPT

## 2022-06-29 PROCEDURE — 85025 COMPLETE CBC W/AUTO DIFF WBC: CPT

## 2022-06-29 NOTE — CARE COORDINATION
[facility] Transitions Follow Up Call    2022    Patient: Edinson Mayer  Patient : 1960   MRN: [id number]  Reason for Admission: abd pain, pneumoperitoneum 2/2 small bowel perforation s/p exp lap with RAMESH and small bowel resection on  (Dr Jefferson Zayas), abd abscess s/p drain placement 6/10 by IR -> home no services with drains in place     Discharge Date: 22 RARS: Readmission Risk Score: 14.3 ( )       Spoke with: Edinson Mayer (patient)    Needs to be reviewed by the provider   Additional needs identified to be addressed with provider: No         Method of communication with provider : none    Care Transition Nurse (CTN) contacted the patient by telephone to follow up after recent hospital admission. Addressed changes since last contact: completed 2 OV with surgeon - 2 drains removed  Discussed follow-up appointments. If no appointment was previously scheduled, appointment scheduling offered: no.   Non-[facility] follow up appointment(s): NA    Discussed appropriate site of care based on symptoms and resources available to patient including: PCP  Specialist. The patient agrees to contact the PCP office for questions related to their healthcare. Patients top risk factors for readmission: medical condition-   Interventions to address risk factors: Education of patient/family/caregiver/guardian to support self-management-     Last drain to be removed today at GI visit. Incision WNL. Reviewed Dr Jefferson Zayas note regarding narcotic pain med vs ibuprofen. He said he is not using either and suffering. Instructed him in ibuprofen use, take with water and/or food to see if that helps. He is having better BM now. Denies needs. CTN provided contact information. Plan for follow-up call in 7-10 days based on severity of symptoms and risk factors.   Plan for next call: self management-      Follow Up  Future Appointments   Date Time Provider Lars Morales   2022  5:20 PM HERRERA Reece - [place] - [place]   8/30/2022  4:20 PM HERRERA Charles - CNP [facility]  [place] - [place] Cormier RN

## 2022-06-30 LAB
A/G RATIO: 1.1 (ref 1.1–2.2)
ALBUMIN SERPL-MCNC: 3.9 G/DL (ref 3.4–5)
ALP BLD-CCNC: 111 U/L (ref 40–129)
ALT SERPL-CCNC: 20 U/L (ref 10–40)
ANION GAP SERPL CALCULATED.3IONS-SCNC: 12 MMOL/L (ref 3–16)
AST SERPL-CCNC: 18 U/L (ref 15–37)
BASOPHILS ABSOLUTE: 0.1 K/UL (ref 0–0.2)
BASOPHILS RELATIVE PERCENT: 0.9 %
BILIRUB SERPL-MCNC: <0.2 MG/DL (ref 0–1)
BUN BLDV-MCNC: 11 MG/DL (ref 7–20)
CALCIUM SERPL-MCNC: 9 MG/DL (ref 8.3–10.6)
CHLORIDE BLD-SCNC: 102 MMOL/L (ref 99–110)
CO2: 25 MMOL/L (ref 21–32)
CREAT SERPL-MCNC: 0.6 MG/DL (ref 0.9–1.3)
EOSINOPHILS ABSOLUTE: 0.3 K/UL (ref 0–0.6)
EOSINOPHILS RELATIVE PERCENT: 4.4 %
GFR AFRICAN AMERICAN: >60
GFR NON-AFRICAN AMERICAN: >60
GLUCOSE BLD-MCNC: 85 MG/DL (ref 70–99)
HCT VFR BLD CALC: 33.7 % (ref 40.5–52.5)
HEMOGLOBIN: 11.5 G/DL (ref 13.5–17.5)
LIPASE: 49 U/L (ref 13–60)
LYMPHOCYTES ABSOLUTE: 1.6 K/UL (ref 1–5.1)
LYMPHOCYTES RELATIVE PERCENT: 26.9 %
MCH RBC QN AUTO: 30.9 PG (ref 26–34)
MCHC RBC AUTO-ENTMCNC: 34 G/DL (ref 31–36)
MCV RBC AUTO: 90.8 FL (ref 80–100)
MONOCYTES ABSOLUTE: 0.5 K/UL (ref 0–1.3)
MONOCYTES RELATIVE PERCENT: 8 %
NEUTROPHILS ABSOLUTE: 3.6 K/UL (ref 1.7–7.7)
NEUTROPHILS RELATIVE PERCENT: 59.8 %
PDW BLD-RTO: 15.2 % (ref 12.4–15.4)
PLATELET # BLD: 239 K/UL (ref 135–450)
PMV BLD AUTO: 10 FL (ref 5–10.5)
POTASSIUM SERPL-SCNC: 4 MMOL/L (ref 3.5–5.1)
RBC # BLD: 3.71 M/UL (ref 4.2–5.9)
SODIUM BLD-SCNC: 139 MMOL/L (ref 136–145)
TOTAL PROTEIN: 7.3 G/DL (ref 6.4–8.2)
WBC # BLD: 6 K/UL (ref 4–11)

## 2022-07-05 DIAGNOSIS — M1A.40X0 OTHER SECONDARY CHRONIC GOUT WITHOUT TOPHUS, UNSPECIFIED SITE: ICD-10-CM

## 2022-07-05 RX ORDER — ALLOPURINOL 100 MG/1
TABLET ORAL
Qty: 30 TABLET | Refills: 5 | Status: SHIPPED | OUTPATIENT
Start: 2022-07-05

## 2022-07-05 RX ORDER — GABAPENTIN 400 MG/1
CAPSULE ORAL
Qty: 90 CAPSULE | Refills: 5 | Status: SHIPPED | OUTPATIENT
Start: 2022-07-05 | End: 2022-07-05 | Stop reason: CLARIF

## 2022-07-05 RX ORDER — GABAPENTIN 400 MG/1
CAPSULE ORAL
Qty: 90 CAPSULE | Refills: 2 | Status: SHIPPED | OUTPATIENT
Start: 2022-07-05 | End: 2022-08-08

## 2022-07-05 NOTE — TELEPHONE ENCOUNTER
Controlled Substance Monitoring:    Acute and Chronic Pain Monitoring:   RX Monitoring 7/5/2022   Periodic Controlled Substance Monitoring No signs of potential drug abuse or diversion identified.

## 2022-07-06 ENCOUNTER — CARE COORDINATION (OUTPATIENT)
Dept: CASE MANAGEMENT | Age: 62
End: 2022-07-06

## 2022-07-07 RX ORDER — TRAZODONE HYDROCHLORIDE 150 MG/1
TABLET ORAL
Qty: 30 TABLET | Refills: 2 | OUTPATIENT
Start: 2022-07-07

## 2022-07-07 RX ORDER — DULOXETINE 40 MG/1
CAPSULE, DELAYED RELEASE ORAL
Qty: 30 CAPSULE | Refills: 2 | OUTPATIENT
Start: 2022-07-07

## 2022-07-07 NOTE — CARE COORDINATION
[facility]  Transitions Follow Up Call    2022    Patient: Oly Ramirez  Patient : 1960   MRN: [id number]  Reason for Admission: abd pain, pneumoperitoneum 2/2 small bowel perforation s/p exp lap with RAMESH and small bowel resection on  (Dr Rashida Mae), abd abscess s/p drain placement 6/10 by IR -> home no services with drains in place  Discharge Date: 22 RARS: Readmission Risk Score: 14.3 ( )       Spoke with: Oly Ramirez (patient)    Needs to be reviewed by the provider   Additional needs identified to be addressed with provider: yes  Asking about having liver drain removed         Method of communication with provider : phone    Care Transition Nurse (CTN) contacted the patient by telephone to follow up after recent hospital admission. Addressed changes since last contact: none  Discussed follow-up appointments. If no appointment was previously scheduled, appointment scheduling offered: no.   Non-[facility] follow up appointment(s): NA    Discussed appropriate site of care based on symptoms and resources available to patient including: PCP  Specialist. The patient agrees to contact the PCP office for questions related to their healthcare. Patients top risk factors for readmission: medical condition-   Interventions to address risk factors: Education of patient/family/caregiver/guardian to support self-management-f/up with GI and have CT done as scheduled    One drain remains and per Dr Rashida Mae note on  he was to see Dr Maryuri Gibbs on  to have liver drain removed. He doesn't remember seeing Dr Maryuri Gibbs last week. There is a new order for CT abd/pel placed by Dr Seferino Rodriguez on  and he is scheduled for CT next week on . Daughters who are nurses are maintaining drain and dressing changes. States there is no drainage and he thinks it should be out already. He has not contacted GI office about this.  CTN will contact Dr Maryuri Gibbs office as he is on the road and wants them to call him. CTN provided contact information. CTN contacted GI office. Confirmed that he did have OV with Dr Peggy Man last week as noted by Dr Priyanka Camejo. States she was planning to contact him about the liver drain and will call him now. States it cannot be removed until CT scheduled for 7/11. Plan for follow-up call in 7-10 days based on severity of symptoms and risk factors. Plan for next call: self management-liver drain out?         Follow Up  Future Appointments   Date Time Provider Lars Morales   7/11/2022  5:00 PM [facility]   8/30/2022  4:20 PM HERRERA Pike - CNP [place] [facility] Shivani Witt RN

## 2022-07-11 ENCOUNTER — HOSPITAL ENCOUNTER (OUTPATIENT)
Dept: CT IMAGING | Age: 62
Discharge: HOME | End: 2022-07-11
Payer: COMMERCIAL

## 2022-07-11 DIAGNOSIS — R93.3 ABNORMAL VIRTUAL COLONOSCOPE: ICD-10-CM

## 2022-07-11 DIAGNOSIS — K75.0 LIVER ABSCESS: ICD-10-CM

## 2022-07-11 PROCEDURE — 74177 CT ABD & PELVIS W/CONTRAST: CPT

## 2022-07-11 PROCEDURE — 6360000004 HC RX CONTRAST MEDICATION: Performed by: INTERNAL MEDICINE

## 2022-07-11 RX ADMIN — IOPAMIDOL 75 ML: 755 INJECTION, SOLUTION INTRAVENOUS at 16:53

## 2022-07-14 ENCOUNTER — CARE COORDINATION (OUTPATIENT)
Dept: CASE MANAGEMENT | Age: 62
End: 2022-07-14

## 2022-07-14 NOTE — CARE COORDINATION
[facility] 45 Transitions Follow Up Call    2022    Patient: Pierre Muhammad  Patient : 1960   MRN: [id number]  Reason for Admission: abd pain, pneumoperitoneum 2/ small bowel perforation s/p exp lap with RAMESH and small bowel resection on  (Dr Sara Dos Santos), abd abscess s/p drain placement 6/10 by IR -> home no services with drains in place  Discharge Date: 22 RARS: Readmission Risk Score: 14.3 ( )       CTN attempted follow-up outreach to patient. Message left including CTN contact information for return call.      Follow Up  Future Appointments   Date Time Provider Lars Morales   2022  4:20 PM [facility]

## 2022-07-15 ENCOUNTER — CARE COORDINATION (OUTPATIENT)
Dept: CASE MANAGEMENT | Age: 62
End: 2022-07-15

## 2022-07-15 NOTE — CARE COORDINATION
[facility] 45 Transitions Follow Up Call    7/15/2022    Patient: Nichole Rene  Patient : 1960   MRN: [id number]  Reason for Admission: abd pain, pneumoperitoneum / small bowel perforation s/p exp lap with RAMESH and small bowel resection on  (Dr Elmer Gonzalez), abd abscess s/p drain placement 6/10 by IR -> home no services with drains in place  Discharge Date: 22 RARS: Readmission Risk Score: 14.3 ( )         Spoke with: nikhil    [facility] attempted outreach for care transition follow up call. Left HIPPA compliant message and contact information for call back. Care Transitions Subsequent and Final Call    Subsequent and Final Calls  Care Transitions Interventions  Other Interventions:              Follow Up  Future Appointments   Date Time Provider Lars Morales   2022  4:20 PM [facility]       Charlotte Quarles LPN

## 2022-07-18 DIAGNOSIS — F32.9 REACTIVE DEPRESSION: ICD-10-CM

## 2022-07-19 RX ORDER — DULOXETIN HYDROCHLORIDE 60 MG/1
CAPSULE, DELAYED RELEASE ORAL
Qty: 90 CAPSULE | Refills: 1 | OUTPATIENT
Start: 2022-07-19

## 2022-07-27 RX ORDER — TRAZODONE HYDROCHLORIDE 150 MG/1
TABLET ORAL
Qty: 30 TABLET | Refills: 1 | OUTPATIENT
Start: 2022-07-27

## 2022-07-27 RX ORDER — DULOXETINE 40 MG/1
CAPSULE, DELAYED RELEASE ORAL
Qty: 30 CAPSULE | Refills: 1 | OUTPATIENT
Start: 2022-07-27

## 2022-08-26 NOTE — TELEPHONE ENCOUNTER
Refill request for duloxetine 40 mg capsule,delayed release medication.      Name of David Jamil Dr      Last visit - 5-     Pending visit - 8-    Last refill - 3-      Medication Contract signed -   Last Shell ruiz-         Additional Comments

## 2022-08-29 RX ORDER — DULOXETINE 40 MG/1
CAPSULE, DELAYED RELEASE ORAL
Qty: 30 CAPSULE | Refills: 2 | Status: SHIPPED | OUTPATIENT
Start: 2022-08-29

## 2022-08-30 ENCOUNTER — TELEPHONE (OUTPATIENT)
Dept: FAMILY MEDICINE CLINIC | Age: 62
End: 2022-08-30

## 2022-08-30 NOTE — LETTER
[address]  Phone: [phone]  Fax: [phone]        September 2, 2022     [address] [place] [address]      Dear Cele Oneil: We missed seeing you for a scheduled appointment at [address] [place] [address] with Anirudh Merlene on 8/30/22. We're sorry you were unable to keep your appointment and hope that you are doing well. We ask that you please call 24 hours in advance if you are unable to make your appointment, so that we can give that time to another patient in need. We care about you and the management of your healthcare and want to make sure that you follow up as recommended. To provide quality care and timely appointments to all our patients, you may be dismissed from the practice if you do not show for three (3) scheduled appointments within a 12-month period. We would like to continue treating your healthcare needs. Please call the office to reschedule your appointment, if needed.      Sincerely,        Gretchen Dickinson

## 2022-08-31 RX ORDER — GABAPENTIN 400 MG/1
CAPSULE ORAL
Qty: 90 CAPSULE | Refills: 2 | OUTPATIENT
Start: 2022-08-31

## 2022-09-06 ENCOUNTER — HOSPITAL ENCOUNTER (OUTPATIENT)
Dept: CT IMAGING | Age: 62
Discharge: HOME | End: 2022-09-06
Payer: COMMERCIAL

## 2022-09-06 DIAGNOSIS — K75.0 LIVER ABSCESS: ICD-10-CM

## 2022-09-06 PROCEDURE — 6360000004 HC RX CONTRAST MEDICATION: Performed by: INTERNAL MEDICINE

## 2022-09-06 PROCEDURE — 74177 CT ABD & PELVIS W/CONTRAST: CPT

## 2022-09-06 RX ADMIN — IOPAMIDOL 75 ML: 755 INJECTION, SOLUTION INTRAVENOUS at 14:16

## 2022-10-11 DIAGNOSIS — F51.02 ADJUSTMENT INSOMNIA: ICD-10-CM

## 2022-10-11 RX ORDER — TRAZODONE HYDROCHLORIDE 100 MG/1
TABLET ORAL
Qty: 180 TABLET | Refills: 1 | OUTPATIENT
Start: 2022-10-11

## 2022-10-27 RX ORDER — DULOXETINE 40 MG/1
CAPSULE, DELAYED RELEASE ORAL
Qty: 30 CAPSULE | Refills: 2 | OUTPATIENT
Start: 2022-10-27

## 2022-11-29 DIAGNOSIS — M1A.40X0 OTHER SECONDARY CHRONIC GOUT WITHOUT TOPHUS, UNSPECIFIED SITE: ICD-10-CM

## 2022-11-29 RX ORDER — ALLOPURINOL 100 MG/1
TABLET ORAL
Qty: 30 TABLET | Refills: 5 | OUTPATIENT
Start: 2022-11-29

## 2022-11-29 RX ORDER — DULOXETINE 40 MG/1
CAPSULE, DELAYED RELEASE ORAL
Qty: 30 CAPSULE | Refills: 2 | OUTPATIENT
Start: 2022-11-29

## 2022-12-06 DIAGNOSIS — F51.02 ADJUSTMENT INSOMNIA: ICD-10-CM

## 2022-12-07 RX ORDER — TRAZODONE HYDROCHLORIDE 100 MG/1
100 TABLET ORAL NIGHTLY
Qty: 14 TABLET | Refills: 0 | Status: SHIPPED | OUTPATIENT
Start: 2022-12-07

## 2022-12-19 ENCOUNTER — OFFICE VISIT (OUTPATIENT)
Dept: FAMILY MEDICINE CLINIC | Age: 62
End: 2022-12-19
Payer: COMMERCIAL

## 2022-12-19 VITALS
TEMPERATURE: 98.1 F | OXYGEN SATURATION: 94 % | WEIGHT: 237.2 LBS | HEART RATE: 88 BPM | SYSTOLIC BLOOD PRESSURE: 138 MMHG | DIASTOLIC BLOOD PRESSURE: 85 MMHG | BODY MASS INDEX: 34.03 KG/M2

## 2022-12-19 DIAGNOSIS — I10 ESSENTIAL HYPERTENSION: Primary | ICD-10-CM

## 2022-12-19 DIAGNOSIS — M79.2 PERIPHERAL NEURALGIA: ICD-10-CM

## 2022-12-19 DIAGNOSIS — Z23 NEED FOR IMMUNIZATION AGAINST INFLUENZA: ICD-10-CM

## 2022-12-19 DIAGNOSIS — L40.9 PSORIASIS: ICD-10-CM

## 2022-12-19 DIAGNOSIS — F10.10 ALCOHOL ABUSE: ICD-10-CM

## 2022-12-19 DIAGNOSIS — I73.9 PERIPHERAL VASCULAR INSUFFICIENCY (HCC): ICD-10-CM

## 2022-12-19 DIAGNOSIS — F51.01 PRIMARY INSOMNIA: ICD-10-CM

## 2022-12-19 DIAGNOSIS — K21.9 GASTROESOPHAGEAL REFLUX DISEASE WITHOUT ESOPHAGITIS: ICD-10-CM

## 2022-12-19 PROCEDURE — 99214 OFFICE O/P EST MOD 30 MIN: CPT | Performed by: NURSE PRACTITIONER

## 2022-12-19 PROCEDURE — 36415 COLL VENOUS BLD VENIPUNCTURE: CPT | Performed by: NURSE PRACTITIONER

## 2022-12-19 PROCEDURE — G8427 DOCREV CUR MEDS BY ELIG CLIN: HCPCS | Performed by: NURSE PRACTITIONER

## 2022-12-19 PROCEDURE — 3078F DIAST BP <80 MM HG: CPT | Performed by: NURSE PRACTITIONER

## 2022-12-19 PROCEDURE — 1036F TOBACCO NON-USER: CPT | Performed by: NURSE PRACTITIONER

## 2022-12-19 PROCEDURE — 3074F SYST BP LT 130 MM HG: CPT | Performed by: NURSE PRACTITIONER

## 2022-12-19 PROCEDURE — G8417 CALC BMI ABV UP PARAM F/U: HCPCS | Performed by: NURSE PRACTITIONER

## 2022-12-19 PROCEDURE — 90674 CCIIV4 VAC NO PRSV 0.5 ML IM: CPT | Performed by: NURSE PRACTITIONER

## 2022-12-19 PROCEDURE — 3017F COLORECTAL CA SCREEN DOC REV: CPT | Performed by: NURSE PRACTITIONER

## 2022-12-19 PROCEDURE — 90471 IMMUNIZATION ADMIN: CPT | Performed by: NURSE PRACTITIONER

## 2022-12-19 PROCEDURE — G8482 FLU IMMUNIZE ORDER/ADMIN: HCPCS | Performed by: NURSE PRACTITIONER

## 2022-12-19 RX ORDER — TRAZODONE HYDROCHLORIDE 300 MG/1
300 TABLET ORAL NIGHTLY
Qty: 30 TABLET | Refills: 5 | Status: SHIPPED | OUTPATIENT
Start: 2022-12-19 | End: 2023-01-18

## 2022-12-19 RX ORDER — MELATONIN 10 MG
CAPSULE ORAL
Qty: 30 CAPSULE | Refills: 5 | Status: SHIPPED | OUTPATIENT
Start: 2022-12-19

## 2022-12-19 RX ORDER — TRIAMCINOLONE ACETONIDE 1 MG/G
CREAM TOPICAL
Qty: 80 G | Refills: 2 | Status: SHIPPED | OUTPATIENT
Start: 2022-12-19

## 2022-12-19 ASSESSMENT — ENCOUNTER SYMPTOMS
RESPIRATORY NEGATIVE: 1
CHEST TIGHTNESS: 0
SHORTNESS OF BREATH: 0
ABDOMINAL PAIN: 0
EYES NEGATIVE: 1
ALLERGIC/IMMUNOLOGIC NEGATIVE: 1

## 2022-12-19 NOTE — PATIENT INSTRUCTIONS
Please read the healthy family handout that you were given and share it with your family. Please compare this printed medication list with your medications at home to be sure they are the same. If you have any medications that are different please contact us immediately at [phone]. Also review your allergies that we have listed, these may also include medications that you have not been able to tolerate, make sure everything listed is correct. If you have any allergies that are different please contact us immediately at [phone]. You may receive a survey in the mail or by email asking about your experience during your visit today. Please complete and return to us so we know how we are serving you.

## 2022-12-19 NOTE — PROGRESS NOTES
Subjective:      Patient ID: Kam Hidalgo is a 63 y.o. male. Chief Complaint   Patient presents with    Check-Up    Hypertension        HPI    Patient presents today to follow-up on chronic conditions. Patient also with complaints of numbness and pain of both feet and legs. Patient also reports he is not sleeping well. Hypertension:  Home blood pressure monitoring: No.  He is not adherent to a low sodium diet. Patient denies chest pain, shortness of breath, headache, lightheadedness, blurred vision, peripheral edema, palpitations, dry cough, and fatigue. Antihypertensive medication side effects: no medication side effects noted. Use of agents associated with hypertension: none. Sodium (mmol/L)   Date Value   06/29/2022 139    BUN (mg/dL)   Date Value   06/29/2022 11    Glucose (mg/dL)   Date Value   06/29/2022 85      Potassium (mmol/L)   Date Value   06/29/2022 4.0     Potassium reflex Magnesium (mmol/L)   Date Value   06/13/2022 4.5    Creatinine (mg/dL)   Date Value   06/29/2022 0.6 (L)         Review of Systems   Constitutional: Negative. Negative for activity change, appetite change, chills, fatigue and unexpected weight change. HENT: Negative. Eyes: Negative. Respiratory: Negative. Negative for chest tightness and shortness of breath. Cardiovascular: Negative. Negative for chest pain, palpitations and leg swelling. Gastrointestinal:  Negative for abdominal pain. Endocrine: Negative. Genitourinary: Negative. Musculoskeletal: Negative. Skin:  Positive for rash (scaly rash of hands and lower back/buttock). Negative for wound. Allergic/Immunologic: Negative. Neurological:  Positive for numbness (and pain of bilat lower extremities and feet). Negative for dizziness, light-headedness and headaches. Hematological: Negative. Psychiatric/Behavioral: Negative. Negative for dysphoric mood.       Patient Active Problem List soft.   Musculoskeletal:      Cervical back: Neck supple. Right lower leg: No edema. Left lower leg: No edema. Lymphadenopathy:      Cervical: No cervical adenopathy. Skin:     General: Skin is warm and moist.      Capillary Refill: Capillary refill takes less than 2 seconds. Findings: Rash (scaly rash of hands, buttock and lower back) present. Neurological:      General: No focal deficit present. Mental Status: He is alert and oriented to person, place, and time. Mental status is at baseline. Psychiatric:         Attention and Perception: Attention normal.         Mood and Affect: Mood normal.         Speech: Speech normal.         Behavior: Behavior normal. Behavior is cooperative. Narrative   Lower Extremities Arterial Duplex        Demographics        Patient Name       ADALI Arias        Date of Study      08/17/2020         Gender              Male        Patient Number     [id number]         Date of Birth       1960        Visit Number       [id number]          Age                 64 year(s)        Accession Number   [id number]         Room Number         OP        Corporate ID       [id number]           Sonographer         Radha Guerrero RVT        Ordering Physician Georgiana Oconnor MD        Physician           Vascular                                                              Kendal Ibarra MD, [facility] [place], [address]       Procedure       Type of Study:        Extremities Arteries:Lower Extremities Arterial Duplex, VL LOWER EXTREMITY    ARTERIES DUPLEX BILATERAL. Vascular Sonographer Report       Indications for Study:Leg pain. Additional Indications:Patient reports bilateral leg pain with throbbing at   night for at least 10 years.        Arterial Duplex Scan: Grayscale and color imaging of the extremity arteries,   including Doppler spectral waveform analysis. Impressions   Right Impression   The right ankle/brachial index is 1.28 (the DP is 212 , the PT is 212 mmHg). There is normal triphasic flow at the common femoral artery indicating no   significant aorto-iliac inflow disease. There is atherosclerotic plaque seen within the common femoral artery, deep   femoral artery, popliteal artery consistent with <50% stenosis. The calf vessels appear patent to the ankle. Left Impression   The left ankle/brachial index is 1.32 (the DP is 217 , the PT is 191 mmHg). There is normal triphasic flow at the common femoral artery indicating no   significant aorto-iliac inflow disease. There is atherosclerotic plaque seen within the common femoral artery, deep   femoral artery, popliteal artery consistent with <50% stenosis. The calf vessels appear patent to the ankle. There is no previous exam for comparison. Conclusions        Summary        1. There is no resting arterial insufficiency noted within the lower    extremities bilaterally. 2. There is mild atherosclerotic plaque seen within the common femoral    artery, deep femoral artery, popliteal artery consistent with <50% stenosis. Signature        ------------------------------------------------------------------    Electronically signed by Talat Mcneil MD, Carson Whitfield    (Interpreting physician) on 08/18/2020 at 04:11 PM    ------------------------------------------------------------------       Blood Pressure:Right arm 164/165 mmHg. Patient Status:Routine. Study [address] - Vascular Lab. Technical Quality:Adequate visualization. Risk Factors         - The patient's risk factor(s) include: obesity and arterial hypertension.        Velocities are measured in cm/s ; Diameters are measured in mm       LE Duplex Measurements +-------------------++-----+-----+----------++----+-----+----------+   ! ! !Right! ! Left      !!    !     !          !   +-------------------++-----+-----+----------++----+-----+----------+   ! Location           ! !PSV  ! Ratio! Wave Desc. !!PSV ! Ratio! Wave Desc.!   +-------------------++-----+-----+----------++----+-----+----------+   ! Prox Common Femoral!!83.9 ! ! Triphasic !!109 ! ! Triphasic !   +-------------------++-----+-----+----------++----+-----+----------+   ! Dist Common Femoral!!85.5 ! ! Triphasic !!77.6! ! Triphasic !   +-------------------++-----+-----+----------++----+-----+----------+   ! PFA                ! !674  ! ! Triphasic ! !117 ! ! Triphasic !   +-------------------++-----+-----+----------++----+-----+----------+   ! Prox SFA           !!96.4 !1.13 ! Triphasic !!90.9!1.17 ! Triphasic !   +-------------------++-----+-----+----------++----+-----+----------+   ! Mid SFA            !!75.3 !0.78 ! Triphasic !!92.5!1.02 ! Triphasic !   +-------------------++-----+-----+----------++----+-----+----------+   ! Dist SFA           !!79.2 ! 1.05 ! Triphasic !!90.2!0.98 ! Triphasic !   +-------------------++-----+-----+----------++----+-----+----------+   ! Prox Popliteal     !!78.9 !1    ! Triphasic !!90.9!1.01 ! Triphasic !   +-------------------++-----+-----+----------++----+-----+----------+   ! Dist Popliteal     !!52.8 !0.67 ! Triphasic !!75.3!0.83 ! Triphasic !   +-------------------++-----+-----+----------++----+-----+----------+   ! Mid PTA            !!70.7 !1.34 ! Triphasic !!77.6!1.03 ! Triphasic !   +-------------------++-----+-----+----------++----+-----+----------+   ! Dist PTA           !!47   !0.66 ! Triphasic !!57.6!0.74 ! Triphasic !   +-------------------++-----+-----+----------++----+-----+----------+   ! Dist TIFFANIE           !!52.1 !0.99 ! Triphasic !!59.3!0.79 ! Triphasic !   +-------------------++-----+-----+----------++----+-----+----------+   ! Mid Peroneal       !!47.4 ! 0. 9  !Triphasic !!40.3!0.54 ! Triphasic !   +-------------------++-----+-----+----------++----+-----+----------+     Assessment/Plan:   1. Essential hypertension  Patient presents today to follow-up on chronic conditions. Patient continues to take lisinopril and metoprolol daily as ordered. Blood pressure has been well controlled with current treatment. Recommend patient continue with current medication regimen.  - CBC with Auto Differential  - Comprehensive Metabolic Panel    2. Psoriasis  Hands, lower back and buttock with scaling patches. Patient reports intense itching. Suspect psoriasis and recommend treatment as below. Advised patient to follow-up with dermatology for further evaluation. Patient agreeable. Referral provided. - triamcinolone (KENALOG) 0.1 % cream; Apply topically 2 times daily. Dispense: 80 g; Refill: 2  - [facility]    3. Primary insomnia  Provement with use of trazodone however patient has difficulty falling asleep at times. Recommend increasing trazodone as below. Patient to f/u if no better or worsening of symptoms. - traZODone (DESYREL) 300 MG tablet; Take 1 tablet by mouth nightly  Dispense: 30 tablet; Refill: 5  - melatonin ([facility] MELATONIN) 10 MG CAPS capsule; TAKE 1 CAPSULE BY MOUTH EVERY DAY AT NIGHT  Dispense: 30 capsule; Refill: 5    4. Peripheral neuralgia  Bilateral lower extremities. Patient complains of paresthesia and advancing pain. Patient admits has not been taking gabapentin consistently and refill pattern is reflective of this. I had previously recommended an EMG however patient did not follow through. Advised patient to take gabapentin 400 mg 3 times daily as directed and schedule EMG. Patient agreeable. - EMG; Future    5. Peripheral vascular insufficiency (HCC)  Mild plaque noted via Doppler. 6. Alcohol abuse  Patient states he has been binge drinking again.   Patient reports he has not had any alcohol x5 days and is quitting for good this time. Discuss benefits of rehab. Patient declines at this time. 7. Gastroesophageal reflux disease without esophagitis  Asymptomatic.      8. Need for immunization against influenza  - Influenza, FLUCELVAX, (age 10 mo+), IM, Preservative Free, 0.5 mL

## 2022-12-20 LAB
A/G RATIO: 1.5 (ref 1.1–2.2)
ALBUMIN SERPL-MCNC: 4.3 G/DL (ref 3.4–5)
ALP BLD-CCNC: 94 U/L (ref 40–129)
ALT SERPL-CCNC: 49 U/L (ref 10–40)
ANION GAP SERPL CALCULATED.3IONS-SCNC: 16 MMOL/L (ref 3–16)
AST SERPL-CCNC: 46 U/L (ref 15–37)
BASOPHILS ABSOLUTE: 0.1 K/UL (ref 0–0.2)
BASOPHILS RELATIVE PERCENT: 0.8 %
BILIRUB SERPL-MCNC: 0.7 MG/DL (ref 0–1)
BUN BLDV-MCNC: 12 MG/DL (ref 7–20)
CALCIUM SERPL-MCNC: 9.4 MG/DL (ref 8.3–10.6)
CHLORIDE BLD-SCNC: 103 MMOL/L (ref 99–110)
CO2: 21 MMOL/L (ref 21–32)
CREAT SERPL-MCNC: 0.8 MG/DL (ref 0.8–1.3)
EOSINOPHILS ABSOLUTE: 0.1 K/UL (ref 0–0.6)
EOSINOPHILS RELATIVE PERCENT: 2.1 %
GFR SERPL CREATININE-BSD FRML MDRD: >60 ML/MIN/{1.73_M2}
GLUCOSE BLD-MCNC: 115 MG/DL (ref 70–99)
HCT VFR BLD CALC: 45.1 % (ref 40.5–52.5)
HEMOGLOBIN: 14.9 G/DL (ref 13.5–17.5)
LYMPHOCYTES ABSOLUTE: 1.4 K/UL (ref 1–5.1)
LYMPHOCYTES RELATIVE PERCENT: 19.9 %
MCH RBC QN AUTO: 31.9 PG (ref 26–34)
MCHC RBC AUTO-ENTMCNC: 33 G/DL (ref 31–36)
MCV RBC AUTO: 96.7 FL (ref 80–100)
MONOCYTES ABSOLUTE: 0.6 K/UL (ref 0–1.3)
MONOCYTES RELATIVE PERCENT: 8.8 %
NEUTROPHILS ABSOLUTE: 5 K/UL (ref 1.7–7.7)
NEUTROPHILS RELATIVE PERCENT: 68.4 %
PDW BLD-RTO: 14 % (ref 12.4–15.4)
PLATELET # BLD: 178 K/UL (ref 135–450)
PMV BLD AUTO: 9.2 FL (ref 5–10.5)
POTASSIUM SERPL-SCNC: 4.2 MMOL/L (ref 3.5–5.1)
RBC # BLD: 4.67 M/UL (ref 4.2–5.9)
SODIUM BLD-SCNC: 140 MMOL/L (ref 136–145)
TOTAL PROTEIN: 7.2 G/DL (ref 6.4–8.2)
WBC # BLD: 7.2 K/UL (ref 4–11)

## 2022-12-23 DIAGNOSIS — M1A.40X0 OTHER SECONDARY CHRONIC GOUT WITHOUT TOPHUS, UNSPECIFIED SITE: ICD-10-CM

## 2022-12-23 RX ORDER — DULOXETINE 40 MG/1
CAPSULE, DELAYED RELEASE ORAL
Qty: 30 CAPSULE | Refills: 4 | Status: SHIPPED | OUTPATIENT
Start: 2022-12-23

## 2022-12-23 RX ORDER — ALLOPURINOL 100 MG/1
TABLET ORAL
Qty: 30 TABLET | Refills: 4 | Status: SHIPPED | OUTPATIENT
Start: 2022-12-23

## 2022-12-30 ENCOUNTER — HOSPITAL ENCOUNTER (OUTPATIENT)
Dept: CT IMAGING | Age: 62
Discharge: HOME | End: 2022-12-30
Payer: COMMERCIAL

## 2022-12-30 DIAGNOSIS — K75.0 PYOGENIC HEPATIC ABSCESS: ICD-10-CM

## 2022-12-30 PROCEDURE — 6360000004 HC RX CONTRAST MEDICATION: Performed by: INTERNAL MEDICINE

## 2022-12-30 PROCEDURE — 74170 CT ABD WO CNTRST FLWD CNTRST: CPT

## 2022-12-30 RX ADMIN — IOPAMIDOL 75 ML: 755 INJECTION, SOLUTION INTRAVENOUS at 16:20

## 2023-01-11 DIAGNOSIS — F51.01 PRIMARY INSOMNIA: ICD-10-CM

## 2023-01-11 RX ORDER — TRAZODONE HYDROCHLORIDE 300 MG/1
300 TABLET ORAL NIGHTLY
Qty: 30 TABLET | Refills: 5 | Status: SHIPPED | OUTPATIENT
Start: 2023-01-11 | End: 2023-02-10

## 2023-01-11 RX ORDER — CHOLECALCIFEROL (VITAMIN D3) 125 MCG
CAPSULE ORAL
Qty: 60 TABLET | Refills: 5 | Status: SHIPPED | OUTPATIENT
Start: 2023-01-11

## 2023-04-26 DIAGNOSIS — M1A.40X0 OTHER SECONDARY CHRONIC GOUT WITHOUT TOPHUS, UNSPECIFIED SITE: ICD-10-CM

## 2023-04-26 RX ORDER — DULOXETINE 40 MG/1
CAPSULE, DELAYED RELEASE ORAL
Qty: 30 CAPSULE | Refills: 11 | Status: SHIPPED | OUTPATIENT
Start: 2023-04-26

## 2023-04-26 RX ORDER — ALLOPURINOL 100 MG/1
TABLET ORAL
Qty: 30 TABLET | Refills: 11 | Status: SHIPPED | OUTPATIENT
Start: 2023-04-26

## 2023-04-26 NOTE — TELEPHONE ENCOUNTER
Future appt scheduled 0 appt scheduled                        Last appt 12/19/2022      Last Written       allopurinol (ZYLOPRIM) 100 MG tablet  12/23/2022  #30  4 RF     DULoxetine HCl 40 MG CPEP  12/23/2022  #30  4 RF

## 2023-06-01 ENCOUNTER — TELEPHONE (OUTPATIENT)
Dept: FAMILY MEDICINE CLINIC | Age: 63
End: 2023-06-01

## 2023-07-20 DIAGNOSIS — F51.01 PRIMARY INSOMNIA: ICD-10-CM

## 2023-07-25 RX ORDER — TRAZODONE HYDROCHLORIDE 300 MG/1
300 TABLET ORAL NIGHTLY
Qty: 30 TABLET | Refills: 5 | Status: SHIPPED | OUTPATIENT
Start: 2023-07-25 | End: 2023-08-24

## 2023-09-20 ENCOUNTER — OFFICE VISIT (OUTPATIENT)
Dept: FAMILY MEDICINE CLINIC | Age: 63
End: 2023-09-20
Payer: COMMERCIAL

## 2023-09-20 VITALS
DIASTOLIC BLOOD PRESSURE: 96 MMHG | WEIGHT: 226.2 LBS | HEART RATE: 78 BPM | OXYGEN SATURATION: 92 % | TEMPERATURE: 98.1 F | BODY MASS INDEX: 32.46 KG/M2 | SYSTOLIC BLOOD PRESSURE: 154 MMHG

## 2023-09-20 DIAGNOSIS — M1A.40X0 OTHER SECONDARY CHRONIC GOUT WITHOUT TOPHUS, UNSPECIFIED SITE: ICD-10-CM

## 2023-09-20 DIAGNOSIS — F51.01 PRIMARY INSOMNIA: ICD-10-CM

## 2023-09-20 DIAGNOSIS — F10.10 ALCOHOL ABUSE: ICD-10-CM

## 2023-09-20 DIAGNOSIS — R73.09 ELEVATED GLUCOSE: ICD-10-CM

## 2023-09-20 DIAGNOSIS — F33.1 MODERATE EPISODE OF RECURRENT MAJOR DEPRESSIVE DISORDER (HCC): ICD-10-CM

## 2023-09-20 DIAGNOSIS — Z12.5 PROSTATE CANCER SCREENING: ICD-10-CM

## 2023-09-20 DIAGNOSIS — M25.562 ACUTE PAIN OF LEFT KNEE: Primary | ICD-10-CM

## 2023-09-20 DIAGNOSIS — K21.9 GASTROESOPHAGEAL REFLUX DISEASE WITHOUT ESOPHAGITIS: ICD-10-CM

## 2023-09-20 DIAGNOSIS — K75.0 ABSCESS OF LIVER: ICD-10-CM

## 2023-09-20 DIAGNOSIS — G62.9 NEUROPATHY: ICD-10-CM

## 2023-09-20 DIAGNOSIS — I73.9 PERIPHERAL VASCULAR INSUFFICIENCY (HCC): ICD-10-CM

## 2023-09-20 DIAGNOSIS — I10 ESSENTIAL HYPERTENSION: ICD-10-CM

## 2023-09-20 DIAGNOSIS — E55.9 VITAMIN D DEFICIENCY: ICD-10-CM

## 2023-09-20 LAB
25(OH)D3 SERPL-MCNC: 33.2 NG/ML
ALBUMIN SERPL-MCNC: 4.5 G/DL (ref 3.4–5)
ALBUMIN/GLOB SERPL: 1.8 {RATIO} (ref 1.1–2.2)
ALP SERPL-CCNC: 77 U/L (ref 40–129)
ALT SERPL-CCNC: 48 U/L (ref 10–40)
ANION GAP SERPL CALCULATED.3IONS-SCNC: 14 MMOL/L (ref 3–16)
AST SERPL-CCNC: 40 U/L (ref 15–37)
BASOPHILS # BLD: 0 K/UL (ref 0–0.2)
BASOPHILS NFR BLD: 1 %
BILIRUB SERPL-MCNC: 0.3 MG/DL (ref 0–1)
BUN SERPL-MCNC: 8 MG/DL (ref 7–20)
CALCIUM SERPL-MCNC: 8.9 MG/DL (ref 8.3–10.6)
CHLORIDE SERPL-SCNC: 105 MMOL/L (ref 99–110)
CO2 SERPL-SCNC: 24 MMOL/L (ref 21–32)
CREAT SERPL-MCNC: 0.6 MG/DL (ref 0.8–1.3)
DEPRECATED RDW RBC AUTO: 14.4 % (ref 12.4–15.4)
EOSINOPHIL # BLD: 0.1 K/UL (ref 0–0.6)
EOSINOPHIL NFR BLD: 2.3 %
ERYTHROCYTE [SEDIMENTATION RATE] IN BLOOD BY WESTERGREN METHOD: 16 MM/HR (ref 0–20)
FOLATE SERPL-MCNC: 6.87 NG/ML (ref 4.78–24.2)
GFR SERPLBLD CREATININE-BSD FMLA CKD-EPI: >60 ML/MIN/{1.73_M2}
GLUCOSE SERPL-MCNC: 116 MG/DL (ref 70–99)
HCT VFR BLD AUTO: 41.1 % (ref 40.5–52.5)
HGB BLD-MCNC: 13.7 G/DL (ref 13.5–17.5)
LYMPHOCYTES # BLD: 1 K/UL (ref 1–5.1)
LYMPHOCYTES NFR BLD: 24 %
MCH RBC QN AUTO: 32.6 PG (ref 26–34)
MCHC RBC AUTO-ENTMCNC: 33.4 G/DL (ref 31–36)
MCV RBC AUTO: 97.5 FL (ref 80–100)
MONOCYTES # BLD: 0.4 K/UL (ref 0–1.3)
MONOCYTES NFR BLD: 10.2 %
NEUTROPHILS # BLD: 2.6 K/UL (ref 1.7–7.7)
NEUTROPHILS NFR BLD: 62.5 %
PLATELET # BLD AUTO: 296 K/UL (ref 135–450)
PMV BLD AUTO: 9.3 FL (ref 5–10.5)
POTASSIUM SERPL-SCNC: 4.3 MMOL/L (ref 3.5–5.1)
PROT SERPL-MCNC: 7 G/DL (ref 6.4–8.2)
PSA SERPL DL<=0.01 NG/ML-MCNC: 0.77 NG/ML (ref 0–4)
RBC # BLD AUTO: 4.22 M/UL (ref 4.2–5.9)
SODIUM SERPL-SCNC: 143 MMOL/L (ref 136–145)
URATE SERPL-MCNC: 7.3 MG/DL (ref 3.5–7.2)
VIT B12 SERPL-MCNC: 427 PG/ML (ref 211–911)
WBC # BLD AUTO: 4.2 K/UL (ref 4–11)

## 2023-09-20 PROCEDURE — 99214 OFFICE O/P EST MOD 30 MIN: CPT | Performed by: NURSE PRACTITIONER

## 2023-09-20 PROCEDURE — G8427 DOCREV CUR MEDS BY ELIG CLIN: HCPCS | Performed by: NURSE PRACTITIONER

## 2023-09-20 PROCEDURE — 36415 COLL VENOUS BLD VENIPUNCTURE: CPT | Performed by: NURSE PRACTITIONER

## 2023-09-20 PROCEDURE — 3077F SYST BP >= 140 MM HG: CPT | Performed by: NURSE PRACTITIONER

## 2023-09-20 PROCEDURE — 3080F DIAST BP >= 90 MM HG: CPT | Performed by: NURSE PRACTITIONER

## 2023-09-20 PROCEDURE — 1036F TOBACCO NON-USER: CPT | Performed by: NURSE PRACTITIONER

## 2023-09-20 PROCEDURE — 3017F COLORECTAL CA SCREEN DOC REV: CPT | Performed by: NURSE PRACTITIONER

## 2023-09-20 PROCEDURE — G8417 CALC BMI ABV UP PARAM F/U: HCPCS | Performed by: NURSE PRACTITIONER

## 2023-09-20 RX ORDER — TRAZODONE HYDROCHLORIDE 300 MG/1
300 TABLET ORAL NIGHTLY
Qty: 30 TABLET | Refills: 5 | Status: SHIPPED | OUTPATIENT
Start: 2023-09-20 | End: 2024-03-18

## 2023-09-20 RX ORDER — ALLOPURINOL 100 MG/1
TABLET ORAL
Qty: 30 TABLET | Refills: 5 | Status: SHIPPED | OUTPATIENT
Start: 2023-09-20

## 2023-09-20 RX ORDER — LISINOPRIL 20 MG/1
20 TABLET ORAL DAILY
Qty: 30 TABLET | Refills: 5 | Status: SHIPPED | OUTPATIENT
Start: 2023-09-20

## 2023-09-20 RX ORDER — GABAPENTIN 400 MG/1
CAPSULE ORAL
Qty: 90 CAPSULE | Refills: 2 | Status: SHIPPED | OUTPATIENT
Start: 2023-09-20 | End: 2024-12-05

## 2023-09-20 RX ORDER — DOXYCYCLINE HYCLATE 100 MG
100 TABLET ORAL 2 TIMES DAILY
Qty: 14 TABLET | Refills: 0 | Status: SHIPPED | OUTPATIENT
Start: 2023-09-20 | End: 2023-09-27

## 2023-09-20 ASSESSMENT — PATIENT HEALTH QUESTIONNAIRE - PHQ9
5. POOR APPETITE OR OVEREATING: 0
2. FEELING DOWN, DEPRESSED OR HOPELESS: 0
3. TROUBLE FALLING OR STAYING ASLEEP: 0
6. FEELING BAD ABOUT YOURSELF - OR THAT YOU ARE A FAILURE OR HAVE LET YOURSELF OR YOUR FAMILY DOWN: 0
8. MOVING OR SPEAKING SO SLOWLY THAT OTHER PEOPLE COULD HAVE NOTICED. OR THE OPPOSITE, BEING SO FIGETY OR RESTLESS THAT YOU HAVE BEEN MOVING AROUND A LOT MORE THAN USUAL: 0
10. IF YOU CHECKED OFF ANY PROBLEMS, HOW DIFFICULT HAVE THESE PROBLEMS MADE IT FOR YOU TO DO YOUR WORK, TAKE CARE OF THINGS AT HOME, OR GET ALONG WITH OTHER PEOPLE: 0
SUM OF ALL RESPONSES TO PHQ QUESTIONS 1-9: 0
SUM OF ALL RESPONSES TO PHQ QUESTIONS 1-9: 0
SUM OF ALL RESPONSES TO PHQ9 QUESTIONS 1 & 2: 0
SUM OF ALL RESPONSES TO PHQ QUESTIONS 1-9: 0
1. LITTLE INTEREST OR PLEASURE IN DOING THINGS: 0
7. TROUBLE CONCENTRATING ON THINGS, SUCH AS READING THE NEWSPAPER OR WATCHING TELEVISION: 0
SUM OF ALL RESPONSES TO PHQ QUESTIONS 1-9: 0
9. THOUGHTS THAT YOU WOULD BE BETTER OFF DEAD, OR OF HURTING YOURSELF: 0
4. FEELING TIRED OR HAVING LITTLE ENERGY: 0

## 2023-09-20 ASSESSMENT — ENCOUNTER SYMPTOMS
EYES NEGATIVE: 1
CHEST TIGHTNESS: 0
RESPIRATORY NEGATIVE: 1
ABDOMINAL PAIN: 0
ALLERGIC/IMMUNOLOGIC NEGATIVE: 1
SHORTNESS OF BREATH: 0

## 2023-09-20 NOTE — PROGRESS NOTES
Subjective:      Patient ID: Aly Enriquez is a 66 y.o. male. Chief Complaint   Patient presents with    Depression    Hypertension    Check-Up        HPI    Patient presents today with concerns of left knee pain and burning sensation in bilateral lower extremities. Patient has not been in since December 2022 and reports he has not been taking most of his medications as prescribed. Patient also admits he has been drinking alcohol intermittently however has slacked off in the last 2 weeks. Knee Pain  Patient presents with a knee injury involving the left knee. Onset of the symptoms was 2 weeks ago. Inciting event:  reports he was on his knees working on [employer] door, thinks he injured knee on a rock . Current symptoms include pain located anterior and swelling. Pain is aggravated by any weight bearing. Patient has had prior knee problems. Evaluation to date: none. Treatment to date: avoidance of offending activity. Hypertension:  Home blood pressure monitoring: No.  He is not adherent to a low sodium diet. Patient denies chest pain, shortness of breath, headache, lightheadedness, blurred vision, peripheral edema, palpitations, dry cough, and fatigue. Antihypertensive medication side effects: no medication side effects noted. Use of agents associated with hypertension: none. Patient has not been taking medication as prescribed. Sodium (mmol/L)   Date Value   12/19/2022 140    BUN (mg/dL)   Date Value   12/19/2022 12    Glucose (mg/dL)   Date Value   12/19/2022 115 (H)      Potassium (mmol/L)   Date Value   12/19/2022 4.2     Potassium reflex Magnesium (mmol/L)   Date Value   06/13/2022 4.5    Creatinine (mg/dL)   Date Value   12/19/2022 0.8         Review of Systems   Constitutional: Negative. Negative for activity change, appetite change, chills, fatigue and unexpected weight change. HENT: Negative. Eyes: Negative. Respiratory: Negative.   Negative for

## 2023-09-20 NOTE — PATIENT INSTRUCTIONS
Please read the healthy family handout that you were given and share it with your family. Please compare this printed medication list with your medications at home to be sure they are the same. If you have any medications that are different please contact us immediately at [phone]. Also review your allergies that we have listed, these may also include medications that you have not been able to tolerate, make sure everything listed is correct. If you have any allergies that are different please contact us immediately at [phone]. You may receive a survey in the mail or by email asking about your experience during your visit today. Please complete and return to us so we know how we are serving you.

## 2023-09-21 LAB
EST. AVERAGE GLUCOSE BLD GHB EST-MCNC: 108.3 MG/DL
HBA1C MFR BLD: 5.4 %

## 2023-10-18 DIAGNOSIS — I10 ESSENTIAL HYPERTENSION: ICD-10-CM

## 2023-10-18 RX ORDER — LISINOPRIL 20 MG/1
20 TABLET ORAL DAILY
Qty: 90 TABLET | Refills: 3 | Status: SHIPPED | OUTPATIENT
Start: 2023-10-18

## 2023-10-18 NOTE — TELEPHONE ENCOUNTER
Pharmacy requested 90 day supply replace 30 day sent in to them.  Requested fax is scanned into this encounter        Future appt scheduled 12/20/2023                       Last appt 09/20/2023        Last Written 09/20/2023     lisinopril (PRINIVIL;ZESTRIL) 20 MG tablet  #30  5 RF

## 2023-10-18 NOTE — TELEPHONE ENCOUNTER
Pharmacy requested 90 day supply replace 30 day sent in to them.  Requested fax is scanned into this encounter      Future appt scheduled 12/20/2023                       Last appt 09/20/2023      Last Written 09/20/2023    metoprolol tartrate (LOPRESSOR) 25 MG tablet  #60  5 RF

## 2023-11-01 ENCOUNTER — TELEPHONE (OUTPATIENT)
Dept: FAMILY MEDICINE CLINIC | Age: 63
End: 2023-11-01

## 2023-11-01 NOTE — TELEPHONE ENCOUNTER
----- Message from Laurence On[employer] sent at 11/1/2023  2:43 PM EDT -----  Subject: Refill Request    QUESTIONS  Name of Medication? metoprolol tartrate (LOPRESSOR) 25 MG tablet  Patient-reported dosage and instructions? once a day   How many days do you have left? 0  Preferred Pharmacy? [id number]  Pharmacy phone number (if available)? [phone]  Additional Information for Provider? Nuno Zurita has an appt on 12- 90   day supply   ---------------------------------------------------------------------------  --------------  CALL BACK INFO  What is the best way for the office to contact you? OK to leave message on   voicemail  Preferred Call Back Phone Number? [phone]  ---------------------------------------------------------------------------  --------------  SCRIPT ANSWERS  Relationship to Patient?  Self

## 2023-12-07 ENCOUNTER — APPOINTMENT (OUTPATIENT)
Dept: GENERAL RADIOLOGY | Age: 63
End: 2023-12-07
Payer: COMMERCIAL

## 2023-12-07 ENCOUNTER — HOSPITAL ENCOUNTER (EMERGENCY)
Age: 63
Discharge: HOME | End: 2023-12-07
Attending: EMERGENCY MEDICINE
Payer: COMMERCIAL

## 2023-12-07 VITALS
HEART RATE: 76 BPM | TEMPERATURE: 97.8 F | HEIGHT: 70 IN | SYSTOLIC BLOOD PRESSURE: 148 MMHG | RESPIRATION RATE: 16 BRPM | OXYGEN SATURATION: 97 % | BODY MASS INDEX: 32.04 KG/M2 | WEIGHT: 223.8 LBS | DIASTOLIC BLOOD PRESSURE: 97 MMHG

## 2023-12-07 DIAGNOSIS — R07.81 RIB PAIN ON LEFT SIDE: Primary | ICD-10-CM

## 2023-12-07 PROCEDURE — 6370000000 HC RX 637 (ALT 250 FOR IP): Performed by: EMERGENCY MEDICINE

## 2023-12-07 PROCEDURE — 71101 X-RAY EXAM UNILAT RIBS/CHEST: CPT

## 2023-12-07 PROCEDURE — 99283 EMERGENCY DEPT VISIT LOW MDM: CPT

## 2023-12-07 RX ORDER — IBUPROFEN 600 MG/1
600 TABLET ORAL ONCE
Status: COMPLETED | OUTPATIENT
Start: 2023-12-07 | End: 2023-12-07

## 2023-12-07 RX ORDER — CYCLOBENZAPRINE HCL 10 MG
10 TABLET ORAL 2 TIMES DAILY PRN
Qty: 20 TABLET | Refills: 0 | Status: SHIPPED | OUTPATIENT
Start: 2023-12-07 | End: 2023-12-17

## 2023-12-07 RX ORDER — CYCLOBENZAPRINE HCL 10 MG
10 TABLET ORAL ONCE
Status: COMPLETED | OUTPATIENT
Start: 2023-12-07 | End: 2023-12-07

## 2023-12-07 RX ADMIN — CYCLOBENZAPRINE 10 MG: 10 TABLET, FILM COATED ORAL at 15:08

## 2023-12-07 RX ADMIN — IBUPROFEN 600 MG: 600 TABLET, FILM COATED ORAL at 12:26

## 2023-12-07 ASSESSMENT — PAIN DESCRIPTION - ORIENTATION
ORIENTATION: LEFT
ORIENTATION: LEFT

## 2023-12-07 ASSESSMENT — PAIN SCALES - GENERAL
PAINLEVEL_OUTOF10: 6
PAINLEVEL_OUTOF10: 8

## 2023-12-07 ASSESSMENT — PAIN - FUNCTIONAL ASSESSMENT
PAIN_FUNCTIONAL_ASSESSMENT: 0-10
PAIN_FUNCTIONAL_ASSESSMENT: 0-10

## 2023-12-07 ASSESSMENT — PAIN DESCRIPTION - LOCATION
LOCATION: RIB CAGE;BACK
LOCATION: BACK;RIB CAGE

## 2023-12-07 ASSESSMENT — LIFESTYLE VARIABLES: HOW OFTEN DO YOU HAVE A DRINK CONTAINING ALCOHOL: 2-3 TIMES A WEEK
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
## 2023-12-20 PROBLEM — F33.1 MODERATE EPISODE OF RECURRENT MAJOR DEPRESSIVE DISORDER (HCC): Status: RESOLVED | Noted: 2018-11-16 | Resolved: 2023-12-20

## 2024-01-19 ENCOUNTER — TELEPHONE (OUTPATIENT)
Dept: FAMILY MEDICINE CLINIC | Age: 64
End: 2024-01-19

## 2024-01-19 NOTE — TELEPHONE ENCOUNTER
----- Message from Anmol Fraser sent at 1/19/2024  9:42 AM EST -----  Subject: Referral Request    Reason for referral request? Patient requesting a referral to a Pain   Management Doctor in [facility]. Patient hurt his back and refused to make   an appointment to see doctor.  Provider patient wants to be referred to(if known):     Provider Phone Number(if known):    Additional Information for Provider?   ---------------------------------------------------------------------------  --------------  CALL BACK INFO    [phone]; OK to leave message on voicemail,OK to respond with electronic   message via [employer] portal (only for patients who have registered [employer]   account)  ---------------------------------------------------------------------------  --------------

## 2024-01-19 NOTE — TELEPHONE ENCOUNTER
Spoke to patient notified him that Ayana would have to see him and do testing and treatment before a referral could be done.

## 2024-01-23 ENCOUNTER — OFFICE VISIT (OUTPATIENT)
Dept: FAMILY MEDICINE CLINIC | Age: 64
End: 2024-01-23
Payer: COMMERCIAL

## 2024-01-23 VITALS
HEART RATE: 92 BPM | WEIGHT: 231 LBS | BODY MASS INDEX: 33.15 KG/M2 | SYSTOLIC BLOOD PRESSURE: 154 MMHG | TEMPERATURE: 98 F | DIASTOLIC BLOOD PRESSURE: 99 MMHG | OXYGEN SATURATION: 94 %

## 2024-01-23 DIAGNOSIS — M54.50 ACUTE BILATERAL LOW BACK PAIN WITHOUT SCIATICA: Primary | ICD-10-CM

## 2024-01-23 DIAGNOSIS — R07.81 RIB PAIN ON RIGHT SIDE: ICD-10-CM

## 2024-01-23 PROBLEM — K75.0 HEPATIC ABSCESS: Status: RESOLVED | Noted: 2022-05-17 | Resolved: 2024-01-23

## 2024-01-23 PROCEDURE — G8484 FLU IMMUNIZE NO ADMIN: HCPCS | Performed by: NURSE PRACTITIONER

## 2024-01-23 PROCEDURE — G8417 CALC BMI ABV UP PARAM F/U: HCPCS | Performed by: NURSE PRACTITIONER

## 2024-01-23 PROCEDURE — 3077F SYST BP >= 140 MM HG: CPT | Performed by: NURSE PRACTITIONER

## 2024-01-23 PROCEDURE — 99213 OFFICE O/P EST LOW 20 MIN: CPT | Performed by: NURSE PRACTITIONER

## 2024-01-23 PROCEDURE — G8427 DOCREV CUR MEDS BY ELIG CLIN: HCPCS | Performed by: NURSE PRACTITIONER

## 2024-01-23 PROCEDURE — 1036F TOBACCO NON-USER: CPT | Performed by: NURSE PRACTITIONER

## 2024-01-23 PROCEDURE — 3080F DIAST BP >= 90 MM HG: CPT | Performed by: NURSE PRACTITIONER

## 2024-01-23 PROCEDURE — 3017F COLORECTAL CA SCREEN DOC REV: CPT | Performed by: NURSE PRACTITIONER

## 2024-01-23 RX ORDER — PREDNISONE 20 MG/1
20 TABLET ORAL DAILY
Qty: 10 TABLET | Refills: 0 | Status: SHIPPED | OUTPATIENT
Start: 2024-01-23 | End: 2024-02-02

## 2024-01-23 RX ORDER — TIZANIDINE 4 MG/1
4 TABLET ORAL 3 TIMES DAILY PRN
Qty: 30 TABLET | Refills: 1 | Status: SHIPPED | OUTPATIENT
Start: 2024-01-23

## 2024-01-23 ASSESSMENT — PATIENT HEALTH QUESTIONNAIRE - PHQ9
SUM OF ALL RESPONSES TO PHQ QUESTIONS 1-9: 0
6. FEELING BAD ABOUT YOURSELF - OR THAT YOU ARE A FAILURE OR HAVE LET YOURSELF OR YOUR FAMILY DOWN: 0
2. FEELING DOWN, DEPRESSED OR HOPELESS: 0
SUM OF ALL RESPONSES TO PHQ QUESTIONS 1-9: 0
9. THOUGHTS THAT YOU WOULD BE BETTER OFF DEAD, OR OF HURTING YOURSELF: 0
3. TROUBLE FALLING OR STAYING ASLEEP: 0
SUM OF ALL RESPONSES TO PHQ9 QUESTIONS 1 & 2: 0
1. LITTLE INTEREST OR PLEASURE IN DOING THINGS: 0
5. POOR APPETITE OR OVEREATING: 0
10. IF YOU CHECKED OFF ANY PROBLEMS, HOW DIFFICULT HAVE THESE PROBLEMS MADE IT FOR YOU TO DO YOUR WORK, TAKE CARE OF THINGS AT HOME, OR GET ALONG WITH OTHER PEOPLE: 0
SUM OF ALL RESPONSES TO PHQ QUESTIONS 1-9: 0
7. TROUBLE CONCENTRATING ON THINGS, SUCH AS READING THE NEWSPAPER OR WATCHING TELEVISION: 0
SUM OF ALL RESPONSES TO PHQ QUESTIONS 1-9: 0
8. MOVING OR SPEAKING SO SLOWLY THAT OTHER PEOPLE COULD HAVE NOTICED. OR THE OPPOSITE, BEING SO FIGETY OR RESTLESS THAT YOU HAVE BEEN MOVING AROUND A LOT MORE THAN USUAL: 0
4. FEELING TIRED OR HAVING LITTLE ENERGY: 0

## 2024-01-23 ASSESSMENT — ENCOUNTER SYMPTOMS
RESPIRATORY NEGATIVE: 1
COUGH: 0
ALLERGIC/IMMUNOLOGIC NEGATIVE: 1
BACK PAIN: 1
SHORTNESS OF BREATH: 0
GASTROINTESTINAL NEGATIVE: 1
EYES NEGATIVE: 1

## 2024-01-23 NOTE — PROGRESS NOTES
cervical adenopathy.   Skin:     General: Skin is warm and moist.      Capillary Refill: Capillary refill takes less than 2 seconds.      Findings: No rash.   Neurological:      General: No focal deficit present.      Mental Status: He is alert and oriented to person, place, and time. Mental status is at baseline.   Psychiatric:         Attention and Perception: Attention normal.         Mood and Affect: Mood normal.         Speech: Speech normal.         Behavior: Behavior normal. Behavior is cooperative.         Assessment/Plan:   1. Acute bilateral low back pain without sciatica  Patient presents today with bilateral low back pain and pain in the right side x 1 week.  Patient reports he fell out of the back of a truck onto the ground.  Patient states he fell on his back.  Patient has not taken any medication for symptoms however has rested.  On exam noted mild muscular tenderness otherwise exam is benign.  No ecchymosis or edema noted.  Patient denies paresthesia, weakness or changes in bowel or bladder habits.  Recommend imaging and treatment as below.  Advised to ice for 20-minute intervals and to follow-up if no better or worsening of symptoms.  Patient verbalized understanding and agreeable to plan.  - XR LUMBAR SPINE (2-3 VIEWS); Future  - XR RIBS RIGHT INCLUDE CHEST (MIN 3 VIEWS); Future  - tiZANidine (ZANAFLEX) 4 MG tablet; Take 1 tablet by mouth 3 times daily as needed (back pain)  Dispense: 30 tablet; Refill: 1  - predniSONE (DELTASONE) 20 MG tablet; Take 1 tablet by mouth daily for 10 days  Dispense: 10 tablet; Refill: 0    2. Rib pain on right side  See #1.  - XR RIBS RIGHT INCLUDE CHEST (MIN 3 VIEWS); Future

## 2024-01-23 NOTE — PATIENT INSTRUCTIONS
Please read the healthy family handout that you were given and share it with your family.       Please compare this printed medication list with your medications at home to be sure they are the same.  If you have any medications that are different please contact us immediately at [phone].     Also review your allergies that we have listed, these may also include medications that you have not been able to tolerate, make sure everything listed is correct. If you have any allergies that are different please contact us immediately at [phone].     You may receive a survey in the mail or by email asking about your experience during your visit today. Please complete and return to us so we know how we are serving you.

## 2024-01-29 ENCOUNTER — HOSPITAL ENCOUNTER (OUTPATIENT)
Age: 64
Discharge: HOME | End: 2024-01-29
Payer: COMMERCIAL

## 2024-01-29 ENCOUNTER — HOSPITAL ENCOUNTER (OUTPATIENT)
Dept: GENERAL RADIOLOGY | Age: 64
Discharge: HOME | End: 2024-01-29
Payer: COMMERCIAL

## 2024-01-29 DIAGNOSIS — R07.81 RIB PAIN ON RIGHT SIDE: ICD-10-CM

## 2024-01-29 DIAGNOSIS — M54.50 ACUTE BILATERAL LOW BACK PAIN WITHOUT SCIATICA: ICD-10-CM

## 2024-01-29 PROCEDURE — 72100 X-RAY EXAM L-S SPINE 2/3 VWS: CPT

## 2024-01-29 PROCEDURE — 71101 X-RAY EXAM UNILAT RIBS/CHEST: CPT

## 2024-02-01 ENCOUNTER — TELEPHONE (OUTPATIENT)
Dept: FAMILY MEDICINE CLINIC | Age: 64
End: 2024-02-01

## 2024-02-01 DIAGNOSIS — L40.9 PSORIASIS: ICD-10-CM

## 2024-02-01 DIAGNOSIS — F51.01 PRIMARY INSOMNIA: ICD-10-CM

## 2024-02-01 RX ORDER — METHOCARBAMOL 750 MG/1
750 TABLET, FILM COATED ORAL 4 TIMES DAILY
Qty: 40 TABLET | Refills: 0 | Status: SHIPPED | OUTPATIENT
Start: 2024-02-01 | End: 2024-02-11

## 2024-02-01 NOTE — TELEPHONE ENCOUNTER
Patient states tizanidine is not helping back pain at all, he is unable to move out of chair. States he does have appointment with a spine specialist, Dr. Mena tomorrow 2/2/24. Wants to know if a different medication can be called in?     [facility]

## 2024-02-02 RX ORDER — TRIAMCINOLONE ACETONIDE 1 MG/G
CREAM TOPICAL
Qty: 80 G | Refills: 2 | Status: SHIPPED | OUTPATIENT
Start: 2024-02-02

## 2024-02-02 RX ORDER — TRAZODONE HYDROCHLORIDE 300 MG/1
300 TABLET ORAL NIGHTLY
Qty: 90 TABLET | Refills: 1 | Status: SHIPPED | OUTPATIENT
Start: 2024-02-02

## 2024-02-06 RX ORDER — METHOCARBAMOL 750 MG/1
750 TABLET, FILM COATED ORAL 4 TIMES DAILY
Qty: 40 TABLET | Refills: 0 | OUTPATIENT
Start: 2024-02-06 | End: 2024-02-16

## 2024-07-01 DIAGNOSIS — I10 ESSENTIAL HYPERTENSION: ICD-10-CM

## 2024-07-01 RX ORDER — LISINOPRIL 20 MG/1
20 TABLET ORAL DAILY
Qty: 90 TABLET | Refills: 0 | Status: SHIPPED | OUTPATIENT
Start: 2024-07-01

## 2024-07-12 ENCOUNTER — OFFICE VISIT (OUTPATIENT)
Dept: FAMILY MEDICINE CLINIC | Age: 64
End: 2024-07-12
Payer: COMMERCIAL

## 2024-07-12 VITALS
DIASTOLIC BLOOD PRESSURE: 100 MMHG | BODY MASS INDEX: 31.58 KG/M2 | OXYGEN SATURATION: 93 % | SYSTOLIC BLOOD PRESSURE: 167 MMHG | WEIGHT: 225.6 LBS | HEART RATE: 83 BPM | HEIGHT: 71 IN

## 2024-07-12 DIAGNOSIS — G62.9 NEUROPATHY: ICD-10-CM

## 2024-07-12 DIAGNOSIS — F51.01 PRIMARY INSOMNIA: ICD-10-CM

## 2024-07-12 DIAGNOSIS — M1A.40X0 OTHER SECONDARY CHRONIC GOUT WITHOUT TOPHUS, UNSPECIFIED SITE: ICD-10-CM

## 2024-07-12 DIAGNOSIS — K21.9 GASTROESOPHAGEAL REFLUX DISEASE WITHOUT ESOPHAGITIS: ICD-10-CM

## 2024-07-12 DIAGNOSIS — Z12.5 PROSTATE CANCER SCREENING: ICD-10-CM

## 2024-07-12 DIAGNOSIS — L70.9 ACNE, UNSPECIFIED ACNE TYPE: ICD-10-CM

## 2024-07-12 DIAGNOSIS — I73.9 PERIPHERAL VASCULAR INSUFFICIENCY (HCC): ICD-10-CM

## 2024-07-12 DIAGNOSIS — M54.50 CHRONIC BILATERAL LOW BACK PAIN WITHOUT SCIATICA: ICD-10-CM

## 2024-07-12 DIAGNOSIS — E55.9 VITAMIN D DEFICIENCY: ICD-10-CM

## 2024-07-12 DIAGNOSIS — M51.36 DEGENERATIVE DISC DISEASE, LUMBAR: ICD-10-CM

## 2024-07-12 DIAGNOSIS — F10.10 ALCOHOL ABUSE: ICD-10-CM

## 2024-07-12 DIAGNOSIS — G89.29 CHRONIC BILATERAL LOW BACK PAIN WITHOUT SCIATICA: ICD-10-CM

## 2024-07-12 DIAGNOSIS — I10 ESSENTIAL HYPERTENSION: Primary | ICD-10-CM

## 2024-07-12 DIAGNOSIS — R73.09 ELEVATED GLUCOSE: ICD-10-CM

## 2024-07-12 PROCEDURE — 3017F COLORECTAL CA SCREEN DOC REV: CPT | Performed by: NURSE PRACTITIONER

## 2024-07-12 PROCEDURE — 3080F DIAST BP >= 90 MM HG: CPT | Performed by: NURSE PRACTITIONER

## 2024-07-12 PROCEDURE — 3077F SYST BP >= 140 MM HG: CPT | Performed by: NURSE PRACTITIONER

## 2024-07-12 PROCEDURE — 1036F TOBACCO NON-USER: CPT | Performed by: NURSE PRACTITIONER

## 2024-07-12 PROCEDURE — G2211 COMPLEX E/M VISIT ADD ON: HCPCS | Performed by: NURSE PRACTITIONER

## 2024-07-12 PROCEDURE — G8427 DOCREV CUR MEDS BY ELIG CLIN: HCPCS | Performed by: NURSE PRACTITIONER

## 2024-07-12 PROCEDURE — 99214 OFFICE O/P EST MOD 30 MIN: CPT | Performed by: NURSE PRACTITIONER

## 2024-07-12 PROCEDURE — 36415 COLL VENOUS BLD VENIPUNCTURE: CPT | Performed by: NURSE PRACTITIONER

## 2024-07-12 PROCEDURE — G8417 CALC BMI ABV UP PARAM F/U: HCPCS | Performed by: NURSE PRACTITIONER

## 2024-07-12 RX ORDER — LISINOPRIL 20 MG/1
20 TABLET ORAL DAILY
Qty: 90 TABLET | Refills: 3 | Status: SHIPPED | OUTPATIENT
Start: 2024-07-12 | End: 2025-07-07

## 2024-07-12 RX ORDER — GABAPENTIN 400 MG/1
CAPSULE ORAL
Qty: 90 CAPSULE | Refills: 2 | Status: SHIPPED | OUTPATIENT
Start: 2024-07-12 | End: 2025-09-27

## 2024-07-12 RX ORDER — DOXYCYCLINE HYCLATE 100 MG
100 TABLET ORAL 2 TIMES DAILY
Qty: 20 TABLET | Refills: 0 | Status: SHIPPED | OUTPATIENT
Start: 2024-07-12 | End: 2024-07-22

## 2024-07-12 RX ORDER — LISINOPRIL 40 MG/1
40 TABLET ORAL DAILY
Qty: 90 TABLET | Refills: 3 | Status: SHIPPED | OUTPATIENT
Start: 2024-07-12 | End: 2024-07-12

## 2024-07-12 RX ORDER — ALLOPURINOL 100 MG/1
TABLET ORAL
Qty: 90 TABLET | Refills: 3 | Status: SHIPPED | OUTPATIENT
Start: 2024-07-12

## 2024-07-12 RX ORDER — TIZANIDINE 4 MG/1
4 TABLET ORAL 3 TIMES DAILY PRN
Qty: 30 TABLET | Refills: 5 | Status: SHIPPED | OUTPATIENT
Start: 2024-07-12

## 2024-07-12 RX ORDER — LISINOPRIL 20 MG/1
20 TABLET ORAL DAILY
Qty: 90 TABLET | Refills: 3 | Status: SHIPPED | OUTPATIENT
Start: 2024-07-12 | End: 2024-07-12

## 2024-07-12 RX ORDER — TRAZODONE HYDROCHLORIDE 300 MG/1
300 TABLET ORAL NIGHTLY
Qty: 90 TABLET | Refills: 3 | Status: SHIPPED | OUTPATIENT
Start: 2024-07-12

## 2024-07-12 SDOH — ECONOMIC STABILITY: HOUSING INSECURITY
IN THE LAST 12 MONTHS, WAS THERE A TIME WHEN YOU DID NOT HAVE A STEADY PLACE TO SLEEP OR SLEPT IN A SHELTER (INCLUDING NOW)?: NO

## 2024-07-12 SDOH — ECONOMIC STABILITY: FOOD INSECURITY: WITHIN THE PAST 12 MONTHS, THE FOOD YOU BOUGHT JUST DIDN'T LAST AND YOU DIDN'T HAVE MONEY TO GET MORE.: NEVER TRUE

## 2024-07-12 SDOH — ECONOMIC STABILITY: FOOD INSECURITY: WITHIN THE PAST 12 MONTHS, YOU WORRIED THAT YOUR FOOD WOULD RUN OUT BEFORE YOU GOT MONEY TO BUY MORE.: NEVER TRUE

## 2024-07-12 SDOH — ECONOMIC STABILITY: INCOME INSECURITY: HOW HARD IS IT FOR YOU TO PAY FOR THE VERY BASICS LIKE FOOD, HOUSING, MEDICAL CARE, AND HEATING?: NOT HARD AT ALL

## 2024-07-12 ASSESSMENT — ENCOUNTER SYMPTOMS
ALLERGIC/IMMUNOLOGIC NEGATIVE: 1
RESPIRATORY NEGATIVE: 1
EYES NEGATIVE: 1
CHEST TIGHTNESS: 0
ABDOMINAL PAIN: 0
SHORTNESS OF BREATH: 0

## 2024-07-12 NOTE — PROGRESS NOTES
esophagitis  Currently asymptomatic.    11. Elevated glucose  - Hemoglobin A1C    12. Alcohol abuse  Continues to drink alcohol.     13. Vitamin D deficiency  - Vitamin D 25 Hydroxy    14. Prostate cancer screening  - PSA

## 2024-07-13 LAB
25(OH)D3 SERPL-MCNC: 35 NG/ML
ALBUMIN SERPL-MCNC: 4.7 G/DL (ref 3.4–5)
ALBUMIN/GLOB SERPL: 1.6 {RATIO} (ref 1.1–2.2)
ALP SERPL-CCNC: 92 U/L (ref 40–129)
ALT SERPL-CCNC: 41 U/L (ref 10–40)
ANION GAP SERPL CALCULATED.3IONS-SCNC: 16 MMOL/L (ref 3–16)
AST SERPL-CCNC: 54 U/L (ref 15–37)
BASOPHILS # BLD: 0 K/UL (ref 0–0.2)
BASOPHILS NFR BLD: 0.5 %
BILIRUB SERPL-MCNC: 0.9 MG/DL (ref 0–1)
BUN SERPL-MCNC: 17 MG/DL (ref 7–20)
CALCIUM SERPL-MCNC: 9.9 MG/DL (ref 8.3–10.6)
CHLORIDE SERPL-SCNC: 98 MMOL/L (ref 99–110)
CO2 SERPL-SCNC: 25 MMOL/L (ref 21–32)
CREAT SERPL-MCNC: 0.7 MG/DL (ref 0.8–1.3)
DEPRECATED RDW RBC AUTO: 15 % (ref 12.4–15.4)
EOSINOPHIL # BLD: 0.1 K/UL (ref 0–0.6)
EOSINOPHIL NFR BLD: 1.6 %
EST. AVERAGE GLUCOSE BLD GHB EST-MCNC: 105.4 MG/DL
GFR SERPLBLD CREATININE-BSD FMLA CKD-EPI: >90 ML/MIN/{1.73_M2}
GLUCOSE SERPL-MCNC: 117 MG/DL (ref 70–99)
HBA1C MFR BLD: 5.3 %
HCT VFR BLD AUTO: 42.8 % (ref 40.5–52.5)
HGB BLD-MCNC: 14.7 G/DL (ref 13.5–17.5)
LYMPHOCYTES # BLD: 1.3 K/UL (ref 1–5.1)
LYMPHOCYTES NFR BLD: 16.6 %
MCH RBC QN AUTO: 32.5 PG (ref 26–34)
MCHC RBC AUTO-ENTMCNC: 34.3 G/DL (ref 31–36)
MCV RBC AUTO: 94.7 FL (ref 80–100)
MONOCYTES # BLD: 0.6 K/UL (ref 0–1.3)
MONOCYTES NFR BLD: 7.5 %
NEUTROPHILS # BLD: 5.8 K/UL (ref 1.7–7.7)
NEUTROPHILS NFR BLD: 73.8 %
PLATELET # BLD AUTO: 109 K/UL (ref 135–450)
PMV BLD AUTO: 9.6 FL (ref 5–10.5)
POTASSIUM SERPL-SCNC: 3.5 MMOL/L (ref 3.5–5.1)
PROT SERPL-MCNC: 7.6 G/DL (ref 6.4–8.2)
PSA SERPL DL<=0.01 NG/ML-MCNC: 0.67 NG/ML (ref 0–4)
RBC # BLD AUTO: 4.51 M/UL (ref 4.2–5.9)
SODIUM SERPL-SCNC: 139 MMOL/L (ref 136–145)
URATE SERPL-MCNC: 7.4 MG/DL (ref 3.5–7.2)
WBC # BLD AUTO: 7.8 K/UL (ref 4–11)

## 2024-07-15 DIAGNOSIS — G62.9 NEUROPATHY: Primary | ICD-10-CM

## 2024-08-29 DIAGNOSIS — G62.9 NEUROPATHY: ICD-10-CM

## 2024-09-04 DIAGNOSIS — M79.2 PERIPHERAL NEURALGIA: Primary | ICD-10-CM

## 2024-10-07 DIAGNOSIS — I10 ESSENTIAL HYPERTENSION: ICD-10-CM

## 2024-10-07 RX ORDER — LISINOPRIL 20 MG/1
20 TABLET ORAL DAILY
Qty: 90 TABLET | Refills: 3 | Status: SHIPPED | OUTPATIENT
Start: 2024-10-07

## 2024-10-07 RX ORDER — METOPROLOL TARTRATE 25 MG/1
25 TABLET, FILM COATED ORAL 2 TIMES DAILY
Qty: 180 TABLET | Refills: 3 | Status: SHIPPED | OUTPATIENT
Start: 2024-10-07

## 2024-10-11 ENCOUNTER — APPOINTMENT (OUTPATIENT)
Dept: GENERAL RADIOLOGY | Age: 64
End: 2024-10-11
Payer: COMMERCIAL

## 2024-10-11 ENCOUNTER — APPOINTMENT (OUTPATIENT)
Dept: CT IMAGING | Age: 64
End: 2024-10-11
Payer: COMMERCIAL

## 2024-10-11 ENCOUNTER — HOSPITAL ENCOUNTER (EMERGENCY)
Age: 64
Discharge: HOME | End: 2024-10-12
Payer: COMMERCIAL

## 2024-10-11 DIAGNOSIS — F10.920 ACUTE ALCOHOLIC INTOXICATION WITHOUT COMPLICATION (HCC): ICD-10-CM
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
## 2024-10-11 DIAGNOSIS — F10.10 ALCOHOL ABUSE: ICD-10-CM

## 2024-10-11 DIAGNOSIS — K57.90 DIVERTICULOSIS: ICD-10-CM

## 2024-10-11 DIAGNOSIS — K70.0 FATTY LIVER DUE TO ALCOHOLISM: ICD-10-CM

## 2024-10-11 DIAGNOSIS — S92.002A CLOSED NONDISPLACED FRACTURE OF LEFT CALCANEUS, UNSPECIFIED PORTION OF CALCANEUS, INITIAL ENCOUNTER: Primary | ICD-10-CM

## 2024-10-11 LAB
ALBUMIN SERPL-MCNC: 4.5 G/DL (ref 3.4–5)
ALBUMIN/GLOB SERPL: 1.3 {RATIO} (ref 1.1–2.2)
ALP SERPL-CCNC: 91 U/L (ref 40–129)
ALT SERPL-CCNC: 74 U/L (ref 10–40)
AMPHETAMINES UR QL SCN>1000 NG/ML: ABNORMAL
ANION GAP SERPL CALCULATED.3IONS-SCNC: 16 MMOL/L (ref 3–16)
APAP SERPL-MCNC: <5 UG/ML (ref 10–30)
AST SERPL-CCNC: 61 U/L (ref 15–37)
BARBITURATES UR QL SCN>200 NG/ML: ABNORMAL
BASOPHILS # BLD: 0.1 K/UL (ref 0–0.2)
BASOPHILS NFR BLD: 1 %
BENZODIAZ UR QL SCN>200 NG/ML: ABNORMAL
BILIRUB SERPL-MCNC: 0.3 MG/DL (ref 0–1)
BILIRUB UR QL STRIP.AUTO: NEGATIVE
BUN SERPL-MCNC: 5 MG/DL (ref 7–20)
CALCIUM SERPL-MCNC: 8.8 MG/DL (ref 8.3–10.6)
CANNABINOIDS UR QL SCN>50 NG/ML: ABNORMAL
CHLORIDE SERPL-SCNC: 101 MMOL/L (ref 99–110)
CLARITY UR: CLEAR
CO2 SERPL-SCNC: 24 MMOL/L (ref 21–32)
COCAINE UR QL SCN: ABNORMAL
COLOR UR: YELLOW
CREAT SERPL-MCNC: 0.7 MG/DL (ref 0.8–1.3)
DEPRECATED RDW RBC AUTO: 15.7 % (ref 12.4–15.4)
DRUG SCREEN COMMENT UR-IMP: ABNORMAL
EOSINOPHIL # BLD: 0.1 K/UL (ref 0–0.6)
EOSINOPHIL NFR BLD: 1.1 %
ETHANOLAMINE SERPL-MCNC: 385 MG/DL (ref 0–0.08)
FENTANYL SCREEN, URINE: ABNORMAL
GFR SERPLBLD CREATININE-BSD FMLA CKD-EPI: >90 ML/MIN/{1.73_M2}
GLUCOSE SERPL-MCNC: 172 MG/DL (ref 70–99)
GLUCOSE UR STRIP.AUTO-MCNC: NEGATIVE MG/DL
HCT VFR BLD AUTO: 41.8 % (ref 40.5–52.5)
HGB BLD-MCNC: 14.3 G/DL (ref 13.5–17.5)
HGB UR QL STRIP.AUTO: NEGATIVE
INR PPP: 1.04 (ref 0.85–1.15)
KETONES UR STRIP.AUTO-MCNC: NEGATIVE MG/DL
LEUKOCYTE ESTERASE UR QL STRIP.AUTO: NEGATIVE
LYMPHOCYTES # BLD: 2.1 K/UL (ref 1–5.1)
LYMPHOCYTES NFR BLD: 35.3 %
MCH RBC QN AUTO: 33 PG (ref 26–34)
MCHC RBC AUTO-ENTMCNC: 34.2 G/DL (ref 31–36)
MCV RBC AUTO: 96.3 FL (ref 80–100)
METHADONE UR QL SCN>300 NG/ML: ABNORMAL
MONOCYTES # BLD: 0.8 K/UL (ref 0–1.3)
MONOCYTES NFR BLD: 13 %
NEUTROPHILS # BLD: 3 K/UL (ref 1.7–7.7)
NEUTROPHILS NFR BLD: 49.6 %
NITRITE UR QL STRIP.AUTO: NEGATIVE
OPIATES UR QL SCN>300 NG/ML: POSITIVE
OXYCODONE UR QL SCN: ABNORMAL
PCP UR QL SCN>25 NG/ML: ABNORMAL
PH UR STRIP.AUTO: 6 [PH] (ref 5–8)
PH UR STRIP: 6 [PH]
PLATELET # BLD AUTO: 185 K/UL (ref 135–450)
PMV BLD AUTO: 8.5 FL (ref 5–10.5)
POTASSIUM SERPL-SCNC: 4.3 MMOL/L (ref 3.5–5.1)
PROT SERPL-MCNC: 8 G/DL (ref 6.4–8.2)
PROT UR STRIP.AUTO-MCNC: NEGATIVE MG/DL
PROTHROMBIN TIME: 13.8 SEC (ref 11.9–14.9)
RBC # BLD AUTO: 4.34 M/UL (ref 4.2–5.9)
SALICYLATES SERPL-MCNC: <0.3 MG/DL (ref 15–30)
SODIUM SERPL-SCNC: 141 MMOL/L (ref 136–145)
SP GR UR STRIP.AUTO: 1.01 (ref 1–1.03)
UA COMPLETE W REFLEX CULTURE PNL UR: NORMAL
UA DIPSTICK W REFLEX MICRO PNL UR: NORMAL
URN SPEC COLLECT METH UR: NORMAL
UROBILINOGEN UR STRIP-ACNC: 0.2 E.U./DL
WBC # BLD AUTO: 6.1 K/UL (ref 4–11)

## 2024-10-11 PROCEDURE — 73630 X-RAY EXAM OF FOOT: CPT

## 2024-10-11 PROCEDURE — 6360000004 HC RX CONTRAST MEDICATION

## 2024-10-11 PROCEDURE — 73700 CT LOWER EXTREMITY W/O DYE: CPT

## 2024-10-11 PROCEDURE — 6360000002 HC RX W HCPCS

## 2024-10-11 PROCEDURE — 93005 ELECTROCARDIOGRAM TRACING: CPT

## 2024-10-11 PROCEDURE — 85025 COMPLETE CBC W/AUTO DIFF WBC: CPT

## 2024-10-11 PROCEDURE — 99285 EMERGENCY DEPT VISIT HI MDM: CPT

## 2024-10-11 PROCEDURE — 80307 DRUG TEST PRSMV CHEM ANLYZR: CPT

## 2024-10-11 PROCEDURE — 73610 X-RAY EXAM OF ANKLE: CPT

## 2024-10-11 PROCEDURE — 29515 APPLICATION SHORT LEG SPLINT: CPT

## 2024-10-11 PROCEDURE — 81003 URINALYSIS AUTO W/O SCOPE: CPT

## 2024-10-11 PROCEDURE — 96374 THER/PROPH/DIAG INJ IV PUSH: CPT

## 2024-10-11 PROCEDURE — 70450 CT HEAD/BRAIN W/O DYE: CPT

## 2024-10-11 PROCEDURE — 71260 CT THORAX DX C+: CPT

## 2024-10-11 PROCEDURE — 72125 CT NECK SPINE W/O DYE: CPT

## 2024-10-11 PROCEDURE — 85610 PROTHROMBIN TIME: CPT

## 2024-10-11 PROCEDURE — 80053 COMPREHEN METABOLIC PANEL: CPT

## 2024-10-11 PROCEDURE — 80179 DRUG ASSAY SALICYLATE: CPT

## 2024-10-11 PROCEDURE — 82077 ASSAY SPEC XCP UR&BREATH IA: CPT

## 2024-10-11 PROCEDURE — 80143 DRUG ASSAY ACETAMINOPHEN: CPT

## 2024-10-11 PROCEDURE — 6370000000 HC RX 637 (ALT 250 FOR IP)

## 2024-10-11 RX ORDER — IOPAMIDOL 755 MG/ML
75 INJECTION, SOLUTION INTRAVASCULAR
Status: COMPLETED | OUTPATIENT
Start: 2024-10-11 | End: 2024-10-11

## 2024-10-11 RX ORDER — OXYCODONE AND ACETAMINOPHEN 5; 325 MG/1; MG/1
1 TABLET ORAL ONCE
Status: COMPLETED | OUTPATIENT
Start: 2024-10-11 | End: 2024-10-11

## 2024-10-11 RX ORDER — FENTANYL CITRATE 50 UG/ML
25 INJECTION, SOLUTION INTRAMUSCULAR; INTRAVENOUS ONCE
Status: COMPLETED | OUTPATIENT
Start: 2024-10-11 | End: 2024-10-11

## 2024-10-11 RX ORDER — IOPAMIDOL 755 MG/ML
75 INJECTION, SOLUTION INTRAVASCULAR
Status: DISCONTINUED | OUTPATIENT
Start: 2024-10-11 | End: 2024-10-11

## 2024-10-11 RX ADMIN — FENTANYL CITRATE 25 MCG: 50 INJECTION, SOLUTION INTRAMUSCULAR; INTRAVENOUS at 20:56

## 2024-10-11 RX ADMIN — OXYCODONE HYDROCHLORIDE AND ACETAMINOPHEN 1 TABLET: 5; 325 TABLET ORAL at 22:27

## 2024-10-11 RX ADMIN — IOPAMIDOL 75 ML: 755 INJECTION, SOLUTION INTRAVENOUS at 20:06

## 2024-10-11 ASSESSMENT — PAIN DESCRIPTION - DESCRIPTORS
DESCRIPTORS: THROBBING
DESCRIPTORS: OTHER (COMMENT)

## 2024-10-11 ASSESSMENT — PAIN DESCRIPTION - ORIENTATION
ORIENTATION: RIGHT;LEFT
ORIENTATION: LEFT
ORIENTATION: RIGHT;LEFT

## 2024-10-11 ASSESSMENT — PAIN - FUNCTIONAL ASSESSMENT
PAIN_FUNCTIONAL_ASSESSMENT: 0-10
PAIN_FUNCTIONAL_ASSESSMENT: PREVENTS OR INTERFERES WITH MANY ACTIVE NOT PASSIVE ACTIVITIES

## 2024-10-11 ASSESSMENT — PAIN SCALES - GENERAL
PAINLEVEL_OUTOF10: 6
PAINLEVEL_OUTOF10: 10
PAINLEVEL_OUTOF10: 10

## 2024-10-11 ASSESSMENT — PAIN DESCRIPTION - LOCATION
LOCATION: ANKLE
LOCATION: HEAD;FOOT
LOCATION: FOOT

## 2024-10-11 ASSESSMENT — PAIN DESCRIPTION - PAIN TYPE: TYPE: ACUTE PAIN

## 2024-10-11 ASSESSMENT — LIFESTYLE VARIABLES
HOW MANY STANDARD DRINKS CONTAINING ALCOHOL DO YOU HAVE ON A TYPICAL DAY: 5 OR 6
HOW OFTEN DO YOU HAVE A DRINK CONTAINING ALCOHOL: 2-3 TIMES A WEEK

## 2024-10-12 VITALS
OXYGEN SATURATION: 93 % | SYSTOLIC BLOOD PRESSURE: 153 MMHG | HEART RATE: 101 BPM | DIASTOLIC BLOOD PRESSURE: 90 MMHG | RESPIRATION RATE: 13 BRPM | TEMPERATURE: 98.1 F

## 2024-10-12 LAB
EKG ATRIAL RATE: 89 BPM
EKG DIAGNOSIS: NORMAL
EKG P AXIS: 76 DEGREES
EKG P-R INTERVAL: 166 MS
EKG Q-T INTERVAL: 372 MS
EKG QRS DURATION: 116 MS
EKG QTC CALCULATION (BAZETT): 452 MS
EKG R AXIS: 45 DEGREES
EKG T AXIS: 45 DEGREES
EKG VENTRICULAR RATE: 89 BPM

## 2024-10-12 PROCEDURE — 93010 ELECTROCARDIOGRAM REPORT: CPT | Performed by: INTERNAL MEDICINE

## 2024-10-12 PROCEDURE — 6370000000 HC RX 637 (ALT 250 FOR IP)

## 2024-10-12 RX ORDER — HYDROCODONE BITARTRATE AND ACETAMINOPHEN 5; 325 MG/1; MG/1
1 TABLET ORAL EVERY 6 HOURS PRN
Qty: 10 TABLET | Refills: 0 | Status: SHIPPED | OUTPATIENT
Start: 2024-10-12 | End: 2024-10-15 | Stop reason: SDUPTHER

## 2024-10-12 RX ORDER — OXYCODONE HYDROCHLORIDE 5 MG/1
5 TABLET ORAL ONCE
Status: COMPLETED | OUTPATIENT
Start: 2024-10-12 | End: 2024-10-12

## 2024-10-12 RX ADMIN — OXYCODONE HYDROCHLORIDE 5 MG: 5 TABLET ORAL at 00:11

## 2024-10-12 ASSESSMENT — ENCOUNTER SYMPTOMS
SHORTNESS OF BREATH: 0
BACK PAIN: 0
CHEST TIGHTNESS: 0
NAUSEA: 0
VOMITING: 0
ABDOMINAL PAIN: 0

## 2024-10-12 ASSESSMENT — PAIN DESCRIPTION - DESCRIPTORS: DESCRIPTORS: OTHER (COMMENT)

## 2024-10-12 ASSESSMENT — PAIN - FUNCTIONAL ASSESSMENT: PAIN_FUNCTIONAL_ASSESSMENT: PREVENTS OR INTERFERES WITH ALL ACTIVE AND SOME PASSIVE ACTIVITIES

## 2024-10-12 ASSESSMENT — PAIN SCALES - GENERAL: PAINLEVEL_OUTOF10: 9

## 2024-10-12 ASSESSMENT — PAIN DESCRIPTION - ORIENTATION: ORIENTATION: LEFT

## 2024-10-12 NOTE — ED PROVIDER NOTES
Patient seen and evaluated by KARON:      EKG: Normal sinus rhythm with a rate of 89.  Normal axis.  Normal intervals and durations.  Incomplete right bundle branch block.  No acute signs of ischemia.     Joe Dick II, DO  10/11/24 1891
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
## 2024-10-12 NOTE — DISCHARGE INSTRUCTIONS
You have a left calcaneus fracture which will require follow-up with orthopedics at the contact number listed above.  You will likely need surgery for this injury.    I did send pain medication to your pharmacy.  This is a controlled substance containing hydrocodone acetaminophen.  Do not exceed 4 g of acetaminophen/Tylenol in 24 hours.  Do not drive, drink alcohol, take other sedating medications with this medication as it can result in oversedation and respiratory depression.    You will stay in the splint that was placed in the emergency department until orthopedics removes it.  You will be nonweightbearing to the left lower extremity until Ortho gives you weightbearing instructions.  You are able to shower but you were not able to get the splint wet.  Keep it covered with Saran wrap or plastic bag when showering.    Elevate and apply ice 2-3 times a day for 10 to 15 minutes at a time for additional relief of symptoms.    Follow-up with your primary care as needed.    Please return to the emergency department if you experience inability to wiggle the toes on the affected extremity or delayed capillary refill of to evaluate for concern for compartment syndrome.

## 2024-10-14 ENCOUNTER — TELEPHONE (OUTPATIENT)
Dept: ORTHOPEDIC SURGERY | Age: 64
End: 2024-10-14

## 2024-10-14 NOTE — TELEPHONE ENCOUNTER
----- Message from Dr. Aris Dick MD sent at 10/14/2024  9:45 AM EDT -----  Calcaneus fracture from the weekend. Can we reach out to him and offer to see Tues/Wed

## 2024-10-15 ENCOUNTER — OFFICE VISIT (OUTPATIENT)
Dept: ORTHOPEDIC SURGERY | Age: 64
End: 2024-10-15

## 2024-10-15 VITALS — HEIGHT: 70 IN | WEIGHT: 225 LBS | BODY MASS INDEX: 32.21 KG/M2

## 2024-10-15 DIAGNOSIS — S92.002A CLOSED NONDISPLACED FRACTURE OF LEFT CALCANEUS, UNSPECIFIED PORTION OF CALCANEUS, INITIAL ENCOUNTER: Primary | ICD-10-CM

## 2024-10-15 RX ORDER — HYDROCODONE BITARTRATE AND ACETAMINOPHEN 5; 325 MG/1; MG/1
1 TABLET ORAL EVERY 6 HOURS PRN
Qty: 10 TABLET | Refills: 0 | Status: SHIPPED | OUTPATIENT
Start: 2024-10-15 | End: 2024-10-18
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
## 2024-10-18 ENCOUNTER — TELEPHONE (OUTPATIENT)
Dept: ORTHOPEDIC SURGERY | Age: 64
End: 2024-10-18

## 2024-10-18 DIAGNOSIS — S92.002A CLOSED NONDISPLACED FRACTURE OF LEFT CALCANEUS, UNSPECIFIED PORTION OF CALCANEUS, INITIAL ENCOUNTER: ICD-10-CM

## 2024-10-18 NOTE — TELEPHONE ENCOUNTER
Prescription Refill     Medication Name:  HYDROCODONE  [facility]  Patient Contact Number:  [phone]

## 2024-10-21 RX ORDER — HYDROCODONE BITARTRATE AND ACETAMINOPHEN 5; 325 MG/1; MG/1
1 TABLET ORAL EVERY 8 HOURS PRN
Qty: 21 TABLET | Refills: 0 | OUTPATIENT
Start: 2024-10-21 | End: 2024-10-28

## 2024-10-22 ENCOUNTER — TELEPHONE (OUTPATIENT)
Dept: ORTHOPEDIC SURGERY | Age: 64
End: 2024-10-22

## 2024-10-22 DIAGNOSIS — S92.002A CLOSED NONDISPLACED FRACTURE OF LEFT CALCANEUS, UNSPECIFIED PORTION OF CALCANEUS, INITIAL ENCOUNTER: ICD-10-CM

## 2024-10-22 RX ORDER — HYDROCODONE BITARTRATE AND ACETAMINOPHEN 5; 325 MG/1; MG/1
TABLET ORAL
Qty: 10 TABLET | Refills: 0 | OUTPATIENT
Start: 2024-10-22

## 2024-10-22 NOTE — TELEPHONE ENCOUNTER
Other PATIENT IS AWARE OF THAT DOCTOR IS OUT OF OFFICE. BUT WANTS TO SPEAK WITH SOMEONE REGARDING MEDICATION PLEASE CALL PATIENT BACK AT  [phone]

## 2024-10-22 NOTE — TELEPHONE ENCOUNTER
Prescription Refill     Medication Name:  HYDROCODONE  Pharmacy: [facility]  Patient Contact Number:  [phone]

## 2024-10-23 NOTE — TELEPHONE ENCOUNTER
Other    PATIENT CALLING TO FOLLOWING UP ON THE REFILL , PATIENT STATED CALLED PHARMACY AND THE 7 DAYS SUPPLY OF THE HYDROCODONE WAS NOT SENT     PLEASE CALL PATIENT [phone]

## 2024-11-05 DIAGNOSIS — F51.01 PRIMARY INSOMNIA: ICD-10-CM

## 2024-11-05 RX ORDER — TRAZODONE HYDROCHLORIDE 300 MG/1
300 TABLET ORAL NIGHTLY
Qty: 90 TABLET | Refills: 1 | Status: SHIPPED | OUTPATIENT
Start: 2024-11-05

## 2024-11-05 NOTE — TELEPHONE ENCOUNTER
Pharmacy said patient needs new prescription sent for the trazodone   Future appt 1/14    Last appt 7/12

## 2024-11-06 ENCOUNTER — OFFICE VISIT (OUTPATIENT)
Dept: ORTHOPEDIC SURGERY | Age: 64
End: 2024-11-06

## 2024-11-06 VITALS — BODY MASS INDEX: 32.21 KG/M2 | HEIGHT: 70 IN | WEIGHT: 225 LBS

## 2024-11-06 DIAGNOSIS — S92.002A CLOSED NONDISPLACED FRACTURE OF LEFT CALCANEUS, UNSPECIFIED PORTION OF CALCANEUS, INITIAL ENCOUNTER: Primary | ICD-10-CM

## 2024-11-06 PROCEDURE — 99024 POSTOP FOLLOW-UP VISIT: CPT | Performed by: ORTHOPAEDIC SURGERY

## 2024-11-06 RX ORDER — MELOXICAM 15 MG/1
15 TABLET ORAL DAILY
Qty: 30 TABLET | Refills: 0 | Status: SHIPPED | OUTPATIENT
Start: 2024-11-06

## 2024-11-07 NOTE — PROGRESS NOTES
control  Counseled the patient on range of motion activities to include ankle and subtalar motion.  Continue cam boot immobilization  Ice and elevate  Compressive Ace wrap  RTC in 6 weeks.  Anticipating advancing to weightbearing as tolerated at that time point.    Aris Dick MD

## 2024-11-13 NOTE — TELEPHONE ENCOUNTER
----- Message from Elisabet Gore sent at 8/26/2020  8:29 AM EDT -----  Subject: Message to Provider    QUESTIONS  Information for Provider? pt says the medication gabapentin is causing   side effects. He stated it is messing with his nervous system and not   helping with leg pain. Pt would also like medication for gout.  ---------------------------------------------------------------------------  --------------  CALL BACK INFO  What is the best way for the office to contact you? OK to leave message on   voicemail  Preferred Call Back Phone Number? [phone]  ---------------------------------------------------------------------------  --------------  SCRIPT ANSWERS  Relationship to Patient?  Self
If patient is having a gout flare, recommend he f/u in office. Recommend he discontinue gabapentin and can discuss further at visit.
Patient notified, appt scheduled
Please review attached note from call center
Adult

## 2024-12-06 DIAGNOSIS — S92.002A CLOSED NONDISPLACED FRACTURE OF LEFT CALCANEUS, UNSPECIFIED PORTION OF CALCANEUS, INITIAL ENCOUNTER: Primary | ICD-10-CM

## 2024-12-06 RX ORDER — MELOXICAM 15 MG/1
15 TABLET ORAL DAILY
Qty: 30 TABLET | Refills: 0 | Status: SHIPPED | OUTPATIENT
Start: 2024-12-06

## 2024-12-20 ENCOUNTER — TELEPHONE (OUTPATIENT)
Dept: ADMINISTRATIVE | Age: 64
End: 2024-12-20

## 2024-12-20 NOTE — TELEPHONE ENCOUNTER
Submitted PA for traZODone HCl 300MG tablets   Via CM Key: [id number] STATUS: PENDING.    Follow up done daily; if no decision with in three days we will refax.  If another three days goes by with no decision will call the insurance for status.

## 2024-12-23 NOTE — TELEPHONE ENCOUNTER
The medication is APPROVED.    Outcome  Approved on December 20 by [place] [employer] 2017  Your PA request for [id number] was approved for 365 days. The PA# assigned is [id number].  Authorization Expiration Date: 12/18/2025    If this requires a response please respond to the pool ( P MHCX PSC MEDICATION PRE-AUTH).      Thank you please advise patient.

## 2025-01-14 PROBLEM — G62.9 NEUROPATHY: Status: ACTIVE | Noted: 2025-01-14

## 2025-01-14 PROBLEM — F51.01 PRIMARY INSOMNIA: Status: ACTIVE | Noted: 2025-01-14

## 2025-01-14 PROBLEM — M51.360 DEGENERATION OF INTERVERTEBRAL DISC OF LUMBAR REGION WITH DISCOGENIC BACK PAIN: Status: ACTIVE | Noted: 2025-01-14

## 2025-04-10 ENCOUNTER — OFFICE VISIT (OUTPATIENT)
Dept: FAMILY MEDICINE CLINIC | Age: 65
End: 2025-04-10
Payer: COMMERCIAL

## 2025-04-10 VITALS
DIASTOLIC BLOOD PRESSURE: 72 MMHG | TEMPERATURE: 98.3 F | HEART RATE: 57 BPM | SYSTOLIC BLOOD PRESSURE: 138 MMHG | OXYGEN SATURATION: 98 %

## 2025-04-10 DIAGNOSIS — R21 RASH: Primary | ICD-10-CM

## 2025-04-10 DIAGNOSIS — M25.561 ACUTE PAIN OF RIGHT KNEE: ICD-10-CM

## 2025-04-10 DIAGNOSIS — S92.002S CLOSED NONDISPLACED FRACTURE OF LEFT CALCANEUS, UNSPECIFIED PORTION OF CALCANEUS, SEQUELA: ICD-10-CM

## 2025-04-10 DIAGNOSIS — R60.0 LOCALIZED EDEMA: ICD-10-CM

## 2025-04-10 DIAGNOSIS — L70.0 ACNE VULGARIS: ICD-10-CM

## 2025-04-10 PROCEDURE — 3017F COLORECTAL CA SCREEN DOC REV: CPT | Performed by: NURSE PRACTITIONER

## 2025-04-10 PROCEDURE — 99213 OFFICE O/P EST LOW 20 MIN: CPT | Performed by: NURSE PRACTITIONER

## 2025-04-10 PROCEDURE — G8427 DOCREV CUR MEDS BY ELIG CLIN: HCPCS | Performed by: NURSE PRACTITIONER

## 2025-04-10 PROCEDURE — G8417 CALC BMI ABV UP PARAM F/U: HCPCS | Performed by: NURSE PRACTITIONER

## 2025-04-10 PROCEDURE — 3078F DIAST BP <80 MM HG: CPT | Performed by: NURSE PRACTITIONER

## 2025-04-10 PROCEDURE — 1036F TOBACCO NON-USER: CPT | Performed by: NURSE PRACTITIONER

## 2025-04-10 PROCEDURE — 3075F SYST BP GE 130 - 139MM HG: CPT | Performed by: NURSE PRACTITIONER

## 2025-04-10 RX ORDER — DOXYCYCLINE HYCLATE 100 MG
100 TABLET ORAL 2 TIMES DAILY
Qty: 20 TABLET | Refills: 0 | Status: SHIPPED | OUTPATIENT
Start: 2025-04-10 | End: 2025-04-20

## 2025-04-10 SDOH — ECONOMIC STABILITY: FOOD INSECURITY: WITHIN THE PAST 12 MONTHS, YOU WORRIED THAT YOUR FOOD WOULD RUN OUT BEFORE YOU GOT MONEY TO BUY MORE.: NEVER TRUE

## 2025-04-10 SDOH — ECONOMIC STABILITY: FOOD INSECURITY: WITHIN THE PAST 12 MONTHS, THE FOOD YOU BOUGHT JUST DIDN'T LAST AND YOU DIDN'T HAVE MONEY TO GET MORE.: NEVER TRUE

## 2025-04-10 ASSESSMENT — PATIENT HEALTH QUESTIONNAIRE - PHQ9
SUM OF ALL RESPONSES TO PHQ QUESTIONS 1-9: 0
10. IF YOU CHECKED OFF ANY PROBLEMS, HOW DIFFICULT HAVE THESE PROBLEMS MADE IT FOR YOU TO DO YOUR WORK, TAKE CARE OF THINGS AT HOME, OR GET ALONG WITH OTHER PEOPLE: NOT DIFFICULT AT ALL
5. POOR APPETITE OR OVEREATING: NOT AT ALL
SUM OF ALL RESPONSES TO PHQ QUESTIONS 1-9: 0
SUM OF ALL RESPONSES TO PHQ QUESTIONS 1-9: 0
2. FEELING DOWN, DEPRESSED OR HOPELESS: NOT AT ALL
6. FEELING BAD ABOUT YOURSELF - OR THAT YOU ARE A FAILURE OR HAVE LET YOURSELF OR YOUR FAMILY DOWN: NOT AT ALL
3. TROUBLE FALLING OR STAYING ASLEEP: NOT AT ALL
1. LITTLE INTEREST OR PLEASURE IN DOING THINGS: NOT AT ALL
SUM OF ALL RESPONSES TO PHQ QUESTIONS 1-9: 0
4. FEELING TIRED OR HAVING LITTLE ENERGY: NOT AT ALL
9. THOUGHTS THAT YOU WOULD BE BETTER OFF DEAD, OR OF HURTING YOURSELF: NOT AT ALL
7. TROUBLE CONCENTRATING ON THINGS, SUCH AS READING THE NEWSPAPER OR WATCHING TELEVISION: NOT AT ALL

## 2025-04-10 NOTE — PROGRESS NOTES
Assessment & Plan    Assessment & Plan  1. Wellness exam  - Patient is overdue for a follow-up on chronic conditions  - Appointment to be scheduled to address these issues comprehensively    2. Facial rash  - Etiology uncertain  - Prescription for an antibiotic to facilitate resolution  - Referral to a dermatologist for further evaluation and management  - Previous referral to dermatology not utilized due to insurance issues    3. Right knee pain  - Significant pain and swelling in the right knee following a fall; no edema noted on exam today  - X-ray of the right knee to assess extent of injury  - Referral to Dr. Dick for further evaluation and management  - Advised to try icing and using Ace wraps for symptomatic relief    4. Left heel fracture  - Ongoing issues following a fracture  - Referral to Dr. Dick for follow-up  - Using a boot and bandages for support  - Previous imaging confirmed fracture; further evaluation needed to assess healing and function    5. Neuropathy  - Worsening numbness in feet despite gabapentin  - Continued use of gabapentin for nerve pain advised  - Previous nerve conduction studies indicated significant nerve damage  - Further evaluation may be needed if symptoms persist or worsen    Rash  Localized edema  Acute pain of right knee  -     XR KNEE RIGHT (1-2 VIEWS); Future  -     Aris Dacosta MD, Orthopedic Surgery (Hip; Knee; Trauma), [facility]  Acne vulgaris  -     doxycycline hyclate (VIBRA-TABS) 100 MG tablet; Take 1 tablet by mouth 2 times daily for 10 days, Disp-20 tablet, R-0Normal  -     Heather Dickens [facility], [facility]  Closed nondisplaced fracture of left calcaneus, unspecified portion of calcaneus, sequela  -     Aris Dacosta MD, Orthopedic Surgery (Hip; Knee; Trauma), [facility]         Subjective   History of Present Illness  The patient is a 64-year-old white male who presents today with complaints of a diffuse rash and

## 2025-05-09 ENCOUNTER — OFFICE VISIT (OUTPATIENT)
Dept: ORTHOPEDIC SURGERY | Age: 65
End: 2025-05-09

## 2025-05-09 VITALS — HEIGHT: 70 IN | WEIGHT: 225 LBS | BODY MASS INDEX: 32.21 KG/M2

## 2025-05-09 DIAGNOSIS — M25.561 CHRONIC PAIN OF RIGHT KNEE: Primary | ICD-10-CM

## 2025-05-09 DIAGNOSIS — S92.062S CLOSED DISPLACED INTRA-ARTICULAR FRACTURE OF LEFT CALCANEUS, SEQUELA: ICD-10-CM

## 2025-05-09 DIAGNOSIS — M17.11 PRIMARY OSTEOARTHRITIS OF RIGHT KNEE: ICD-10-CM

## 2025-05-09 DIAGNOSIS — G89.29 CHRONIC PAIN OF RIGHT KNEE: Primary | ICD-10-CM

## 2025-05-09 RX ORDER — TRIAMCINOLONE ACETONIDE 40 MG/ML
80 INJECTION, SUSPENSION INTRA-ARTICULAR; INTRAMUSCULAR ONCE
Status: COMPLETED | OUTPATIENT
Start: 2025-05-09 | End: 2025-05-09

## 2025-05-09 RX ORDER — LIDOCAINE HYDROCHLORIDE 10 MG/ML
3 INJECTION, SOLUTION INFILTRATION; PERINEURAL ONCE
Status: COMPLETED | OUTPATIENT
Start: 2025-05-09 | End: 2025-05-09

## 2025-05-09 RX ADMIN — TRIAMCINOLONE ACETONIDE 80 MG: 40 INJECTION, SUSPENSION INTRA-ARTICULAR; INTRAMUSCULAR at 09:56

## 2025-05-09 RX ADMIN — LIDOCAINE HYDROCHLORIDE 3 ML: 10 INJECTION, SOLUTION INFILTRATION; PERINEURAL at 09:55

## 2025-05-09 NOTE — PROGRESS NOTES
ORTHOPAEDIC SURGERY FOLLOWUP VISIT    CHIEF COMPLAINT: Right knee pain, left heel pain    HISTORY OF PRESENT ILLNESS:  64-year-old male presents for evaluation of right knee pain.  He reports that this started around the time of his calcaneus fracture in October 2024.  Reports a global knee pain associated with swelling.  It has worsened over the last 2 months.  He reports a chronic dull aching pain which is worse with activities.  He will have sharp pain with weightbearing.  Denies mechanical symptoms involving the right knee.  He does have a level of baseline neuropathy that is contributing.    He also sustained a intra-articular calcaneus fracture which was managed nonoperatively at the time of October 2024.  It has been 7 months since this injury.  He was only seen 3 weeks post injury and has not followed up.  He reports that overall it has gotten better, but continues to bother him.  He presents using crutches today.    PHYSICAL EXAM:  General: Well-appearing.  No distress.  Right lower extremity: No cuts, open wounds, or abrasions.  There is a moderate effusion.  Knee range of motion is ranged gingerly.  He is able to achieve full extension and 110 degrees of flexion somewhat limited by the degree of anterior knee swelling/effusion.  No ligamentous instability appreciated with varus or valgus stress.  2+ anterior drawer laxity.  Negative posterior drawer.  Moderate medial/lateral joint line tenderness to palpation. Sensation is intact to light touch in deep peroneal, superficial peroneal, tibial, sural, and saphenous nerve distributions.  Motor function is intact to EHL, FHL, tibialis anterior, and gastroc.  There is brisk capillary refill to the toes and a strong palpable dorsalis pedis pulse.  Compartments are soft and compressible.  There is no calf tenderness and a negative Homans' sign.    Left lower extremity:  No cuts, wounds, or abrasions.  There is limited subtalar motion.  Ankle range of motion is

## 2025-05-13 ENCOUNTER — OFFICE VISIT (OUTPATIENT)
Dept: FAMILY MEDICINE CLINIC | Age: 65
End: 2025-05-13
Payer: COMMERCIAL

## 2025-05-13 VITALS
SYSTOLIC BLOOD PRESSURE: 136 MMHG | HEART RATE: 89 BPM | BODY MASS INDEX: 35.93 KG/M2 | DIASTOLIC BLOOD PRESSURE: 82 MMHG | OXYGEN SATURATION: 98 % | HEIGHT: 70 IN | WEIGHT: 251 LBS

## 2025-05-13 DIAGNOSIS — G89.29 CHRONIC BILATERAL LOW BACK PAIN WITHOUT SCIATICA: ICD-10-CM

## 2025-05-13 DIAGNOSIS — K21.9 GASTROESOPHAGEAL REFLUX DISEASE WITHOUT ESOPHAGITIS: ICD-10-CM

## 2025-05-13 DIAGNOSIS — M1A.29X0 DRUG-INDUCED CHRONIC GOUT OF MULTIPLE SITES WITHOUT TOPHUS: ICD-10-CM

## 2025-05-13 DIAGNOSIS — G62.9 NEUROPATHY: ICD-10-CM

## 2025-05-13 DIAGNOSIS — M54.50 CHRONIC BILATERAL LOW BACK PAIN WITHOUT SCIATICA: ICD-10-CM

## 2025-05-13 DIAGNOSIS — F51.01 PRIMARY INSOMNIA: ICD-10-CM

## 2025-05-13 DIAGNOSIS — L70.0 ACNE VULGARIS: ICD-10-CM

## 2025-05-13 DIAGNOSIS — F33.1 MODERATE EPISODE OF RECURRENT MAJOR DEPRESSIVE DISORDER (HCC): ICD-10-CM

## 2025-05-13 DIAGNOSIS — I73.9 PERIPHERAL VASCULAR INSUFFICIENCY: ICD-10-CM

## 2025-05-13 DIAGNOSIS — M51.362 DEGENERATION OF INTERVERTEBRAL DISC OF LUMBAR REGION WITH DISCOGENIC BACK PAIN AND LOWER EXTREMITY PAIN: ICD-10-CM

## 2025-05-13 DIAGNOSIS — I10 ESSENTIAL HYPERTENSION: Primary | ICD-10-CM

## 2025-05-13 DIAGNOSIS — F10.10 ALCOHOL ABUSE: ICD-10-CM

## 2025-05-13 PROCEDURE — 3079F DIAST BP 80-89 MM HG: CPT | Performed by: NURSE PRACTITIONER

## 2025-05-13 PROCEDURE — G8427 DOCREV CUR MEDS BY ELIG CLIN: HCPCS | Performed by: NURSE PRACTITIONER

## 2025-05-13 PROCEDURE — G8417 CALC BMI ABV UP PARAM F/U: HCPCS | Performed by: NURSE PRACTITIONER

## 2025-05-13 PROCEDURE — 3017F COLORECTAL CA SCREEN DOC REV: CPT | Performed by: NURSE PRACTITIONER

## 2025-05-13 PROCEDURE — 1036F TOBACCO NON-USER: CPT | Performed by: NURSE PRACTITIONER

## 2025-05-13 PROCEDURE — 36415 COLL VENOUS BLD VENIPUNCTURE: CPT | Performed by: NURSE PRACTITIONER

## 2025-05-13 PROCEDURE — 99214 OFFICE O/P EST MOD 30 MIN: CPT | Performed by: NURSE PRACTITIONER

## 2025-05-13 PROCEDURE — 3075F SYST BP GE 130 - 139MM HG: CPT | Performed by: NURSE PRACTITIONER

## 2025-05-13 RX ORDER — TRAZODONE HYDROCHLORIDE 300 MG/1
300 TABLET ORAL NIGHTLY
Qty: 90 TABLET | Refills: 1 | Status: CANCELLED | OUTPATIENT
Start: 2025-05-13

## 2025-05-13 RX ORDER — GABAPENTIN 400 MG/1
1 CAPSULE ORAL EVERY 8 HOURS
COMMUNITY

## 2025-05-13 RX ORDER — DOXEPIN 3 MG/1
3 TABLET, FILM COATED ORAL NIGHTLY
Qty: 30 TABLET | Refills: 5 | Status: SHIPPED | OUTPATIENT
Start: 2025-05-13 | End: 2025-11-09

## 2025-05-13 NOTE — PROGRESS NOTES
Assessment & Plan  1. Hypertension:  - Blood pressure readings are within the normal range today.  - Current regimen of metoprolol and lisinopril will be continued.  - No new symptoms or complications reported.  - Blood pressure remains well controlled.    2. Chronic gout:  - Reported a gout flare in his big toe about a week ago.  - Currently taking allopurinol 100 mg daily.  - Uric acid level test will be conducted today to monitor his condition.  - CBC and CMP will be ordered to evaluate overall health status.    3. Insomnia:  - Experiencing significant trouble sleeping, requesting stronger medication.  - Advised to discontinue trazodone.  Doxepin will be prescribed for insomnia.  - Discussed potential interactions with alcohol and other medications.  - Patient reported quitting drinking for the last 2-3 weeks.    4. Rash:  - Reported improvement in rash but has not seen a dermatologist due to insurance issues.  - Advised to contact insurance provider to find a dermatologist who accepts his plan.  - Referral will be provided once a suitable dermatologist is identified.  - Cortisone shot received last Friday made a significant difference in right knee pain.    5. 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  - Will continue with gabapentin.  - Prescription for muscle relaxers will be renewed.  - Noted improvement in mobility and reduction in pain after cortisone shot.    6. Alcohol abuse:  - Reported quitting drinking for the last 2-3 weeks.  - Continued abstinence from alcohol was encouraged.  - Discussed potential interactions with sleep medications.    7. Peripheral vascular disease:  - No new symptoms or complications reported.  - Will continue with current treatment plan.  - Monitoring for any changes in condition.    8. Gastroesophageal reflux disease (GERD):  - No new symptoms or complications reported.  - Will continue with

## 2025-05-14 ENCOUNTER — TELEPHONE (OUTPATIENT)
Dept: ADMINISTRATIVE | Age: 65
End: 2025-05-14

## 2025-05-14 ENCOUNTER — RESULTS FOLLOW-UP (OUTPATIENT)
Dept: FAMILY MEDICINE CLINIC | Age: 65
End: 2025-05-14

## 2025-05-14 DIAGNOSIS — R74.8 ELEVATED LIVER ENZYMES: Primary | ICD-10-CM

## 2025-05-14 DIAGNOSIS — F10.10 ALCOHOL ABUSE: ICD-10-CM

## 2025-05-14 LAB
ALBUMIN SERPL-MCNC: 4.2 G/DL (ref 3.4–5)
ALBUMIN/GLOB SERPL: 1.6 {RATIO} (ref 1.1–2.2)
ALP SERPL-CCNC: 99 U/L (ref 40–129)
ALT SERPL-CCNC: 127 U/L (ref 10–40)
ANION GAP SERPL CALCULATED.3IONS-SCNC: 11 MMOL/L (ref 3–16)
AST SERPL-CCNC: 102 U/L (ref 15–37)
BASOPHILS # BLD: 0.1 K/UL (ref 0–0.2)
BASOPHILS NFR BLD: 1 %
BILIRUB SERPL-MCNC: 0.4 MG/DL (ref 0–1)
BUN SERPL-MCNC: 15 MG/DL (ref 7–20)
CALCIUM SERPL-MCNC: 9.1 MG/DL (ref 8.3–10.6)
CHLORIDE SERPL-SCNC: 105 MMOL/L (ref 99–110)
CO2 SERPL-SCNC: 25 MMOL/L (ref 21–32)
CREAT SERPL-MCNC: 1.1 MG/DL (ref 0.8–1.3)
DEPRECATED RDW RBC AUTO: 15.1 % (ref 12.4–15.4)
EOSINOPHIL # BLD: 0.1 K/UL (ref 0–0.6)
EOSINOPHIL NFR BLD: 1 %
GFR SERPLBLD CREATININE-BSD FMLA CKD-EPI: 76 ML/MIN/{1.73_M2}
GLUCOSE SERPL-MCNC: 125 MG/DL (ref 70–99)
HCT VFR BLD AUTO: 40.5 % (ref 40.5–52.5)
HGB BLD-MCNC: 13.5 G/DL (ref 13.5–17.5)
LYMPHOCYTES # BLD: 1.2 K/UL (ref 1–5.1)
LYMPHOCYTES NFR BLD: 15 %
MCH RBC QN AUTO: 33.4 PG (ref 26–34)
MCHC RBC AUTO-ENTMCNC: 33.3 G/DL (ref 31–36)
MCV RBC AUTO: 100.5 FL (ref 80–100)
MONOCYTES # BLD: 0.5 K/UL (ref 0–1.3)
MONOCYTES NFR BLD: 7 %
NEUTROPHILS # BLD: 5.7 K/UL (ref 1.7–7.7)
NEUTROPHILS NFR BLD: 72 %
NEUTS BAND NFR BLD MANUAL: 3 % (ref 0–7)
PLATELET # BLD AUTO: 394 K/UL (ref 135–450)
PLATELET BLD QL SMEAR: ADEQUATE
PMV BLD AUTO: 9.7 FL (ref 5–10.5)
POTASSIUM SERPL-SCNC: 4.4 MMOL/L (ref 3.5–5.1)
PROT SERPL-MCNC: 6.8 G/DL (ref 6.4–8.2)
RBC # BLD AUTO: 4.03 M/UL (ref 4.2–5.9)
SLIDE REVIEW: ABNORMAL
SODIUM SERPL-SCNC: 141 MMOL/L (ref 136–145)
URATE SERPL-MCNC: 5.6 MG/DL (ref 3.5–7.2)
VARIANT LYMPHS NFR BLD MANUAL: 1 % (ref 0–6)
WBC # BLD AUTO: 7.6 K/UL (ref 4–11)

## 2025-05-14 NOTE — TELEPHONE ENCOUNTER
Submitted PA for Doxepin HCl 3MG tablets   Via CMM Key: [id number]  STATUS: PENDING.      WAITING FOR CMM CLINICAL QUESTION SET      Follow up done daily; if no decision with in three days we will refax.  If another three days goes by with no decision will call the insurance for status.

## 2025-05-15 NOTE — TELEPHONE ENCOUNTER
Submitted PA for Doxepin HCl 3MG tablets   Via [facility] Key: [id number] STATUS: PENDING.    Follow up done daily; if no decision with in three days we will refax.  If another three days goes by with no decision will call the insurance for status.

## 2025-05-16 NOTE — TELEPHONE ENCOUNTER
The medication Doxepin HCl 3MG tablets  is APPROVED from 5/15/25 to 11/10/25.    Please notify the patient.    If this requires a response please respond to the pool ( P MHCX PSC MEDICATION PRE-AUTH).

## 2025-05-30 DIAGNOSIS — M54.50 CHRONIC BILATERAL LOW BACK PAIN WITHOUT SCIATICA: ICD-10-CM

## 2025-05-30 DIAGNOSIS — G62.9 NEUROPATHY: ICD-10-CM

## 2025-05-30 DIAGNOSIS — G89.29 CHRONIC BILATERAL LOW BACK PAIN WITHOUT SCIATICA: ICD-10-CM

## 2025-05-30 DIAGNOSIS — M51.369 DEGENERATIVE DISC DISEASE, LUMBAR: ICD-10-CM

## 2025-05-30 RX ORDER — GABAPENTIN 400 MG/1
CAPSULE ORAL
Qty: 90 CAPSULE | Refills: 2 | Status: SHIPPED | OUTPATIENT
Start: 2025-05-30 | End: 2026-08-15

## 2025-05-30 NOTE — TELEPHONE ENCOUNTER
I called the patient and he has missed doses but does see Ayana Raines every 6 months for the gabapentin. rc

## 2025-05-30 NOTE — TELEPHONE ENCOUNTER
Date of last refill of this med was 7/12, # of pills given 90 and # of refills given 2.  Their next appointment is 11/18, the last date patient was seen was 5/13 .  Does patient have medication agreement on file? No  Has drug screen been done in last 12 months if needed? no  PATIENT IS OUT

## 2025-06-13 DIAGNOSIS — R74.8 ELEVATED LIVER ENZYMES: Primary | ICD-10-CM

## 2025-06-23 ENCOUNTER — ANESTHESIA EVENT (OUTPATIENT)
Dept: ENDOSCOPY | Age: 65
DRG: 201 | End: 2025-06-23
Payer: COMMERCIAL

## 2025-07-06 NOTE — ANESTHESIA PRE PROCEDURE
Department of Anesthesiology  Preprocedure Note       Name:  John Womack   Age:  64 y.o.  :  1960                                          MRN:  [id number]         Date:  2025      Surgeon: Surgeon(s):  Shantanu Washburn MD    Procedure: Procedure(s):  EGD W/ANES.    Medications prior to admission:   Prior to Admission medications    Medication Sig Start Date End Date Taking? Authorizing Provider   gabapentin (NEURONTIN) 400 MG capsule TAKE ONE CAPSULE BY MOUTH THREE TIMES DAILY @ 9AM-1PM-5PM 5/30/25 8/15/26  Kiara Hauser APRN - CNP   tiZANidine (ZANAFLEX) 4 MG tablet Take 1 tablet by mouth 3 times daily as needed (back pain) 25   Jennifer Raines APRN - CNP   doxepin (SILENOR) 3 MG TABS tablet Take 1 tablet by mouth nightly 25  Jennifer Raines APRN - CNP   meloxicam (MOBIC) 15 MG tablet TAKE 1 TABLET BY MOUTH EVERY DAY 24   Aris Dick MD   metoprolol tartrate (LOPRESSOR) 25 MG tablet TAKE 1 TABLET BY MOUTH TWICE A DAY 10/7/24   Jennifer Raines APRN - CNP   lisinopril (PRINIVIL;ZESTRIL) 20 MG tablet TAKE 1 TABLET BY MOUTH EVERY DAY 10/7/24   Jennifer Raines APRN - CNP   allopurinol (ZYLOPRIM) 100 MG tablet Take 1 tablet daily 24   Jennifer Raines APRN - CNP       Current medications:    No current facility-administered medications for this encounter.     Current Outpatient Medications   Medication Sig Dispense Refill   • gabapentin (NEURONTIN) 400 MG capsule TAKE ONE CAPSULE BY MOUTH THREE TIMES DAILY @ 9AM-1PM-5PM 90 capsule 2   • tiZANidine (ZANAFLEX) 4 MG tablet Take 1 tablet by mouth 3 times daily as needed (back pain) 90 tablet 5   • doxepin (SILENOR) 3 MG TABS tablet Take 1 tablet by mouth nightly 30 tablet 5   • meloxicam (MOBIC) 15 MG tablet TAKE 1 TABLET BY MOUTH EVERY DAY 30 tablet 0   • metoprolol tartrate (LOPRESSOR) 25 MG tablet TAKE 1 TABLET BY MOUTH TWICE A  tablet 3   • lisinopril (PRINIVIL;ZESTRIL) 20 MG tablet TAKE 1

## 2025-07-08 ENCOUNTER — ANESTHESIA (OUTPATIENT)
Dept: ENDOSCOPY | Age: 65
DRG: 201 | End: 2025-07-08
Payer: COMMERCIAL

## 2025-07-08 ENCOUNTER — HOSPITAL ENCOUNTER (OUTPATIENT)
Age: 65
Setting detail: OUTPATIENT SURGERY
Discharge: HOME | DRG: 201 | End: 2025-07-08
Attending: INTERNAL MEDICINE | Admitting: INTERNAL MEDICINE
Payer: COMMERCIAL

## 2025-07-08 ENCOUNTER — HOSPITAL ENCOUNTER (INPATIENT)
Age: 65
LOS: 1 days | Discharge: ANOTHER HOSPITAL | DRG: 201 | End: 2025-07-09
Attending: INTERNAL MEDICINE | Admitting: INTERNAL MEDICINE
Payer: COMMERCIAL

## 2025-07-08 VITALS
HEIGHT: 70 IN | SYSTOLIC BLOOD PRESSURE: 153 MMHG | TEMPERATURE: 97.6 F | BODY MASS INDEX: 35.07 KG/M2 | RESPIRATION RATE: 16 BRPM | HEART RATE: 107 BPM | DIASTOLIC BLOOD PRESSURE: 116 MMHG | OXYGEN SATURATION: 97 % | WEIGHT: 245 LBS

## 2025-07-08 DIAGNOSIS — I48.91 ATRIAL FIBRILLATION, UNSPECIFIED TYPE (HCC): Primary | ICD-10-CM

## 2025-07-08 DIAGNOSIS — R07.9 CHEST PAIN, UNSPECIFIED TYPE: Primary | ICD-10-CM

## 2025-07-08 LAB
ALBUMIN SERPL-MCNC: 3.9 G/DL (ref 3.4–5)
ALBUMIN SERPL-MCNC: 4.3 G/DL (ref 3.4–5)
ALBUMIN/GLOB SERPL: 1.3 {RATIO} (ref 1.1–2.2)
ALBUMIN/GLOB SERPL: 1.4 {RATIO} (ref 1.1–2.2)
ALP SERPL-CCNC: 73 U/L (ref 40–129)
ALP SERPL-CCNC: 74 U/L (ref 40–129)
ALT SERPL-CCNC: 67 U/L (ref 10–40)
ALT SERPL-CCNC: 68 U/L (ref 10–40)
ANION GAP SERPL CALCULATED.3IONS-SCNC: 12 MMOL/L (ref 3–16)
ANION GAP SERPL CALCULATED.3IONS-SCNC: 14 MMOL/L (ref 3–16)
AST SERPL-CCNC: 78 U/L (ref 15–37)
AST SERPL-CCNC: 82 U/L (ref 15–37)
BASOPHILS # BLD: 0 K/UL (ref 0–0.2)
BASOPHILS # BLD: 0 K/UL (ref 0–0.2)
BASOPHILS NFR BLD: 0.7 %
BASOPHILS NFR BLD: 0.8 %
BILIRUB SERPL-MCNC: 0.7 MG/DL (ref 0–1)
BILIRUB SERPL-MCNC: 0.8 MG/DL (ref 0–1)
BUN SERPL-MCNC: 8 MG/DL (ref 7–20)
BUN SERPL-MCNC: 8 MG/DL (ref 7–20)
CALCIUM SERPL-MCNC: 8.6 MG/DL (ref 8.3–10.6)
CALCIUM SERPL-MCNC: 8.8 MG/DL (ref 8.3–10.6)
CHLORIDE SERPL-SCNC: 103 MMOL/L (ref 99–110)
CHLORIDE SERPL-SCNC: 103 MMOL/L (ref 99–110)
CO2 SERPL-SCNC: 23 MMOL/L (ref 21–32)
CO2 SERPL-SCNC: 25 MMOL/L (ref 21–32)
CREAT SERPL-MCNC: 0.7 MG/DL (ref 0.8–1.3)
CREAT SERPL-MCNC: 0.7 MG/DL (ref 0.8–1.3)
DEPRECATED RDW RBC AUTO: 15.6 % (ref 12.4–15.4)
DEPRECATED RDW RBC AUTO: 15.8 % (ref 12.4–15.4)
EKG DIAGNOSIS: NORMAL
EKG Q-T INTERVAL: 332 MS
EKG QRS DURATION: 102 MS
EKG QTC CALCULATION (BAZETT): 463 MS
EKG R AXIS: 30 DEGREES
EKG T AXIS: -30 DEGREES
EKG VENTRICULAR RATE: 117 BPM
EOSINOPHIL # BLD: 0.1 K/UL (ref 0–0.6)
EOSINOPHIL # BLD: 0.1 K/UL (ref 0–0.6)
EOSINOPHIL NFR BLD: 1.9 %
EOSINOPHIL NFR BLD: 1.9 %
GFR SERPLBLD CREATININE-BSD FMLA CKD-EPI: >90 ML/MIN/{1.73_M2}
GFR SERPLBLD CREATININE-BSD FMLA CKD-EPI: >90 ML/MIN/{1.73_M2}
GLUCOSE SERPL-MCNC: 130 MG/DL (ref 70–99)
GLUCOSE SERPL-MCNC: 169 MG/DL (ref 70–99)
HCT VFR BLD AUTO: 42.4 % (ref 40.5–52.5)
HCT VFR BLD AUTO: 44.5 % (ref 40.5–52.5)
HGB BLD-MCNC: 14.4 G/DL (ref 13.5–17.5)
HGB BLD-MCNC: 15.2 G/DL (ref 13.5–17.5)
INR PPP: 1.22 (ref 0.86–1.14)
LYMPHOCYTES # BLD: 1.1 K/UL (ref 1–5.1)
LYMPHOCYTES # BLD: 1.3 K/UL (ref 1–5.1)
LYMPHOCYTES NFR BLD: 21.1 %
LYMPHOCYTES NFR BLD: 24.1 %
MAGNESIUM SERPL-MCNC: 2.15 MG/DL (ref 1.8–2.4)
MCH RBC QN AUTO: 33.9 PG (ref 26–34)
MCH RBC QN AUTO: 33.9 PG (ref 26–34)
MCHC RBC AUTO-ENTMCNC: 34.1 G/DL (ref 31–36)
MCHC RBC AUTO-ENTMCNC: 34.1 G/DL (ref 31–36)
MCV RBC AUTO: 99.3 FL (ref 80–100)
MCV RBC AUTO: 99.4 FL (ref 80–100)
MONOCYTES # BLD: 0.4 K/UL (ref 0–1.3)
MONOCYTES # BLD: 0.5 K/UL (ref 0–1.3)
MONOCYTES NFR BLD: 7.7 %
MONOCYTES NFR BLD: 9.4 %
NEUTROPHILS # BLD: 3.6 K/UL (ref 1.7–7.7)
NEUTROPHILS # BLD: 3.6 K/UL (ref 1.7–7.7)
NEUTROPHILS NFR BLD: 65.6 %
NEUTROPHILS NFR BLD: 66.8 %
PLATELET # BLD AUTO: 169 K/UL (ref 135–450)
PLATELET # BLD AUTO: 173 K/UL (ref 135–450)
PMV BLD AUTO: 9.5 FL (ref 5–10.5)
PMV BLD AUTO: 9.6 FL (ref 5–10.5)
POTASSIUM SERPL-SCNC: 3.8 MMOL/L (ref 3.5–5.1)
POTASSIUM SERPL-SCNC: 3.8 MMOL/L (ref 3.5–5.1)
PROT SERPL-MCNC: 7 G/DL (ref 6.4–8.2)
PROT SERPL-MCNC: 7.4 G/DL (ref 6.4–8.2)
PROTHROMBIN TIME: 15.7 SEC (ref 12.1–14.9)
RBC # BLD AUTO: 4.26 M/UL (ref 4.2–5.9)
RBC # BLD AUTO: 4.48 M/UL (ref 4.2–5.9)
SODIUM SERPL-SCNC: 140 MMOL/L (ref 136–145)
SODIUM SERPL-SCNC: 140 MMOL/L (ref 136–145)
TROPONIN, HIGH SENSITIVITY: 14 NG/L (ref 0–22)
TROPONIN, HIGH SENSITIVITY: 14 NG/L (ref 0–22)
TSH SERPL DL<=0.005 MIU/L-ACNC: 1.78 UIU/ML (ref 0.27–4.2)
WBC # BLD AUTO: 5.4 K/UL (ref 4–11)
WBC # BLD AUTO: 5.6 K/UL (ref 4–11)

## 2025-07-08 PROCEDURE — 2060000000 HC ICU INTERMEDIATE R&B

## 2025-07-08 PROCEDURE — 84484 ASSAY OF TROPONIN QUANT: CPT

## 2025-07-08 PROCEDURE — 80053 COMPREHEN METABOLIC PANEL: CPT

## 2025-07-08 PROCEDURE — 6370000000 HC RX 637 (ALT 250 FOR IP): Performed by: STUDENT IN AN ORGANIZED HEALTH CARE EDUCATION/TRAINING PROGRAM

## 2025-07-08 PROCEDURE — 36415 COLL VENOUS BLD VENIPUNCTURE: CPT

## 2025-07-08 PROCEDURE — 85025 COMPLETE CBC W/AUTO DIFF WBC: CPT

## 2025-07-08 PROCEDURE — 2500000003 HC RX 250 WO HCPCS

## 2025-07-08 PROCEDURE — 2500000003 HC RX 250 WO HCPCS: Performed by: ANESTHESIOLOGY

## 2025-07-08 PROCEDURE — 83735 ASSAY OF MAGNESIUM: CPT

## 2025-07-08 PROCEDURE — 84443 ASSAY THYROID STIM HORMONE: CPT

## 2025-07-08 PROCEDURE — 93005 ELECTROCARDIOGRAM TRACING: CPT | Performed by: INTERNAL MEDICINE

## 2025-07-08 PROCEDURE — 6370000000 HC RX 637 (ALT 250 FOR IP): Performed by: INTERNAL MEDICINE

## 2025-07-08 PROCEDURE — 85610 PROTHROMBIN TIME: CPT

## 2025-07-08 PROCEDURE — 6360000002 HC RX W HCPCS: Performed by: ANESTHESIOLOGY

## 2025-07-08 PROCEDURE — 6370000000 HC RX 637 (ALT 250 FOR IP)

## 2025-07-08 PROCEDURE — 6360000002 HC RX W HCPCS

## 2025-07-08 PROCEDURE — 93010 ELECTROCARDIOGRAM REPORT: CPT | Performed by: INTERNAL MEDICINE

## 2025-07-08 RX ORDER — POTASSIUM CHLORIDE 7.45 MG/ML
10 INJECTION INTRAVENOUS PRN
Status: DISCONTINUED | OUTPATIENT
Start: 2025-07-08 | End: 2025-07-10 | Stop reason: HOSPADM

## 2025-07-08 RX ORDER — ENOXAPARIN SODIUM 150 MG/ML
1 INJECTION SUBCUTANEOUS 2 TIMES DAILY
Status: DISCONTINUED | OUTPATIENT
Start: 2025-07-08 | End: 2025-07-10 | Stop reason: HOSPADM

## 2025-07-08 RX ORDER — DIAZEPAM 2 MG/1
2 TABLET ORAL NIGHTLY
Status: DISCONTINUED | OUTPATIENT
Start: 2025-07-08 | End: 2025-07-09

## 2025-07-08 RX ORDER — MAGNESIUM SULFATE IN WATER 40 MG/ML
2000 INJECTION, SOLUTION INTRAVENOUS PRN
Status: DISCONTINUED | OUTPATIENT
Start: 2025-07-08 | End: 2025-07-10 | Stop reason: HOSPADM

## 2025-07-08 RX ORDER — ONDANSETRON 4 MG/1
4 TABLET, ORALLY DISINTEGRATING ORAL EVERY 8 HOURS PRN
Status: DISCONTINUED | OUTPATIENT
Start: 2025-07-08 | End: 2025-07-10 | Stop reason: HOSPADM

## 2025-07-08 RX ORDER — SODIUM CHLORIDE, SODIUM LACTATE, POTASSIUM CHLORIDE, CALCIUM CHLORIDE 600; 310; 30; 20 MG/100ML; MG/100ML; MG/100ML; MG/100ML
INJECTION, SOLUTION INTRAVENOUS CONTINUOUS
Status: DISCONTINUED | OUTPATIENT
Start: 2025-07-08 | End: 2025-07-08 | Stop reason: HOSPADM

## 2025-07-08 RX ORDER — SODIUM CHLORIDE 9 MG/ML
INJECTION, SOLUTION INTRAVENOUS PRN
Status: DISCONTINUED | OUTPATIENT
Start: 2025-07-08 | End: 2025-07-08 | Stop reason: HOSPADM

## 2025-07-08 RX ORDER — POTASSIUM CHLORIDE 1500 MG/1
40 TABLET, EXTENDED RELEASE ORAL PRN
Status: DISCONTINUED | OUTPATIENT
Start: 2025-07-08 | End: 2025-07-10 | Stop reason: HOSPADM

## 2025-07-08 RX ORDER — METOPROLOL TARTRATE 25 MG/1
25 TABLET, FILM COATED ORAL 2 TIMES DAILY
Status: DISCONTINUED | OUTPATIENT
Start: 2025-07-08 | End: 2025-07-09

## 2025-07-08 RX ORDER — ACETAMINOPHEN 650 MG/1
650 SUPPOSITORY RECTAL EVERY 6 HOURS PRN
Status: DISCONTINUED | OUTPATIENT
Start: 2025-07-08 | End: 2025-07-10 | Stop reason: HOSPADM

## 2025-07-08 RX ORDER — METOPROLOL TARTRATE 25 MG/1
25 TABLET, FILM COATED ORAL 2 TIMES DAILY
Status: DISCONTINUED | OUTPATIENT
Start: 2025-07-08 | End: 2025-07-08 | Stop reason: HOSPADM

## 2025-07-08 RX ORDER — SODIUM CHLORIDE 9 MG/ML
INJECTION, SOLUTION INTRAVENOUS PRN
Status: DISCONTINUED | OUTPATIENT
Start: 2025-07-08 | End: 2025-07-10 | Stop reason: HOSPADM

## 2025-07-08 RX ORDER — ONDANSETRON 2 MG/ML
4 INJECTION INTRAMUSCULAR; INTRAVENOUS EVERY 6 HOURS PRN
Status: DISCONTINUED | OUTPATIENT
Start: 2025-07-08 | End: 2025-07-10 | Stop reason: HOSPADM

## 2025-07-08 RX ORDER — SODIUM CHLORIDE 0.9 % (FLUSH) 0.9 %
10 SYRINGE (ML) INJECTION PRN
Status: DISCONTINUED | OUTPATIENT
Start: 2025-07-08 | End: 2025-07-10 | Stop reason: HOSPADM

## 2025-07-08 RX ORDER — SODIUM CHLORIDE 0.9 % (FLUSH) 0.9 %
5-40 SYRINGE (ML) INJECTION EVERY 12 HOURS SCHEDULED
Status: DISCONTINUED | OUTPATIENT
Start: 2025-07-08 | End: 2025-07-10 | Stop reason: HOSPADM

## 2025-07-08 RX ORDER — SODIUM CHLORIDE 0.9 % (FLUSH) 0.9 %
5-40 SYRINGE (ML) INJECTION PRN
Status: DISCONTINUED | OUTPATIENT
Start: 2025-07-08 | End: 2025-07-08 | Stop reason: HOSPADM

## 2025-07-08 RX ORDER — DIAZEPAM 2 MG/1
2 TABLET ORAL DAILY
Status: DISCONTINUED | OUTPATIENT
Start: 2025-07-09 | End: 2025-07-08

## 2025-07-08 RX ORDER — POLYETHYLENE GLYCOL 3350 17 G/17G
17 POWDER, FOR SOLUTION ORAL DAILY PRN
Status: DISCONTINUED | OUTPATIENT
Start: 2025-07-08 | End: 2025-07-10 | Stop reason: HOSPADM

## 2025-07-08 RX ORDER — LIDOCAINE 4 G/G
1 PATCH TOPICAL DAILY
Status: DISCONTINUED | OUTPATIENT
Start: 2025-07-08 | End: 2025-07-10 | Stop reason: HOSPADM

## 2025-07-08 RX ORDER — GABAPENTIN 400 MG/1
400 CAPSULE ORAL 3 TIMES DAILY
Status: DISCONTINUED | OUTPATIENT
Start: 2025-07-08 | End: 2025-07-10 | Stop reason: HOSPADM

## 2025-07-08 RX ORDER — ACETAMINOPHEN 325 MG/1
650 TABLET ORAL EVERY 6 HOURS PRN
Status: DISCONTINUED | OUTPATIENT
Start: 2025-07-08 | End: 2025-07-10 | Stop reason: HOSPADM

## 2025-07-08 RX ORDER — SODIUM CHLORIDE 0.9 % (FLUSH) 0.9 %
5-40 SYRINGE (ML) INJECTION EVERY 12 HOURS SCHEDULED
Status: DISCONTINUED | OUTPATIENT
Start: 2025-07-08 | End: 2025-07-08 | Stop reason: HOSPADM

## 2025-07-08 RX ADMIN — DIAZEPAM 2 MG: 2 TABLET ORAL at 23:27

## 2025-07-08 RX ADMIN — Medication 20 MG: at 09:45

## 2025-07-08 RX ADMIN — GABAPENTIN 400 MG: 400 CAPSULE ORAL at 18:15

## 2025-07-08 RX ADMIN — ENOXAPARIN SODIUM 105 MG: 150 INJECTION SUBCUTANEOUS at 20:12

## 2025-07-08 RX ADMIN — TIZANIDINE 4 MG: 4 TABLET ORAL at 18:15

## 2025-07-08 RX ADMIN — METOPROLOL TARTRATE 25 MG: 25 TABLET, FILM COATED ORAL at 20:01

## 2025-07-08 RX ADMIN — DICLOFENAC SODIUM 2 G: 10 GEL TOPICAL at 20:02

## 2025-07-08 RX ADMIN — Medication 10 ML: at 20:01

## 2025-07-08 ASSESSMENT — PAIN SCALES - GENERAL
PAINLEVEL_OUTOF10: 0
PAINLEVEL_OUTOF10: 8
PAINLEVEL_OUTOF10: 0
PAINLEVEL_OUTOF10: 8
PAINLEVEL_OUTOF10: 0

## 2025-07-08 ASSESSMENT — PAIN - FUNCTIONAL ASSESSMENT
PAIN_FUNCTIONAL_ASSESSMENT: ACTIVITIES ARE NOT PREVENTED
PAIN_FUNCTIONAL_ASSESSMENT: 0-10

## 2025-07-08 ASSESSMENT — PAIN DESCRIPTION - ORIENTATION
ORIENTATION: RIGHT;LEFT
ORIENTATION: RIGHT

## 2025-07-08 ASSESSMENT — LIFESTYLE VARIABLES
HOW OFTEN DO YOU HAVE A DRINK CONTAINING ALCOHOL: 2-3 TIMES A WEEK
HOW MANY STANDARD DRINKS CONTAINING ALCOHOL DO YOU HAVE ON A TYPICAL DAY: 1 OR 2

## 2025-07-08 ASSESSMENT — PAIN DESCRIPTION - DESCRIPTORS
DESCRIPTORS: ACHING
DESCRIPTORS: ACHING;THROBBING

## 2025-07-08 ASSESSMENT — PAIN DESCRIPTION - LOCATION
LOCATION: KNEE
LOCATION: KNEE;LEG

## 2025-07-08 NOTE — H&P
MOUTH THREE TIMES DAILY @ 9AM-1PM-5PM 90 capsule 2    tiZANidine (ZANAFLEX) 4 MG tablet Take 1 tablet by mouth 3 times daily as needed (back pain) 90 tablet 5    doxepin (SILENOR) 3 MG TABS tablet Take 1 tablet by mouth nightly 30 tablet 5    meloxicam (MOBIC) 15 MG tablet TAKE 1 TABLET BY MOUTH EVERY DAY 30 tablet 0    metoprolol tartrate (LOPRESSOR) 25 MG tablet TAKE 1 TABLET BY MOUTH TWICE A  tablet 3    lisinopril (PRINIVIL;ZESTRIL) 20 MG tablet TAKE 1 TABLET BY MOUTH EVERY DAY 90 tablet 3    allopurinol (ZYLOPRIM) 100 MG tablet Take 1 tablet daily 90 tablet 3        Allergies:  Patient has no known allergies.    Social History:   Social History     Socioeconomic History    Marital status:      Spouse name: Not on file    Number of children: Not on file    Years of education: Not on file    Highest education level: Not on file   Occupational History    Not on file   Tobacco Use    Smoking status: Never    Smokeless tobacco: Never   Vaping Use    Vaping status: Never Used   Substance and Sexual Activity    Alcohol use: Yes     Comment: >1 pint nliquor daily    Drug use: Yes     Frequency: 1.0 times per week     Types: Marijuana (Weed)    Sexual activity: Not on file   Other Topics Concern    Not on file   Social History Narrative    Not on file     Social Drivers of Health     Financial Resource Strain: Low Risk  (7/12/2024)    Overall Financial Resource Strain (CARDIA)     Difficulty of Paying Living Expenses: Not hard at all   Food Insecurity: No Food Insecurity (4/10/2025)    Hunger Vital Sign     Worried About Running Out of Food in the Last Year: Never true     Ran Out of Food in the Last Year: Never true   Transportation Needs: No Transportation Needs (4/10/2025)    PRAPARE - Transportation     Lack of Transportation (Medical): No     Lack of Transportation (Non-Medical): No   Physical Activity: Not on file   Stress: Not on file   Social Connections: Unknown (11/14/2019)    Received from [place]

## 2025-07-08 NOTE — PLAN OF CARE
Problem: Discharge Planning  Goal: Discharge to home or other facility with appropriate resources  Outcome: Progressing  Flowsheets (Taken 7/8/2025 2899)  Discharge to home or other facility with appropriate resources: Identify barriers to discharge with patient and caregiver

## 2025-07-08 NOTE — PROGRESS NOTES
.1.  Do not eat or drink anything after 12 midnight prior to surgery.  This includes no water, chewing gum,mints or chewing tobacco, except for bowel prep per MD.  2.  Take pills with a small sip of water on the morning of surgery.  3.  Aspirin, Ibuprofen, Advil, Naproxen, Vitamin E and other Anti-inflammatory products should be stopped as directed by your physician.  4.  Check with your doctor regarding stopping Plavix, Coumadin, Lovenox, or other blood thinners.  5.  Do not smoke and do not drink alcoholic beverages 24 hours prior to surgery. This includes NA Beer.  6.  You may brush your teeth and gargle the morning of surgery.  DO NOT SWALLOW WATER.  7.  You MUST make arrangements for a responsible adult to take you home after your procedure.  You will not be allowed to leave alone or drive yourself home.  It is strongly suggested someone   stay with you the first 24 hours.  Your procedure will be cancelled if you do not have a ride home.  8.  A parent/legal guardian must accompany a child scheduled for procedure and plan to stay at the hospital until the child is discharged.  Please do not bring other children with you.  9.  Please wear simple, loose fitting clothing to the hospital.  Do not bring valuables ( money, credit cards, checkbooks, etc.) 10.  Do not wear any jewelry or piercing on the day of surgery.  All body piercing jewelry must be removed.  11.  If you have dentures, they will be removed before going to the OR; we will provide you a container.  If you wear contact lenses or glasses, they will be removed; please bring a case for them.  12.  Notify your Physician if you develop any illness between now and surgery time; cough, cold, fever, sore throat, nausea, vomiting, etc.   13.  Please notify your physician if you experience dizziness, shortness of breath or blurred vision between now and the time of your surgery.        To provide excellent care, visitors will be limited to two in a room at any 
Bedside report given to Anni VALENTINE.  Pt transported to PCU per wheelchair in stable condition with Anni VALENTINE.  
Cardiology consult called to Dr Jerez.  Talked with his nurse Deanna.  
Dr Jerez at bedside for consult.  
Gissel Delong notified of admission.  Dr Jerez talked with Dr PERALTA  
Lab at bedside.    
Lunch tray given.  
Pre-Operative:  1.  Patient/Caregiver identifies - states name and date of birth.  2.  The patient is free from signs and symptoms of injury.  3.  The patient receives appropriate medication(s), safely administered during the Perioperative period.  4.  The patients's fluid, electrolyte, and acid-base balances are established preoperatively.  5.  The patient's pulmonary function is established preoperatively.  6.  The patient's cardiovascular status is established preoperatively.  7.  The patient / caregiver demonstrates knowledge of nutritional management related to the operative or other invasive procedure.  8.  The patient/caregiver demonstrates knowledge of medication management.  9.  The patient/caregiver demonstrates knowledge of pain management.  10.  The patient/caregiver participates in decisions affection his or her Perioperative plan of care.  11.  The patient's care is consistent with the individualized Perioperative plan of care.  12.  The patient's right to privacy is maintained.  13.  The patient is the recipient of competent and ethical care within legal standards of practice.  14.  The patient's value system, lifestyle, ethnicity, and culture are considered, respected, and incorporated in the Perioperative plan of care and understands special services available.  15.  The patient demonstrates and/or reports adequate pain control throughout the the Perioperative period.  16.  The patient's neurological status is established preoperatively.  17.  The patient/caregiver demonstrates knowledge of the expected responses to the endoscopy procedure.  18.  Patient/Caregiver has reduced anxiety.  Interventions- Familiarize with environment and equipment.  19. Patient/Caregiver verbalizes understanding of Phase II and/or Phase I process.  20.  Patient pain level is established preoperatively using age appropriate pain scale.  21.  The patient will move to fall risk upon sedation- during and through the recovery 
Pt resting quietly skin warm and dry. Respirations easy and regular.  Water given.  
Pt sitting up in chair.  Asks for something to eat.  Called for a lunch tray.  Visitor with pt.  
Spoke with patient aware of time change to 0900 arrival on 07/08/2025.Amara Canada RN    
No

## 2025-07-08 NOTE — PROGRESS NOTES
Patient admitted to room 307 from [facility]. Patient oriented to room, call light, bed rails, phone, lights and bathroom. Patient instructed about the schedule of the day including: vital sign frequency, lab draws, possible tests, frequency of MD and staff rounds, daily weights, I &O's and prescribed diet.  Telemetry box in place, patient aware of placement and reason. Bed locked, in lowest position, side rails up 2/4, call light within reach.        Recliner Assessment  Patient is able to demonstrate the ability to move from a reclining position to an upright position within the recliner.       4 Eyes Skin Assessment     NAME:  John Womack  YOB: 1960  MEDICAL RECORD NUMBER:  [id number]    The patient is being assessed for  Admission    I agree that at least one RN has performed a thorough Head to Toe Skin Assessment on the patient. ALL assessment sites listed below have been assessed.      Redness/rash on face. Scattered bruising -Refused sacrum assessment denies any open areas.    Areas assessed by both nurses:    Head, Face, Ears, Shoulders, Back, Chest, Arms, Elbows, Hands, and Legs. Feet and Heels        Does the Patient have a Wound? No noted wound(s)       Jesus Prevention initiated by RN: No  Wound Care Orders initiated by RN: No    Pressure Injury (Stage 3,4, Unstageable, DTI, NWPT, and Complex wounds) if present, place Wound referral order by RN under : No    New Ostomies, if present place, Ostomy referral order under : No     Nurse 1 eSignature: Electronically signed by Anni Barajas RN on 7/8/25 at 6:35 PM EDT    **SHARE this note so that the co-signing nurse can place an eSignature**    Nurse 2 eSignature: Electronically signed by Bailey Gaona RN on 7/8/25 at 6:38 PM EDT

## 2025-07-08 NOTE — PROGRESS NOTES
Consult has been called to Dr. Jerez on 7/8/25. Spoke with voicemail. 5:48 PM    Richelle Carver  7/8/2025

## 2025-07-08 NOTE — PLAN OF CARE
PCU admit    Afib with RVR  Direct admit from endo  -Dr Jerez saw- started therapeutic lovenox, metoprolol 25 BID, labs ordered  -echo pending   -monitor on tele    Nursing communication for home med rec    Daxa Sprague PA-C 7/8/2025 4:03 PM

## 2025-07-08 NOTE — DISCHARGE INSTRUCTIONS
PATIENT INSTRUCTIONS  POST-SEDATION      FOLLOW UP WITH DR GARCIA TO RESCHEDULE EGD    FOLLOW UP WITH DR CASAREZ FOR IRREGULAR HEART RATE
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
## 2025-07-09 ENCOUNTER — APPOINTMENT (OUTPATIENT)
Age: 65
DRG: 201 | End: 2025-07-09
Attending: INTERNAL MEDICINE
Payer: COMMERCIAL

## 2025-07-09 VITALS
OXYGEN SATURATION: 95 % | BODY MASS INDEX: 36.08 KG/M2 | RESPIRATION RATE: 18 BRPM | HEIGHT: 70 IN | HEART RATE: 103 BPM | DIASTOLIC BLOOD PRESSURE: 94 MMHG | SYSTOLIC BLOOD PRESSURE: 143 MMHG | TEMPERATURE: 97.5 F | WEIGHT: 252 LBS

## 2025-07-09 LAB
ANION GAP SERPL CALCULATED.3IONS-SCNC: 12 MMOL/L (ref 3–16)
BASOPHILS # BLD: 0.1 K/UL (ref 0–0.2)
BASOPHILS NFR BLD: 1.1 %
BUN SERPL-MCNC: 9 MG/DL (ref 7–20)
CALCIUM SERPL-MCNC: 8.4 MG/DL (ref 8.3–10.6)
CHLORIDE SERPL-SCNC: 105 MMOL/L (ref 99–110)
CO2 SERPL-SCNC: 23 MMOL/L (ref 21–32)
CREAT SERPL-MCNC: 0.7 MG/DL (ref 0.8–1.3)
DEPRECATED RDW RBC AUTO: 15.8 % (ref 12.4–15.4)
ECHO AO ASC DIAM: 4.3 CM
ECHO AO ASCENDING AORTA INDEX: 1.87 CM/M2
ECHO AO ROOT DIAM: 4 CM
ECHO AO ROOT INDEX: 1.74 CM/M2
ECHO AV ACCELERATION TIME: 90 MS
ECHO AV AREA PEAK VELOCITY: 3.1 CM2
ECHO AV AREA VTI: 2.4 CM2
ECHO AV AREA/BSA PEAK VELOCITY: 1.3 CM2/M2
ECHO AV AREA/BSA VTI: 1 CM2/M2
ECHO AV MEAN GRADIENT: 3 MMHG
ECHO AV MEAN VELOCITY: 0.8 M/S
ECHO AV PEAK GRADIENT: 4 MMHG
ECHO AV PEAK VELOCITY: 1 M/S
ECHO AV VELOCITY RATIO: 0.8
ECHO AV VTI: 23 CM
ECHO BSA: 2.38 M2
ECHO EST RA PRESSURE: 8 MMHG
ECHO IVC PROX: 2.6 CM
ECHO LA AREA 2C: 26.1 CM2
ECHO LA AREA 4C: 28.2 CM2
ECHO LA DIAMETER INDEX: 1.87 CM/M2
ECHO LA DIAMETER: 4.3 CM
ECHO LA MAJOR AXIS: 6.7 CM
ECHO LA MINOR AXIS: 5.9 CM
ECHO LA TO AORTIC ROOT RATIO: 1.08
ECHO LA VOL BP: 100 ML (ref 18–58)
ECHO LA VOL MOD A2C: 94 ML (ref 18–58)
ECHO LA VOL MOD A4C: 93 ML (ref 18–58)
ECHO LA VOL/BSA BIPLANE: 43 ML/M2 (ref 16–34)
ECHO LA VOLUME INDEX MOD A2C: 41 ML/M2 (ref 16–34)
ECHO LA VOLUME INDEX MOD A4C: 40 ML/M2 (ref 16–34)
ECHO LV EDV A2C: 172 ML
ECHO LV EDV A4C: 149 ML
ECHO LV EDV INDEX A4C: 65 ML/M2
ECHO LV EDV NDEX A2C: 75 ML/M2
ECHO LV EF PHYSICIAN: 33 %
ECHO LV EJECTION FRACTION A2C: 34 %
ECHO LV EJECTION FRACTION A4C: 30 %
ECHO LV EJECTION FRACTION BIPLANE: 32 % (ref 55–100)
ECHO LV ESV A2C: 114 ML
ECHO LV ESV A4C: 104 ML
ECHO LV ESV INDEX A2C: 50 ML/M2
ECHO LV ESV INDEX A4C: 45 ML/M2
ECHO LV FRACTIONAL SHORTENING: 22 % (ref 28–44)
ECHO LV INTERNAL DIMENSION DIASTOLE INDEX: 2.17 CM/M2
ECHO LV INTERNAL DIMENSION DIASTOLIC: 5 CM (ref 4.2–5.9)
ECHO LV INTERNAL DIMENSION SYSTOLIC INDEX: 1.7 CM/M2
ECHO LV INTERNAL DIMENSION SYSTOLIC: 3.9 CM
ECHO LV IVSD: 1.4 CM (ref 0.6–1)
ECHO LV MASS 2D: 291.4 G (ref 88–224)
ECHO LV MASS INDEX 2D: 126.7 G/M2 (ref 49–115)
ECHO LV POSTERIOR WALL DIASTOLIC: 1.4 CM (ref 0.6–1)
ECHO LV RELATIVE WALL THICKNESS RATIO: 0.56
ECHO LVOT AREA: 4.2 CM2
ECHO LVOT AV VTI INDEX: 0.59
ECHO LVOT DIAM: 2.3 CM
ECHO LVOT MEAN GRADIENT: 1 MMHG
ECHO LVOT PEAK GRADIENT: 2 MMHG
ECHO LVOT PEAK VELOCITY: 0.8 M/S
ECHO LVOT STROKE VOLUME INDEX: 24.4 ML/M2
ECHO LVOT SV: 56.1 ML
ECHO LVOT VTI: 13.5 CM
ECHO MV AREA VTI: 2.2 CM2
ECHO MV E DECELERATION TIME (DT): 169 MS
ECHO MV E VELOCITY: 0.96 M/S
ECHO MV LVOT VTI INDEX: 1.9
ECHO MV MAX VELOCITY: 1 M/S
ECHO MV MEAN GRADIENT: 2 MMHG
ECHO MV MEAN VELOCITY: 0.6 M/S
ECHO MV PEAK GRADIENT: 4 MMHG
ECHO MV VTI: 25.6 CM
ECHO PV ACCELERATION TIME (AT): 127 MS
ECHO PV MAX VELOCITY: 0.7 M/S
ECHO PV PEAK GRADIENT: 2 MMHG
ECHO RA AREA 4C: 29.8 CM2
ECHO RA END SYSTOLIC VOLUME APICAL 4 CHAMBER INDEX BSA: 46 ML/M2
ECHO RA VOLUME: 105 ML
ECHO RIGHT VENTRICULAR SYSTOLIC PRESSURE (RVSP): 32 MMHG
ECHO RV BASAL DIMENSION: 3.5 CM
ECHO RV FREE WALL PEAK S': 10.2 CM/S
ECHO RV LONGITUDINAL DIMENSION: 8.7 CM
ECHO RV MID DIMENSION: 1.9 CM
ECHO RV TAPSE: 2.7 CM (ref 1.7–?)
ECHO TV REGURGITANT MAX VELOCITY: 2.46 M/S
ECHO TV REGURGITANT PEAK GRADIENT: 24 MMHG
EKG DIAGNOSIS: NORMAL
EKG Q-T INTERVAL: 358 MS
EKG QRS DURATION: 104 MS
EKG QTC CALCULATION (BAZETT): 499 MS
EKG R AXIS: 24 DEGREES
EKG T AXIS: -49 DEGREES
EKG VENTRICULAR RATE: 117 BPM
EOSINOPHIL # BLD: 0.1 K/UL (ref 0–0.6)
EOSINOPHIL NFR BLD: 2.3 %
GFR SERPLBLD CREATININE-BSD FMLA CKD-EPI: >90 ML/MIN/{1.73_M2}
GLUCOSE SERPL-MCNC: 114 MG/DL (ref 70–99)
HCT VFR BLD AUTO: 40.7 % (ref 40.5–52.5)
HGB BLD-MCNC: 14.1 G/DL (ref 13.5–17.5)
INR PPP: 1.21 (ref 0.86–1.14)
LYMPHOCYTES # BLD: 1.5 K/UL (ref 1–5.1)
LYMPHOCYTES NFR BLD: 30 %
MCH RBC QN AUTO: 34.7 PG (ref 26–34)
MCHC RBC AUTO-ENTMCNC: 34.8 G/DL (ref 31–36)
MCV RBC AUTO: 99.8 FL (ref 80–100)
MONOCYTES # BLD: 0.5 K/UL (ref 0–1.3)
MONOCYTES NFR BLD: 10.7 %
NEUTROPHILS # BLD: 2.8 K/UL (ref 1.7–7.7)
NEUTROPHILS NFR BLD: 55.9 %
PLATELET # BLD AUTO: 154 K/UL (ref 135–450)
PMV BLD AUTO: 10 FL (ref 5–10.5)
POTASSIUM SERPL-SCNC: 4 MMOL/L (ref 3.5–5.1)
PROTHROMBIN TIME: 15.6 SEC (ref 12.1–14.9)
RBC # BLD AUTO: 4.07 M/UL (ref 4.2–5.9)
SODIUM SERPL-SCNC: 140 MMOL/L (ref 136–145)
WBC # BLD AUTO: 5 K/UL (ref 4–11)

## 2025-07-09 PROCEDURE — 6360000002 HC RX W HCPCS: Performed by: INTERNAL MEDICINE

## 2025-07-09 PROCEDURE — 2060000000 HC ICU INTERMEDIATE R&B

## 2025-07-09 PROCEDURE — 6370000000 HC RX 637 (ALT 250 FOR IP)

## 2025-07-09 PROCEDURE — 2500000003 HC RX 250 WO HCPCS

## 2025-07-09 PROCEDURE — 6370000000 HC RX 637 (ALT 250 FOR IP): Performed by: INTERNAL MEDICINE

## 2025-07-09 PROCEDURE — 80048 BASIC METABOLIC PNL TOTAL CA: CPT

## 2025-07-09 PROCEDURE — 2500000003 HC RX 250 WO HCPCS: Performed by: INTERNAL MEDICINE

## 2025-07-09 PROCEDURE — 6360000004 HC RX CONTRAST MEDICATION

## 2025-07-09 PROCEDURE — 93306 TTE W/DOPPLER COMPLETE: CPT | Performed by: INTERNAL MEDICINE

## 2025-07-09 PROCEDURE — 85610 PROTHROMBIN TIME: CPT

## 2025-07-09 PROCEDURE — C8929 TTE W OR WO FOL WCON,DOPPLER: HCPCS

## 2025-07-09 PROCEDURE — 6360000002 HC RX W HCPCS: Performed by: STUDENT IN AN ORGANIZED HEALTH CARE EDUCATION/TRAINING PROGRAM

## 2025-07-09 PROCEDURE — 93005 ELECTROCARDIOGRAM TRACING: CPT

## 2025-07-09 PROCEDURE — 99233 SBSQ HOSP IP/OBS HIGH 50: CPT | Performed by: INTERNAL MEDICINE

## 2025-07-09 PROCEDURE — 6360000002 HC RX W HCPCS

## 2025-07-09 PROCEDURE — 2580000003 HC RX 258: Performed by: INTERNAL MEDICINE

## 2025-07-09 PROCEDURE — 85025 COMPLETE CBC W/AUTO DIFF WBC: CPT

## 2025-07-09 PROCEDURE — 36415 COLL VENOUS BLD VENIPUNCTURE: CPT

## 2025-07-09 RX ORDER — DILTIAZEM HYDROCHLORIDE 5 MG/ML
20 INJECTION INTRAVENOUS ONCE
Status: COMPLETED | OUTPATIENT
Start: 2025-07-09 | End: 2025-07-09

## 2025-07-09 RX ORDER — DILTIAZEM HCL 60 MG
30 TABLET ORAL EVERY 6 HOURS SCHEDULED
Status: DISCONTINUED | OUTPATIENT
Start: 2025-07-09 | End: 2025-07-09 | Stop reason: SDUPTHER

## 2025-07-09 RX ORDER — DOXEPIN 3 MG/1
3 TABLET, FILM COATED ORAL NIGHTLY
Status: DISCONTINUED | OUTPATIENT
Start: 2025-07-09 | End: 2025-07-10 | Stop reason: HOSPADM

## 2025-07-09 RX ORDER — DILTIAZEM HCL 60 MG
30 TABLET ORAL EVERY 6 HOURS SCHEDULED
Status: DISCONTINUED | OUTPATIENT
Start: 2025-07-09 | End: 2025-07-09

## 2025-07-09 RX ORDER — ALLOPURINOL 100 MG/1
100 TABLET ORAL DAILY
Status: DISCONTINUED | OUTPATIENT
Start: 2025-07-09 | End: 2025-07-10 | Stop reason: HOSPADM

## 2025-07-09 RX ORDER — LISINOPRIL 20 MG/1
20 TABLET ORAL DAILY
Status: DISCONTINUED | OUTPATIENT
Start: 2025-07-09 | End: 2025-07-10 | Stop reason: HOSPADM

## 2025-07-09 RX ORDER — KETOROLAC TROMETHAMINE 15 MG/ML
15 INJECTION, SOLUTION INTRAMUSCULAR; INTRAVENOUS ONCE
Status: COMPLETED | OUTPATIENT
Start: 2025-07-09 | End: 2025-07-09

## 2025-07-09 RX ORDER — METOPROLOL TARTRATE 50 MG
50 TABLET ORAL 2 TIMES DAILY
Status: DISCONTINUED | OUTPATIENT
Start: 2025-07-09 | End: 2025-07-09

## 2025-07-09 RX ADMIN — DICLOFENAC SODIUM 2 G: 10 GEL TOPICAL at 08:56

## 2025-07-09 RX ADMIN — ENOXAPARIN SODIUM 105 MG: 150 INJECTION SUBCUTANEOUS at 09:03

## 2025-07-09 RX ADMIN — GABAPENTIN 400 MG: 400 CAPSULE ORAL at 20:04

## 2025-07-09 RX ADMIN — DILTIAZEM HYDROCHLORIDE 5 MG/HR: 5 INJECTION INTRAVENOUS at 11:04

## 2025-07-09 RX ADMIN — Medication 10 ML: at 20:04

## 2025-07-09 RX ADMIN — GABAPENTIN 400 MG: 400 CAPSULE ORAL at 08:44

## 2025-07-09 RX ADMIN — GABAPENTIN 400 MG: 400 CAPSULE ORAL at 14:38

## 2025-07-09 RX ADMIN — DOXEPIN HYDROCHLORIDE 3 MG: 3 TABLET ORAL at 20:04

## 2025-07-09 RX ADMIN — DILTIAZEM HYDROCHLORIDE 20 MG: 5 INJECTION, SOLUTION INTRAVENOUS at 11:02

## 2025-07-09 RX ADMIN — TIZANIDINE 4 MG: 4 TABLET ORAL at 11:31

## 2025-07-09 RX ADMIN — TIZANIDINE 4 MG: 4 TABLET ORAL at 02:18

## 2025-07-09 RX ADMIN — Medication 10 ML: at 08:44

## 2025-07-09 RX ADMIN — KETOROLAC TROMETHAMINE 15 MG: 15 INJECTION, SOLUTION INTRAMUSCULAR; INTRAVENOUS at 03:22

## 2025-07-09 RX ADMIN — METOPROLOL TARTRATE 50 MG: 50 TABLET, FILM COATED ORAL at 08:43

## 2025-07-09 RX ADMIN — METOPROLOL TARTRATE 75 MG: 25 TABLET, FILM COATED ORAL at 20:04

## 2025-07-09 RX ADMIN — ALLOPURINOL 100 MG: 100 TABLET ORAL at 08:43

## 2025-07-09 RX ADMIN — DILTIAZEM HYDROCHLORIDE 30 MG: 60 TABLET ORAL at 11:38

## 2025-07-09 RX ADMIN — SULFUR HEXAFLUORIDE 2 ML: KIT at 14:04

## 2025-07-09 ASSESSMENT — PAIN DESCRIPTION - LOCATION
LOCATION: BACK
LOCATION: LEG;BACK
LOCATION: BACK;KNEE
LOCATION: KNEE
LOCATION: LEG

## 2025-07-09 ASSESSMENT — PAIN DESCRIPTION - ORIENTATION
ORIENTATION: RIGHT;LEFT
ORIENTATION: LOWER
ORIENTATION: RIGHT;LEFT
ORIENTATION: RIGHT

## 2025-07-09 ASSESSMENT — PAIN SCALES - GENERAL
PAINLEVEL_OUTOF10: 8
PAINLEVEL_OUTOF10: 8
PAINLEVEL_OUTOF10: 7
PAINLEVEL_OUTOF10: 7
PAINLEVEL_OUTOF10: 8

## 2025-07-09 ASSESSMENT — PAIN DESCRIPTION - DESCRIPTORS
DESCRIPTORS: ACHING;DISCOMFORT
DESCRIPTORS: ACHING
DESCRIPTORS: ACHING
DESCRIPTORS: THROBBING

## 2025-07-09 NOTE — PROGRESS NOTES
Progress Note    Admit Date:  7/8/2025    Presented to endo for esophagitis   Admitted for Afib with rvr- possibly in alcohol withdrawal?    Subjective:  Mr. Womack denies complaints   HR fluctuates - goes from 90s to 140s    Objective:   BP (!) 147/95   Pulse 95   Temp 98.1 °F (36.7 °C) (Oral)   Resp 18   Ht 1.778 m (5' 10\")   Wt 114.4 kg (252 lb 4 oz)   SpO2 95%   BMI 36.19 kg/m²        Intake/Output Summary (Last 24 hours) at 7/9/2025 0753  Last data filed at 7/8/2025 1944  Gross per 24 hour   Intake 240 ml   Output --   Net 240 ml       Physical Exam:   Gen: No distress. Alert.   Eyes: PERRL. No sclera icterus. No conjunctival injection.   ENT: No discharge. Pharynx clear.   Neck: No JVD.  No Carotid Bruit. Trachea midline.  Resp: No accessory muscle use. No crackles. No wheezes. No rhonchi.   CV:irregular rhythm. No murmur.  No rub. No edema.   Capillary Refill: Brisk,< 3 seconds   Peripheral Pulses: +2 palpable, equal bilaterally   GI: Non-tender. Non-distended. No masses. No organomegaly. Normal bowel sounds. No hernia.   Skin: Warm and dry. No nodule on exposed extremities. No rash on exposed extremities.   M/S: No cyanosis. No joint deformity. No clubbing.   Neuro: Awake. Grossly nonfocal    Psych: Oriented x 3. No anxiety or agitation.         Medications:  sodium chloride flush, 5-40 mL, 2 times per day  metoprolol tartrate, 25 mg, BID  enoxaparin, 1 mg/kg (Order-Specific), BID  gabapentin, 400 mg, TID  lidocaine, 1 patch, Daily  diazePAM, 2 mg, Nightly      PRN Medications:  sodium chloride flush, 10 mL, PRN  sodium chloride, , PRN  potassium chloride, 40 mEq, PRN   Or  potassium alternative oral replacement, 40 mEq, PRN   Or  potassium chloride, 10 mEq, PRN  magnesium sulfate, 2,000 mg, PRN  ondansetron, 4 mg, Q8H PRN   Or  ondansetron, 4 mg, Q6H PRN  polyethylene glycol, 17 g, Daily PRN  acetaminophen, 650 mg, Q6H PRN   Or  acetaminophen, 650 mg, Q6H PRN  sulfur hexafluoride microspheres, 2 mL,

## 2025-07-09 NOTE — FLOWSHEET NOTE
07/09/25 0737   Vital Signs   Temp 98.1 °F (36.7 °C)   Temp Source Oral   Pulse 95   Heart Rate Source Monitor   Respirations 18   BP (!) 147/95   MAP (Calculated) 112   BP Location Right upper arm   BP Method Automatic   Patient Position Semi fowlers   Oxygen Therapy   SpO2 95 %   O2 Device None (Room air)     Pt. Resting in bed. Call light in reach. Shift assessment completed see flow sheet. Denies any needs at this time. Will continue to monitor.

## 2025-07-09 NOTE — PROGRESS NOTES
Pt in bed, awake, A/O X4. Shift assessment complete, night meds given. Pt C/O bilateral knee pain, PRN volatren gel given at this time. Night time snack given.  . No other needs expressed. Call light in reach. Will monitor.  Liza Kimble RN

## 2025-07-09 NOTE — ACP (ADVANCE CARE PLANNING)
Advance Care Planning     General Advance Care Planning (ACP) Conversation    Date of Conversation: 7/9/2025  Conducted with: Patient with Decision Making Capacity  Other persons present: None    Healthcare Decision Maker:   Primary Decision Maker: Anyi Villarreal - Child - [phone]    Secondary Decision Maker: Alivia Contreras - Child - [phone]       Content/Action Overview:  DECLINED ACP Conversation - will revisit periodically  Reviewed DNR/DNI and patient elects Full Code (Attempt Resuscitation)        Length of Voluntary ACP Conversation in minutes:  <16 minutes (Non-Billable)    Susi Barba RN

## 2025-07-09 NOTE — H&P
[facility] Medicine History & Physical    V 5.1    Date of Admission: 7/8/2025    Date of Service:  Pt seen/examined on 7/8/2025 at 2105    [x]Admitted to Inpatient with expected LOS greater than two midnights due to medical therapy.  []Placed in Observation status.    Assessment/Plan:        Atrial fibrillation with RVR: New onset atrial fibrillation with RVR,  Patient seen by cardiology started on 25 mg metoprolol tartrate twice daily.  Patient also started on therapeutic Lovenox.  Possibly related to his alcohol cessation.  -Continue telemetry  -Continue metoprolol  -Echocardiogram ordered  -Continue therapeutic Lovenox, switch to DOAC at discharge    Esophagitis: Was seeing gastroenterology and plan for EGD but states that he went into A-fib with RVR.  - Continue outpatient follow-up with GI    Peripheral neuropathy: Does report chronic peripheral neuropathy in his bilateral legs.  -Continue home gabapentin    Alcohol dependence in remission: Patient reports chronic alcohol dependence states he cut down his alcohol intake 4 days ago and has not had anything to drink for the past 4 days.  Denying any tremors. I did discuss with patient that he should consider outpatient treatment facilities or at least be on medication if he has decided to quit as it can increase risk of seizures and withdrawal can cause arrhythmia.  - Scheduled 2 mg Valium for the next 3 days to decrease risk of seizure        Chief Admission Complaint: Acid reflux    Presenting Admission History:      64 y.o. male who presented to [facility] with hypertension, alcohol dependence who presented to the hospital due to esophagitis.  Patient states he was seeing the gastroenterologist for his chronic esophagitis and evaluation for possible EGD but while he was there he was found to be in atrial fibrillation with RVR and hypertensive.  He reports that he takes medication for his hypertension although no medications listed at this

## 2025-07-09 NOTE — CARE COORDINATION
Case Management Assessment  Initial Evaluation    Date/Time of Evaluation: 7/9/2025 8:02 AM  Assessment Completed by: Susi Barba RN    If patient is discharged prior to next notation, then this note serves as note for discharge by case management.    Patient Name: John Womack                   YOB: 1960  Diagnosis: Atrial fibrillation with RVR (HCC) [I48.91]                   Date / Time: 7/8/2025  4:55 PM    Patient Admission Status: Inpatient   Readmission Risk (Low < 19, Mod (19-27), High > 27): Readmission Risk Score: 6.6    Current PCP: Jennifer Raines, HERRERA - CNP  PCP verified by CM? Yes    Chart Reviewed: Yes      History Provided by: Patient  Patient Orientation: Alert and Oriented    Patient Cognition: Alert    Hospitalization in the last 30 days (Readmission):  No    If yes, Readmission Assessment in CM Navigator will be completed.    Advance Directives:      Code Status: Full Code   Patient's Primary Decision Maker is: Legal Next of Kin    Primary Decision Maker: Anyi Villarreal - Child - [phone]    Secondary Decision Maker: Alivia Contreras - Child - [phone]    Discharge Planning:    Patient lives with: Alone Type of Home: House  Primary Care Giver: Self  Patient Support Systems include: Children, Family Members   Current Financial resources: Medicaid  Current community resources: None  Current services prior to admission: None            Current DME:              Type of Home Care services:  None    ADLS  Prior functional level: Independent in ADLs/IADLs  Current functional level: Independent in ADLs/IADLs    PT AM-PAC:   /24  OT AM-PAC:   /24    Family can provide assistance at DC: Yes  Would you like Case Management to discuss the discharge plan with any other family members/significant others, and if so, who? No  Plans to Return to Present Housing: Yes  Other Identified Issues/Barriers to RETURNING to current housing: na  Potential Assistance needed at discharge: N/A

## 2025-07-09 NOTE — PROGRESS NOTES
I personally reviewed ECHO and EF is moderately reduced 30-35%. I recommend transfer to [facility] for ischemia evaluation with [facility] given new cardiomyopathy along with age and HTN history. Also needs EP consultation for afib. Possibly tachycardia induced cardiomyopathy and may need aggressive rhythm control strategy. I want EP opinion. I d/w nurse who will contact hospitalist to arrange transfer. Thank you.

## 2025-07-09 NOTE — PROGRESS NOTES
Pt up to bathroom, HR up to 160's a fib. Pt back in bed. 's after a few mins. Will monitor Liza Kimble RN

## 2025-07-09 NOTE — PROGRESS NOTES
[facility]   Progress Note  Cardiology      HPI: Seeing Mr. Womack today for f/u afib and HTN. He feels fine today and denies any palps, CP, SOB, or dizziness. Tele afib -140's earlier this AM with reported 160bpm when up walking. Currently HR in high 60's bpm after diltiazem bolus and gtt started.     Physical Examination:    Vitals:    07/09/25 0242   BP: (!) 141/93   Pulse: (!) 117   Resp: 18   Temp: 97.9 °F (36.6 °C)   SpO2: 94%          Constitutional and General Appearance: NAD   Respiratory:  Normal excursion and expansion without use of accessory muscles  Resp Auscultation: Normal breath sounds without dullness  Cardiovascular:  The apical impulses not displaced  Heart tones are crisp and normal; +irregularly irregular   Cervical veins are not engorged  The carotid upstroke is normal in amplitude and contour without delay or bruit  Normal S1S2, No S3, No Murmur  Peripheral pulses are symmetrical and full  There is no clubbing, cyanosis of the extremities.  No edema  Pedal Pulses: 2+ and equal   Abdomen:  No masses or tenderness  Liver/Spleen: No Abnormalities Noted  Neurological/Psychiatric:  Alert and oriented in all spheres  Moves all extremities well  No abnormalities of mood, affect, memory, mentation, or behavior are noted  Skin: warm and dry      Lab Results   Component Value Date    WBC 5.0 07/09/2025    HGB 14.1 07/09/2025    HCT 40.7 07/09/2025    MCV 99.8 07/09/2025     07/09/2025     Lab Results   Component Value Date    CREATININE 0.7 (L) 07/09/2025    BUN 9 07/09/2025     07/09/2025    K 4.0 07/09/2025     07/09/2025    CO2 23 07/09/2025     Lab Results   Component Value Date    INR 1.21 (H) 07/09/2025    PROTIME 15.6 (H) 07/09/2025        [id number] Score for Atrial Fibrillation Stroke Risk    Risk   Factors   Component Value   C CHF No 0   H HTN Yes 1   A2 Age >= 75 No,  (64 y.o.) 0   D DM No 0   S2 Prior Stroke/TIA No 0   V Vascular Disease No 0   A Age

## 2025-07-09 NOTE — PROGRESS NOTES
Pt in bed, eyes closed. No distress noted. Call light in reach. Will continue to monitor.  Liza Kimble RN

## 2025-07-09 NOTE — PLAN OF CARE
Contacted regarding plan for patient needs transfer to [facility] for EP and interventional. Contacted transfer center.     Transfer accepted    The patient most likely will not get a bed tonight but it is possible a bed will be available sometime tomorrow. Advised transfer center to call again closer to transfer time for updated transport needs.       Debra Virk, DO

## 2025-07-09 NOTE — PROGRESS NOTES
Pt. Informed of Cardiology wish to transfer to [facility] for EP and possible cath. Perfect  sent to on Call MD. Dr. Virk to inform her of Cardiology wish to transfer pt. Hospital to [facility] for cath and EP eval.

## 2025-07-10 ENCOUNTER — TELEPHONE (OUTPATIENT)
Dept: FAMILY MEDICINE CLINIC | Age: 65
End: 2025-07-10

## 2025-07-10 ENCOUNTER — HOSPITAL ENCOUNTER (INPATIENT)
Age: 65
LOS: 7 days | Discharge: HOME | DRG: 201 | End: 2025-07-17
Attending: INTERNAL MEDICINE | Admitting: STUDENT IN AN ORGANIZED HEALTH CARE EDUCATION/TRAINING PROGRAM
Payer: COMMERCIAL

## 2025-07-10 DIAGNOSIS — M54.50 CHRONIC BILATERAL LOW BACK PAIN WITHOUT SCIATICA: ICD-10-CM

## 2025-07-10 DIAGNOSIS — I42.9 CARDIOMYOPATHY, UNSPECIFIED TYPE (HCC): ICD-10-CM

## 2025-07-10 DIAGNOSIS — G62.9 NEUROPATHY: ICD-10-CM

## 2025-07-10 DIAGNOSIS — R94.31 ABNORMAL ELECTROCARDIOGRAPHY: Primary | ICD-10-CM

## 2025-07-10 DIAGNOSIS — I48.91 ATRIAL FIBRILLATION WITH RVR (HCC): ICD-10-CM

## 2025-07-10 DIAGNOSIS — I48.0 PAROXYSMAL ATRIAL FIBRILLATION (HCC): ICD-10-CM

## 2025-07-10 DIAGNOSIS — M51.369 DEGENERATIVE DISC DISEASE, LUMBAR: ICD-10-CM

## 2025-07-10 DIAGNOSIS — G89.29 CHRONIC BILATERAL LOW BACK PAIN WITHOUT SCIATICA: ICD-10-CM

## 2025-07-10 DIAGNOSIS — R13.10 DYSPHAGIA, UNSPECIFIED TYPE: ICD-10-CM

## 2025-07-10 LAB
ANION GAP SERPL CALCULATED.3IONS-SCNC: 9 MMOL/L (ref 3–16)
ANTI-XA UNFRAC HEPARIN: <0.1 IU/ML (ref 0.3–0.7)
ANTI-XA UNFRAC HEPARIN: <0.1 IU/ML (ref 0.3–0.7)
APTT BLD: 29.6 SEC (ref 22.8–35.8)
BASOPHILS # BLD: 0.1 K/UL (ref 0–0.2)
BASOPHILS NFR BLD: 1.6 %
BUN SERPL-MCNC: 9 MG/DL (ref 7–20)
CALCIUM SERPL-MCNC: 9.5 MG/DL (ref 8.3–10.6)
CHLORIDE SERPL-SCNC: 106 MMOL/L (ref 99–110)
CO2 SERPL-SCNC: 25 MMOL/L (ref 21–32)
CREAT SERPL-MCNC: 0.7 MG/DL (ref 0.8–1.3)
DEPRECATED RDW RBC AUTO: 15.4 % (ref 12.4–15.4)
DEPRECATED RDW RBC AUTO: 16 % (ref 12.4–15.4)
EKG DIAGNOSIS: NORMAL
EKG Q-T INTERVAL: 358 MS
EKG QRS DURATION: 102 MS
EKG QTC CALCULATION (BAZETT): 468 MS
EKG R AXIS: 40 DEGREES
EKG T AXIS: -16 DEGREES
EKG VENTRICULAR RATE: 103 BPM
EOSINOPHIL # BLD: 0.1 K/UL (ref 0–0.6)
EOSINOPHIL NFR BLD: 3 %
GFR SERPLBLD CREATININE-BSD FMLA CKD-EPI: >90 ML/MIN/{1.73_M2}
GLUCOSE SERPL-MCNC: 123 MG/DL (ref 70–99)
HCT VFR BLD AUTO: 40.2 % (ref 40.5–52.5)
HCT VFR BLD AUTO: 42 % (ref 40.5–52.5)
HGB BLD-MCNC: 13.8 G/DL (ref 13.5–17.5)
HGB BLD-MCNC: 14.2 G/DL (ref 13.5–17.5)
INR PPP: 1.23 (ref 0.86–1.14)
LYMPHOCYTES # BLD: 1.4 K/UL (ref 1–5.1)
LYMPHOCYTES NFR BLD: 29.1 %
MCH RBC QN AUTO: 34.2 PG (ref 26–34)
MCH RBC QN AUTO: 34.6 PG (ref 26–34)
MCHC RBC AUTO-ENTMCNC: 33.7 G/DL (ref 31–36)
MCHC RBC AUTO-ENTMCNC: 34.4 G/DL (ref 31–36)
MCV RBC AUTO: 100.6 FL (ref 80–100)
MCV RBC AUTO: 101.4 FL (ref 80–100)
MONOCYTES # BLD: 0.5 K/UL (ref 0–1.3)
MONOCYTES NFR BLD: 10.6 %
NEUTROPHILS # BLD: 2.7 K/UL (ref 1.7–7.7)
NEUTROPHILS NFR BLD: 55.7 %
PLATELET # BLD AUTO: 181 K/UL (ref 135–450)
PLATELET # BLD AUTO: 193 K/UL (ref 135–450)
PMV BLD AUTO: 9.5 FL (ref 5–10.5)
PMV BLD AUTO: 9.9 FL (ref 5–10.5)
POTASSIUM SERPL-SCNC: 4.1 MMOL/L (ref 3.5–5.1)
PROTHROMBIN TIME: 15.8 SEC (ref 12.1–14.9)
RBC # BLD AUTO: 4 M/UL (ref 4.2–5.9)
RBC # BLD AUTO: 4.14 M/UL (ref 4.2–5.9)
SODIUM SERPL-SCNC: 140 MMOL/L (ref 136–145)
WBC # BLD AUTO: 4.9 K/UL (ref 4–11)
WBC # BLD AUTO: 5.1 K/UL (ref 4–11)

## 2025-07-10 PROCEDURE — 93010 ELECTROCARDIOGRAM REPORT: CPT | Performed by: INTERNAL MEDICINE

## 2025-07-10 PROCEDURE — 6370000000 HC RX 637 (ALT 250 FOR IP): Performed by: STUDENT IN AN ORGANIZED HEALTH CARE EDUCATION/TRAINING PROGRAM

## 2025-07-10 PROCEDURE — 80048 BASIC METABOLIC PNL TOTAL CA: CPT

## 2025-07-10 PROCEDURE — 85025 COMPLETE CBC W/AUTO DIFF WBC: CPT

## 2025-07-10 PROCEDURE — 85610 PROTHROMBIN TIME: CPT

## 2025-07-10 PROCEDURE — 6360000002 HC RX W HCPCS: Performed by: INTERNAL MEDICINE

## 2025-07-10 PROCEDURE — 1200000000 HC SEMI PRIVATE

## 2025-07-10 PROCEDURE — 85520 HEPARIN ASSAY: CPT

## 2025-07-10 PROCEDURE — 93005 ELECTROCARDIOGRAM TRACING: CPT | Performed by: STUDENT IN AN ORGANIZED HEALTH CARE EDUCATION/TRAINING PROGRAM

## 2025-07-10 PROCEDURE — 2500000003 HC RX 250 WO HCPCS: Performed by: INTERNAL MEDICINE

## 2025-07-10 PROCEDURE — 36415 COLL VENOUS BLD VENIPUNCTURE: CPT

## 2025-07-10 PROCEDURE — 85027 COMPLETE CBC AUTOMATED: CPT

## 2025-07-10 PROCEDURE — 2500000003 HC RX 250 WO HCPCS: Performed by: STUDENT IN AN ORGANIZED HEALTH CARE EDUCATION/TRAINING PROGRAM

## 2025-07-10 PROCEDURE — 85730 THROMBOPLASTIN TIME PARTIAL: CPT

## 2025-07-10 RX ORDER — ACETAMINOPHEN 650 MG/1
650 SUPPOSITORY RECTAL EVERY 6 HOURS PRN
Status: DISCONTINUED | OUTPATIENT
Start: 2025-07-10 | End: 2025-07-10

## 2025-07-10 RX ORDER — POLYETHYLENE GLYCOL 3350 17 G/17G
17 POWDER, FOR SOLUTION ORAL DAILY PRN
Status: DISCONTINUED | OUTPATIENT
Start: 2025-07-10 | End: 2025-07-17 | Stop reason: HOSPADM

## 2025-07-10 RX ORDER — SODIUM CHLORIDE 0.9 % (FLUSH) 0.9 %
5-40 SYRINGE (ML) INJECTION EVERY 12 HOURS SCHEDULED
Status: DISCONTINUED | OUTPATIENT
Start: 2025-07-10 | End: 2025-07-17 | Stop reason: HOSPADM

## 2025-07-10 RX ORDER — POLYETHYLENE GLYCOL 3350 17 G/17G
17 POWDER, FOR SOLUTION ORAL DAILY PRN
Status: DISCONTINUED | OUTPATIENT
Start: 2025-07-10 | End: 2025-07-10

## 2025-07-10 RX ORDER — MAGNESIUM SULFATE IN WATER 40 MG/ML
2000 INJECTION, SOLUTION INTRAVENOUS PRN
Status: DISCONTINUED | OUTPATIENT
Start: 2025-07-10 | End: 2025-07-17 | Stop reason: HOSPADM

## 2025-07-10 RX ORDER — LISINOPRIL 20 MG/1
20 TABLET ORAL DAILY
Status: DISCONTINUED | OUTPATIENT
Start: 2025-07-10 | End: 2025-07-17 | Stop reason: HOSPADM

## 2025-07-10 RX ORDER — ONDANSETRON 2 MG/ML
4 INJECTION INTRAMUSCULAR; INTRAVENOUS EVERY 6 HOURS PRN
Status: DISCONTINUED | OUTPATIENT
Start: 2025-07-10 | End: 2025-07-17 | Stop reason: HOSPADM

## 2025-07-10 RX ORDER — POTASSIUM CHLORIDE 1500 MG/1
40 TABLET, EXTENDED RELEASE ORAL PRN
Status: DISCONTINUED | OUTPATIENT
Start: 2025-07-10 | End: 2025-07-17 | Stop reason: HOSPADM

## 2025-07-10 RX ORDER — ONDANSETRON 4 MG/1
4 TABLET, ORALLY DISINTEGRATING ORAL EVERY 8 HOURS PRN
Status: DISCONTINUED | OUTPATIENT
Start: 2025-07-10 | End: 2025-07-17 | Stop reason: HOSPADM

## 2025-07-10 RX ORDER — LISINOPRIL 20 MG/1
20 TABLET ORAL DAILY
Status: DISCONTINUED | OUTPATIENT
Start: 2025-07-10 | End: 2025-07-10

## 2025-07-10 RX ORDER — HEPARIN SODIUM 1000 [USP'U]/ML
4000 INJECTION, SOLUTION INTRAVENOUS; SUBCUTANEOUS PRN
Status: DISCONTINUED | OUTPATIENT
Start: 2025-07-10 | End: 2025-07-17

## 2025-07-10 RX ORDER — ENOXAPARIN SODIUM 150 MG/ML
1 INJECTION SUBCUTANEOUS 2 TIMES DAILY
Status: DISCONTINUED | OUTPATIENT
Start: 2025-07-10 | End: 2025-07-10

## 2025-07-10 RX ORDER — ALLOPURINOL 100 MG/1
100 TABLET ORAL DAILY
Status: DISCONTINUED | OUTPATIENT
Start: 2025-07-10 | End: 2025-07-17 | Stop reason: HOSPADM

## 2025-07-10 RX ORDER — HEPARIN SODIUM 1000 [USP'U]/ML
4000 INJECTION, SOLUTION INTRAVENOUS; SUBCUTANEOUS ONCE
Status: COMPLETED | OUTPATIENT
Start: 2025-07-10 | End: 2025-07-10

## 2025-07-10 RX ORDER — HEPARIN SODIUM 10000 [USP'U]/100ML
5-30 INJECTION, SOLUTION INTRAVENOUS CONTINUOUS
Status: DISCONTINUED | OUTPATIENT
Start: 2025-07-10 | End: 2025-07-17

## 2025-07-10 RX ORDER — ACETAMINOPHEN 325 MG/1
650 TABLET ORAL EVERY 6 HOURS PRN
Status: DISCONTINUED | OUTPATIENT
Start: 2025-07-10 | End: 2025-07-17 | Stop reason: HOSPADM

## 2025-07-10 RX ORDER — ACETAMINOPHEN 650 MG/1
650 SUPPOSITORY RECTAL EVERY 6 HOURS PRN
Status: DISCONTINUED | OUTPATIENT
Start: 2025-07-10 | End: 2025-07-17 | Stop reason: HOSPADM

## 2025-07-10 RX ORDER — ONDANSETRON 2 MG/ML
4 INJECTION INTRAMUSCULAR; INTRAVENOUS EVERY 6 HOURS PRN
Status: DISCONTINUED | OUTPATIENT
Start: 2025-07-10 | End: 2025-07-10 | Stop reason: SDUPTHER

## 2025-07-10 RX ORDER — SODIUM CHLORIDE 9 MG/ML
INJECTION, SOLUTION INTRAVENOUS PRN
Status: DISCONTINUED | OUTPATIENT
Start: 2025-07-10 | End: 2025-07-17 | Stop reason: HOSPADM

## 2025-07-10 RX ORDER — POTASSIUM CHLORIDE 7.45 MG/ML
10 INJECTION INTRAVENOUS PRN
Status: DISCONTINUED | OUTPATIENT
Start: 2025-07-10 | End: 2025-07-17 | Stop reason: HOSPADM

## 2025-07-10 RX ORDER — PANTOPRAZOLE SODIUM 40 MG/1
40 TABLET, DELAYED RELEASE ORAL
Status: DISCONTINUED | OUTPATIENT
Start: 2025-07-10 | End: 2025-07-17 | Stop reason: HOSPADM

## 2025-07-10 RX ORDER — DOXEPIN 3 MG/1
3 TABLET, FILM COATED ORAL NIGHTLY
Status: DISCONTINUED | OUTPATIENT
Start: 2025-07-10 | End: 2025-07-17 | Stop reason: HOSPADM

## 2025-07-10 RX ORDER — TRAZODONE HYDROCHLORIDE 300 MG/1
300 TABLET ORAL NIGHTLY
COMMUNITY

## 2025-07-10 RX ORDER — HEPARIN SODIUM 1000 [USP'U]/ML
2000 INJECTION, SOLUTION INTRAVENOUS; SUBCUTANEOUS PRN
Status: DISCONTINUED | OUTPATIENT
Start: 2025-07-10 | End: 2025-07-17

## 2025-07-10 RX ORDER — LIDOCAINE 4 G/G
1 PATCH TOPICAL DAILY
Status: DISCONTINUED | OUTPATIENT
Start: 2025-07-10 | End: 2025-07-17 | Stop reason: HOSPADM

## 2025-07-10 RX ORDER — ACETAMINOPHEN 325 MG/1
650 TABLET ORAL EVERY 6 HOURS PRN
Status: DISCONTINUED | OUTPATIENT
Start: 2025-07-10 | End: 2025-07-10

## 2025-07-10 RX ORDER — SODIUM CHLORIDE 0.9 % (FLUSH) 0.9 %
5-40 SYRINGE (ML) INJECTION PRN
Status: DISCONTINUED | OUTPATIENT
Start: 2025-07-10 | End: 2025-07-17 | Stop reason: HOSPADM

## 2025-07-10 RX ORDER — GABAPENTIN 400 MG/1
400 CAPSULE ORAL 3 TIMES DAILY
Status: DISCONTINUED | OUTPATIENT
Start: 2025-07-10 | End: 2025-07-17 | Stop reason: HOSPADM

## 2025-07-10 RX ADMIN — GABAPENTIN 400 MG: 400 CAPSULE ORAL at 14:55

## 2025-07-10 RX ADMIN — TIZANIDINE 4 MG: 4 TABLET ORAL at 08:26

## 2025-07-10 RX ADMIN — TIZANIDINE 4 MG: 4 TABLET ORAL at 21:14

## 2025-07-10 RX ADMIN — HEPARIN SODIUM 8 UNITS/KG/HR: 10000 INJECTION, SOLUTION INTRAVENOUS at 17:21

## 2025-07-10 RX ADMIN — TIZANIDINE 4 MG: 4 TABLET ORAL at 14:55

## 2025-07-10 RX ADMIN — PANTOPRAZOLE SODIUM 40 MG: 40 TABLET, DELAYED RELEASE ORAL at 17:23

## 2025-07-10 RX ADMIN — HEPARIN SODIUM 4000 UNITS: 1000 INJECTION INTRAVENOUS; SUBCUTANEOUS at 18:01

## 2025-07-10 RX ADMIN — AMIODARONE HYDROCHLORIDE 150 MG: 1.5 INJECTION, SOLUTION INTRAVENOUS at 17:23

## 2025-07-10 RX ADMIN — PANTOPRAZOLE SODIUM 40 MG: 40 TABLET, DELAYED RELEASE ORAL at 08:15

## 2025-07-10 RX ADMIN — GABAPENTIN 400 MG: 400 CAPSULE ORAL at 21:06

## 2025-07-10 RX ADMIN — METOPROLOL TARTRATE 75 MG: 50 TABLET, FILM COATED ORAL at 08:15

## 2025-07-10 RX ADMIN — LISINOPRIL 20 MG: 20 TABLET ORAL at 01:13

## 2025-07-10 RX ADMIN — ALLOPURINOL 100 MG: 100 TABLET ORAL at 08:15

## 2025-07-10 RX ADMIN — METOPROLOL TARTRATE 75 MG: 50 TABLET, FILM COATED ORAL at 21:06

## 2025-07-10 RX ADMIN — GABAPENTIN 400 MG: 400 CAPSULE ORAL at 08:15

## 2025-07-10 RX ADMIN — AMIODARONE HYDROCHLORIDE 1 MG/MIN: 1.8 INJECTION, SOLUTION INTRAVENOUS at 17:37

## 2025-07-10 RX ADMIN — AMIODARONE HYDROCHLORIDE 0.5 MG/MIN: 1.8 INJECTION, SOLUTION INTRAVENOUS at 22:59

## 2025-07-10 RX ADMIN — TIZANIDINE 4 MG: 4 TABLET ORAL at 01:13

## 2025-07-10 RX ADMIN — Medication 10 ML: at 08:15

## 2025-07-10 ASSESSMENT — PAIN DESCRIPTION - LOCATION
LOCATION: BACK

## 2025-07-10 ASSESSMENT — PAIN DESCRIPTION - DESCRIPTORS
DESCRIPTORS: ACHING;DISCOMFORT
DESCRIPTORS: DISCOMFORT;ACHING
DESCRIPTORS: ACHING;DISCOMFORT
DESCRIPTORS: ACHING;DISCOMFORT

## 2025-07-10 ASSESSMENT — PAIN DESCRIPTION - PAIN TYPE
TYPE: CHRONIC PAIN
TYPE: CHRONIC PAIN

## 2025-07-10 ASSESSMENT — PAIN SCALES - GENERAL
PAINLEVEL_OUTOF10: 3
PAINLEVEL_OUTOF10: 7
PAINLEVEL_OUTOF10: 8

## 2025-07-10 ASSESSMENT — PAIN DESCRIPTION - ORIENTATION
ORIENTATION: LOWER;MID
ORIENTATION: LOWER

## 2025-07-10 ASSESSMENT — PAIN DESCRIPTION - FREQUENCY
FREQUENCY: CONTINUOUS
FREQUENCY: CONTINUOUS

## 2025-07-10 ASSESSMENT — PAIN DESCRIPTION - ONSET
ONSET: ON-GOING
ONSET: ON-GOING

## 2025-07-10 ASSESSMENT — PAIN - FUNCTIONAL ASSESSMENT: PAIN_FUNCTIONAL_ASSESSMENT: PREVENTS OR INTERFERES SOME ACTIVE ACTIVITIES AND ADLS

## 2025-07-10 NOTE — CARE COORDINATION
Patient transferred from [facility].   He is being followed by Cardiology and medications are being adjusted.    Spoke with patient at bedside for introduction.   He does not anticipate any needs from CM.  He is from home alone and was independent prior to admission.    CM will continue to follow should any needs arise.

## 2025-07-10 NOTE — PLAN OF CARE
Problem: Discharge Planning  Goal: Discharge to home or other facility with appropriate resources  7/9/2025 2010 by Zhou Soto RN  Outcome: Progressing  7/9/2025 2002 by Zhou Soto RN  Outcome: Progressing  7/9/2025 0906 by Kalyan Carrizales RN  Outcome: Progressing     Problem: Safety - Adult  Goal: Free from fall injury  7/9/2025 2010 by Zhou Soto RN  Outcome: Progressing  7/9/2025 2002 by Zhou Soto RN  Outcome: Progressing  7/9/2025 0906 by Kalyan Carrizales RN  Outcome: Progressing     Problem: ABCDS Injury Assessment  Goal: Absence of physical injury  7/9/2025 2010 by Zhou Soto RN  Outcome: Progressing  7/9/2025 2002 by Zhou Soto RN  Outcome: Progressing  7/9/2025 0906 by Kalyan Carrizales RN  Outcome: Progressing     Problem: Pain  Goal: Verbalizes/displays adequate comfort level or baseline comfort level  7/9/2025 2010 by Zhou Soto RN  Outcome: Progressing  7/9/2025 2002 by Zhou Soto RN  Outcome: Progressing     Problem: Cardiovascular - Adult  Goal: Maintains optimal cardiac output and hemodynamic stability  Outcome: Progressing  Goal: Absence of cardiac dysrhythmias or at baseline  Outcome: Progressing     Problem: Skin/Tissue Integrity - Adult  Goal: Skin integrity remains intact  Outcome: Progressing  Goal: Incisions, wounds, or drain sites healing without S/S of infection  Outcome: Progressing  Goal: Oral mucous membranes remain intact  Outcome: Progressing     Problem: Musculoskeletal - Adult  Goal: Return mobility to safest level of function  Outcome: Progressing  Goal: Maintain proper alignment of affected body part  Outcome: Progressing  Goal: Return ADL status to a safe level of function  Outcome: Progressing

## 2025-07-10 NOTE — PROGRESS NOTES
4 Eyes Skin Assessment     The patient is being assess for  Admission    I agree that 2 RN's have performed a thorough Head to Toe Skin Assessment on the patient. ALL assessment sites listed below have been assessed.       Areas assessed by both nurses:   [x]   Head, Face, and Ears   [x]   Shoulders, Back, and Chest  [x]   Arms, Elbows, and Hands   [x]   Coccyx, Sacrum, and Ischum  [x]   Legs, Feet, and Heels        Does the Patient have Skin Breakdown?  No         Jesus Prevention initiated:  No   Wound Care Orders initiated:  No      [facility] nurse consulted for Pressure Injury (Stage 3,4, Unstageable, DTI, NWPT, and Complex wounds):  No      Nurse 1 eSignature: Electronically signed by Carlos Norris RN on 7/10/25 at 1:22 AM EDT    **SHARE this note so that the co-signing nurse is able to place an eSignature**    Nurse 2 eSignature: {Esignature:[id number]}         ]

## 2025-07-10 NOTE — PROGRESS NOTES
Pt admitted to room 211 from [facility]. Telemetry applied and confirmed with CMU. Pt up independently in room. Bed locked in low position, pt oriented to room and call light. VSS. Pt c/o chronic back pain. Perfect serve page sent to cross cover notifying of pt's arrival.

## 2025-07-10 NOTE — H&P
[facility] Medicine History & Physical    V 5.1    Date of Admission: 7/10/2025    Date of Service:  Pt seen/examined on 7/10/2025     [x]Admitted to Inpatient with expected LOS greater than two midnights due to medical therapy.  []Placed in Observation status.    Chief Admission Complaint: Transferred from OSH for ischemia eval and EP consultation for AF    Presenting Admission History:      64 y.o. male who presented to [facility] as a transfer from outside hospital.  Records reviewed and discussed with patient RN.  Patient presented to OSH for outpatient EGD for possible esophagitis.  PMH HTN, chronic gout, insomnia, DJD, alcohol abuse, PVD, and GERD. No cardiac history to his knowledge. Prior testing: Echo 5/19/22 EF=55%, no WMA; mild JACEK; mild AR and UT.       ECG prior to EGD showed AF RVR rate 117 IRBBB no ST changes.  Cardiology was contacted and evaluated patient, /100 at time, pt c/o  trouble swallowing pills solid foods.+mid-sternal, sharp CP occurring randomly without radiation or associated symptoms. Also c/o dizziness and orthostasis symptoms.  No syncope.  No palpitations.      He was diagnosed with new onset A-fib of unknown duration, uncontrolled HTN.  He was started on therapeutic dosing of Lovenox, metoprolol, diltiazem gtt. TSH normal 1.78 and Augustina negative 14 x 2.  Echo showed EF 30-35%.  Cardiology recommended transfer to [facility] for ischemia evaluation with [facility] given new cardiomyopathy along with age and HTN history. Also needs EP consultation for afib. Possibly tachycardia induced cardiomyopathy and may need aggressive rhythm control strategy.     Assessment/Plan:      New onset atrial fibrillation with RVR, unknown duration AF  Uncontrolled HTN  New cardiomyopathy with EF 30-35%  Obesity BMI 36  Gout  Insomnia  Previous alcohol abuse  GERD    Patient seen and examined upon arrival to floor.  Hemodynamically stable.  VSS.  He is not in distress.  Euvolemic.  Mentating  edema or fluid overload.  Mentating normally.  He reported some back pain which improved with p.o. medication.  Otherwise symptom-free.  Telemetry with AF rate controlled 90s.  EKG ordered.  Cardiology is consulted.  N.p.o. after midnight.  He was weaned off diltiazem gtt prior to transfer.  Continuing IV Lovenox and p.o. metoprolol for now.  Patient is tolerating current treatment.  N.p.o. after midnight as ordered.  Continue current treatment, supportive care and monitoring in hospital.  Orders placed and discussed with patient and RN.  Everyone was in agreement with plan.    Discussed management and the need for Hospitalization of the patient w/ the Emergency Department Provider: Hospitalist    CXR: I have reviewed the CXR with the following interpretation: N/A  EKG:  I have reviewed the EKG with the following interpretation: Repeat ordered    Physical Exam Performed:      General appearance:  No apparent distress, appears stated age and cooperative.  HEENT:  Pupils equal, round, and reactive to light. Conjunctivae/corneas clear.  Respiratory:  Normal respiratory effort. Clear to auscultation bilaterally without Rales/Wheezes/Rhonchi.  Cardiovascular:  Irregular rate and rhythm with normal S1/S2 rate controlled  Abdomen:  Soft, non-tender, non-distended with normal bowel sounds.  Musculoskeletal:  No clubbing, cyanosis or edema bilaterally.  Full range of motion without deformity.  Neurologic:  Neurovascularly intact without any focal sensory/motor deficits. Cranial nerves: II-XII intact, grossly non-focal.  Psychiatric:  Alert and oriented, thought content appropriate, normal insight  Skin:  Skin color, texture, turgor normal.  No rashes or lesions.  Capillary Refill:  Brisk,< 3 seconds   Peripheral Pulses:  +2 palpable, equal bilaterally     BP (!) 137/110   Pulse 96   Temp 97.9 °F (36.6 °C) (Oral)   Resp 18   Wt 114.8 kg (253 lb 1.6 oz)   SpO2 95%   BMI 36.32 kg/m²     Diet: []Adult/General  []Cardiac

## 2025-07-10 NOTE — FLOWSHEET NOTE
07/09/25 1945   Vital Signs   Temp 97.5 °F (36.4 °C)   Temp Source Oral   Pulse (!) 103   Heart Rate Source Monitor   Respirations 18   BP (!) 159/100   MAP (Calculated) 120   BP Location Left upper arm   BP Method Automatic   Patient Position Semi fowlers   Pain Assessment   Pain Assessment 0-10   Pain Level 8   Patient's Stated Pain Goal 0 - No pain   Pain Location Leg   Pain Orientation Right;Left   Pain Descriptors Throbbing   Oxygen Therapy   SpO2 95 %   O2 Device None (Room air)
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
## 2025-07-10 NOTE — TELEPHONE ENCOUNTER
Care Transitions Initial Follow Up Call    Outreach made within 2 business days of discharge: Yes    Patient: John Womack Patient : 1960   MRN: [id number]  Reason for Admission: AFIB  Discharge Date: 25       Spoke with: Pt is currently admitted.  He was admitted to [place] and then transferred to [place]    Discharge department/facility: Currently Admitted        Scheduled appointment with PCP within 7-14 days    Follow Up  Future Appointments   Date Time Provider Department Center   2025  3:00 PM Jennifer Raines, [facility]N - [facility]       KHANG RAMIREZ MA

## 2025-07-10 NOTE — PROGRESS NOTES
Patient admitted earlier this morning by colleague agree with assessment and plan as documented.  On my evaluation he is in no acute distress and hemodynamically stable.  Awaiting cardiology evaluation for an accepting his care    Best Garcia MD  Hospitalist

## 2025-07-10 NOTE — PROGRESS NOTES
Report given to SERGE Gonzalez @ [facility]. Asked pt if he would like me to call family to make them aware. He denied at this time. Mita Carlin RN

## 2025-07-10 NOTE — PLAN OF CARE
Problem: Discharge Planning  Goal: Discharge to home or other facility with appropriate resources  7/9/2025 2002 by Zhou Soto RN  Outcome: Progressing  7/9/2025 0906 by Kalyan Carrizales RN  Outcome: Progressing     Problem: Safety - Adult  Goal: Free from fall injury  7/9/2025 2002 by Zhou Soto RN  Outcome: Progressing  7/9/2025 0906 by Kalyan Carrizales RN  Outcome: Progressing     Problem: ABCDS Injury Assessment  Goal: Absence of physical injury  7/9/2025 2002 by Zhou Soto RN  Outcome: Progressing  7/9/2025 0906 by Kalyan Carrizales RN  Outcome: Progressing     Problem: Pain  Goal: Verbalizes/displays adequate comfort level or baseline comfort level  Outcome: Progressing

## 2025-07-11 ENCOUNTER — APPOINTMENT (OUTPATIENT)
Dept: NUCLEAR MEDICINE | Age: 65
DRG: 201 | End: 2025-07-11
Attending: INTERNAL MEDICINE
Payer: COMMERCIAL

## 2025-07-11 ENCOUNTER — APPOINTMENT (OUTPATIENT)
Age: 65
DRG: 201 | End: 2025-07-11
Attending: INTERNAL MEDICINE
Payer: COMMERCIAL

## 2025-07-11 PROBLEM — R94.31 ABNORMAL ELECTROCARDIOGRAPHY: Status: ACTIVE | Noted: 2025-07-11

## 2025-07-11 LAB
ANION GAP SERPL CALCULATED.3IONS-SCNC: 9 MMOL/L (ref 3–16)
ANTI-XA UNFRAC HEPARIN: 0.22 IU/ML (ref 0.3–0.7)
ANTI-XA UNFRAC HEPARIN: 0.27 IU/ML (ref 0.3–0.7)
ANTI-XA UNFRAC HEPARIN: 0.43 IU/ML (ref 0.3–0.7)
BASOPHILS # BLD: 0.1 K/UL (ref 0–0.2)
BASOPHILS NFR BLD: 1.1 %
BUN SERPL-MCNC: 9 MG/DL (ref 7–20)
CALCIUM SERPL-MCNC: 9.4 MG/DL (ref 8.3–10.6)
CHLORIDE SERPL-SCNC: 104 MMOL/L (ref 99–110)
CO2 SERPL-SCNC: 26 MMOL/L (ref 21–32)
CREAT SERPL-MCNC: 0.7 MG/DL (ref 0.8–1.3)
DEPRECATED RDW RBC AUTO: 15.3 % (ref 12.4–15.4)
EOSINOPHIL # BLD: 0.2 K/UL (ref 0–0.6)
EOSINOPHIL NFR BLD: 2.8 %
GFR SERPLBLD CREATININE-BSD FMLA CKD-EPI: >90 ML/MIN/{1.73_M2}
GLUCOSE SERPL-MCNC: 122 MG/DL (ref 70–99)
HCT VFR BLD AUTO: 42 % (ref 40.5–52.5)
HGB BLD-MCNC: 14.2 G/DL (ref 13.5–17.5)
LYMPHOCYTES # BLD: 1.7 K/UL (ref 1–5.1)
LYMPHOCYTES NFR BLD: 31.6 %
MCH RBC QN AUTO: 34.1 PG (ref 26–34)
MCHC RBC AUTO-ENTMCNC: 33.7 G/DL (ref 31–36)
MCV RBC AUTO: 101.4 FL (ref 80–100)
MONOCYTES # BLD: 0.6 K/UL (ref 0–1.3)
MONOCYTES NFR BLD: 10.6 %
NEUTROPHILS # BLD: 2.9 K/UL (ref 1.7–7.7)
NEUTROPHILS NFR BLD: 53.9 %
NUC STRESS EJECTION FRACTION: 30 %
NUC STRESS LV EDV: 192 ML (ref 67–155)
NUC STRESS LV ESV: 135 ML (ref 22–58)
NUC STRESS LV MASS: 199 G
PLATELET # BLD AUTO: 192 K/UL (ref 135–450)
PMV BLD AUTO: 9.9 FL (ref 5–10.5)
POTASSIUM SERPL-SCNC: 4.2 MMOL/L (ref 3.5–5.1)
RBC # BLD AUTO: 4.15 M/UL (ref 4.2–5.9)
SODIUM SERPL-SCNC: 139 MMOL/L (ref 136–145)
STRESS BASELINE DIAS BP: 97 MMHG
STRESS BASELINE HR: 82 BPM
STRESS BASELINE SYS BP: 131 MMHG
STRESS ESTIMATED WORKLOAD: 1 METS
STRESS PEAK DIAS BP: 107 MMHG
STRESS PEAK SYS BP: 146 MMHG
STRESS PERCENT HR ACHIEVED: 63 %
STRESS POST PEAK HR: 100 BPM
STRESS RATE PRESSURE PRODUCT: ABNORMAL BPM*MMHG
STRESS TARGET HR: 158 BPM
WBC # BLD AUTO: 5.4 K/UL (ref 4–11)

## 2025-07-11 PROCEDURE — 99233 SBSQ HOSP IP/OBS HIGH 50: CPT | Performed by: NURSE PRACTITIONER

## 2025-07-11 PROCEDURE — 85520 HEPARIN ASSAY: CPT

## 2025-07-11 PROCEDURE — 36415 COLL VENOUS BLD VENIPUNCTURE: CPT

## 2025-07-11 PROCEDURE — 93017 CV STRESS TEST TRACING ONLY: CPT

## 2025-07-11 PROCEDURE — 80048 BASIC METABOLIC PNL TOTAL CA: CPT

## 2025-07-11 PROCEDURE — 1200000000 HC SEMI PRIVATE

## 2025-07-11 PROCEDURE — 6370000000 HC RX 637 (ALT 250 FOR IP): Performed by: STUDENT IN AN ORGANIZED HEALTH CARE EDUCATION/TRAINING PROGRAM

## 2025-07-11 PROCEDURE — 78452 HT MUSCLE IMAGE SPECT MULT: CPT | Performed by: INTERNAL MEDICINE

## 2025-07-11 PROCEDURE — 78452 HT MUSCLE IMAGE SPECT MULT: CPT

## 2025-07-11 PROCEDURE — 6360000002 HC RX W HCPCS: Performed by: NURSE PRACTITIONER

## 2025-07-11 PROCEDURE — 93018 CV STRESS TEST I&R ONLY: CPT | Performed by: INTERNAL MEDICINE

## 2025-07-11 PROCEDURE — 85025 COMPLETE CBC W/AUTO DIFF WBC: CPT

## 2025-07-11 PROCEDURE — 2500000003 HC RX 250 WO HCPCS: Performed by: STUDENT IN AN ORGANIZED HEALTH CARE EDUCATION/TRAINING PROGRAM

## 2025-07-11 PROCEDURE — 6360000002 HC RX W HCPCS: Performed by: INTERNAL MEDICINE

## 2025-07-11 PROCEDURE — 2500000003 HC RX 250 WO HCPCS: Performed by: INTERNAL MEDICINE

## 2025-07-11 PROCEDURE — 6370000000 HC RX 637 (ALT 250 FOR IP): Performed by: NURSE PRACTITIONER

## 2025-07-11 PROCEDURE — A9502 TC99M TETROFOSMIN: HCPCS | Performed by: NURSE PRACTITIONER

## 2025-07-11 PROCEDURE — 93016 CV STRESS TEST SUPVJ ONLY: CPT | Performed by: INTERNAL MEDICINE

## 2025-07-11 PROCEDURE — 3430000000 HC RX DIAGNOSTIC RADIOPHARMACEUTICAL: Performed by: NURSE PRACTITIONER

## 2025-07-11 RX ORDER — AMIODARONE HYDROCHLORIDE 200 MG/1
200 TABLET ORAL DAILY
Status: DISCONTINUED | OUTPATIENT
Start: 2025-07-18 | End: 2025-07-17 | Stop reason: HOSPADM

## 2025-07-11 RX ORDER — ATORVASTATIN CALCIUM 40 MG/1
40 TABLET, FILM COATED ORAL NIGHTLY
Status: DISCONTINUED | OUTPATIENT
Start: 2025-07-11 | End: 2025-07-17 | Stop reason: HOSPADM

## 2025-07-11 RX ORDER — AMIODARONE HYDROCHLORIDE 200 MG/1
200 TABLET ORAL 2 TIMES DAILY
Status: DISCONTINUED | OUTPATIENT
Start: 2025-07-11 | End: 2025-07-17 | Stop reason: HOSPADM

## 2025-07-11 RX ORDER — METOPROLOL TARTRATE 50 MG
100 TABLET ORAL 2 TIMES DAILY
Status: DISCONTINUED | OUTPATIENT
Start: 2025-07-11 | End: 2025-07-17 | Stop reason: HOSPADM

## 2025-07-11 RX ORDER — REGADENOSON 0.08 MG/ML
0.4 INJECTION, SOLUTION INTRAVENOUS
Status: COMPLETED | OUTPATIENT
Start: 2025-07-11 | End: 2025-07-11

## 2025-07-11 RX ORDER — ASPIRIN 81 MG/1
81 TABLET ORAL DAILY
Status: DISCONTINUED | OUTPATIENT
Start: 2025-07-11 | End: 2025-07-17 | Stop reason: HOSPADM

## 2025-07-11 RX ADMIN — TETROFOSMIN 10.8 MILLICURIE: 1.38 INJECTION, POWDER, LYOPHILIZED, FOR SOLUTION INTRAVENOUS at 11:58

## 2025-07-11 RX ADMIN — PANTOPRAZOLE SODIUM 40 MG: 40 TABLET, DELAYED RELEASE ORAL at 16:57

## 2025-07-11 RX ADMIN — TIZANIDINE 4 MG: 4 TABLET ORAL at 21:16

## 2025-07-11 RX ADMIN — REGADENOSON 0.4 MG: 0.08 INJECTION, SOLUTION INTRAVENOUS at 13:10

## 2025-07-11 RX ADMIN — TIZANIDINE 4 MG: 4 TABLET ORAL at 06:44

## 2025-07-11 RX ADMIN — HEPARIN SODIUM 2000 UNITS: 1000 INJECTION INTRAVENOUS; SUBCUTANEOUS at 16:57

## 2025-07-11 RX ADMIN — GABAPENTIN 400 MG: 400 CAPSULE ORAL at 21:16

## 2025-07-11 RX ADMIN — AMIODARONE HYDROCHLORIDE 200 MG: 200 TABLET ORAL at 21:17

## 2025-07-11 RX ADMIN — ATORVASTATIN CALCIUM 40 MG: 40 TABLET, FILM COATED ORAL at 21:16

## 2025-07-11 RX ADMIN — METOPROLOL TARTRATE 100 MG: 50 TABLET, FILM COATED ORAL at 21:17

## 2025-07-11 RX ADMIN — LISINOPRIL 20 MG: 20 TABLET ORAL at 09:40

## 2025-07-11 RX ADMIN — Medication 10 ML: at 21:17

## 2025-07-11 RX ADMIN — ACETAMINOPHEN 650 MG: 325 TABLET ORAL at 06:44

## 2025-07-11 RX ADMIN — ALLOPURINOL 100 MG: 100 TABLET ORAL at 09:40

## 2025-07-11 RX ADMIN — METOPROLOL TARTRATE 75 MG: 50 TABLET, FILM COATED ORAL at 09:40

## 2025-07-11 RX ADMIN — TETROFOSMIN 31.8 MILLICURIE: 1.38 INJECTION, POWDER, LYOPHILIZED, FOR SOLUTION INTRAVENOUS at 13:10

## 2025-07-11 RX ADMIN — HEPARIN SODIUM 14 UNITS/KG/HR: 10000 INJECTION, SOLUTION INTRAVENOUS at 14:36

## 2025-07-11 RX ADMIN — HEPARIN SODIUM 2000 UNITS: 1000 INJECTION INTRAVENOUS; SUBCUTANEOUS at 09:44

## 2025-07-11 RX ADMIN — PANTOPRAZOLE SODIUM 40 MG: 40 TABLET, DELAYED RELEASE ORAL at 09:40

## 2025-07-11 RX ADMIN — AMIODARONE HYDROCHLORIDE 200 MG: 200 TABLET ORAL at 11:09

## 2025-07-11 RX ADMIN — AMIODARONE HYDROCHLORIDE 0.5 MG/MIN: 1.8 INJECTION, SOLUTION INTRAVENOUS at 09:38

## 2025-07-11 RX ADMIN — HEPARIN SODIUM 4000 UNITS: 1000 INJECTION INTRAVENOUS; SUBCUTANEOUS at 00:43

## 2025-07-11 RX ADMIN — ASPIRIN 81 MG: 81 TABLET, COATED ORAL at 14:57

## 2025-07-11 RX ADMIN — TIZANIDINE 4 MG: 4 TABLET ORAL at 14:58

## 2025-07-11 RX ADMIN — GABAPENTIN 400 MG: 400 CAPSULE ORAL at 14:57

## 2025-07-11 RX ADMIN — GABAPENTIN 400 MG: 400 CAPSULE ORAL at 09:40

## 2025-07-11 ASSESSMENT — PAIN SCALES - GENERAL
PAINLEVEL_OUTOF10: 8
PAINLEVEL_OUTOF10: 3

## 2025-07-11 ASSESSMENT — PAIN DESCRIPTION - LOCATION: LOCATION: KNEE;ANKLE

## 2025-07-11 ASSESSMENT — PAIN DESCRIPTION - ORIENTATION: ORIENTATION: RIGHT;LEFT

## 2025-07-11 NOTE — PLAN OF CARE
Problem: Pain  Goal: Verbalizes/displays adequate comfort level or baseline comfort level  7/11/2025 1415 by Elena Duffy, RN  Outcome: Progressing     Problem: Safety - Adult  Goal: Free from fall injury  7/11/2025 1415 by Elena Duffy, RN  Outcome: Progressing

## 2025-07-11 NOTE — PROGRESS NOTES
Hospital Medicine Progress Note  V 5.17      Date of Admission: 7/10/2025    Hospital Day: 2      Chief Admission Complaint: Transfer for cardiology evaluation      Subjective:    Patient my evaluation no acute distress today.  Heart rate better controlled medication.  He is on top.  Stress test today.    Presenting Admission History:       64 y.o. male who presented to [facility] as a transfer from outside hospital.  Records reviewed and discussed with patient RN.  Patient presented to OS for outpatient EGD for possible esophagitis.  PMH HTN, chronic gout, insomnia, DJD, alcohol abuse, PVD, and GERD. No cardiac history to his knowledge. Prior testing: Echo 5/19/22 EF=55%, no WMA; mild JACEK; mild AR and NH.        ECG prior to EGD showed AF RVR rate 117 IRBBB no ST changes.  Cardiology was contacted and evaluated patient, /100 at time, pt c/o  trouble swallowing pills solid foods.+mid-sternal, sharp CP occurring randomly without radiation or associated symptoms. Also c/o dizziness and orthostasis symptoms.  No syncope.  No palpitations.       He was diagnosed with new onset A-fib of unknown duration, uncontrolled HTN.  He was started on therapeutic dosing of Lovenox, metoprolol, diltiazem gtt. TSH normal 1.78 and Augustina negative 14 x 2.  Echo showed EF 30-35%.  Cardiology recommended transfer to [facility] for ischemia evaluation with [facility] given new cardiomyopathy along with age and HTN history. Also needs EP consultation for afib. Possibly tachycardia induced cardiomyopathy and may need aggressive rhythm control strategy.     Assessment/Plan:      Patient with RVR  -Cardiology consulted  - IV amiodarone  - IV heparin    Cardiomyopathy   EF 30 to 35%  -Cardiology consulted  - Stress test    Hypertension  - Lisinopril  - Metoprolol    Hyperlipidemia  - continue statin    Ongoing threat to life and/or bodily function without ongoing treatment due to: A-fib and cardiomyopathy    Consults:      IP

## 2025-07-11 NOTE — PLAN OF CARE
Problem: Pain  Goal: Verbalizes/displays adequate comfort level or baseline comfort level  7/11/2025 0704 by Elena Duffy RN  Outcome: Progressing     Problem: Safety - Adult  Goal: Free from fall injury  Outcome: Progressing

## 2025-07-11 NOTE — CONSULTS
Pharmacy to Manage Heparin Infusion per Hospital Nomogram    Oral factor Xa-inhibitors may alter and elevate anti-Xa levels used for unfractionated heparin monitoring. As a result, anti-Xa monitoring is not accurate while Xa-inhibitor activity is detectable. Utilize aPTT monitoring when patient received an oral factor Xa-inhibitor (apixaban, betrixaban, edoxaban or rivaroxaban) within 72 hours prior to admission (please document last administration time). The goal is to allow a washout of oral factor Xa-inhibitors by using aPTT for 72 hours, then change to ant-Xa levels for UFH.     Heparin (weight-based) Infusion: CAD/STEMI/NSTEMI/UA/AFib)   Heparin 60 units/kg IVP bolus (max 4,000 units) followed by Heparin infusion at 8 units/kg/hr (recommended max initial rate: 1000 units/hr).  Recheck anti-Xa (unless aPTT being used) in 6 hours.  Goal anti-Xa 0.3-0.7 IU/mL  Goal aPTT =  seconds.    Román Zaidi PharmD        7/10 2325  Anti-Xa: <0.10  2000 units of heparin bolus  Increase heparin infusion rate to 12 units/kg/hr  Recheck anti-Xa at 0630  Henok Macario PharmD 7/11/2025  12:32 AM    7/11  - Anti-Xa = 0.27 at 6:20am  - Plan:2,000 units bolus, increase infusion rate to 14 units/kg/hr  - Will recheck Anti-Xa level after 6 hrs ~ 15:45.  - PLT= 192K  Danielle Lilly/Bijalh. 7/11/25 9:43 AM EDT    7/11/2025  Recent Labs     07/11/25  1529   ANTIXAUHEP 0.22*   - Heparin _2000_ units IVP bolus and then continue Heparin infusion at _14_ units/kg/hr.   - Per RN heparin was off for a few hours while patient was in Nuc Med. Will re-bolus patient but keep rate the same.   - Recheck Anti-Xa at 2100.   Román Zaidi PharmD    7/11/2025 4:31 PM     7/11/2025  Recent Labs     07/11/25  2130   ANTIXAUHEP 0.43   - No change to Heparin at this time, continue Heparin infusion at _14_ units/kg/hr.   - Recheck Anti-Xa in 6 hours at 0330.   Román Zaidi, Tasha    7/11/2025 10:05 PM     7/12/2025  at 0338  anti-Xa level = 0.43

## 2025-07-11 NOTE — PROGRESS NOTES
[facility]   Daily Progress Note    Admit Date:  7/10/2025  HPI:   John Womack presented with esophagitis.  Before undergoing EGD he was found to be in atrial fibrillation with RVR.  Echocardiogram showed depressed LV function with EF 30 to 35%.  He was transferred from [facility] to [facility] for further evaluation.    Cardiology consulted for new A-fib with RVR, chest pain    PMH: Hypertension, hyperlipidemia, chronic gout, degenerative joint disease, alcohol abuse, PAD, GERD, esophagitis    Subjective:  Mr. Womack sitting up in bed, denies any palpitations, chest pain or shortness of breath.      Objective:   Patient Vitals for the past 24 hrs:   BP Temp Temp src Pulse Resp SpO2 Weight   07/11/25 0457 -- -- -- -- -- -- 113.5 kg (250 lb 3.2 oz)   07/11/25 0453 (!) 138/97 98.1 °F (36.7 °C) Oral 91 18 93 % --   07/10/25 2301 (!) 124/99 97.9 °F (36.6 °C) Oral 90 20 94 % --   07/10/25 2045 (!) 130/109 98.4 °F (36.9 °C) Oral 87 18 95 % --   07/10/25 1732 (!) 142/111 98.2 °F (36.8 °C) Oral 90 18 99 % --   07/10/25 1126 (!) 137/107 97.9 °F (36.6 °C) Oral 91 18 95 % --       Intake/Output Summary (Last 24 hours) at 7/11/2025 0905  Last data filed at 7/10/2025 1810  Gross per 24 hour   Intake 220 ml   Output --   Net 220 ml     Wt Readings from Last 3 Encounters:   07/11/25 113.5 kg (250 lb 3.2 oz)   07/09/25 114.3 kg (252 lb)   06/26/25 111.1 kg (245 lb)         ASSESSMENT:   Atrial fibrillation: New diagnosis, rate with better control, on IV amiodarone and IV heparin  Cardiomyopathy: EF 30-35%, no etiology, considering tachycardia mediated versus ischemic versus alcohol/toxin versus hypertension  HYPERTENSION: Suboptimal: On lisinopril 20 mg daily and metoprolol tartrate 75 mg twice daily  Chest pain: Atypical but concerning for angina given new A-fib and LV dysfunction  History esophagitis  History of alcohol abuse  Obesity  Gout    PLAN:  Stop IV amiodarone, transition to  Ordering of unique tests, medications, or procedures  (x ) Documentation within the EHR    Chief Complaint: "HE FEEL OUT OF BED"    HPI:85 YR OLD WHITE MAN WITH HX OF ESOPHAGEAL CA S/P 5 STENTS LAST YR. According to wife patient was ssent for angiography by his cardiologist Dr. Leif reid"preparation  for esophageal surgery. Of note is the fact that pt has not been ambulatory since the time of surgery due to sever weakness. Patient and wife denie chest pain sob palps    PMH:   Coronary artery disease  OA (osteoarthritis) of knee  Former smoker, stopped smoking in distant past  Former smoker  Rheumatoid arthritis  Seizure disorder  Asthma  Hyperlipidemia  BPH (benign prostatic hyperplasia)  HTN (hypertension)  Esophagus cancer  Chronic allergic rhinitis  BCC (basal cell carcinoma)  Cerebrovascular accident (CVA)  Anxiety    PSH:   Anastomotic leak following esophagectomy  History of tonsillectomy  H/O heart artery stent  Knee arthropathy  History of total hip replacement  History of hernia repair    Family History:  FAMILY HISTORY:  Family history of heart attack  Family history of pulmonary embolism: father @ 49 yr old  FH: CAD (coronary artery disease) (Sibling)      Social History:  Smoking: more than 1 ppd x10 yrs stopped 45 yrs ago  Alcohol:no  Drugs:no    Allergies:  penicillin (Rash)      Medications:  ALBUTerol/ipratropium for Nebulization 3 milliLiter(s) Nebulizer every 6 hours  ALPRAZolam 0.25 milliGRAM(s) Oral four times a day PRN  amitriptyline 10 milliGRAM(s) Oral three times a day  atorvastatin 20 milliGRAM(s) Oral at bedtime  clopidogrel Tablet 75 milliGRAM(s) Oral daily  enoxaparin Injectable 40 milliGRAM(s) SubCutaneous every 24 hours  finasteride 5 milliGRAM(s) Oral daily  folic acid 1 milliGRAM(s) Oral daily  furosemide   Injectable 40 milliGRAM(s) IV Push daily  levothyroxine 50 MICROGram(s) Oral daily  montelukast 10 milliGRAM(s) Oral daily  pantoprazole    Tablet 40 milliGRAM(s) Oral before breakfast  phenytoin   Capsule 100 milliGRAM(s) Oral every 6 hours  predniSONE   Tablet 5 milliGRAM(s) Oral daily  tamsulosin 0.4 milliGRAM(s) Oral at bedtime  tiotropium 18 MICROgram(s) Capsule 1 Capsule(s) Inhalation daily      REVIEW OF SYSTEMS:  CONSTITUTIONAL: No fever, weight loss, or fatigue  EYES: No eye pain, visual disturbances, or discharge  ENMT:  No difficulty hearing, tinnitus, vertigo; No sinus or throat pain  NECK: No pain or stiffness  BREASTS: No pain, masses, or nipple discharge  RESPIRATORY: No cough, wheezing, chills or hemoptysis; No shortness of breath  CARDIOVASCULAR: No chest pain, palpitations, dizziness, or leg swelling  GASTROINTESTINAL: No abdominal or epigastric pain. No nausea, vomiting, or hematemesis; No diarrhea or constipation. No melena or hematochezia.  GENITOURINARY: No dysuria, frequency, hematuria, or incontinence  NEUROLOGICAL: No headaches, memory loss, loss of strength, numbness, or tremors  SKIN: No itching, burning, rashes, or lesions   LYMPH NODES: No enlarged glands  ENDOCRINE: No heat or cold intolerance; No hair loss  MUSCULOSKELETAL: unable to walk due to weakness  PSYCHIATRIC: No depression, anxiety, mood swings, or difficulty sleeping  HEME/LYMPH: No easy bruising, or bleeding gums  ALLERY AND IMMUNOLOGIC: No hives or eczema    Physical Exam:  T(C): 36.6 (09-24-18 @ 08:17), Max: 36.6 (09-23-18 @ 17:00)  HR: 61 (09-24-18 @ 08:17) (61 - 88)  BP: 96/51 (09-24-18 @ 08:17) (93/44 - 112/56)  RR: 15 (09-24-18 @ 08:17) (15 - 28)  SpO2: 100% (09-24-18 @ 08:17) (95% - 100%)  Wt(kg): --    GENERAL: thin chronically ill appearing man  HEAD:  Atraumatic, Normocephalic  EYES: EOMI, conjunctiva and sclera clear  ENT: Moist mucous membranes,  NECK: Supple, No JVD, no bruits  CHEST/LUNG: Clear to percussion bilaterally; No rales, rhonchi, wheezing, or rubs  HEART: Regular rate and rhythm; No murmurs, rubs, or gallops PMI non displaced.  ABDOMEN: Soft, Nontender, Nondistended; Bowel sounds present  EXTREMITIES:  2+ Peripheral Pulses, No clubbing, cyanosis, or edema  SKIN: No rashes or lesions  NERVOUS SYSTEM:  unable to cooperate    Cardiovascular Diagnostic Testing:  ECG: rsr rbbb    Labs:                        9.5    4.8   )-----------( 158      ( 24 Sep 2018 07:10 )             31.1     09-24    140  |  103  |  24<H>  ----------------------------<  81  3.8   |  33<H>  |  0.74    Ca    9.5      24 Sep 2018 07:10  Mg     1.7     09-24    TPro  6.7  /  Alb  2.4<L>  /  TBili  0.1<L>  /  DBili  x   /  AST  27  /  ALT  39  /  AlkPhos  195<H>  09-24    PT/INR - ( 23 Sep 2018 17:00 )   PT: 11.6 sec;   INR: 1.04 ratio         PTT - ( 23 Sep 2018 17:00 )  PTT:34.6 sec  CARDIAC MARKERS ( 24 Sep 2018 07:10 )  <.017 ng/mL / x     / x     / x     / x      CARDIAC MARKERS ( 23 Sep 2018 23:15 )  <.017 ng/mL / x     / x     / x     / x      CARDIAC MARKERS ( 23 Sep 2018 17:00 )  <.017 ng/mL / x     / x     / x     / x          Serum Pro-Brain Natriuretic Peptide: 3981 pg/mL (09-23 @ 17:00)  echo 10 days ago-nl lvef lvh moderate to severe pulm htn mild mr mild tr mod as  cxr-mild congestion by report            Imaging:

## 2025-07-12 PROBLEM — R94.39 ABNORMAL CARDIOVASCULAR STRESS TEST: Status: ACTIVE | Noted: 2025-07-12

## 2025-07-12 LAB
ALBUMIN SERPL-MCNC: 4.1 G/DL (ref 3.4–5)
ANION GAP SERPL CALCULATED.3IONS-SCNC: 9 MMOL/L (ref 3–16)
ANTI-XA UNFRAC HEPARIN: 0.43 IU/ML (ref 0.3–0.7)
BUN SERPL-MCNC: 10 MG/DL (ref 7–20)
CALCIUM SERPL-MCNC: 9.7 MG/DL (ref 8.3–10.6)
CHLORIDE SERPL-SCNC: 105 MMOL/L (ref 99–110)
CO2 SERPL-SCNC: 27 MMOL/L (ref 21–32)
CREAT SERPL-MCNC: 0.9 MG/DL (ref 0.8–1.3)
DEPRECATED RDW RBC AUTO: 15.5 % (ref 12.4–15.4)
GFR SERPLBLD CREATININE-BSD FMLA CKD-EPI: >90 ML/MIN/{1.73_M2}
GLUCOSE SERPL-MCNC: 124 MG/DL (ref 70–99)
HCT VFR BLD AUTO: 41.8 % (ref 40.5–52.5)
HGB BLD-MCNC: 14.1 G/DL (ref 13.5–17.5)
MAGNESIUM SERPL-MCNC: 2.06 MG/DL (ref 1.8–2.4)
MCH RBC QN AUTO: 34.4 PG (ref 26–34)
MCHC RBC AUTO-ENTMCNC: 33.8 G/DL (ref 31–36)
MCV RBC AUTO: 101.7 FL (ref 80–100)
PHOSPHATE SERPL-MCNC: 4 MG/DL (ref 2.5–4.9)
PLATELET # BLD AUTO: 182 K/UL (ref 135–450)
PMV BLD AUTO: 10.4 FL (ref 5–10.5)
POTASSIUM SERPL-SCNC: 4.5 MMOL/L (ref 3.5–5.1)
RBC # BLD AUTO: 4.11 M/UL (ref 4.2–5.9)
SODIUM SERPL-SCNC: 141 MMOL/L (ref 136–145)
WBC # BLD AUTO: 6.3 K/UL (ref 4–11)

## 2025-07-12 PROCEDURE — 80069 RENAL FUNCTION PANEL: CPT

## 2025-07-12 PROCEDURE — 85027 COMPLETE CBC AUTOMATED: CPT

## 2025-07-12 PROCEDURE — 99232 SBSQ HOSP IP/OBS MODERATE 35: CPT | Performed by: NURSE PRACTITIONER

## 2025-07-12 PROCEDURE — 2500000003 HC RX 250 WO HCPCS: Performed by: STUDENT IN AN ORGANIZED HEALTH CARE EDUCATION/TRAINING PROGRAM

## 2025-07-12 PROCEDURE — 6370000000 HC RX 637 (ALT 250 FOR IP): Performed by: STUDENT IN AN ORGANIZED HEALTH CARE EDUCATION/TRAINING PROGRAM

## 2025-07-12 PROCEDURE — 6370000000 HC RX 637 (ALT 250 FOR IP): Performed by: NURSE PRACTITIONER

## 2025-07-12 PROCEDURE — 6360000002 HC RX W HCPCS: Performed by: INTERNAL MEDICINE

## 2025-07-12 PROCEDURE — 83735 ASSAY OF MAGNESIUM: CPT

## 2025-07-12 PROCEDURE — 1200000000 HC SEMI PRIVATE

## 2025-07-12 PROCEDURE — 85520 HEPARIN ASSAY: CPT

## 2025-07-12 RX ADMIN — ASPIRIN 81 MG: 81 TABLET, COATED ORAL at 09:08

## 2025-07-12 RX ADMIN — PANTOPRAZOLE SODIUM 40 MG: 40 TABLET, DELAYED RELEASE ORAL at 09:08

## 2025-07-12 RX ADMIN — Medication 10 ML: at 20:34

## 2025-07-12 RX ADMIN — GABAPENTIN 400 MG: 400 CAPSULE ORAL at 09:09

## 2025-07-12 RX ADMIN — AMIODARONE HYDROCHLORIDE 200 MG: 200 TABLET ORAL at 09:09

## 2025-07-12 RX ADMIN — METOPROLOL TARTRATE 100 MG: 50 TABLET, FILM COATED ORAL at 09:08

## 2025-07-12 RX ADMIN — LISINOPRIL 20 MG: 20 TABLET ORAL at 09:08

## 2025-07-12 RX ADMIN — TIZANIDINE 4 MG: 4 TABLET ORAL at 09:12

## 2025-07-12 RX ADMIN — DOXEPIN 3 MG: 3 TABLET, FILM COATED ORAL at 20:37

## 2025-07-12 RX ADMIN — GABAPENTIN 400 MG: 400 CAPSULE ORAL at 20:34

## 2025-07-12 RX ADMIN — HEPARIN SODIUM 14 UNITS/KG/HR: 10000 INJECTION, SOLUTION INTRAVENOUS at 06:14

## 2025-07-12 RX ADMIN — METOPROLOL TARTRATE 100 MG: 50 TABLET, FILM COATED ORAL at 20:34

## 2025-07-12 RX ADMIN — GABAPENTIN 400 MG: 400 CAPSULE ORAL at 14:24

## 2025-07-12 RX ADMIN — HEPARIN SODIUM 14 UNITS/KG/HR: 10000 INJECTION, SOLUTION INTRAVENOUS at 21:53

## 2025-07-12 RX ADMIN — AMIODARONE HYDROCHLORIDE 200 MG: 200 TABLET ORAL at 20:34

## 2025-07-12 RX ADMIN — TIZANIDINE 4 MG: 4 TABLET ORAL at 15:51

## 2025-07-12 RX ADMIN — ACETAMINOPHEN 650 MG: 325 TABLET ORAL at 15:51

## 2025-07-12 RX ADMIN — PANTOPRAZOLE SODIUM 40 MG: 40 TABLET, DELAYED RELEASE ORAL at 15:51

## 2025-07-12 RX ADMIN — ATORVASTATIN CALCIUM 40 MG: 40 TABLET, FILM COATED ORAL at 20:34

## 2025-07-12 RX ADMIN — ALLOPURINOL 100 MG: 100 TABLET ORAL at 09:08

## 2025-07-12 NOTE — PLAN OF CARE
Problem: Discharge Planning  Goal: Discharge to home or other facility with appropriate resources  Outcome: Progressing  Flowsheets (Taken 7/12/2025 0001)  Discharge to home or other facility with appropriate resources: Identify barriers to discharge with patient and caregiver     Problem: Safety - Adult  Goal: Free from fall injury  7/12/2025 0001 by Codie Sanchez, RN  Outcome: Progressing     Problem: Pain  Goal: Verbalizes/displays adequate comfort level or baseline comfort level  7/12/2025 0001 by Codie Sanchez RN  Outcome: Progressing  Flowsheets (Taken 7/12/2025 0001)  Verbalizes/displays adequate comfort level or baseline comfort level: Encourage patient to monitor pain and request assistance

## 2025-07-12 NOTE — PROGRESS NOTES
[facility] Medicine Progress Note  V 5.17      Date of Admission: 7/10/2025    Hospital Day: 3      Chief Admission Complaint: Transfer for cardiology evaluation      Subjective:    Patient in no acute distress. Stress test was abnormal and will have cardiac cath this admission    Presenting Admission History:       64 y.o. male who presented to [facility] as a transfer from outside hospital.  Records reviewed and discussed with patient RN.  Patient presented to OSH for outpatient EGD for possible esophagitis.  PMH HTN, chronic gout, insomnia, DJD, alcohol abuse, PVD, and GERD. No cardiac history to his knowledge. Prior testing: Echo 5/19/22 EF=55%, no WMA; mild JACEK; mild AR and NE.        ECG prior to EGD showed AF RVR rate 117 IRBBB no ST changes.  Cardiology was contacted and evaluated patient, /100 at time, pt c/o  trouble swallowing pills solid foods.+mid-sternal, sharp CP occurring randomly without radiation or associated symptoms. Also c/o dizziness and orthostasis symptoms.  No syncope.  No palpitations.       He was diagnosed with new onset A-fib of unknown duration, uncontrolled HTN.  He was started on therapeutic dosing of Lovenox, metoprolol, diltiazem gtt. TSH normal 1.78 and Augustina negative 14 x 2.  Echo showed EF 30-35%.  Cardiology recommended transfer to [facility] for ischemia evaluation with [facility] given new cardiomyopathy along with age and HTN history. Also needs EP consultation for afib. Possibly tachycardia induced cardiomyopathy and may need aggressive rhythm control strategy.     Assessment/Plan:      Patient with RVR  -Cardiology consulted  - IV amiodarone  - IV heparin    Cardiomyopathy   EF 30 to 35%  -Cardiology consulted  - Stress test abnormal  - cardiac cath 7/14    Hypertension  - Lisinopril  - Metoprolol    Hyperlipidemia  - continue statin    Ongoing threat to life and/or bodily function without ongoing treatment due to: A-fib and cardiomyopathy    Consults:      IP

## 2025-07-12 NOTE — PLAN OF CARE
Problem: Safety - Adult  Goal: Free from fall injury  7/12/2025 0941 by Danny Ryan, RN  Outcome: Progressing     Problem: Pain  Goal: Verbalizes/displays adequate comfort level or baseline comfort level  7/12/2025 0941 by Danny Ryan, RN  Outcome: Progressing     Problem: Discharge Planning  Goal: Discharge to home or other facility with appropriate resources  7/12/2025 0941 by Danny Ryan, RN  Outcome: Progressing

## 2025-07-12 NOTE — PROGRESS NOTES
[facility]   Daily Progress Note    Admit Date:  7/10/2025  HPI:   John Womack presented with esophagitis.  Before undergoing EGD he was found to be in atrial fibrillation with RVR.  Echocardiogram showed depressed LV function with EF 30 to 35%.  He was transferred from [facility] to [facility] for further evaluation.    Cardiology consulted for new A-fib with RVR, chest pain    PMH: Hypertension, hyperlipidemia, chronic gout, degenerative joint disease, alcohol abuse, PAD, GERD, esophagitis    Subjective:  Mr. Womack sitting up on side of bed with multiple family members in room.  Denies any chest pain or shortness of breath.      Reviewed results of echocardiogram and stress test.  Recommend left heart catheterization to evaluate for coronary artery disease and possible percutaneous coronary intervention.  Other possibilities reviewed.     DISCUSSION OF CARDIAC CATHETERIZATION PROCEDURES: The procedures, indications, risks and alternatives have been discussed with the patient and, as appropriate, with the patient's guardian . Risks discussed included, but are not limited to, bleeding, development of blood clots/emboli, damage to blood vessels, renal failure, malignant cardiac arrhythmias, stroke, heart attack, emergent coronary bypass surgery, death, dye allergy.  The patient (and guardian as appropriate) expressed understanding of the aforementioned and wished to proceed.       Objective:   Patient Vitals for the past 24 hrs:   BP Temp Temp src Pulse Resp SpO2 Height Weight   07/12/25 1128 (!) 81/62 97.7 °F (36.5 °C) Oral 97 18 97 % -- --   07/12/25 0900 (!) 140/90 97.9 °F (36.6 °C) Oral 95 18 99 % -- --   07/12/25 0438 (!) 139/100 98.1 °F (36.7 °C) Oral 84 16 97 % -- 112.9 kg (248 lb 12.8 oz)   07/12/25 0001 -- -- -- -- -- -- 1.778 m (5' 10\") --   07/11/25 2348 115/75 97.8 °F (36.6 °C) Oral 79 18 94 % -- --   07/11/25 2111 (!) 127/92 97.9 °F (36.6 °C) Oral 89 16 93 % -- --  35%. EF by 2D Simpsons Biplane is 32%. Left ventricle size is normal. Moderately increased wall thickness. Findings consistent with moderate concentric hypertrophy. Moderate global hypokinesis present.    Right Ventricle: Right ventricle size is normal. Normal systolic function. TAPSE is normal.    Aortic Valve: Trileaflet valve. Mild sclerosis of the aortic valve cusps. Mild regurgitation.    Mitral Valve: Mild regurgitation.    Tricuspid Valve: Mild regurgitation. Normal RVSP. RVSP is 32 mmHg.    Left Atrium: Left atrium is mildly dilated.    Right Atrium: Right atrium is mildly dilated.    Aorta: Normal sized aortic root. Moderately dilated ascending aorta. Ao ascending diameter is 4.3 cm.    Image quality is technically difficult. Contrast used: Lumason. Technically difficult study.     Echocardiogram 5/19/2022:   Summary   LV systolic function is normal with EF estimated at 55%.   No regional wall motion abnormalities.   There is mild concentric left ventricular hypertrophy.   Normal left ventricular diastolic filling pressure.   The right atrium is mildly dilated.   Mild aortic and pulmonic regurgitation.   Inadequate tricuspid regurgitation jet to estimate systolic artery pressure   (SPAP).      Time Based Itemization  A total of 50 minutes was spent on today's patient encounter.  If applicable, non-patient-facing activities:  (x )Preparing to see the patient and reviewing records  (x ) Individual interpretation of results  (x) Discussion or coordination of care with other health care professionals  (x ) Ordering of unique tests, medications, or procedures  (x ) Documentation within the EHR

## 2025-07-12 NOTE — PROGRESS NOTES
4 Eyes Skin Assessment     The patient is being assess for  Shift Handoff    I agree that 2 RN's have performed a thorough Head to Toe Skin Assessment on the patient. ALL assessment sites listed below have been assessed.       Areas assessed by both nurses: SERGE Mackay/SERGE Escalera  [x]   Head, Face, and Ears   [x]   Shoulders, Back, and Chest  [x]   Arms, Elbows, and Hands   [x]   Coccyx, Sacrum, and Ischum  [x]   Legs, Feet, and Heels        Does the Patient have Skin Breakdown?  No         Jesus Prevention initiated:  No   Wound Care Orders initiated:  No      WOC nurse consulted for Pressure Injury (Stage 3,4, Unstageable, DTI, NWPT, and Complex wounds):  No      Nurse 1 eSignature: Electronically signed by Codie Sanchez RN on 7/12/25 at 12:09 AM EDT    **SHARE this note so that the co-signing nurse is able to place an eSignature**    Nurse 2 eSignature: Electronically signed by Lali Stone RN on 7/12/25 at 6:09 AM EDT

## 2025-07-13 LAB — ANTI-XA UNFRAC HEPARIN: 0.5 IU/ML (ref 0.3–0.7)

## 2025-07-13 PROCEDURE — 36415 COLL VENOUS BLD VENIPUNCTURE: CPT

## 2025-07-13 PROCEDURE — 1200000000 HC SEMI PRIVATE

## 2025-07-13 PROCEDURE — 6370000000 HC RX 637 (ALT 250 FOR IP): Performed by: NURSE PRACTITIONER

## 2025-07-13 PROCEDURE — 99232 SBSQ HOSP IP/OBS MODERATE 35: CPT | Performed by: NURSE PRACTITIONER

## 2025-07-13 PROCEDURE — 2500000003 HC RX 250 WO HCPCS: Performed by: STUDENT IN AN ORGANIZED HEALTH CARE EDUCATION/TRAINING PROGRAM

## 2025-07-13 PROCEDURE — 85520 HEPARIN ASSAY: CPT

## 2025-07-13 PROCEDURE — 6370000000 HC RX 637 (ALT 250 FOR IP): Performed by: STUDENT IN AN ORGANIZED HEALTH CARE EDUCATION/TRAINING PROGRAM

## 2025-07-13 RX ADMIN — ALLOPURINOL 100 MG: 100 TABLET ORAL at 08:32

## 2025-07-13 RX ADMIN — GABAPENTIN 400 MG: 400 CAPSULE ORAL at 20:51

## 2025-07-13 RX ADMIN — ATORVASTATIN CALCIUM 40 MG: 40 TABLET, FILM COATED ORAL at 20:51

## 2025-07-13 RX ADMIN — GABAPENTIN 400 MG: 400 CAPSULE ORAL at 13:37

## 2025-07-13 RX ADMIN — METOPROLOL TARTRATE 100 MG: 50 TABLET, FILM COATED ORAL at 08:32

## 2025-07-13 RX ADMIN — AMIODARONE HYDROCHLORIDE 200 MG: 200 TABLET ORAL at 20:51

## 2025-07-13 RX ADMIN — PANTOPRAZOLE SODIUM 40 MG: 40 TABLET, DELAYED RELEASE ORAL at 18:08

## 2025-07-13 RX ADMIN — METOPROLOL TARTRATE 100 MG: 50 TABLET, FILM COATED ORAL at 20:52

## 2025-07-13 RX ADMIN — LISINOPRIL 20 MG: 20 TABLET ORAL at 08:32

## 2025-07-13 RX ADMIN — DOXEPIN 3 MG: 3 TABLET, FILM COATED ORAL at 21:07

## 2025-07-13 RX ADMIN — Medication 10 ML: at 22:36

## 2025-07-13 RX ADMIN — ACETAMINOPHEN 650 MG: 325 TABLET ORAL at 19:32

## 2025-07-13 RX ADMIN — TIZANIDINE 4 MG: 4 TABLET ORAL at 19:32

## 2025-07-13 RX ADMIN — TIZANIDINE 4 MG: 4 TABLET ORAL at 08:32

## 2025-07-13 RX ADMIN — ASPIRIN 81 MG: 81 TABLET, COATED ORAL at 08:32

## 2025-07-13 RX ADMIN — PANTOPRAZOLE SODIUM 40 MG: 40 TABLET, DELAYED RELEASE ORAL at 08:32

## 2025-07-13 RX ADMIN — AMIODARONE HYDROCHLORIDE 200 MG: 200 TABLET ORAL at 08:32

## 2025-07-13 RX ADMIN — GABAPENTIN 400 MG: 400 CAPSULE ORAL at 08:32

## 2025-07-13 ASSESSMENT — PAIN SCALES - GENERAL
PAINLEVEL_OUTOF10: 0
PAINLEVEL_OUTOF10: 5

## 2025-07-13 NOTE — PLAN OF CARE
Problem: Discharge Planning  Goal: Discharge to home or other facility with appropriate resources  7/13/2025 1021 by Danny Ryan, RN  Outcome: Progressing     Problem: Safety - Adult  Goal: Free from fall injury  7/13/2025 1021 by Danny Ryan, RN  Outcome: Progressing     Problem: Pain  Goal: Verbalizes/displays adequate comfort level or baseline comfort level  7/13/2025 1021 by Danny Ryan, RN  Outcome: Progressing

## 2025-07-13 NOTE — PROGRESS NOTES
[facility] Medicine Progress Note  V 5.17      Date of Admission: 7/10/2025    Hospital Day: 4      Chief Admission Complaint: Transfer for cardiology evaluation      Subjective:    Patient with no acute complaints for me this morning.  Discussed with cardiology will have GI see the patient as he may need EGD this admission.  We need to ensure that he is able to tolerate his antiplatelets and if intervention is done it would be best to do before initiation of Plavix or alternative if required    Presenting Admission History:       64 y.o. male who presented to [facility] as a transfer from outside hospital.  Records reviewed and discussed with patient RN.  Patient presented to OSH for outpatient EGD for possible esophagitis.  PMH HTN, chronic gout, insomnia, DJD, alcohol abuse, PVD, and GERD. No cardiac history to his knowledge. Prior testing: Echo 5/19/22 EF=55%, no WMA; mild JACEK; mild AR and MT.        ECG prior to EGD showed AF RVR rate 117 IRBBB no ST changes.  Cardiology was contacted and evaluated patient, /100 at time, pt c/o  trouble swallowing pills solid foods.+mid-sternal, sharp CP occurring randomly without radiation or associated symptoms. Also c/o dizziness and orthostasis symptoms.  No syncope.  No palpitations.       He was diagnosed with new onset A-fib of unknown duration, uncontrolled HTN.  He was started on therapeutic dosing of Lovenox, metoprolol, diltiazem gtt. TSH normal 1.78 and Augustina negative 14 x 2.  Echo showed EF 30-35%.  Cardiology recommended transfer to [facility] for ischemia evaluation with [facility] given new cardiomyopathy along with age and HTN history. Also needs EP consultation for afib. Possibly tachycardia induced cardiomyopathy and may need aggressive rhythm control strategy.     Assessment/Plan:      Patient with RVR  -Cardiology consulted  - IV amiodarone  - IV heparin    Cardiomyopathy   EF 30 to 35%  -Cardiology consulted  - Stress test abnormal  - cardiac

## 2025-07-13 NOTE — PROGRESS NOTES
[facility]   Daily Progress Note    Admit Date:  7/10/2025  HPI:   John Womack presented with esophagitis.  Before undergoing EGD he was found to be in atrial fibrillation with RVR.  Echocardiogram showed depressed LV function with EF 30 to 35%.  He was transferred from [facility] to [facility] for further evaluation.    Cardiology consulted for new A-fib with RVR, chest pain    PMH: Hypertension, hyperlipidemia, chronic gout, degenerative joint disease, alcohol abuse, PAD, GERD, esophagitis    Subjective:  Mr. Womack lying nearly flat in bed.  Denies any palpitations ,chest pain or shortness of breath.  His main concern is difficult swallowing pills and food.    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       Objective:   Patient Vitals for the past 24 hrs:   BP Temp Temp src Pulse Resp SpO2   07/13/25 0828 (!) 149/89 97.5 °F (36.4 °C) Oral 85 18 98 %   07/13/25 0440 (!) 128/101 97.3 °F (36.3 °C) Oral 74 16 96 %   07/12/25 2338 (!) 145/96 97.6 °F (36.4 °C) Oral 81 18 94 %   07/12/25 1950 102/74 97.5 °F (36.4 °C) Oral 64 16 94 %   07/12/25 1544 112/86 97.8 °F (36.6 °C) Oral 78 20 96 %   07/12/25 1128 (!) 81/62 97.7 °F (36.5 °C) Oral 97 18 97 %       Intake/Output Summary (Last 24 hours) at 7/13/2025 1023  Last data filed at 7/13/2025 0921  Gross per 24 hour   Intake 1059 ml   Output 1250 ml   Net -191 ml     Wt Readings from Last 3 Encounters:   07/12/25 112.9 kg (248 lb 12.8 oz)   07/09/25 114.3 kg (252 lb)   06/26/25 111.1 kg (245 lb)         ASSESSMENT:   Atrial fibrillation: New diagnosis, rate with better control, on IV heparin and po Amio 200 mg bid  Cardiomyopathy: EF 30-35%, no etiology, considering tachycardia mediated versus ischemic versus alcohol/toxin versus hypertension  HYPERTENSION: improving, on lisinopril 20 mg daily and metoprolol tartrate 100 mg twice daily  Chest pain: Atypical but concerning for angina given new A-fib and LV  sodium chloride      heparin (PORCINE) Infusion 14 Units/kg/hr (07/12/25 3567)       Lab Data: Lab results independently reviewed and analyzed by myself 7/13/2025    CBC:   Recent Labs     07/10/25  1629 07/11/25  0620 07/12/25  0338   WBC 5.1 5.4 6.3   HGB 14.2 14.2 14.1    192 182     BMP:    Recent Labs     07/11/25  0620 07/12/25  0338    141   K 4.2 4.5   CO2 26 27   BUN 9 10   CREATININE 0.7* 0.9     INR:    Recent Labs     07/10/25  1629   INR 1.23*     BNP:  No results for input(s): \"PROBNP\" in the last 72 hours.  Cardiac Enzymes:   Lab Results   Component Value Date    TROPHS 14 07/08/2025    TROPHS 14 07/08/2025     Lipids:   Lab Results   Component Value Date/Time    TRIG 165 03/01/2022 11:08 AM    HDL 49 03/01/2022 11:08 AM     Lab Results   Component Value Date/Time    TRIG 165 03/01/2022 11:08 AM    HDL 49 03/01/2022 11:08 AM     03/01/2022 11:08 AM       Cardiac Imaging:   STRESS TEST 7/11/2025:     Stress Combined Conclusion: Small sized partially reversible defect apical-lateral wall c/w mixed ischemia and infarct. LV function moderate-severely reduced 30% with global hypokinesis. Intermediate-higher risk abnormal study based on LV dysfunction.    Stress Test: A pharmacological stress test was performed using regadenoson (Lexiscan). PO caffeine given as a reversal agent.    Resting ECG: atrial fibrillation; NST changes; RSR' c/w RV conduction delay    Stress ECG: The stress ECG was not diagnostic due to failure to achieve target heart rate.     Echo 7/9/2025:    Left Ventricle: Severely reduced left ventricular systolic function with a visually estimated EF of 30 - 35%. EF by 2D Simpsons Biplane is 32%. Left ventricle size is normal. Moderately increased wall thickness. Findings consistent with moderate concentric hypertrophy. Moderate global hypokinesis present.    Right Ventricle: Right ventricle size is normal. Normal systolic function. TAPSE is normal.    Aortic Valve:

## 2025-07-13 NOTE — PROGRESS NOTES
Pt supply of Doxepin 3mg has been counted and verified by writer and Lali VALENTINE.  Release and Indemnification form and Pt receipt and storage paperwork signed and placed with medication in lock box located in the A2 med room at this time.

## 2025-07-13 NOTE — PLAN OF CARE
Problem: Discharge Planning  Goal: Discharge to home or other facility with appropriate resources  7/12/2025 2255 by Codie Sanchez, RN  Outcome: Progressing  Flowsheets (Taken 7/12/2025 0001)  Discharge to home or other facility with appropriate resources: Identify barriers to discharge with patient and caregiver     Problem: Safety - Adult  Goal: Free from fall injury  7/12/2025 2255 by Codie Sanchez, RN  Outcome: Progressing     Problem: Pain  Goal: Verbalizes/displays adequate comfort level or baseline comfort level  7/12/2025 2255 by Codie Sanchez, RN  Outcome: Progressing  Flowsheets (Taken 7/12/2025 2255)  Verbalizes/displays adequate comfort level or baseline comfort level: Encourage patient to monitor pain and request assistance

## 2025-07-14 LAB
ANTI-XA UNFRAC HEPARIN: 0.45 IU/ML (ref 0.3–0.7)
DEPRECATED RDW RBC AUTO: 15.4 % (ref 12.4–15.4)
HCT VFR BLD AUTO: 41.2 % (ref 40.5–52.5)
HGB BLD-MCNC: 14.2 G/DL (ref 13.5–17.5)
MCH RBC QN AUTO: 34.8 PG (ref 26–34)
MCHC RBC AUTO-ENTMCNC: 34.6 G/DL (ref 31–36)
MCV RBC AUTO: 100.6 FL (ref 80–100)
PLATELET # BLD AUTO: 187 K/UL (ref 135–450)
PMV BLD AUTO: 10 FL (ref 5–10.5)
RBC # BLD AUTO: 4.1 M/UL (ref 4.2–5.9)
WBC # BLD AUTO: 4.4 K/UL (ref 4–11)

## 2025-07-14 PROCEDURE — 1200000000 HC SEMI PRIVATE

## 2025-07-14 PROCEDURE — 6370000000 HC RX 637 (ALT 250 FOR IP): Performed by: NURSE PRACTITIONER

## 2025-07-14 PROCEDURE — 99232 SBSQ HOSP IP/OBS MODERATE 35: CPT | Performed by: NURSE PRACTITIONER

## 2025-07-14 PROCEDURE — 36415 COLL VENOUS BLD VENIPUNCTURE: CPT

## 2025-07-14 PROCEDURE — 6360000002 HC RX W HCPCS: Performed by: INTERNAL MEDICINE

## 2025-07-14 PROCEDURE — 85027 COMPLETE CBC AUTOMATED: CPT

## 2025-07-14 PROCEDURE — 85520 HEPARIN ASSAY: CPT

## 2025-07-14 PROCEDURE — 6370000000 HC RX 637 (ALT 250 FOR IP): Performed by: STUDENT IN AN ORGANIZED HEALTH CARE EDUCATION/TRAINING PROGRAM

## 2025-07-14 RX ORDER — OXYCODONE AND ACETAMINOPHEN 5; 325 MG/1; MG/1
1 TABLET ORAL EVERY 4 HOURS PRN
Refills: 0 | Status: DISCONTINUED | OUTPATIENT
Start: 2025-07-14 | End: 2025-07-17 | Stop reason: HOSPADM

## 2025-07-14 RX ADMIN — ALLOPURINOL 100 MG: 100 TABLET ORAL at 09:08

## 2025-07-14 RX ADMIN — DOXEPIN 3 MG: 3 TABLET, FILM COATED ORAL at 20:48

## 2025-07-14 RX ADMIN — GABAPENTIN 400 MG: 400 CAPSULE ORAL at 16:02

## 2025-07-14 RX ADMIN — LISINOPRIL 20 MG: 20 TABLET ORAL at 09:09

## 2025-07-14 RX ADMIN — PANTOPRAZOLE SODIUM 40 MG: 40 TABLET, DELAYED RELEASE ORAL at 18:38

## 2025-07-14 RX ADMIN — ASPIRIN 81 MG: 81 TABLET, COATED ORAL at 09:08

## 2025-07-14 RX ADMIN — PANTOPRAZOLE SODIUM 40 MG: 40 TABLET, DELAYED RELEASE ORAL at 09:08

## 2025-07-14 RX ADMIN — GABAPENTIN 400 MG: 400 CAPSULE ORAL at 09:08

## 2025-07-14 RX ADMIN — TIZANIDINE 4 MG: 4 TABLET ORAL at 18:44

## 2025-07-14 RX ADMIN — METOPROLOL TARTRATE 100 MG: 50 TABLET, FILM COATED ORAL at 20:44

## 2025-07-14 RX ADMIN — AMIODARONE HYDROCHLORIDE 200 MG: 200 TABLET ORAL at 09:09

## 2025-07-14 RX ADMIN — HEPARIN SODIUM 14 UNITS/KG/HR: 10000 INJECTION, SOLUTION INTRAVENOUS at 08:05

## 2025-07-14 RX ADMIN — OXYCODONE AND ACETAMINOPHEN 1 TABLET: 5; 325 TABLET ORAL at 21:42

## 2025-07-14 RX ADMIN — ATORVASTATIN CALCIUM 40 MG: 40 TABLET, FILM COATED ORAL at 20:48

## 2025-07-14 RX ADMIN — METOPROLOL TARTRATE 100 MG: 50 TABLET, FILM COATED ORAL at 09:09

## 2025-07-14 RX ADMIN — AMIODARONE HYDROCHLORIDE 200 MG: 200 TABLET ORAL at 20:48

## 2025-07-14 RX ADMIN — GABAPENTIN 400 MG: 400 CAPSULE ORAL at 20:44

## 2025-07-14 ASSESSMENT — PAIN SCALES - GENERAL
PAINLEVEL_OUTOF10: 0
PAINLEVEL_OUTOF10: 7
PAINLEVEL_OUTOF10: 7
PAINLEVEL_OUTOF10: 0
PAINLEVEL_OUTOF10: 0
PAINLEVEL_OUTOF10: 5

## 2025-07-14 ASSESSMENT — PAIN DESCRIPTION - ORIENTATION
ORIENTATION: LEFT;RIGHT
ORIENTATION: RIGHT

## 2025-07-14 ASSESSMENT — PAIN DESCRIPTION - LOCATION
LOCATION: BACK;KNEE
LOCATION: KNEE
LOCATION: KNEE

## 2025-07-14 ASSESSMENT — PAIN DESCRIPTION - DESCRIPTORS: DESCRIPTORS: ACHING;DISCOMFORT

## 2025-07-14 NOTE — PROGRESS NOTES
Shift Summary    Admission Diagnosis: Cardiomyopathy (HCC)    Shift Events:  - Heparin gtt infusing per order - remains therapeutic   - Patient ambulating without assistance     Vitals:  Vitals:    07/13/25 2048 07/13/25 2351 07/14/25 0553 07/14/25 0603   BP: 116/84 110/79  (!) 147/93   Pulse: 93 80  91   Resp: 16 18  18   Temp: 97.4 °F (36.3 °C) 98.1 °F (36.7 °C)  97.5 °F (36.4 °C)   TempSrc: Oral Oral  Oral   SpO2: 94% 93%  98%   Weight:   112.6 kg (248 lb 3.2 oz)    Height:          room air    WEIGHT: Admit Weight - Scale: 114.8 kg (253 lb 1.6 oz)      Today  Weight - Scale: 112.6 kg (248 lb 3.2 oz)    Tele:  atrial fibrillation - controlled    IV/Line:  Peripheral IV 07/13/25 Right;Anterior Forearm (Active)   Site Assessment Clean, dry & intact 07/14/25 0600   Line Status Infusing 07/14/25 0600   Line Care Connections checked and tightened 07/14/25 0600   Phlebitis Assessment No symptoms 07/14/25 0600   Infiltration Assessment 0 07/14/25 0600   Alcohol Cap Used Yes 07/14/25 0600   Dressing Status Clean, dry & intact 07/14/25 0600   Dressing Type Transparent 07/14/25 0600     Neuro:   oriented to time, place, person and situation    I/O:   I/O this shift:  In: 360 [P.O.:360]  Out: -

## 2025-07-14 NOTE — PROGRESS NOTES
[facility] Medicine Progress Note  V 5.17      Date of Admission: 7/10/2025    Hospital Day: 5      Chief Admission Complaint: Transfer for cardiology evaluation      Subjective:  No new complaints.    Presenting Admission History:       64 y.o. male who presented to [facility] as a transfer from outside hospital.  Records reviewed and discussed with patient RN.  Patient presented to OSH for outpatient EGD for possible esophagitis.  PMH HTN, chronic gout, insomnia, DJD, alcohol abuse, PVD, and GERD. No cardiac history to his knowledge. Prior testing: Echo 5/19/22 EF=55%, no WMA; mild JACEK; mild AR and AK.        ECG prior to EGD showed AF RVR rate 117 IRBBB no ST changes.  Cardiology was contacted and evaluated patient, /100 at time, pt c/o  trouble swallowing pills solid foods.+mid-sternal, sharp CP occurring randomly without radiation or associated symptoms. Also c/o dizziness and orthostasis symptoms.  No syncope.  No palpitations.       He was diagnosed with new onset A-fib of unknown duration, uncontrolled HTN.  He was started on therapeutic dosing of Lovenox, metoprolol, diltiazem gtt. TSH normal 1.78 and Augustina negative 14 x 2.  Echo showed EF 30-35%.  Cardiology recommended transfer to [facility] for ischemia evaluation with LHC given new cardiomyopathy along with age and HTN history. Also needs EP consultation for afib. Possibly tachycardia induced cardiomyopathy and may need aggressive rhythm control strategy.     Assessment/Plan:      Atrial Fibrillation with RVR  - Acute paroxysmal, of unspecified and clinically unable to determine etiology  - Rate controlled on BBlocker and Amiodarone - continued  - continue heparin gtt. Will start NOAC after procedures    Cardiomyopathy   - echo with EF 30 to 35%  - started on lisinopril, metoprolol  - appears to be euvolemic  - stress test with increased risk  - Cardiology consulted. Plan for LHC following EGD    Esophagitis/dysphagia  - continue PPI

## 2025-07-14 NOTE — PROGRESS NOTES
Received consult. Upon reviewing chart it appears patient follows with [facility]. Their service is aware of the consult.    Electronically signed by: HERRERA Valadez 7/14/2025 7:53 AM           (O) [phone]  (F) [phone]  Available via perfect serve

## 2025-07-14 NOTE — PLAN OF CARE
Problem: Safety - Adult  Goal: Free from fall injury  7/14/2025 1204 by Elena Duffy, RN  Outcome: Progressing     Problem: Pain  Goal: Verbalizes/displays adequate comfort level or baseline comfort level  7/14/2025 1204 by Elena Duffy, RN  Outcome: Progressing

## 2025-07-14 NOTE — PROGRESS NOTES
Call out to GI at this time to clarify if patient can eat or if they will be able to scope today.

## 2025-07-14 NOTE — PLAN OF CARE
Problem: Discharge Planning  Goal: Discharge to home or other facility with appropriate resources  Outcome: Progressing     Problem: Safety - Adult  Goal: Free from fall injury  Outcome: Progressing     Problem: Pain  Goal: Verbalizes/displays adequate comfort level or baseline comfort level  Outcome: Progressing     Problem: Cardiovascular - Adult  Goal: Maintains optimal cardiac output and hemodynamic stability  Outcome: Progressing  Goal: Absence of cardiac dysrhythmias or at baseline  Outcome: Progressing     Problem: Gastrointestinal - Adult  Goal: Maintains or returns to baseline bowel function  Outcome: Progressing

## 2025-07-14 NOTE — PROGRESS NOTES
esophagitis/esophageal stricture - GI consulted.  Obesity  Hx of EtOH abuse - now abstinent.  Suspicious for BETHANIE - needs formal outpatient evaluation - discussed with patient.      PLAN:  Continue amiodarone 200 mg BID x 7 days, then decrease to 200 mg daily.  Not a good candidate for CCB due to reduced EF.  Continue IV heparin for now until procedures completed.  He will need to transition to DOAC following.    Planning for left heart cath after GI work-up.    Continue ASA, statin.  Continue metoprolol tartrate 100 mg BID and lisinopril 20 mg daily.  Consider adding MRA for GDMT.    Outpatient referral for sleep study ordered.        Corina Sehrwood, APRN - CNP, 7/14/2025, 8:13 AM  [facility]   [phone]       Telemetry independently reviewed and interpreted by me today and shows: atrial fibrillation, rates 80s-90s  NYHA: III    Physical Exam:  General:  Awake, alert, NAD  Skin:  Warm and dry  Neck:  No JVD.  Chest:  Clear throughout.  On room air.  Cardiovascular:  Irregularly irregular, normal S1S2,   Abdomen:  Soft, nontender, +bowel sounds  Extremities:  trace edema        Medications:    amiodarone  200 mg Oral BID    Followed by    [START ON 7/18/2025] amiodarone  200 mg Oral Daily    metoprolol tartrate  100 mg Oral BID    aspirin  81 mg Oral Daily    atorvastatin  40 mg Oral Nightly    allopurinol  100 mg Oral Daily    doxepin  3 mg Oral Nightly    gabapentin  400 mg Oral TID    lidocaine  1 patch TransDERmal Daily    sodium chloride flush  5-40 mL IntraVENous 2 times per day    lisinopril  20 mg Oral Daily    pantoprazole  40 mg Oral BID AC      sodium chloride      heparin (PORCINE) Infusion 14 Units/kg/hr (07/14/25 0805)       Lab Data: Lab results independently reviewed and analyzed by myself 7/14/2025    CBC:   Recent Labs     07/12/25  0338 07/14/25  0428   WBC 6.3 4.4   HGB 14.1 14.2    187     BMP:    Recent Labs     07/12/25  0338      K 4.5   CO2 27   BUN 10   CREATININE  0.9     INR:  No results for input(s): \"INR\" in the last 72 hours.  BNP:  No results for input(s): \"PROBNP\" in the last 72 hours.  Cardiac Enzymes:   Lab Results   Component Value Date    TROPHS 14 07/08/2025    TROPHS 14 07/08/2025     Lipids:   Lab Results   Component Value Date/Time    TRIG 165 03/01/2022 11:08 AM    HDL 49 03/01/2022 11:08 AM     Lab Results   Component Value Date/Time    TRIG 165 03/01/2022 11:08 AM    HDL 49 03/01/2022 11:08 AM     03/01/2022 11:08 AM       Cardiac Imaging:     STRESS TEST 7/11/2025:     Stress Combined Conclusion: Small sized partially reversible defect apical-lateral wall c/w mixed ischemia and infarct. LV function moderate-severely reduced 30% with global hypokinesis. Intermediate-higher risk abnormal study based on LV dysfunction.    Stress Test: A pharmacological stress test was performed using regadenoson (Lexiscan). PO caffeine given as a reversal agent.    Resting ECG: atrial fibrillation; NST changes; RSR' c/w RV conduction delay    Stress ECG: The stress ECG was not diagnostic due to failure to achieve target heart rate.     Echo 7/9/2025:    Left Ventricle: Severely reduced left ventricular systolic function with a visually estimated EF of 30 - 35%. EF by 2D Simpsons Biplane is 32%. Left ventricle size is normal. Moderately increased wall thickness. Findings consistent with moderate concentric hypertrophy. Moderate global hypokinesis present.    Right Ventricle: Right ventricle size is normal. Normal systolic function. TAPSE is normal.    Aortic Valve: Trileaflet valve. Mild sclerosis of the aortic valve cusps. Mild regurgitation.    Mitral Valve: Mild regurgitation.    Tricuspid Valve: Mild regurgitation. Normal RVSP. RVSP is 32 mmHg.    Left Atrium: Left atrium is mildly dilated.    Right Atrium: Right atrium is mildly dilated.    Aorta: Normal sized aortic root. Moderately dilated ascending aorta. Ao ascending diameter is 4.3 cm.    Image quality is
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
## 2025-07-14 NOTE — CARE COORDINATION
Spoke with RN who states patient is being followed by Cardiology and GI.   Plan is for patient to get an EGD and cardiac cath procedures while here.   Patient is from home alone and was independent prior to admission.    CM will continue to follow should any needs arise.

## 2025-07-15 ENCOUNTER — ANESTHESIA (OUTPATIENT)
Dept: ENDOSCOPY | Age: 65
DRG: 201 | End: 2025-07-15
Payer: COMMERCIAL

## 2025-07-15 ENCOUNTER — ANESTHESIA EVENT (OUTPATIENT)
Dept: ENDOSCOPY | Age: 65
DRG: 201 | End: 2025-07-15
Payer: COMMERCIAL

## 2025-07-15 LAB
ANION GAP SERPL CALCULATED.3IONS-SCNC: 11 MMOL/L (ref 3–16)
BUN SERPL-MCNC: 11 MG/DL (ref 7–20)
CALCIUM SERPL-MCNC: 9.4 MG/DL (ref 8.3–10.6)
CHLORIDE SERPL-SCNC: 105 MMOL/L (ref 99–110)
CO2 SERPL-SCNC: 22 MMOL/L (ref 21–32)
CREAT SERPL-MCNC: 0.9 MG/DL (ref 0.8–1.3)
GFR SERPLBLD CREATININE-BSD FMLA CKD-EPI: >90 ML/MIN/{1.73_M2}
GLUCOSE SERPL-MCNC: 118 MG/DL (ref 70–99)
POTASSIUM SERPL-SCNC: 4.6 MMOL/L (ref 3.5–5.1)
SODIUM SERPL-SCNC: 138 MMOL/L (ref 136–145)

## 2025-07-15 PROCEDURE — 3609017700 HC EGD DILATION GASTRIC/DUODENAL STRICTURE: Performed by: INTERNAL MEDICINE

## 2025-07-15 PROCEDURE — 36415 COLL VENOUS BLD VENIPUNCTURE: CPT

## 2025-07-15 PROCEDURE — 6370000000 HC RX 637 (ALT 250 FOR IP)

## 2025-07-15 PROCEDURE — 3700000000 HC ANESTHESIA ATTENDED CARE: Performed by: INTERNAL MEDICINE

## 2025-07-15 PROCEDURE — C1726 CATH, BAL DIL, NON-VASCULAR: HCPCS | Performed by: INTERNAL MEDICINE

## 2025-07-15 PROCEDURE — 88305 TISSUE EXAM BY PATHOLOGIST: CPT

## 2025-07-15 PROCEDURE — 3700000001 HC ADD 15 MINUTES (ANESTHESIA): Performed by: INTERNAL MEDICINE

## 2025-07-15 PROCEDURE — 2580000003 HC RX 258: Performed by: STUDENT IN AN ORGANIZED HEALTH CARE EDUCATION/TRAINING PROGRAM

## 2025-07-15 PROCEDURE — 2500000003 HC RX 250 WO HCPCS: Performed by: STUDENT IN AN ORGANIZED HEALTH CARE EDUCATION/TRAINING PROGRAM

## 2025-07-15 PROCEDURE — 99232 SBSQ HOSP IP/OBS MODERATE 35: CPT | Performed by: NURSE PRACTITIONER

## 2025-07-15 PROCEDURE — 6360000002 HC RX W HCPCS

## 2025-07-15 PROCEDURE — 6370000000 HC RX 637 (ALT 250 FOR IP): Performed by: INTERNAL MEDICINE

## 2025-07-15 PROCEDURE — 0DB58ZX EXCISION OF ESOPHAGUS, VIA NATURAL OR ARTIFICIAL OPENING ENDOSCOPIC, DIAGNOSTIC: ICD-10-PCS | Performed by: INTERNAL MEDICINE

## 2025-07-15 PROCEDURE — 6370000000 HC RX 637 (ALT 250 FOR IP): Performed by: NURSE PRACTITIONER

## 2025-07-15 PROCEDURE — 1200000000 HC SEMI PRIVATE

## 2025-07-15 PROCEDURE — 6370000000 HC RX 637 (ALT 250 FOR IP): Performed by: STUDENT IN AN ORGANIZED HEALTH CARE EDUCATION/TRAINING PROGRAM

## 2025-07-15 PROCEDURE — 2709999900 HC NON-CHARGEABLE SUPPLY: Performed by: INTERNAL MEDICINE

## 2025-07-15 PROCEDURE — 0D758ZZ DILATION OF ESOPHAGUS, VIA NATURAL OR ARTIFICIAL OPENING ENDOSCOPIC: ICD-10-PCS | Performed by: INTERNAL MEDICINE

## 2025-07-15 PROCEDURE — 7100000010 HC PHASE II RECOVERY - FIRST 15 MIN: Performed by: INTERNAL MEDICINE

## 2025-07-15 PROCEDURE — 80048 BASIC METABOLIC PNL TOTAL CA: CPT

## 2025-07-15 PROCEDURE — 3609012400 HC EGD TRANSORAL BIOPSY SINGLE/MULTIPLE: Performed by: INTERNAL MEDICINE

## 2025-07-15 PROCEDURE — 7100000011 HC PHASE II RECOVERY - ADDTL 15 MIN: Performed by: INTERNAL MEDICINE

## 2025-07-15 RX ORDER — SODIUM CHLORIDE 0.9 % (FLUSH) 0.9 %
5-40 SYRINGE (ML) INJECTION EVERY 12 HOURS SCHEDULED
Status: DISCONTINUED | OUTPATIENT
Start: 2025-07-15 | End: 2025-07-15 | Stop reason: HOSPADM

## 2025-07-15 RX ORDER — ONDANSETRON 2 MG/ML
4 INJECTION INTRAMUSCULAR; INTRAVENOUS EVERY 6 HOURS PRN
Status: DISCONTINUED | OUTPATIENT
Start: 2025-07-15 | End: 2025-07-15 | Stop reason: SDUPTHER

## 2025-07-15 RX ORDER — ASPIRIN 325 MG
325 TABLET ORAL ONCE
Status: COMPLETED | OUTPATIENT
Start: 2025-07-16 | End: 2025-07-16

## 2025-07-15 RX ORDER — LORAZEPAM 0.5 MG/1
0.5 TABLET ORAL
Status: DISCONTINUED | OUTPATIENT
Start: 2025-07-15 | End: 2025-07-16 | Stop reason: HOSPADM

## 2025-07-15 RX ORDER — DROPERIDOL 2.5 MG/ML
0.62 INJECTION, SOLUTION INTRAMUSCULAR; INTRAVENOUS
Status: DISCONTINUED | OUTPATIENT
Start: 2025-07-15 | End: 2025-07-15 | Stop reason: HOSPADM

## 2025-07-15 RX ORDER — SODIUM CHLORIDE 0.9 % (FLUSH) 0.9 %
5-40 SYRINGE (ML) INJECTION PRN
Status: DISCONTINUED | OUTPATIENT
Start: 2025-07-15 | End: 2025-07-15 | Stop reason: HOSPADM

## 2025-07-15 RX ORDER — IPRATROPIUM BROMIDE AND ALBUTEROL SULFATE 2.5; .5 MG/3ML; MG/3ML
1 SOLUTION RESPIRATORY (INHALATION)
Status: DISCONTINUED | OUTPATIENT
Start: 2025-07-15 | End: 2025-07-15 | Stop reason: HOSPADM

## 2025-07-15 RX ORDER — IPRATROPIUM BROMIDE AND ALBUTEROL SULFATE 2.5; .5 MG/3ML; MG/3ML
SOLUTION RESPIRATORY (INHALATION)
Status: COMPLETED
Start: 2025-07-15 | End: 2025-07-15

## 2025-07-15 RX ORDER — PHENYLEPHRINE HCL IN 0.9% NACL 1 MG/10 ML
SYRINGE (ML) INTRAVENOUS
Status: DISCONTINUED | OUTPATIENT
Start: 2025-07-15 | End: 2025-07-15 | Stop reason: SDUPTHER

## 2025-07-15 RX ORDER — IPRATROPIUM BROMIDE AND ALBUTEROL SULFATE 2.5; .5 MG/3ML; MG/3ML
1 SOLUTION RESPIRATORY (INHALATION) ONCE
Status: COMPLETED | OUTPATIENT
Start: 2025-07-15 | End: 2025-07-15

## 2025-07-15 RX ORDER — LABETALOL HYDROCHLORIDE 5 MG/ML
10 INJECTION, SOLUTION INTRAVENOUS
Status: DISCONTINUED | OUTPATIENT
Start: 2025-07-15 | End: 2025-07-15 | Stop reason: HOSPADM

## 2025-07-15 RX ORDER — MIDAZOLAM HYDROCHLORIDE 1 MG/ML
2 INJECTION, SOLUTION INTRAMUSCULAR; INTRAVENOUS
Status: DISCONTINUED | OUTPATIENT
Start: 2025-07-15 | End: 2025-07-15 | Stop reason: HOSPADM

## 2025-07-15 RX ORDER — SODIUM CHLORIDE 0.9 % (FLUSH) 0.9 %
5-40 SYRINGE (ML) INJECTION PRN
Status: DISCONTINUED | OUTPATIENT
Start: 2025-07-15 | End: 2025-07-16 | Stop reason: SDUPTHER

## 2025-07-15 RX ORDER — SODIUM CHLORIDE 9 MG/ML
INJECTION, SOLUTION INTRAVENOUS PRN
Status: DISCONTINUED | OUTPATIENT
Start: 2025-07-15 | End: 2025-07-16 | Stop reason: HOSPADM

## 2025-07-15 RX ORDER — PROPOFOL 10 MG/ML
INJECTION, EMULSION INTRAVENOUS
Status: DISCONTINUED | OUTPATIENT
Start: 2025-07-15 | End: 2025-07-15 | Stop reason: SDUPTHER

## 2025-07-15 RX ORDER — ONDANSETRON 2 MG/ML
4 INJECTION INTRAMUSCULAR; INTRAVENOUS
Status: DISCONTINUED | OUTPATIENT
Start: 2025-07-15 | End: 2025-07-15 | Stop reason: HOSPADM

## 2025-07-15 RX ORDER — LIDOCAINE HYDROCHLORIDE 10 MG/ML
1 INJECTION, SOLUTION EPIDURAL; INFILTRATION; INTRACAUDAL; PERINEURAL
Status: DISCONTINUED | OUTPATIENT
Start: 2025-07-15 | End: 2025-07-15 | Stop reason: HOSPADM

## 2025-07-15 RX ORDER — LIDOCAINE HYDROCHLORIDE 20 MG/ML
INJECTION, SOLUTION INFILTRATION; PERINEURAL
Status: DISCONTINUED | OUTPATIENT
Start: 2025-07-15 | End: 2025-07-15 | Stop reason: SDUPTHER

## 2025-07-15 RX ORDER — SODIUM CHLORIDE, SODIUM LACTATE, POTASSIUM CHLORIDE, CALCIUM CHLORIDE 600; 310; 30; 20 MG/100ML; MG/100ML; MG/100ML; MG/100ML
INJECTION, SOLUTION INTRAVENOUS CONTINUOUS
Status: DISCONTINUED | OUTPATIENT
Start: 2025-07-15 | End: 2025-07-15 | Stop reason: HOSPADM

## 2025-07-15 RX ORDER — MINERAL OIL/HYDROPHIL PETROLAT
OINTMENT (GRAM) TOPICAL 2 TIMES DAILY PRN
Status: DISCONTINUED | OUTPATIENT
Start: 2025-07-15 | End: 2025-07-17 | Stop reason: HOSPADM

## 2025-07-15 RX ORDER — OXYCODONE HYDROCHLORIDE 5 MG/1
10 TABLET ORAL PRN
Status: DISCONTINUED | OUTPATIENT
Start: 2025-07-15 | End: 2025-07-15 | Stop reason: HOSPADM

## 2025-07-15 RX ORDER — OXYCODONE HYDROCHLORIDE 5 MG/1
5 TABLET ORAL PRN
Status: DISCONTINUED | OUTPATIENT
Start: 2025-07-15 | End: 2025-07-15 | Stop reason: HOSPADM

## 2025-07-15 RX ORDER — SODIUM CHLORIDE 9 MG/ML
INJECTION, SOLUTION INTRAVENOUS PRN
Status: DISCONTINUED | OUTPATIENT
Start: 2025-07-15 | End: 2025-07-15 | Stop reason: HOSPADM

## 2025-07-15 RX ORDER — DIPHENHYDRAMINE HYDROCHLORIDE 50 MG/ML
12.5 INJECTION, SOLUTION INTRAMUSCULAR; INTRAVENOUS
Status: DISCONTINUED | OUTPATIENT
Start: 2025-07-15 | End: 2025-07-15 | Stop reason: HOSPADM

## 2025-07-15 RX ORDER — SODIUM CHLORIDE 0.9 % (FLUSH) 0.9 %
5-40 SYRINGE (ML) INJECTION EVERY 12 HOURS SCHEDULED
Status: DISCONTINUED | OUTPATIENT
Start: 2025-07-15 | End: 2025-07-16 | Stop reason: SDUPTHER

## 2025-07-15 RX ADMIN — PROPOFOL 20 MG: 10 INJECTION, EMULSION INTRAVENOUS at 09:38

## 2025-07-15 RX ADMIN — AMIODARONE HYDROCHLORIDE 200 MG: 200 TABLET ORAL at 21:05

## 2025-07-15 RX ADMIN — GABAPENTIN 400 MG: 400 CAPSULE ORAL at 16:49

## 2025-07-15 RX ADMIN — ASPIRIN 81 MG: 81 TABLET, COATED ORAL at 08:10

## 2025-07-15 RX ADMIN — ALLOPURINOL 100 MG: 100 TABLET ORAL at 08:10

## 2025-07-15 RX ADMIN — OXYCODONE AND ACETAMINOPHEN 1 TABLET: 5; 325 TABLET ORAL at 12:36

## 2025-07-15 RX ADMIN — METOPROLOL TARTRATE 100 MG: 50 TABLET, FILM COATED ORAL at 21:05

## 2025-07-15 RX ADMIN — OXYCODONE AND ACETAMINOPHEN 1 TABLET: 5; 325 TABLET ORAL at 17:21

## 2025-07-15 RX ADMIN — PROPOFOL 20 MG: 10 INJECTION, EMULSION INTRAVENOUS at 09:42

## 2025-07-15 RX ADMIN — DOXEPIN 3 MG: 3 TABLET, FILM COATED ORAL at 23:56

## 2025-07-15 RX ADMIN — Medication 100 MCG: at 09:45

## 2025-07-15 RX ADMIN — Medication 10 ML: at 08:10

## 2025-07-15 RX ADMIN — GABAPENTIN 400 MG: 400 CAPSULE ORAL at 08:10

## 2025-07-15 RX ADMIN — PANTOPRAZOLE SODIUM 40 MG: 40 TABLET, DELAYED RELEASE ORAL at 08:10

## 2025-07-15 RX ADMIN — PROPOFOL 20 MG: 10 INJECTION, EMULSION INTRAVENOUS at 09:46

## 2025-07-15 RX ADMIN — OXYCODONE AND ACETAMINOPHEN 1 TABLET: 5; 325 TABLET ORAL at 21:34

## 2025-07-15 RX ADMIN — PANTOPRAZOLE SODIUM 40 MG: 40 TABLET, DELAYED RELEASE ORAL at 16:49

## 2025-07-15 RX ADMIN — ATORVASTATIN CALCIUM 40 MG: 40 TABLET, FILM COATED ORAL at 21:05

## 2025-07-15 RX ADMIN — METOPROLOL TARTRATE 100 MG: 50 TABLET, FILM COATED ORAL at 07:21

## 2025-07-15 RX ADMIN — WHITE PETROLATUM 41 % TOPICAL OINTMENT: OINTMENT at 21:04

## 2025-07-15 RX ADMIN — OXYCODONE AND ACETAMINOPHEN 1 TABLET: 5; 325 TABLET ORAL at 07:20

## 2025-07-15 RX ADMIN — LIDOCAINE HYDROCHLORIDE 60 MG: 20 INJECTION, SOLUTION INFILTRATION; PERINEURAL at 09:37

## 2025-07-15 RX ADMIN — PROPOFOL 20 MG: 10 INJECTION, EMULSION INTRAVENOUS at 09:48

## 2025-07-15 RX ADMIN — Medication 100 MCG: at 09:39

## 2025-07-15 RX ADMIN — AMIODARONE HYDROCHLORIDE 200 MG: 200 TABLET ORAL at 07:21

## 2025-07-15 RX ADMIN — LISINOPRIL 20 MG: 20 TABLET ORAL at 08:10

## 2025-07-15 RX ADMIN — GABAPENTIN 400 MG: 400 CAPSULE ORAL at 21:05

## 2025-07-15 RX ADMIN — IPRATROPIUM BROMIDE AND ALBUTEROL SULFATE 1 DOSE: 2.5; .5 SOLUTION RESPIRATORY (INHALATION) at 09:05

## 2025-07-15 RX ADMIN — PROPOFOL 30 MG: 10 INJECTION, EMULSION INTRAVENOUS at 09:40

## 2025-07-15 RX ADMIN — SODIUM CHLORIDE: 9 INJECTION, SOLUTION INTRAVENOUS at 09:34

## 2025-07-15 RX ADMIN — PROPOFOL 50 MG: 10 INJECTION, EMULSION INTRAVENOUS at 09:37

## 2025-07-15 RX ADMIN — IPRATROPIUM BROMIDE AND ALBUTEROL SULFATE 1 DOSE: .5; 2.5 SOLUTION RESPIRATORY (INHALATION) at 09:05

## 2025-07-15 ASSESSMENT — PAIN DESCRIPTION - LOCATION
LOCATION: KNEE
LOCATION: BACK;KNEE
LOCATION: KNEE
LOCATION: KNEE

## 2025-07-15 ASSESSMENT — PAIN DESCRIPTION - ORIENTATION
ORIENTATION: RIGHT

## 2025-07-15 ASSESSMENT — PAIN SCALES - GENERAL
PAINLEVEL_OUTOF10: 4
PAINLEVEL_OUTOF10: 0
PAINLEVEL_OUTOF10: 4
PAINLEVEL_OUTOF10: 6
PAINLEVEL_OUTOF10: 2
PAINLEVEL_OUTOF10: 0
PAINLEVEL_OUTOF10: 6
PAINLEVEL_OUTOF10: 5
PAINLEVEL_OUTOF10: 6
PAINLEVEL_OUTOF10: 6

## 2025-07-15 ASSESSMENT — PAIN DESCRIPTION - PAIN TYPE
TYPE: CHRONIC PAIN

## 2025-07-15 ASSESSMENT — PAIN DESCRIPTION - DESCRIPTORS
DESCRIPTORS: ACHING;DISCOMFORT
DESCRIPTORS: ACHING;DISCOMFORT
DESCRIPTORS: ACHING;DISCOMFORT;SORE

## 2025-07-15 ASSESSMENT — PAIN DESCRIPTION - FREQUENCY: FREQUENCY: CONTINUOUS

## 2025-07-15 ASSESSMENT — LIFESTYLE VARIABLES: SMOKING_STATUS: 1

## 2025-07-15 ASSESSMENT — PAIN - FUNCTIONAL ASSESSMENT: PAIN_FUNCTIONAL_ASSESSMENT: ACTIVITIES ARE NOT PREVENTED

## 2025-07-15 NOTE — ANESTHESIA PRE PROCEDURE
Department of Anesthesiology  Preprocedure Note       Name:  John Womack   Age:  64 y.o.  :  1960                                          MRN:  [id number]         Date:  7/15/2025      Surgeon: Surgeon(s):  Shantanu Washburn MD    Procedure: Procedure(s):  ESOPHAGOGASTRODUODENOSCOPY    Medications prior to admission:   Prior to Admission medications    Medication Sig Start Date End Date Taking? Authorizing Provider   traZODone (DESYREL) 300 MG tablet Take 1 tablet by mouth nightly   Yes Provider, [place]   gabapentin (NEURONTIN) 400 MG capsule TAKE ONE CAPSULE BY MOUTH THREE TIMES DAILY @ 9AM-1PM-5PM 5/30/25 8/15/26 Yes Kiara Hauser APRN - CNP   tiZANidine (ZANAFLEX) 4 MG tablet Take 1 tablet by mouth 3 times daily as needed (back pain) 25  Yes Jennifer Raines APRN - CNP   lisinopril (PRINIVIL;ZESTRIL) 20 MG tablet TAKE 1 TABLET BY MOUTH EVERY DAY 10/7/24  Yes Jennifer Raines APRN - CNP   allopurinol (ZYLOPRIM) 100 MG tablet Take 1 tablet daily 24  Yes Jennifer Raines APRN - CNP   doxepin (SILENOR) 3 MG TABS tablet Take 1 tablet by mouth nightly 25  Jennifer Raines APRN - CNP   meloxicam (MOBIC) 15 MG tablet TAKE 1 TABLET BY MOUTH EVERY DAY 24   Aris Dick MD   metoprolol tartrate (LOPRESSOR) 25 MG tablet TAKE 1 TABLET BY MOUTH TWICE A DAY 10/7/24   Jennifer Raines APRN - CNP       Current medications:    Current Facility-Administered Medications   Medication Dose Route Frequency Provider Last Rate Last Admin    oxyCODONE-acetaminophen (PERCOCET) 5-325 MG per tablet 1 tablet  1 tablet Oral Q4H PRN Brandon Hemphill APRN - CNP   1 tablet at 07/15/25 0720    amiodarone (CORDARONE) tablet 200 mg  200 mg Oral BID Sally Hernandez APRN - CNP   200 mg at 07/15/25 0721    Followed by    [START ON 2025] amiodarone (CORDARONE) tablet 200 mg  200 mg Oral Daily Sally Hernandez, APRN - CNP        metoprolol tartrate (LOPRESSOR) tablet 100 mg

## 2025-07-15 NOTE — ANESTHESIA POSTPROCEDURE EVALUATION
Department of Anesthesiology  Postprocedure Note    Patient: John Womack  MRN: [id number]  YOB: 1960  Date of evaluation: 7/15/2025    Procedure Summary       Date: 07/15/25 Room / Location: [place] [id number] / [facility]    Anesthesia Start: 0934 Anesthesia Stop: 0953    Procedures:       ESOPHAGOGASTRODUODENOSCOPY DILATION BALLOON      ESOPHAGOGASTRODUODENOSCOPY BIOPSY Diagnosis:       Dysphagia, unspecified type      (Dysphagia, unspecified type [R13.10])    Surgeons: Shantanu Washburn MD Responsible Provider: Goran Redmond MD    Anesthesia Type: MAC ASA Status: 3            Anesthesia Type: No value filed.    Angelia Phase I: Angelia Score: 10    Angelia Phase II: Angelia Score: 9    Anesthesia Post Evaluation    Comments: Postoperative Anesthesia Note    Name:    John Womack  MRN:      [id number]    Patient Vitals in the past 12 hrs:  07/15/25 1015, BP:(!) 130/97, Pulse:62, Resp:16, SpO2:93 %  07/15/25 1000, BP:119/85, Pulse:77, Resp:16, SpO2:94 %  07/15/25 0956, BP:121/78, Pulse:74, Resp:16, SpO2:96 %  07/15/25 0848, BP:(!) 126/97, Temp:97.8 °F (36.6 °C), Temp src:Oral, Pulse:78, Resp:20, SpO2:93 %  07/15/25 0720, Resp:20  07/15/25 0715, BP:(!) 109/96, Temp:97.7 °F (36.5 °C), Temp src:Oral, Pulse:(!) 103, Resp:20, SpO2:96 %  07/15/25 0430, Weight:113.7 kg (250 lb 11.2 oz)  07/15/25 0420, BP:(!) 137/97, Temp:97.5 °F (36.4 °C), Temp src:Oral, Pulse:77, Resp:20, SpO2:98 %  07/14/25 2341, BP:109/87, Temp:97.9 °F (36.6 °C), Temp src:Oral, Pulse:76, Resp:18, SpO2:95 %     LABS:    CBC  Lab Results       Component                Value               Date/Time                  WBC                      4.4                 07/14/2025 04:28 AM        HGB                      14.2                07/14/2025 04:28 AM        HCT                      41.2                07/14/2025 04:28 AM        PLT                      187                 07/14/2025 04:28 AM   RENAL  Lab Results       Component

## 2025-07-15 NOTE — PLAN OF CARE
Problem: Pain  Goal: Verbalizes/displays adequate comfort level or baseline comfort level  7/14/2025 2322 by Carlos Norirs, RN  Outcome: Progressing   Continuing to assess pt's pain level and administer PRN medications as appropriate.    Problem: Cardiovascular - Adult  Goal: Maintains optimal cardiac output and hemodynamic stability  Outcome: Progressing

## 2025-07-15 NOTE — PROGRESS NOTES
[facility]   Daily Progress Note    Admit Date:  7/10/2025  HPI:  No chief complaint on file.     John Womack presented with esophagitis.  Before undergoing EGD he was found to be in atrial fibrillation with RVR. Echocardiogram showed depressed LV function with EF 30 to 35%. He was transferred from [facility] to [facility] for further evaluation.     Cardiology consulted for new A-fib with RVR, chest pain     PMH: Hypertension, hyperlipidemia, chronic gout, degenerative joint disease, alcohol abuse, PAD, GERD, esophagitis       Subjective:  Mr. Womack is lying in bed.  He denies chest pain, palpitations, SOB, and edema.  Heparin is off following EGD.        Objective:   Patient Vitals for the past 24 hrs:   BP Temp Temp src Pulse Resp SpO2 Weight   07/15/25 1117 125/85 97.5 °F (36.4 °C) Oral 69 -- 94 % --   07/15/25 1015 (!) 130/97 -- -- 62 16 93 % --   07/15/25 1000 119/85 -- -- 77 16 94 % --   07/15/25 0956 121/78 -- -- 74 16 96 % --   07/15/25 0848 (!) 126/97 97.8 °F (36.6 °C) Oral 78 20 93 % --   07/15/25 0720 -- -- -- -- 20 -- --   07/15/25 0715 (!) 109/96 97.7 °F (36.5 °C) Oral (!) 103 20 96 % --   07/15/25 0430 -- -- -- -- -- -- 113.7 kg (250 lb 11.2 oz)   07/15/25 0420 (!) 137/97 97.5 °F (36.4 °C) Oral 77 20 98 % --   07/14/25 2341 109/87 97.9 °F (36.6 °C) Oral 76 18 95 % --   07/14/25 2242 -- -- -- -- 18 -- --   07/14/25 2042 100/73 97.7 °F (36.5 °C) Oral 72 18 93 % --   07/14/25 1600 130/85 97.9 °F (36.6 °C) Oral 87 18 98 % --       Intake/Output Summary (Last 24 hours) at 7/15/2025 1158  Last data filed at 7/15/2025 0953  Gross per 24 hour   Intake 1140 ml   Output --   Net 1140 ml     Wt Readings from Last 3 Encounters:   07/15/25 113.7 kg (250 lb 11.2 oz)   07/09/25 114.3 kg (252 lb)   06/26/25 111.1 kg (245 lb)         ASSESSMENT:   Atrial fibrillation - new diagnosis, now rate controlled on PO amio and BB.  Cardiomyopathy - unclear etiology - EF 30-35%  HTN -

## 2025-07-15 NOTE — PROGRESS NOTES
[facility] Medicine Progress Note  V 5.17      Date of Admission: 7/10/2025    Hospital Day: 6      Chief Admission Complaint: Transfer for cardiology evaluation      Subjective:  No new complaints.    Presenting Admission History:       64 y.o. male who presented to [facility] as a transfer from outside hospital.  Records reviewed and discussed with patient RN.  Patient presented to OSH for outpatient EGD for possible esophagitis.  PMH HTN, chronic gout, insomnia, DJD, alcohol abuse, PVD, and GERD. No cardiac history to his knowledge. Prior testing: Echo 5/19/22 EF=55%, no WMA; mild JACEK; mild AR and DC.        ECG prior to EGD showed AF RVR rate 117 IRBBB no ST changes.  Cardiology was contacted and evaluated patient, /100 at time, pt c/o  trouble swallowing pills solid foods.+mid-sternal, sharp CP occurring randomly without radiation or associated symptoms. Also c/o dizziness and orthostasis symptoms.  No syncope.  No palpitations.       He was diagnosed with new onset A-fib of unknown duration, uncontrolled HTN.  He was started on therapeutic dosing of Lovenox, metoprolol, diltiazem gtt. TSH normal 1.78 and Augustina negative 14 x 2.  Echo showed EF 30-35%.  Cardiology recommended transfer to [facility] for ischemia evaluation with [facility] given new cardiomyopathy along with age and HTN history. Also needs EP consultation for afib. Possibly tachycardia induced cardiomyopathy and may need aggressive rhythm control strategy.     Assessment/Plan:      Atrial Fibrillation with RVR  - Acute paroxysmal, of unspecified and clinically unable to determine etiology  - Rate controlled on BBlocker and Amiodarone - continued  - continue heparin gtt. Will start NOAC after procedures    Cardiomyopathy   - echo with EF 30 to 35%  - started on lisinopril, metoprolol  - appears to be euvolemic  - stress test with increased risk  - Cardiology consulted. Plan for [facility] tomorrow    Esophagitis/dysphagia  - continue PPI

## 2025-07-15 NOTE — PROGRESS NOTES
0820-  [facility] notified of patient transport to [facility] at this time.      1038- [facility]U notified of patient return from Endo at this time.

## 2025-07-15 NOTE — H&P
Lack of Transportation (Non-Medical): No   Physical Activity: Not on file   Stress: Not on file   Social Connections: Unknown (11/14/2019)    Received from [facility], [facility]    Social Connection and Isolation Panel [NHANES]     Frequency of Communication with Friends and Family: Not on file     Frequency of Social Gatherings with Friends and Family: Not on file     Attends Confucianism Services: Not on file     Active Member of Clubs or Organizations: No     Attends Club or Organization Meetings: Never     Marital Status: Not on file   Intimate Partner Violence: Not on file   Housing Stability: Low Risk  (7/12/2025)    Housing Stability Vital Sign     Unable to Pay for Housing in the Last Year: No     Number of Times Moved in the Last Year: 0     Homeless in the Last Year: No      Family History:       Problem Relation Age of Onset    Diabetes Mother     Coronary Art Dis Mother     Coronary Art Dis Father     Diabetes Brother         PHYSICAL EXAM:      BP (!) 126/97   Pulse 78   Temp 97.8 °F (36.6 °C) (Oral)   Resp 20   Ht 1.778 m (5' 10\")   Wt 113.7 kg (250 lb 11.2 oz)   SpO2 93%   BMI 35.97 kg/m²  I        Heart:  Normal apical impulse, regular rate and rhythm, normal S1 and S2, no S3 or S4, and no murmur noted    Lungs:  No increased work of breathing, good air exchange, clear to auscultation bilaterally, no crackles or wheezing    Abdomen:  No scars, normal bowel sounds, soft, non-distended, non-tender, no masses palpated, no hepatosplenomegally      ASA Class  ASA 3 - Patient with moderate systemic disease with functional limitations    Mallampati Class: 3      ASSESSMENT AND PLAN:    1.  Patient is a suitable candidate for endoscopic procedure and attendant anesthesia  2.  Risks, benefits, alternatives of procedure discussed in detail with patient including risks of bleeding, infection, perforation, risks of sedation, risks of  missed lesions. The patient wishes to proceed.

## 2025-07-15 NOTE — PROGRESS NOTES
Page out to Cardiology at this time to let them know about return from endo procedure, heparin infusion restart time and questioning time line for cardiac cath.     Awaiting response

## 2025-07-15 NOTE — PROCEDURES
Endoscopy Note    Patient: John Womack  : 1960      Procedure: Esophagogastroduodenoscopy with biopsies and balloon dilation     Date:  7/15/2025     Surgeon:  Shantanu Washburn MD     Referring Physician:  Jennifer Raines, APRN - CNP      History:      INDICATION: esophageal dysphagia      ASA: 3  SEDATION: MAC         Operative Surgeon: Shantanu Washburn MD  Scope Type: Gastroscope      Preoperative Diagnosis: dysphagia   Postoperative Diagnosis: esophageal stricture, zavaleta's esophagus, no varices     Procedure Performed: EGD    Procedure Details:    With the patient in left lateral position the endoscope was passed through the hypopharynx into the esophagus. The scope as then passed through the esophagus to the second portion of the duodenum. All visualized portions were carefully inspected. The gastric air was suctioned and the scope as removed. Patient tolerated the procedure well. Findings and maneuvers are discussed below.      Complications:  None  Estimated Blood Loss: minimal to none    Post Operative Findings:   Esophagus: upper and mid portions distal esophageal scarring with a 6mm ulcer non bleeding. Dilation with a balloon up to 16.5mm with small tear. Within this region there did appear to be a 2cm circumferential region of Zavaleta's esophagus which the ulcer was within an area of zavaleta's. No varices     Stomach:  No gastric varices. Minimal gastritis. No ulcer or other regions.    Duodenum: normal in both first and second portion.       Plan: Resume heparin gtt 6pm. Okay to undergo LHC. Continue PPI BID. We will relook EGD in 3 months to ensure the ulcer has healed and further dilation if needed depending on cardiac course. No contraindication to DAPT or anticoagulation at this time.         Signed By: MD Shantanu Leyva MD,   [facility]  [phone]    Please note that some or all of this record was generated using voice recognition software. If there are any  questions about the content of this document, please contact the author as some errors in translation may have occurred.

## 2025-07-15 NOTE — PERIOP NOTE
Arrived from inpatient hospital room via gurney accompanied by transport staff  Alert and oriented x4   Calm  VSS  Iv site assessed without evidence of infiltration on arrival  Respiration  easy unlabored - auscultated clear  / bilaterally anterior and posterior fields  Abd soft distended : BS+x4 quadrants : no nausea

## 2025-07-15 NOTE — CONSULTS
Low Risk Nutrition Note     Reason for Visit:   Length of Stay    Nutrition Assessment:  Pt seen for nutrition consult for diet education and LOS. Pt nutritionally stable AEB good appetite and PO intakes this admission. PO intakes of %. Reports good appetite and PO intakes at home. Discussed low sodium diet and heart healthy diet for home. Pt normally cooks at home, has gotten some sodium free seasonings. Encouraged lean proteins, increasing fruit and vegetable intakes and lower/healthier fat options. Handouts on heart healthy diet left at bedside. No further nutrition questions or concerns at this time. Will monitor.     Current Nutrition Therapies:    ADULT DIET; Dysphagia - Soft and Bite Sized  Diet NPO    Anthropometrics:   Current Height: 177.8 cm (5' 10\")  Current Weight - Scale: 113.7 kg (250 lb 11.2 oz)      Monitoring and Evaluation:  No nutrition diagnosis. Patient will be monitored per nutrition standards of care.     Education  Educated on heart healthy diet  Learners: Patient  Readiness: Acceptance  Method: Explanation and Handout  Response: Verbalizes Understanding  Contact name and number provided.    Consult Dietitian if nutrition intervention essential to patient care is needed.     Discharge Planning:  No needs    Indira Fox, MS, RD, LD  Contact Number: [phone]

## 2025-07-16 LAB
ANION GAP SERPL CALCULATED.3IONS-SCNC: 11 MMOL/L (ref 3–16)
ANTI-XA UNFRAC HEPARIN: 0.28 IU/ML (ref 0.3–0.7)
ANTI-XA UNFRAC HEPARIN: 0.43 IU/ML (ref 0.3–0.7)
BUN SERPL-MCNC: 10 MG/DL (ref 7–20)
CALCIUM SERPL-MCNC: 9.3 MG/DL (ref 8.3–10.6)
CHLORIDE SERPL-SCNC: 102 MMOL/L (ref 99–110)
CHOLEST SERPL-MCNC: 158 MG/DL (ref 0–199)
CO2 SERPL-SCNC: 24 MMOL/L (ref 21–32)
CREAT SERPL-MCNC: 0.7 MG/DL (ref 0.8–1.3)
DEPRECATED RDW RBC AUTO: 15.1 % (ref 12.4–15.4)
GFR SERPLBLD CREATININE-BSD FMLA CKD-EPI: >90 ML/MIN/{1.73_M2}
GLUCOSE SERPL-MCNC: 138 MG/DL (ref 70–99)
HCT VFR BLD AUTO: 41.5 % (ref 40.5–52.5)
HDLC SERPL-MCNC: 41 MG/DL (ref 40–60)
HGB BLD-MCNC: 14.2 G/DL (ref 13.5–17.5)
LDLC SERPL CALC-MCNC: 91 MG/DL
MCH RBC QN AUTO: 34.8 PG (ref 26–34)
MCHC RBC AUTO-ENTMCNC: 34.2 G/DL (ref 31–36)
MCV RBC AUTO: 101.8 FL (ref 80–100)
PLATELET # BLD AUTO: 193 K/UL (ref 135–450)
PMV BLD AUTO: 9.8 FL (ref 5–10.5)
POTASSIUM SERPL-SCNC: 4.4 MMOL/L (ref 3.5–5.1)
RBC # BLD AUTO: 4.07 M/UL (ref 4.2–5.9)
SODIUM SERPL-SCNC: 137 MMOL/L (ref 136–145)
TRIGL SERPL-MCNC: 131 MG/DL (ref 0–150)
VLDLC SERPL CALC-MCNC: 26 MG/DL
WBC # BLD AUTO: 5.1 K/UL (ref 4–11)

## 2025-07-16 PROCEDURE — 93458 L HRT ARTERY/VENTRICLE ANGIO: CPT | Performed by: INTERNAL MEDICINE

## 2025-07-16 PROCEDURE — 36415 COLL VENOUS BLD VENIPUNCTURE: CPT

## 2025-07-16 PROCEDURE — 80048 BASIC METABOLIC PNL TOTAL CA: CPT

## 2025-07-16 PROCEDURE — 6360000002 HC RX W HCPCS: Performed by: INTERNAL MEDICINE

## 2025-07-16 PROCEDURE — 4A023N7 MEASUREMENT OF CARDIAC SAMPLING AND PRESSURE, LEFT HEART, PERCUTANEOUS APPROACH: ICD-10-PCS | Performed by: INTERNAL MEDICINE

## 2025-07-16 PROCEDURE — 2700000000 HC OXYGEN THERAPY PER DAY

## 2025-07-16 PROCEDURE — 94761 N-INVAS EAR/PLS OXIMETRY MLT: CPT

## 2025-07-16 PROCEDURE — 6370000000 HC RX 637 (ALT 250 FOR IP): Performed by: NURSE PRACTITIONER

## 2025-07-16 PROCEDURE — C1769 GUIDE WIRE: HCPCS | Performed by: INTERNAL MEDICINE

## 2025-07-16 PROCEDURE — 2709999900 HC NON-CHARGEABLE SUPPLY: Performed by: INTERNAL MEDICINE

## 2025-07-16 PROCEDURE — 85520 HEPARIN ASSAY: CPT

## 2025-07-16 PROCEDURE — 2500000003 HC RX 250 WO HCPCS: Performed by: STUDENT IN AN ORGANIZED HEALTH CARE EDUCATION/TRAINING PROGRAM

## 2025-07-16 PROCEDURE — 1200000000 HC SEMI PRIVATE

## 2025-07-16 PROCEDURE — 6360000004 HC RX CONTRAST MEDICATION: Performed by: INTERNAL MEDICINE

## 2025-07-16 PROCEDURE — 85027 COMPLETE CBC AUTOMATED: CPT

## 2025-07-16 PROCEDURE — B2111ZZ FLUOROSCOPY OF MULTIPLE CORONARY ARTERIES USING LOW OSMOLAR CONTRAST: ICD-10-PCS | Performed by: INTERNAL MEDICINE

## 2025-07-16 PROCEDURE — 6370000000 HC RX 637 (ALT 250 FOR IP): Performed by: STUDENT IN AN ORGANIZED HEALTH CARE EDUCATION/TRAINING PROGRAM

## 2025-07-16 PROCEDURE — 80061 LIPID PANEL: CPT

## 2025-07-16 PROCEDURE — 6370000000 HC RX 637 (ALT 250 FOR IP): Performed by: INTERNAL MEDICINE

## 2025-07-16 PROCEDURE — C1894 INTRO/SHEATH, NON-LASER: HCPCS | Performed by: INTERNAL MEDICINE

## 2025-07-16 RX ORDER — IOPAMIDOL 755 MG/ML
INJECTION, SOLUTION INTRAVASCULAR PRN
Status: DISCONTINUED | OUTPATIENT
Start: 2025-07-16 | End: 2025-07-16 | Stop reason: HOSPADM

## 2025-07-16 RX ORDER — FENTANYL CITRATE 50 UG/ML
INJECTION, SOLUTION INTRAMUSCULAR; INTRAVENOUS PRN
Status: DISCONTINUED | OUTPATIENT
Start: 2025-07-16 | End: 2025-07-16 | Stop reason: HOSPADM

## 2025-07-16 RX ORDER — HEPARIN SODIUM 1000 [USP'U]/ML
INJECTION, SOLUTION INTRAVENOUS; SUBCUTANEOUS PRN
Status: DISCONTINUED | OUTPATIENT
Start: 2025-07-16 | End: 2025-07-16 | Stop reason: HOSPADM

## 2025-07-16 RX ORDER — MIDAZOLAM HYDROCHLORIDE 1 MG/ML
INJECTION, SOLUTION INTRAMUSCULAR; INTRAVENOUS PRN
Status: DISCONTINUED | OUTPATIENT
Start: 2025-07-16 | End: 2025-07-16 | Stop reason: HOSPADM

## 2025-07-16 RX ADMIN — GABAPENTIN 400 MG: 400 CAPSULE ORAL at 21:18

## 2025-07-16 RX ADMIN — PANTOPRAZOLE SODIUM 40 MG: 40 TABLET, DELAYED RELEASE ORAL at 18:33

## 2025-07-16 RX ADMIN — OXYCODONE AND ACETAMINOPHEN 1 TABLET: 5; 325 TABLET ORAL at 18:33

## 2025-07-16 RX ADMIN — ALLOPURINOL 100 MG: 100 TABLET ORAL at 08:26

## 2025-07-16 RX ADMIN — ATORVASTATIN CALCIUM 40 MG: 40 TABLET, FILM COATED ORAL at 21:18

## 2025-07-16 RX ADMIN — OXYCODONE AND ACETAMINOPHEN 1 TABLET: 5; 325 TABLET ORAL at 04:30

## 2025-07-16 RX ADMIN — Medication 10 ML: at 21:19

## 2025-07-16 RX ADMIN — AMIODARONE HYDROCHLORIDE 200 MG: 200 TABLET ORAL at 21:18

## 2025-07-16 RX ADMIN — OXYCODONE AND ACETAMINOPHEN 1 TABLET: 5; 325 TABLET ORAL at 23:05

## 2025-07-16 RX ADMIN — HEPARIN SODIUM 2000 UNITS: 1000 INJECTION INTRAVENOUS; SUBCUTANEOUS at 02:21

## 2025-07-16 RX ADMIN — DOXEPIN 3 MG: 3 TABLET, FILM COATED ORAL at 23:06

## 2025-07-16 RX ADMIN — PANTOPRAZOLE SODIUM 40 MG: 40 TABLET, DELAYED RELEASE ORAL at 08:27

## 2025-07-16 RX ADMIN — OXYCODONE AND ACETAMINOPHEN 1 TABLET: 5; 325 TABLET ORAL at 08:27

## 2025-07-16 RX ADMIN — TIZANIDINE 4 MG: 4 TABLET ORAL at 08:27

## 2025-07-16 RX ADMIN — HEPARIN SODIUM 16 UNITS/KG/HR: 10000 INJECTION, SOLUTION INTRAVENOUS at 07:26

## 2025-07-16 RX ADMIN — METOPROLOL TARTRATE 100 MG: 50 TABLET, FILM COATED ORAL at 21:18

## 2025-07-16 RX ADMIN — GABAPENTIN 400 MG: 400 CAPSULE ORAL at 08:27

## 2025-07-16 RX ADMIN — AMIODARONE HYDROCHLORIDE 200 MG: 200 TABLET ORAL at 08:27

## 2025-07-16 RX ADMIN — OXYCODONE AND ACETAMINOPHEN 1 TABLET: 5; 325 TABLET ORAL at 14:44

## 2025-07-16 RX ADMIN — GABAPENTIN 400 MG: 400 CAPSULE ORAL at 14:44

## 2025-07-16 RX ADMIN — METOPROLOL TARTRATE 100 MG: 50 TABLET, FILM COATED ORAL at 08:26

## 2025-07-16 RX ADMIN — ASPIRIN 325 MG: 325 TABLET ORAL at 08:27

## 2025-07-16 RX ADMIN — LISINOPRIL 20 MG: 20 TABLET ORAL at 08:27

## 2025-07-16 ASSESSMENT — PAIN DESCRIPTION - LOCATION
LOCATION: GENERALIZED
LOCATION: BACK;KNEE
LOCATION: FOOT
LOCATION_2: BACK
LOCATION: ARM
LOCATION: KNEE
LOCATION: GENERALIZED

## 2025-07-16 ASSESSMENT — PAIN DESCRIPTION - FREQUENCY
FREQUENCY: CONTINUOUS

## 2025-07-16 ASSESSMENT — PAIN DESCRIPTION - DESCRIPTORS
DESCRIPTORS: SORE
DESCRIPTORS: ACHING

## 2025-07-16 ASSESSMENT — PAIN DESCRIPTION - ORIENTATION
ORIENTATION: RIGHT
ORIENTATION: MID
ORIENTATION: RIGHT;LEFT
ORIENTATION: MID
ORIENTATION: MID

## 2025-07-16 ASSESSMENT — PAIN SCALES - GENERAL
PAINLEVEL_OUTOF10: 7
PAINLEVEL_OUTOF10: 3
PAINLEVEL_OUTOF10: 7

## 2025-07-16 ASSESSMENT — PAIN DESCRIPTION - PAIN TYPE
TYPE: CHRONIC PAIN
TYPE: ACUTE PAIN;CHRONIC PAIN

## 2025-07-16 ASSESSMENT — PAIN - FUNCTIONAL ASSESSMENT
PAIN_FUNCTIONAL_ASSESSMENT: PREVENTS OR INTERFERES SOME ACTIVE ACTIVITIES AND ADLS
PAIN_FUNCTIONAL_ASSESSMENT: PREVENTS OR INTERFERES SOME ACTIVE ACTIVITIES AND ADLS
PAIN_FUNCTIONAL_ASSESSMENT: ACTIVITIES ARE NOT PREVENTED

## 2025-07-16 ASSESSMENT — PAIN DESCRIPTION - ONSET
ONSET: ON-GOING

## 2025-07-16 ASSESSMENT — PAIN DESCRIPTION - INTENSITY: RATING_2: 7

## 2025-07-16 NOTE — CARE COORDINATION
Patient s/p cardiac cath today.  Per Cardiology and RN: Continue medical therapy and titration of GDMT for nonischemic cardiomyopathy most likely related to A-fib  No clear indication for PCI at this time  Reasonable to keep patient on aspirin 81 and high intensity statin long-term  Can start oral anticoagulation tomorrow morning if no radial access site complications    Patient is from home alone and is independent at home.    Patient has insurance and a PCP.    Please notify CM should any  needs arise.

## 2025-07-16 NOTE — PROGRESS NOTES
PRELIMINARY PROCEDURE REPORT        INDICATION FOR PROCEDURE  Chest pain, abnormal stress test, new cardiomyopathy with LV dysfunction and reduced EF      PROCEDURE FINDINGS  Successful left heart cath via right radial approach, access closed with TR band with excellent hemostasis  Very distal LAD has a 70% stenosis which feeds a small territory.  Minimal CAD otherwise  Overall, degree of cardiomyopathy appears out of proportion to the degree of CAD found suggesting most likely nonischemic cause of cardiomyopathy  Mild to moderately reduced LV function, LVEF 40 to 45% with mild diffuse hypokinesis.  Ejection fraction appears to have improved considerably compared to last echocardiogram  Normal left-sided filling pressure, LVEDP 12 mmHg adjusting normal volume status      PLAN/RECOMMENDATIONS  Continue medical therapy and titration of GDMT for nonischemic cardiomyopathy most likely related to A-fib  No clear indication for PCI at this time  Reasonable to keep patient on aspirin 81 and high intensity statin long-term  Can start oral anticoagulation tomorrow morning if no radial access site complications      No complications.    See full report for details.      Liam Solorzano MD, FACC, [facility]  Interventional Cardiology  [facility]  [phone] (c)

## 2025-07-16 NOTE — PLAN OF CARE
Problem: Pain  Goal: Verbalizes/displays adequate comfort level or baseline comfort level  7/15/2025 2157 by Carlos Norris, RN  Outcome: Progressing   Continuing to assess pt's pain level and administer PRN medications as appropriate.    Problem: Cardiovascular - Adult  Goal: Maintains optimal cardiac output and hemodynamic stability  Outcome: Progressing

## 2025-07-16 NOTE — PROGRESS NOTES
[facility] Medicine Progress Note  V 5.17      Date of Admission: 7/10/2025    Hospital Day: 7      Chief Admission Complaint: Transfer for cardiology evaluation      Subjective:  No new complaints.    Presenting Admission History:       64 y.o. male who presented to [facility] as a transfer from outside hospital.  Records reviewed and discussed with patient RN.  Patient presented to OSH for outpatient EGD for possible esophagitis.  PMH HTN, chronic gout, insomnia, DJD, alcohol abuse, PVD, and GERD. No cardiac history to his knowledge. Prior testing: Echo 5/19/22 EF=55%, no WMA; mild JACEK; mild AR and UT.        ECG prior to EGD showed AF RVR rate 117 IRBBB no ST changes.  Cardiology was contacted and evaluated patient, /100 at time, pt c/o  trouble swallowing pills solid foods.+mid-sternal, sharp CP occurring randomly without radiation or associated symptoms. Also c/o dizziness and orthostasis symptoms.  No syncope.  No palpitations.       He was diagnosed with new onset A-fib of unknown duration, uncontrolled HTN.  He was started on therapeutic dosing of Lovenox, metoprolol, diltiazem gtt. TSH normal 1.78 and Augustina negative 14 x 2.  Echo showed EF 30-35%.  Cardiology recommended transfer to [facility] for ischemia evaluation with [facility] given new cardiomyopathy along with age and HTN history. Also needs EP consultation for afib. Possibly tachycardia induced cardiomyopathy and may need aggressive rhythm control strategy.     Assessment/Plan:      Atrial Fibrillation with RVR  - Acute paroxysmal, of unspecified and clinically unable to determine etiology  - Rate controlled on BBlocker and Amiodarone - continued  - continue heparin gtt. Will start NOAC tomorrow morning    Cardiomyopathy   - echo with EF 30 to 35%  - started on lisinopril, metoprolol  - appears to be euvolemic  - stress test with increased risk  - Cardiology consulted. [facility] with moderate CAD    CAD   - w/ known non-occlusive CAD  -

## 2025-07-16 NOTE — PROGRESS NOTES
TR band removed as ordered. Pt tolerated well. Reinforced with pt multiple times regarding restrictions. Pt ambulated to bathroom independently.
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
## 2025-07-16 NOTE — PROGRESS NOTES
INPATIENT PROGRESS NOTE        IDENTIFYING DATA/REASON FOR CONSULTATION   PATIENT:  John Womack  MRN:  [id number]  ADMIT DATE: 7/10/2025  TIME OF EVALUATION: 7/16/2025 7:56 AM  HOSPITAL STAY:   LOS: 6 days   CONSULTING PHYSICIAN: Aris Dodson MD   REASON FOR CONSULTATION: Dysphagia    Subjective:    Patient seen in follow-up.  He tolerated regular diet last night. Seems to possibly have some improvement in dysphagia.  Plan is for heart cath today.    MEDICATIONS   SCHEDULED:  sodium chloride flush, 5-40 mL, 2 times per day  aspirin, 325 mg, Once  amiodarone, 200 mg, BID   Followed by  [START ON 7/18/2025] amiodarone, 200 mg, Daily  metoprolol tartrate, 100 mg, BID  aspirin, 81 mg, Daily  atorvastatin, 40 mg, Nightly  allopurinol, 100 mg, Daily  doxepin, 3 mg, Nightly  gabapentin, 400 mg, TID  lidocaine, 1 patch, Daily  sodium chloride flush, 5-40 mL, 2 times per day  lisinopril, 20 mg, Daily  pantoprazole, 40 mg, BID AC      FLUIDS/DRIPS:     sodium chloride      sodium chloride      heparin (PORCINE) Infusion 16 Units/kg/hr (07/16/25 0726)     PRNs: sodium chloride flush, 5-40 mL, PRN  sodium chloride, , PRN  LORazepam, 0.5 mg, Once PRN  mineral oil-hydrophilic petrolatum, , BID PRN  oxyCODONE-acetaminophen, 1 tablet, Q4H PRN  tiZANidine, 4 mg, TID PRN  acetaminophen, 650 mg, Q6H PRN   Or  acetaminophen, 650 mg, Q6H PRN  ondansetron, 4 mg, Q8H PRN   Or  ondansetron, 4 mg, Q6H PRN  polyethylene glycol, 17 g, Daily PRN  sodium chloride flush, 5-40 mL, PRN  sodium chloride, , PRN  potassium chloride, 40 mEq, PRN   Or  potassium alternative oral replacement, 40 mEq, PRN   Or  potassium chloride, 10 mEq, PRN  magnesium sulfate, 2,000 mg, PRN  heparin (porcine), 4,000 Units, PRN  heparin (porcine), 2,000 Units, PRN      ALLERGIES:  No Known Allergies      PHYSICAL EXAM   VITALS:  /75   Pulse 77   Temp 97.7 °F (36.5 °C) (Oral)   Resp 18   Ht 1.778 m (5' 10\")   Wt 114.2 kg (251 lb 11.2 oz)   SpO2 93%

## 2025-07-17 ENCOUNTER — TELEPHONE (OUTPATIENT)
Dept: CARDIOLOGY CLINIC | Age: 65
End: 2025-07-17

## 2025-07-17 ENCOUNTER — ANCILLARY PROCEDURE (OUTPATIENT)
Dept: CARDIOLOGY CLINIC | Age: 65
End: 2025-07-17

## 2025-07-17 VITALS
WEIGHT: 252.3 LBS | DIASTOLIC BLOOD PRESSURE: 81 MMHG | HEART RATE: 76 BPM | RESPIRATION RATE: 18 BRPM | HEIGHT: 70 IN | SYSTOLIC BLOOD PRESSURE: 106 MMHG | BODY MASS INDEX: 36.12 KG/M2 | OXYGEN SATURATION: 95 % | TEMPERATURE: 97.7 F

## 2025-07-17 DIAGNOSIS — I48.0 PAROXYSMAL ATRIAL FIBRILLATION (HCC): ICD-10-CM

## 2025-07-17 PROCEDURE — 6370000000 HC RX 637 (ALT 250 FOR IP): Performed by: NURSE PRACTITIONER

## 2025-07-17 PROCEDURE — 6370000000 HC RX 637 (ALT 250 FOR IP): Performed by: STUDENT IN AN ORGANIZED HEALTH CARE EDUCATION/TRAINING PROGRAM

## 2025-07-17 PROCEDURE — 99232 SBSQ HOSP IP/OBS MODERATE 35: CPT | Performed by: NURSE PRACTITIONER

## 2025-07-17 RX ORDER — ATORVASTATIN CALCIUM 40 MG/1
40 TABLET, FILM COATED ORAL NIGHTLY
Qty: 30 TABLET | Refills: 3 | Status: SHIPPED | OUTPATIENT
Start: 2025-07-17

## 2025-07-17 RX ORDER — GABAPENTIN 300 MG/1
600 CAPSULE ORAL 3 TIMES DAILY
Qty: 180 CAPSULE | Refills: 0 | Status: SHIPPED | OUTPATIENT
Start: 2025-07-17 | End: 2025-08-16

## 2025-07-17 RX ORDER — AMIODARONE HYDROCHLORIDE 200 MG/1
TABLET ORAL
Qty: 32 TABLET | Refills: 2 | Status: SHIPPED | OUTPATIENT
Start: 2025-07-17 | End: 2025-08-17

## 2025-07-17 RX ORDER — METOPROLOL TARTRATE 100 MG/1
100 TABLET ORAL 2 TIMES DAILY
Qty: 60 TABLET | Refills: 3 | Status: SHIPPED | OUTPATIENT
Start: 2025-07-17

## 2025-07-17 RX ORDER — ASPIRIN 81 MG/1
81 TABLET ORAL DAILY
Qty: 30 TABLET | Refills: 3 | Status: SHIPPED | OUTPATIENT
Start: 2025-07-18

## 2025-07-17 RX ORDER — OXYCODONE AND ACETAMINOPHEN 5; 325 MG/1; MG/1
1 TABLET ORAL EVERY 6 HOURS PRN
Qty: 8 TABLET | Refills: 0 | Status: SHIPPED | OUTPATIENT
Start: 2025-07-17 | End: 2025-07-20

## 2025-07-17 RX ADMIN — GABAPENTIN 400 MG: 400 CAPSULE ORAL at 08:29

## 2025-07-17 RX ADMIN — OXYCODONE AND ACETAMINOPHEN 1 TABLET: 5; 325 TABLET ORAL at 04:20

## 2025-07-17 RX ADMIN — APIXABAN 5 MG: 5 TABLET, FILM COATED ORAL at 10:08

## 2025-07-17 RX ADMIN — ALLOPURINOL 100 MG: 100 TABLET ORAL at 08:30

## 2025-07-17 RX ADMIN — LISINOPRIL 20 MG: 20 TABLET ORAL at 08:30

## 2025-07-17 RX ADMIN — PANTOPRAZOLE SODIUM 40 MG: 40 TABLET, DELAYED RELEASE ORAL at 08:29

## 2025-07-17 RX ADMIN — AMIODARONE HYDROCHLORIDE 200 MG: 200 TABLET ORAL at 08:30

## 2025-07-17 RX ADMIN — METOPROLOL TARTRATE 100 MG: 50 TABLET, FILM COATED ORAL at 08:30

## 2025-07-17 RX ADMIN — OXYCODONE AND ACETAMINOPHEN 1 TABLET: 5; 325 TABLET ORAL at 08:30

## 2025-07-17 RX ADMIN — ASPIRIN 81 MG: 81 TABLET, COATED ORAL at 08:30

## 2025-07-17 ASSESSMENT — PAIN DESCRIPTION - DESCRIPTORS: DESCRIPTORS: ACHING

## 2025-07-17 ASSESSMENT — PAIN DESCRIPTION - PAIN TYPE: TYPE: CHRONIC PAIN

## 2025-07-17 ASSESSMENT — PAIN SCALES - GENERAL
PAINLEVEL_OUTOF10: 7
PAINLEVEL_OUTOF10: 6
PAINLEVEL_OUTOF10: 2
PAINLEVEL_OUTOF10: 0

## 2025-07-17 ASSESSMENT — PAIN DESCRIPTION - LOCATION
LOCATION: GENERALIZED
LOCATION: BACK;KNEE

## 2025-07-17 ASSESSMENT — PAIN DESCRIPTION - FREQUENCY: FREQUENCY: CONTINUOUS

## 2025-07-17 ASSESSMENT — PAIN DESCRIPTION - ONSET: ONSET: ON-GOING

## 2025-07-17 ASSESSMENT — PAIN DESCRIPTION - ORIENTATION: ORIENTATION: MID

## 2025-07-17 ASSESSMENT — PAIN - FUNCTIONAL ASSESSMENT: PAIN_FUNCTIONAL_ASSESSMENT: PREVENTS OR INTERFERES SOME ACTIVE ACTIVITIES AND ADLS

## 2025-07-17 NOTE — PROGRESS NOTES
PIV removed. Tip intact. Dressing in place. Tele box removed. [facility] notified of pt discharge. Event monitor educated and application provided by Cardiology office. Discharge paperwork went over with and given to pt. Restrictions reinforced with pt. Verbalizes understanding. Pt lock box emptied- home medications returned to pt. Meds to beds picked up and given to pt at this time. Pt awaiting ride home.

## 2025-07-17 NOTE — DISCHARGE SUMMARY
Hospital Medicine Discharge Summary    Patient: John Womack   : 1960     Hospital:  [facility]  Admit Date: 7/10/2025   Discharge Date: 2025    Disposition:  Home   Code status:  Full  Condition at Discharge: Stable  Primary Care Provider: Jennifer Raines, APRN - CNP    Admitting Provider: Shruthi Parkinson MD  Discharge Provider: Aris Dodson MD     Discharge Diagnoses:      Active Hospital Problems    Diagnosis     Abnormal cardiovascular stress test [R94.39]     Abnormal electrocardiography [R94.31]     Cardiomyopathy (HCC) [I42.9]        Presenting Admission History:      64 y.o. male who presented to [facility] as a transfer from outside hospital.  Records reviewed and discussed with patient RN.  Patient presented to OSH for outpatient EGD for possible esophagitis.  PMH HTN, chronic gout, insomnia, DJD, alcohol abuse, PVD, and GERD. No cardiac history to his knowledge. Prior testing: Echo 22 EF=55%, no WMA; mild JACEK; mild AR and IN.        ECG prior to EGD showed AF RVR rate 117 IRBBB no ST changes.  Cardiology was contacted and evaluated patient, /100 at time, pt c/o  trouble swallowing pills solid foods.+mid-sternal, sharp CP occurring randomly without radiation or associated symptoms. Also c/o dizziness and orthostasis symptoms.  No syncope.  No palpitations.       He was diagnosed with new onset A-fib of unknown duration, uncontrolled HTN.  He was started on therapeutic dosing of Lovenox, metoprolol, diltiazem gtt. TSH normal 1.78 and Augustina negative 14 x 2.  Echo showed EF 30-35%.  Cardiology recommended transfer to [facility] for ischemia evaluation with [facility] given new cardiomyopathy along with age and HTN history. Also needs EP consultation for afib. Possibly tachycardia induced cardiomyopathy and may need aggressive rhythm control strategy.      Assessment/Plan:      Atrial Fibrillation with RVR  - Acute paroxysmal, of unspecified and clinically

## 2025-07-17 NOTE — PROGRESS NOTES
Pt wheeled via wheelchair to private car with all belongings. Pt discharge home in stable condition.

## 2025-07-17 NOTE — TELEPHONE ENCOUNTER
Monitor placed by SARAVANAN in hospital  Monitor company   Length of monitor 30 days  Monitor ordered by HERRERA Orlando CNP  Device ID [id number]  Patch ID #1 [id number] [id number] #[id number] #[id number] #[id number]  Activation successful prior to pt leaving office? Yes        
Pt lives in a house  with 3 steps to enter with 1  rails and 0 stairs inside.   Pt owns medical equipment: RW, SAC   Pt lives with: Alone

## 2025-07-17 NOTE — DISCHARGE INSTRUCTIONS
DO NOT lift anything heavier than 5 pounds with your right arm for 5 days.   DO NOT submerse your right wrist in water for 5 days - shower only, no tub baths or hot tubs.  You may take the dressing off your right wrist tomorrow.  Please contact our office immediately if you notice redness, swelling, drainage, bleeding, or increased pain to right wrist site.      
no

## 2025-07-17 NOTE — PLAN OF CARE
Problem: Discharge Planning  Goal: Discharge to home or other facility with appropriate resources  Outcome: Progressing   Pt plans to d/c home.     Problem: Safety - Adult  Goal: Free from fall injury  Outcome: Progressing   Patient remains free from falls during this shift. Oriented to call light. Verbalizes understanding. Alert and oriented x4. Bedside table and call light within reach.  Bed in lowest position, brakes locked. Nonskid footwear in place. Will continue to monitor.

## 2025-07-17 NOTE — PROGRESS NOTES
[facility]   Daily Progress Note    Admit Date:  7/10/2025  HPI:  No chief complaint on file.     John Womack presented with esophagitis.  Before undergoing EGD he was found to be in atrial fibrillation with RVR. Echocardiogram showed depressed LV function with EF 30 to 35%. He was transferred from [facility] to [facility] for further evaluation.     Cardiology consulted for new A-fib with RVR, chest pain     PMH: Hypertension, hyperlipidemia, chronic gout, degenerative joint disease, alcohol abuse, PAD, GERD, esophagitis       Subjective:  Mr. Womack is sitting up in the chair.  On room air.  He denies chest pain, SOB, and edema.       Objective:   Patient Vitals for the past 24 hrs:   BP Temp Temp src Pulse Resp SpO2 Weight   07/17/25 0827 117/83 97.7 °F (36.5 °C) Oral 75 18 96 % --   07/17/25 0450 -- -- -- -- 18 -- --   07/17/25 0420 -- -- -- -- 18 -- 114.4 kg (252 lb 4.8 oz)   07/17/25 0414 118/71 97.9 °F (36.6 °C) Oral 83 20 98 % --   07/17/25 0005 -- -- -- -- 18 -- --   07/16/25 2305 -- -- -- -- 18 -- --   07/16/25 2304 (!) 119/97 97.9 °F (36.6 °C) Oral 79 18 97 % --   07/16/25 2115 111/72 98.2 °F (36.8 °C) Oral 82 18 97 % --   07/16/25 1833 -- -- -- -- 18 -- --   07/16/25 1800 129/85 -- -- 88 18 94 % --   07/16/25 1645 124/86 -- -- 85 18 95 % --   07/16/25 1623 138/85 -- -- 69 16 95 % --   07/16/25 1610 (!) 142/80 -- -- 88 18 97 % --   07/16/25 1557 126/76 -- -- 84 20 98 % --   07/16/25 1515 123/88 -- -- 84 18 98 % --   07/16/25 1500 (!) 133/96 -- -- 86 18 93 % --   07/16/25 1445 123/87 -- -- 76 18 97 % --   07/16/25 1433 117/84 97.9 °F (36.6 °C) Oral 77 18 97 % --   07/16/25 1303 111/78 -- -- -- -- -- --   07/16/25 1135 91/66 -- -- 95 -- -- --   07/16/25 1132 94/70 97.2 °F (36.2 °C) Axillary 69 16 95 % --       Intake/Output Summary (Last 24 hours) at 7/17/2025 1105  Last data filed at 7/17/2025 1004  Gross per 24 hour   Intake 2520 ml   Output 5 ml   Net 2515 ml  air.  Cardiovascular:  Irregularly irregular, normal S1S2.  Abdomen:  Soft, nontender, +bowel sounds  Extremities:  trace edema  Right radial site without bruise, hematoma, oozing, dsg CDI, 2+ pulse        Medications:    apixaban  5 mg Oral BID    amiodarone  200 mg Oral BID    Followed by    [START ON 7/18/2025] amiodarone  200 mg Oral Daily    metoprolol tartrate  100 mg Oral BID    aspirin  81 mg Oral Daily    atorvastatin  40 mg Oral Nightly    allopurinol  100 mg Oral Daily    doxepin  3 mg Oral Nightly    gabapentin  400 mg Oral TID    lidocaine  1 patch TransDERmal Daily    sodium chloride flush  5-40 mL IntraVENous 2 times per day    lisinopril  20 mg Oral Daily    pantoprazole  40 mg Oral BID AC      sodium chloride         Lab Data: Lab results independently reviewed and analyzed by myself 7/17/2025    CBC:   Recent Labs     07/16/25  0422   WBC 5.1   HGB 14.2        BMP:    Recent Labs     07/15/25  0431 07/16/25  0422    137   K 4.6 4.4   CO2 22 24   BUN 11 10   CREATININE 0.9 0.7*     INR:  No results for input(s): \"INR\" in the last 72 hours.  BNP:  No results for input(s): \"PROBNP\" in the last 72 hours.  Cardiac Enzymes:   Lab Results   Component Value Date    TROPHS 14 07/08/2025    TROPHS 14 07/08/2025     Lipids:   Lab Results   Component Value Date/Time    TRIG 131 07/16/2025 04:22 AM    TRIG 165 03/01/2022 11:08 AM    HDL 41 07/16/2025 04:22 AM    HDL 49 03/01/2022 11:08 AM     Lab Results   Component Value Date/Time    TRIG 131 07/16/2025 04:22 AM    TRIG 165 03/01/2022 11:08 AM    HDL 41 07/16/2025 04:22 AM    HDL 49 03/01/2022 11:08 AM    LDL 91 07/16/2025 04:22 AM     03/01/2022 11:08 AM       Cardiac Imaging:     Left heart cath 7/16/25:         PRELIMINARY PROCEDURE REPORT           INDICATION FOR PROCEDURE  Chest pain, abnormal stress test, new cardiomyopathy with LV dysfunction and reduced EF        PROCEDURE FINDINGS  Successful left heart cath via right radial

## 2025-07-18 ENCOUNTER — CARE COORDINATION (OUTPATIENT)
Dept: CASE MANAGEMENT | Age: 65
End: 2025-07-18

## 2025-07-18 NOTE — CARE COORDINATION
Care Transitions Note    Initial Call - Call within 2 business days of discharge: Yes    Attempted to reach patient for transitions of care follow up. Unable to reach patient.    Outreach Attempts:   Unable to leave message.     Patient: John Womack    Patient : 1960   MRN: [id number]    Reason for Admission: Abnormal stress test & EKG, cardiomyopathy, Afib with RVR, HTN, Left heart cath   Discharge Date: 25  RURS: Readmission Risk Score: 8.7    Last Discharge Facility       Date Complaint Diagnosis Description Type Department Provider    7/10/25  Abnormal electrocardiography ... Admission (Discharged) RONIAZ Aris Prince MD            Was this an external facility discharge? No    Follow Up Appointment:   Patient does not have a follow up appointment scheduled at time of call. UTR  Future Appointments         Provider Specialty Dept Phone    2025 1:30 PM Corina Sherwood APRN - [facility] Cardiology [phone]    2025 3:00 PM Jennifer Raines, APRN - CNP Family Medicine [phone]            Plan for follow-up on next business day.      VIOLETA CALIXTO RN

## 2025-07-21 ENCOUNTER — CARE COORDINATION (OUTPATIENT)
Dept: CASE MANAGEMENT | Age: 65
End: 2025-07-21

## 2025-07-21 DIAGNOSIS — I48.91 ATRIAL FIBRILLATION WITH RVR (HCC): ICD-10-CM

## 2025-07-21 DIAGNOSIS — R07.9 CHEST PAIN, UNSPECIFIED TYPE: ICD-10-CM

## 2025-07-21 DIAGNOSIS — I42.9 CARDIOMYOPATHY, UNSPECIFIED TYPE (HCC): ICD-10-CM

## 2025-07-21 DIAGNOSIS — R94.31 ABNORMAL ELECTROCARDIOGRAPHY: ICD-10-CM

## 2025-07-21 DIAGNOSIS — R94.39 ABNORMAL CARDIOVASCULAR STRESS TEST: Primary | ICD-10-CM

## 2025-07-21 NOTE — CARE COORDINATION
Care Transitions Note    Initial Call - Call within 2 business days of discharge: Yes    Patient Current Location:  Home: [address] [place] [id number]    Care Transition Nurse contacted the patient by telephone to perform post hospital discharge assessment, verified name and  as identifiers.  Provided introduction to self, and explanation of the Care Transition Nurse role.    Patient: John Womack    Patient : 1960   MRN: [id number]    Reason for Admission: Abnormal stress test & EKG, MCOT, cardiomyopathy, Afib with RVR, HTN, Left heart cath   Discharge Date: 25  RURS: Readmission Risk Score: 8.7      Last Discharge Facility       Date Complaint Diagnosis Description Type Department Provider    7/10/25  Abnormal electrocardiography ... Admission (Discharged) Aris Goodwin MD            Was this an external facility discharge? No    Additional needs identified to be addressed with provider   No needs identified             Method of communication with provider: none.    Patients top risk factors for readmission: medical condition-.    Interventions to address risk factors:   Education: .  Review of patient management of conditions/medications: .    Care Summary Note: Patient reports that he is doing well, does report some chest and head congestion with non-productive cough.  He denies any SOB, chest pain, fever.  Discussed discharge instructions and reviewed medications, 1111F completed.  CTN reviewed medication regimen in detail, patient made notes on when he should take each medication.  He has a cardiology follow up 25 and PCP is no longer with practice.  Patient denies any further questions or concerns at this time.  CTN will close program & remain available.      Care Transition Nurse reviewed discharge instructions, medical action plan, and red flags with patient. The patient was given an opportunity to ask questions; all questions answered at this time.. The patient

## 2025-07-28 NOTE — PROGRESS NOTES
nondisplaced.  Pulmonary/Chest: Effort normal.  Lungs clear to auscultation. Chest wall nontender  Abdominal: soft, nontender, nondistended. + bowel sounds; no organomegaly or bruits. Aorta normal  Extremities: No edema, cyanosis, or clubbing. Pulses are 2+ radial/carotid/dorsalis pedis bilaterally. Cap refill brisk.  Neurological: No cranial nerve deficit.   Skin: Skin is warm and dry.   Psychiatric: He has a normal mood and affect. His speech is normal and behavior is normal.     Lab Review:   Lab Results   Component Value Date/Time    TRIG 131 07/16/2025 04:22 AM    HDL 41 07/16/2025 04:22 AM     Lab Results   Component Value Date/Time     07/16/2025 04:22 AM    K 4.4 07/16/2025 04:22 AM     07/16/2025 04:22 AM    CO2 24 07/16/2025 04:22 AM    BUN 10 07/16/2025 04:22 AM    CREATININE 0.7 07/16/2025 04:22 AM    GLUCOSE 138 07/16/2025 04:22 AM    CALCIUM 9.3 07/16/2025 04:22 AM      Lab Results   Component Value Date    WBC 5.1 07/16/2025    HGB 14.2 07/16/2025    HCT 41.5 07/16/2025    .8 (H) 07/16/2025     07/16/2025         Assessment:  1. Paroxysmal atrial fibrillation (HCC)  - Currently wearing 30-day event monitor  - Referral placed to Delphine Mcknight DO, Cardiac Electrophysiology, [facility]  - Continue Eliquis 5 mg twice daily for stroke prevention.  - Continue metoprolol tartrate 100 mg twice daily and amiodarone 200 mg daily    2. Nonischemic cardiomyopathy (HCC)  - Likely related to A-fib.  - EF 30-35% upon presentation, but cath showed EF 40-45%  - Continue metoprolol tartrate 100 mg BID and lisinopril 20 mg daily.    - I considered the addition of MRA, but the patient is already having a difficult time taking his medications as directed.  I fear the addition of more medications will result in poor compliance.    - Repeat echo in 3 months  - Follow-up with Dr. Solorzano in 3 months    3. Mixed hyperlipidemia  - Continue atorvastatin 40 mg nightly  - LDL 91 on

## 2025-07-31 ENCOUNTER — OFFICE VISIT (OUTPATIENT)
Dept: CARDIOLOGY CLINIC | Age: 65
End: 2025-07-31

## 2025-07-31 VITALS
WEIGHT: 252 LBS | SYSTOLIC BLOOD PRESSURE: 126 MMHG | HEART RATE: 70 BPM | OXYGEN SATURATION: 97 % | DIASTOLIC BLOOD PRESSURE: 60 MMHG | HEIGHT: 71 IN | BODY MASS INDEX: 35.28 KG/M2

## 2025-07-31 DIAGNOSIS — I42.8 NONISCHEMIC CARDIOMYOPATHY (HCC): ICD-10-CM

## 2025-07-31 DIAGNOSIS — G47.33 OSA (OBSTRUCTIVE SLEEP APNEA): ICD-10-CM

## 2025-07-31 DIAGNOSIS — E78.2 MIXED HYPERLIPIDEMIA: ICD-10-CM

## 2025-07-31 DIAGNOSIS — I48.0 PAROXYSMAL ATRIAL FIBRILLATION (HCC): Primary | ICD-10-CM

## 2025-07-31 NOTE — PATIENT INSTRUCTIONS
Continue your cardiac medications as discussed.     Please return in 3 months to see Dr. Solorzano.    Continue to abstain from alcohol.

## 2025-08-25 LAB — ECHO BSA: 2.39 M2

## 2025-08-30 DIAGNOSIS — F51.01 PRIMARY INSOMNIA: Primary | ICD-10-CM

## 2025-09-02 ENCOUNTER — RESULTS FOLLOW-UP (OUTPATIENT)
Dept: CARDIOLOGY CLINIC | Age: 65
End: 2025-09-02

## 2025-09-02 DIAGNOSIS — I10 ESSENTIAL HYPERTENSION: ICD-10-CM

## 2025-09-02 DIAGNOSIS — I48.19 PERSISTENT ATRIAL FIBRILLATION (HCC): Primary | ICD-10-CM

## 2025-09-02 RX ORDER — ATORVASTATIN CALCIUM 40 MG/1
40 TABLET, FILM COATED ORAL DAILY
Qty: 30 TABLET | Refills: 3 | Status: SHIPPED | OUTPATIENT
Start: 2025-09-02

## 2025-09-02 RX ORDER — AMIODARONE HYDROCHLORIDE 200 MG/1
200 TABLET ORAL DAILY
Qty: 30 TABLET | Refills: 3 | Status: SHIPPED | OUTPATIENT
Start: 2025-09-02

## 2025-09-02 RX ORDER — TRAZODONE HYDROCHLORIDE 300 MG/1
TABLET ORAL
Qty: 90 TABLET | Refills: 3 | Status: SHIPPED | OUTPATIENT
Start: 2025-09-02

## 2025-09-02 RX ORDER — LISINOPRIL 20 MG/1
20 TABLET ORAL DAILY
Qty: 90 TABLET | Refills: 3 | Status: SHIPPED | OUTPATIENT
Start: 2025-09-02

## 2025-09-02 RX ORDER — LISINOPRIL 20 MG/1
20 TABLET ORAL DAILY
Qty: 30 TABLET | Refills: 3 | Status: SHIPPED | OUTPATIENT
Start: 2025-09-02

## 2025-09-02 RX ORDER — METOPROLOL TARTRATE 50 MG
100 TABLET ORAL 2 TIMES DAILY
Qty: 120 TABLET | Refills: 3 | Status: SHIPPED | OUTPATIENT
Start: 2025-09-02

## (undated) PROCEDURE — 0DB58ZX EXCISION OF ESOPHAGUS, VIA NATURAL OR ARTIFICIAL OPENING ENDOSCOPIC, DIAGNOSTIC: ICD-10-PCS

## (undated) PROCEDURE — 0D758ZZ DILATION OF ESOPHAGUS, VIA NATURAL OR ARTIFICIAL OPENING ENDOSCOPIC: ICD-10-PCS

## (undated) DEVICE — ENDOSCOPIC KIT 2 12 FT OP4 DE2 GWN SYR

## (undated) DEVICE — FORCEPS BX L240CM DIA2.4MM L NDL RAD JAW 4 133340

## (undated) DEVICE — CONMED SCOPE SAVER BITE BLOCK, 20X27 MM: Brand: SCOPE SAVER

## (undated) DEVICE — TRAY CATH 16FR F INCLUDE LUB DRNGE BG STATLOK STBL DEV

## (undated) DEVICE — SYRINGE INFL 60ML DISP ALLIANCE II

## (undated) DEVICE — STAPLER INT L75MM CUT LN L73MM STPL LN L77MM BLU B FRM 8

## (undated) DEVICE — APPLICATOR MEDICATED 26 CC SOLUTION HI LT ORNG CHLORAPREP

## (undated) DEVICE — MAJOR SET UP PK

## (undated) DEVICE — CANNULA NSL AD TBNG L7FT PVC STR NONFLARED PRNG O2 DEL W STD

## (undated) DEVICE — BLADE ES L6IN ELASTOMERIC COAT EXT DURABLE BEND UPTO 90DEG

## (undated) DEVICE — SUTURE PERMAHAND SZ 2-0 L30IN NONABSORBABLE BLK SILK W/O A305H

## (undated) DEVICE — GAUZE,SPONGE,4"X4",8PLY,STRL,LF,10/TRAY: Brand: MEDLINE

## (undated) DEVICE — RELOAD STPL L75MM OPN H3.8MM CLS 1.5MM WIRE DIA0.2MM REG

## (undated) DEVICE — ELECTRODE PT RET AD L9FT HI MOIST COND ADH HYDRGEL CORDED

## (undated) DEVICE — SUTURE PERMA-HAND SZ 2-0 L30IN NONABSORBABLE BLK L26MM SH K833H

## (undated) DEVICE — ESOPHAGEAL BALLOON DILATATION CATHETER: Brand: CRE FIXED WIRE

## (undated) DEVICE — SUTURE PERMAHAND SZ 3-0 L18IN NONABSORBABLE BLK L26MM SH C013D

## (undated) DEVICE — KIT INFUS PMP 270ML 4ML/HR 2ML/SITE SOAK CATH L5IN N NARC

## (undated) DEVICE — CIRCUIT ANES L72IN 3L BACT AND VIR FLTR EL CONN SGL LIMB

## (undated) DEVICE — STAPLER SKIN H3.9MM WIRE DIA0.58MM CRWN 6.9MM 35 STPL FIX

## (undated) DEVICE — SPONGE LAP W18XL18IN WHT COT 4 PLY FLD STRUNG RADPQ DISP ST

## (undated) DEVICE — TUBING, SUCTION, 3/16" X 10', STRAIGHT: Brand: MEDLINE

## (undated) DEVICE — CANNULA,OXY,ADULT,SUPERSOFT,W/7'TUB,SC: Brand: MEDLINE INDUSTRIES, INC.

## (undated) DEVICE — SOLUTION IV IRRIG 500ML 0.9% SODIUM CHL 2F7123

## (undated) DEVICE — YANKAUER,BULB TIP,W/O VENT,RIGID,STERILE: Brand: MEDLINE

## (undated) DEVICE — SHEATH INTRO 17GA L8IN TUNN DISP ON-Q

## (undated) DEVICE — FORCEPS BX L240CM JAW DIA2.8MM L CAP W/ NDL MIC MESH TOOTH

## (undated) DEVICE — PACK,UNIVERSAL,SPLIT,II,AURORA: Brand: MEDLINE

## (undated) DEVICE — GOWN SIRUS NONREIN XL W/TWL: Brand: MEDLINE INDUSTRIES, INC.

## (undated) DEVICE — GLOVE ORANGE PI 7 1/2   MSG9075

## (undated) DEVICE — ELECTRODE,ECG,STRESS,FOAM,3PK: Brand: MEDLINE

## (undated) DEVICE — ENDO CARRY-ON PROCEDURE KIT INCLUDES SUCTION TUBING, LUBRICANT, GAUZE, BIOHAZARD STICKER, TRANSPORT PAD AND INTERCEPT BEDSIDE KIT.: Brand: ENDO CARRY-ON PROCEDURE KIT

## (undated) DEVICE — SUTURE PROL SZ 1 L30IN NONABSORBABLE BLU CTX L48MM 1/2 CIR 8455H

## (undated) DEVICE — ELECTRODE ECG MONITR FOAM TEAR DROP ADLT RED